# Patient Record
Sex: MALE | Race: WHITE | NOT HISPANIC OR LATINO
[De-identification: names, ages, dates, MRNs, and addresses within clinical notes are randomized per-mention and may not be internally consistent; named-entity substitution may affect disease eponyms.]

---

## 2019-08-19 ENCOUNTER — APPOINTMENT (OUTPATIENT)
Dept: RADIOLOGY | Facility: CLINIC | Age: 44
End: 2019-08-19
Payer: SELF-PAY

## 2019-08-19 PROBLEM — Z00.00 ENCOUNTER FOR PREVENTIVE HEALTH EXAMINATION: Status: ACTIVE | Noted: 2019-08-19

## 2019-08-19 PROCEDURE — 71046 X-RAY EXAM CHEST 2 VIEWS: CPT

## 2019-08-30 PROBLEM — Z00.00 ENCOUNTER FOR PREVENTIVE HEALTH EXAMINATION: Noted: 2019-08-30

## 2019-09-09 ENCOUNTER — APPOINTMENT (OUTPATIENT)
Dept: CARDIOLOGY | Facility: CLINIC | Age: 44
End: 2019-09-09
Payer: SELF-PAY

## 2019-09-09 ENCOUNTER — NON-APPOINTMENT (OUTPATIENT)
Age: 44
End: 2019-09-09

## 2019-09-09 VITALS
WEIGHT: 251 LBS | BODY MASS INDEX: 38.04 KG/M2 | HEART RATE: 75 BPM | DIASTOLIC BLOOD PRESSURE: 62 MMHG | SYSTOLIC BLOOD PRESSURE: 110 MMHG | HEIGHT: 68 IN | OXYGEN SATURATION: 95 %

## 2019-09-09 DIAGNOSIS — Z86.79 PERSONAL HISTORY OF OTHER DISEASES OF THE CIRCULATORY SYSTEM: ICD-10-CM

## 2019-09-09 DIAGNOSIS — Z78.9 OTHER SPECIFIED HEALTH STATUS: ICD-10-CM

## 2019-09-09 DIAGNOSIS — Z82.49 FAMILY HISTORY OF ISCHEMIC HEART DISEASE AND OTHER DISEASES OF THE CIRCULATORY SYSTEM: ICD-10-CM

## 2019-09-09 DIAGNOSIS — Z83.3 FAMILY HISTORY OF DIABETES MELLITUS: ICD-10-CM

## 2019-09-09 PROCEDURE — 93000 ELECTROCARDIOGRAM COMPLETE: CPT

## 2019-09-09 PROCEDURE — 99204 OFFICE O/P NEW MOD 45 MIN: CPT | Mod: 25

## 2019-09-09 NOTE — HISTORY OF PRESENT ILLNESS
[FreeTextEntry1] : This is a 44 year old male with no PMHx who was living in [place] up until April 2019. In March 2019, he went to hospital there with increasing SOB and swelling. He stated he had the flu. They told him he had heart failure (records not available at this time). Since that time he moved to [place]. He continues with leg swelling and shortness of breath. SOB is worse with exertion and better with rest. He has no palpitations, dizziness. He has no chest pain. He takes his medications with no adverse effects. Symptoms are stable and not getting any better or worse since they started in March.

## 2019-09-09 NOTE — REVIEW OF SYSTEMS
[Shortness Of Breath] : shortness of breath [Dyspnea on exertion] : dyspnea during exertion [Chest Pain] : no chest pain [Lower Ext Edema] : lower extremity edema [Palpitations] : no palpitations [Negative] : Heme/Lymph

## 2019-09-09 NOTE — DISCUSSION/SUMMARY
[FreeTextEntry1] : 1. Dyspnea: possible diagnoses of HF in [place]. I will attempt to obtain records. I will order an echocardiogram. I asked the patient to continue his Lisinopril and Lasix for now. He can follow up with me after testing and hospital records reviewed.

## 2019-09-09 NOTE — PHYSICAL EXAM
[General Appearance - Well Developed] : well developed [Normal Appearance] : normal appearance [Well Groomed] : well groomed [General Appearance - Well Nourished] : well nourished [Normal Conjunctiva] : the conjunctiva exhibited no abnormalities [General Appearance - In No Acute Distress] : no acute distress [No Oral Pallor] : no oral pallor [Eyelids - No Xanthelasma] : the eyelids demonstrated no xanthelasmas [No Oral Cyanosis] : no oral cyanosis [Heart Rate And Rhythm] : heart rate and rhythm were normal [Heart Sounds] : normal S1 and S2 [FreeTextEntry1] : bilateral pitting edema [Murmurs] : no murmurs present [Respiration, Rhythm And Depth] : normal respiratory rhythm and effort [Exaggerated Use Of Accessory Muscles For Inspiration] : no accessory muscle use [Auscultation Breath Sounds / Voice Sounds] : lungs were clear to auscultation bilaterally [Abnormal Walk] : normal gait [Gait - Sufficient For Exercise Testing] : the gait was sufficient for exercise testing [Nail Clubbing] : no clubbing of the fingernails [Cyanosis, Localized] : no localized cyanosis [Skin Turgor] : normal skin turgor [] : no ischemic changes [Impaired Insight] : insight and judgment were intact [Affect] : the affect was normal [Memory Recent] : recent memory was not impaired

## 2019-09-10 ENCOUNTER — APPOINTMENT (OUTPATIENT)
Dept: CARDIOLOGY | Facility: CLINIC | Age: 44
End: 2019-09-10
Payer: SELF-PAY

## 2019-09-10 PROCEDURE — 93306 TTE W/DOPPLER COMPLETE: CPT

## 2019-09-23 ENCOUNTER — APPOINTMENT (OUTPATIENT)
Dept: CARDIOLOGY | Facility: CLINIC | Age: 44
End: 2019-09-23
Payer: SELF-PAY

## 2019-09-23 ENCOUNTER — INPATIENT (INPATIENT)
Facility: HOSPITAL | Age: 44
LOS: 3 days | Discharge: ANOTHER HOSPITAL | End: 2019-09-27
Attending: INTERNAL MEDICINE
Payer: MEDICAID

## 2019-09-23 ENCOUNTER — OUTPATIENT (OUTPATIENT)
Dept: OUTPATIENT SERVICES | Facility: HOSPITAL | Age: 44
LOS: 1 days | End: 2019-09-23

## 2019-09-23 VITALS — DIASTOLIC BLOOD PRESSURE: 64 MMHG | SYSTOLIC BLOOD PRESSURE: 118 MMHG | HEART RATE: 82 BPM | OXYGEN SATURATION: 95 %

## 2019-09-23 PROCEDURE — 99215 OFFICE O/P EST HI 40 MIN: CPT

## 2019-09-23 PROCEDURE — 99284 EMERGENCY DEPT VISIT MOD MDM: CPT

## 2019-09-23 PROCEDURE — 71045 X-RAY EXAM CHEST 1 VIEW: CPT | Mod: 26

## 2019-09-23 PROCEDURE — 93308 TTE F-UP OR LMTD: CPT

## 2019-09-23 RX ORDER — LISINOPRIL 10 MG/1
10 TABLET ORAL DAILY
Qty: 14 | Refills: 0 | Status: DISCONTINUED | COMMUNITY
Start: 2019-09-09 | End: 2019-09-23

## 2019-09-23 NOTE — DISCUSSION/SUMMARY
[FreeTextEntry1] : 1. Large pericardial effusion: recurrent. s/p drainage on 9/16/2019 at [facility]. Removed 770 cc's of fluid. Symptomatically improved, however echocardiogram done in office today revealed large pericardial effusion with no evidence of tamponade physiology on echocardiogram. I asked patient to go to [facility] ED for further evaluation. A repeat pericardiocentesis can be performed. CT of the chest performed on 9/16/2019 revealed thickening of the parietal and visceral pericardium. I am recommending invasive hemodynamics be performed before and after the pericardiocentesis to determine if effusive-constrictive pericarditis is present. Patient should continue on Ibuprofen 600mg TID and I added colchicine 0.6mg BID. \par \par 2. Hypertension: continue Losartan-HCTZ 100/25mg and Coreg 6.25mg BID.

## 2019-09-23 NOTE — HISTORY OF PRESENT ILLNESS
[FreeTextEntry1] : Historical Perspective:\par This is a 44 year old male with no PMHx who was living in [place] up until April 2019. In March 2019, he went to hospital there with increasing SOB and swelling. He stated he had the flu. They told him he had heart failure (records not available at this time). Since that time he moved to [place]. He continues with leg swelling and shortness of breath. SOB is worse with exertion and better with rest. He has no palpitations, dizziness. He has no chest pain. He takes his medications with no adverse effects. Symptoms are stable and not getting any better or worse since they started in March.\par \par Current Health Status:\par Patient finally listened to our voicemail stating to go to hospital because he had a large pericardial effusion. He went to [facility] and underwent a pericardiocentesis on 9/16/2019. 770ccs of fluid was drained. He returns to the office feeling much better. SOB has resolved. He has no chest pain. Orthopnea has resolved as well.

## 2019-09-23 NOTE — REASON FOR VISIT
[Initial Evaluation] : an initial evaluation of [Dyspnea] : dyspnea [Spouse] : spouse [Source: ______] : History obtained from [unfilled]

## 2019-09-23 NOTE — PHYSICAL EXAM
[General Appearance - Well Developed] : well developed [Normal Appearance] : normal appearance [Well Groomed] : well groomed [General Appearance - Well Nourished] : well nourished [General Appearance - In No Acute Distress] : no acute distress [Normal Conjunctiva] : the conjunctiva exhibited no abnormalities [Eyelids - No Xanthelasma] : the eyelids demonstrated no xanthelasmas [No Oral Pallor] : no oral pallor [No Oral Cyanosis] : no oral cyanosis [Respiration, Rhythm And Depth] : normal respiratory rhythm and effort [Exaggerated Use Of Accessory Muscles For Inspiration] : no accessory muscle use [Auscultation Breath Sounds / Voice Sounds] : lungs were clear to auscultation bilaterally [Heart Rate And Rhythm] : heart rate and rhythm were normal [Heart Sounds] : normal S1 and S2 [Murmurs] : no murmurs present [FreeTextEntry1] : bilateral pitting edema [Abnormal Walk] : normal gait [Gait - Sufficient For Exercise Testing] : the gait was sufficient for exercise testing [Nail Clubbing] : no clubbing of the fingernails [Cyanosis, Localized] : no localized cyanosis [Skin Turgor] : normal skin turgor [] : no rash [Impaired Insight] : insight and judgment were intact [Affect] : the affect was normal [Memory Recent] : recent memory was not impaired

## 2019-09-24 ENCOUNTER — OUTPATIENT (OUTPATIENT)
Dept: OUTPATIENT SERVICES | Facility: HOSPITAL | Age: 44
LOS: 1 days | End: 2019-09-24

## 2019-09-24 PROCEDURE — 93010 ELECTROCARDIOGRAM REPORT: CPT

## 2019-09-24 PROCEDURE — 33015: CPT

## 2019-09-24 PROCEDURE — 99254 IP/OBS CNSLTJ NEW/EST MOD 60: CPT | Mod: 25

## 2019-09-24 PROCEDURE — 93308 TTE F-UP OR LMTD: CPT | Mod: 26

## 2019-09-24 PROCEDURE — 93453 R&L HRT CATH W/VENTRICLGRPHY: CPT | Mod: 26

## 2019-09-24 PROCEDURE — 75989 ABSCESS DRAINAGE UNDER X-RAY: CPT | Mod: 26

## 2019-09-26 PROCEDURE — 99024 POSTOP FOLLOW-UP VISIT: CPT

## 2019-09-26 PROCEDURE — 93308 TTE F-UP OR LMTD: CPT | Mod: 26

## 2019-09-27 ENCOUNTER — INPATIENT (INPATIENT)
Facility: HOSPITAL | Age: 44
LOS: 10 days | Discharge: HOME | DRG: 271 | End: 2019-10-08
Attending: THORACIC SURGERY (CARDIOTHORACIC VASCULAR SURGERY) | Admitting: THORACIC SURGERY (CARDIOTHORACIC VASCULAR SURGERY)
Payer: MEDICAID

## 2019-09-27 VITALS
SYSTOLIC BLOOD PRESSURE: 118 MMHG | HEART RATE: 84 BPM | RESPIRATION RATE: 22 BRPM | DIASTOLIC BLOOD PRESSURE: 73 MMHG | OXYGEN SATURATION: 97 %

## 2019-09-27 DIAGNOSIS — I31.3 PERICARDIAL EFFUSION (NONINFLAMMATORY): ICD-10-CM

## 2019-09-27 LAB
ALBUMIN SERPL ELPH-MCNC: 3 G/DL — LOW (ref 3.3–5.2)
ALP SERPL-CCNC: 96 U/L — SIGNIFICANT CHANGE UP (ref 40–120)
ALT FLD-CCNC: 14 U/L — SIGNIFICANT CHANGE UP
ANION GAP SERPL CALC-SCNC: 12 MMOL/L — SIGNIFICANT CHANGE UP (ref 5–17)
APPEARANCE UR: CLEAR — SIGNIFICANT CHANGE UP
APTT BLD: 31.7 SEC — SIGNIFICANT CHANGE UP (ref 27.5–36.3)
AST SERPL-CCNC: 17 U/L — SIGNIFICANT CHANGE UP
BILIRUB SERPL-MCNC: 0.3 MG/DL — LOW (ref 0.4–2)
BILIRUB UR-MCNC: NEGATIVE — SIGNIFICANT CHANGE UP
BLD GP AB SCN SERPL QL: SIGNIFICANT CHANGE UP
BUN SERPL-MCNC: 11 MG/DL — SIGNIFICANT CHANGE UP (ref 8–20)
CALCIUM SERPL-MCNC: 8.7 MG/DL — SIGNIFICANT CHANGE UP (ref 8.6–10.2)
CHLORIDE SERPL-SCNC: 100 MMOL/L — SIGNIFICANT CHANGE UP (ref 98–107)
CO2 SERPL-SCNC: 26 MMOL/L — SIGNIFICANT CHANGE UP (ref 22–29)
COLOR SPEC: YELLOW — SIGNIFICANT CHANGE UP
CREAT SERPL-MCNC: 0.52 MG/DL — SIGNIFICANT CHANGE UP (ref 0.5–1.3)
DIFF PNL FLD: NEGATIVE — SIGNIFICANT CHANGE UP
GLUCOSE BLDC GLUCOMTR-MCNC: 107 MG/DL — HIGH (ref 70–99)
GLUCOSE SERPL-MCNC: 112 MG/DL — SIGNIFICANT CHANGE UP (ref 70–115)
GLUCOSE UR QL: NEGATIVE MG/DL — SIGNIFICANT CHANGE UP
HBA1C BLD-MCNC: 6.9 % — HIGH (ref 4–5.6)
HCT VFR BLD CALC: 36.1 % — LOW (ref 39–50)
HGB BLD-MCNC: 11.3 G/DL — LOW (ref 13–17)
INR BLD: 1.29 RATIO — HIGH (ref 0.88–1.16)
KETONES UR-MCNC: NEGATIVE — SIGNIFICANT CHANGE UP
LEUKOCYTE ESTERASE UR-ACNC: NEGATIVE — SIGNIFICANT CHANGE UP
MAGNESIUM SERPL-MCNC: 1.9 MG/DL — SIGNIFICANT CHANGE UP (ref 1.6–2.6)
MCHC RBC-ENTMCNC: 25.5 PG — LOW (ref 27–34)
MCHC RBC-ENTMCNC: 31.3 GM/DL — LOW (ref 32–36)
MCV RBC AUTO: 81.3 FL — SIGNIFICANT CHANGE UP (ref 80–100)
MRSA PCR RESULT.: SIGNIFICANT CHANGE UP
NITRITE UR-MCNC: NEGATIVE — SIGNIFICANT CHANGE UP
NT-PROBNP SERPL-SCNC: 1765 PG/ML — HIGH (ref 0–300)
PH UR: 6 — SIGNIFICANT CHANGE UP (ref 5–8)
PLATELET # BLD AUTO: 474 K/UL — HIGH (ref 150–400)
POTASSIUM SERPL-MCNC: 3.6 MMOL/L — SIGNIFICANT CHANGE UP (ref 3.5–5.3)
POTASSIUM SERPL-SCNC: 3.6 MMOL/L — SIGNIFICANT CHANGE UP (ref 3.5–5.3)
PROT SERPL-MCNC: 6.4 G/DL — LOW (ref 6.6–8.7)
PROT UR-MCNC: NEGATIVE MG/DL — SIGNIFICANT CHANGE UP
PROTHROM AB SERPL-ACNC: 15 SEC — HIGH (ref 10–12.9)
RAPID RVP RESULT: SIGNIFICANT CHANGE UP
RBC # BLD: 4.44 M/UL — SIGNIFICANT CHANGE UP (ref 4.2–5.8)
RBC # FLD: 15 % — HIGH (ref 10.3–14.5)
S AUREUS DNA NOSE QL NAA+PROBE: DETECTED
SODIUM SERPL-SCNC: 138 MMOL/L — SIGNIFICANT CHANGE UP (ref 135–145)
SP GR SPEC: 1 — LOW (ref 1.01–1.02)
TSH SERPL-MCNC: 4.53 UIU/ML — HIGH (ref 0.27–4.2)
UROBILINOGEN FLD QL: NEGATIVE MG/DL — SIGNIFICANT CHANGE UP
WBC # BLD: 13.34 K/UL — HIGH (ref 3.8–10.5)
WBC # FLD AUTO: 13.34 K/UL — HIGH (ref 3.8–10.5)

## 2019-09-27 PROCEDURE — 99232 SBSQ HOSP IP/OBS MODERATE 35: CPT

## 2019-09-27 PROCEDURE — 71045 X-RAY EXAM CHEST 1 VIEW: CPT | Mod: 26

## 2019-09-27 PROCEDURE — 93308 TTE F-UP OR LMTD: CPT | Mod: 26

## 2019-09-27 PROCEDURE — 33025 INCISION OF HEART SAC: CPT | Mod: AS

## 2019-09-27 PROCEDURE — 93010 ELECTROCARDIOGRAM REPORT: CPT

## 2019-09-27 RX ORDER — VANCOMYCIN HCL 1 G
1500 VIAL (EA) INTRAVENOUS ONCE
Refills: 0 | Status: DISCONTINUED | OUTPATIENT
Start: 2019-09-28 | End: 2019-09-29

## 2019-09-27 RX ORDER — CHLORHEXIDINE GLUCONATE 213 G/1000ML
15 SOLUTION TOPICAL
Refills: 0 | Status: DISCONTINUED | OUTPATIENT
Start: 2019-09-27 | End: 2019-09-30

## 2019-09-27 RX ORDER — SODIUM CHLORIDE 9 MG/ML
3 INJECTION INTRAMUSCULAR; INTRAVENOUS; SUBCUTANEOUS EVERY 8 HOURS
Refills: 0 | Status: DISCONTINUED | OUTPATIENT
Start: 2019-09-27 | End: 2019-10-08

## 2019-09-27 RX ORDER — POTASSIUM CHLORIDE 20 MEQ
40 PACKET (EA) ORAL ONCE
Refills: 0 | Status: COMPLETED | OUTPATIENT
Start: 2019-09-27 | End: 2019-09-27

## 2019-09-27 RX ORDER — INSULIN LISPRO 100/ML
VIAL (ML) SUBCUTANEOUS
Refills: 0 | Status: DISCONTINUED | OUTPATIENT
Start: 2019-09-27 | End: 2019-10-08

## 2019-09-27 RX ORDER — MUPIROCIN 20 MG/G
1 OINTMENT TOPICAL
Refills: 0 | Status: COMPLETED | OUTPATIENT
Start: 2019-09-27 | End: 2019-10-02

## 2019-09-27 RX ORDER — CEFUROXIME AXETIL 250 MG
1500 TABLET ORAL ONCE
Refills: 0 | Status: COMPLETED | OUTPATIENT
Start: 2019-09-28 | End: 2019-09-28

## 2019-09-27 RX ORDER — CHLORHEXIDINE GLUCONATE 213 G/1000ML
1 SOLUTION TOPICAL EVERY 12 HOURS
Refills: 0 | Status: COMPLETED | OUTPATIENT
Start: 2019-09-27 | End: 2019-09-28

## 2019-09-27 RX ORDER — MAGNESIUM SULFATE 500 MG/ML
1 VIAL (ML) INJECTION ONCE
Refills: 0 | Status: COMPLETED | OUTPATIENT
Start: 2019-09-27 | End: 2019-09-27

## 2019-09-27 RX ORDER — METFORMIN HYDROCHLORIDE 850 MG/1
1 TABLET ORAL
Qty: 0 | Refills: 0 | DISCHARGE

## 2019-09-27 RX ADMIN — CHLORHEXIDINE GLUCONATE 1 APPLICATION(S): 213 SOLUTION TOPICAL at 17:55

## 2019-09-27 RX ADMIN — CHLORHEXIDINE GLUCONATE 15 MILLILITER(S): 213 SOLUTION TOPICAL at 17:31

## 2019-09-27 RX ADMIN — MUPIROCIN 1 APPLICATION(S): 20 OINTMENT TOPICAL at 17:31

## 2019-09-27 RX ADMIN — CHLORHEXIDINE GLUCONATE 1 APPLICATION(S): 213 SOLUTION TOPICAL at 19:42

## 2019-09-27 RX ADMIN — Medication 40 MILLIEQUIVALENT(S): at 19:04

## 2019-09-27 NOTE — H&P ADULT - HISTORY OF PRESENT ILLNESS
Patient is a 44 year old male with history of IDDM, HTN, and anemia.  In march while he was in [place] he stated he wasn't feeling well and there was prescribed blood pressure medication.  He was told to follow up when he got home but he was feeling well and he never did.  He started to see swelling in his legs and started to feel more short of breath.  He followed up with cardiologist and had an echo.  His cardiologist was trying to reach him, he finally got a message days later that he had fluid around the heart and needed to be drained.  He was admitted to [facility] 15 days ago, he had a pericardial drainage for 700cc's serous drainage. He was started on medications for pericarditis and was discharged after 4 days. After follow up with cardiologist, he was found to have another accumulation of pericardial effusion.  He was sent to [facility]. On 9/24/19 he underwent a Right heart cath and had another pericardialcentesis for 700 cc's.  The drain was D/C'd on  9/26/19, repeat echo showed posterior moderate effusion.  He was then transported to [place] for evaluation for pericardial window.  He denied and recent viral syndrome, denies, cough, dizziness, SOB.

## 2019-09-27 NOTE — H&P ADULT - ASSESSMENT
44 year old male examined at the bedside alongside with DR. Peters and the interpretive services.  He is hemodynamically stable with moderate pericardial effusion.  Plan is to monitor in ICU tonight, npo after midnight, and will go to the OR in the AM for drainage of pericardial effusion.  SCD's for DVT prophylaxis.  Patient is diabetic, glucose will be monitored and he will be covered with a correction scale. Will continue hypertension medications after drainage tomorrow as tolerated.  Will continue pericarditis medication post op.

## 2019-09-27 NOTE — H&P ADULT - NSHPPHYSICALEXAM_GEN_ALL_CORE
HR 65 NSR  /87  Sp02 100 % on RA    Obese well nourished  Alert and oriented   Chest CTA distant heart sounds  chest tube site healing well  Abdomen soft NT  Groins soft non tender  Entremity B/L lower 2++ edema

## 2019-09-27 NOTE — PATIENT PROFILE ADULT - NSPROEDALEARNPREFOTH_GEN_A_NUR
individual instruction/audio/skill demonstration/written material/group instruction/verbal instruction

## 2019-09-27 NOTE — PATIENT PROFILE ADULT - NSPROEDALEARNPREF_GEN_A_NUR
group instruction/audio/verbal instruction/written material/skill demonstration/individual instruction

## 2019-09-28 DIAGNOSIS — R09.89 OTHER SPECIFIED SYMPTOMS AND SIGNS INVOLVING THE CIRCULATORY AND RESPIRATORY SYSTEMS: ICD-10-CM

## 2019-09-28 DIAGNOSIS — E11.9 TYPE 2 DIABETES MELLITUS WITHOUT COMPLICATIONS: ICD-10-CM

## 2019-09-28 DIAGNOSIS — Z29.9 ENCOUNTER FOR PROPHYLACTIC MEASURES, UNSPECIFIED: ICD-10-CM

## 2019-09-28 DIAGNOSIS — I10 ESSENTIAL (PRIMARY) HYPERTENSION: ICD-10-CM

## 2019-09-28 DIAGNOSIS — E03.9 HYPOTHYROIDISM, UNSPECIFIED: ICD-10-CM

## 2019-09-28 DIAGNOSIS — I31.3 PERICARDIAL EFFUSION (NONINFLAMMATORY): ICD-10-CM

## 2019-09-28 LAB
ABO RH CONFIRMATION: SIGNIFICANT CHANGE UP
ALBUMIN SERPL ELPH-MCNC: 3.2 G/DL — LOW (ref 3.3–5.2)
ALP SERPL-CCNC: 101 U/L — SIGNIFICANT CHANGE UP (ref 40–120)
ALT FLD-CCNC: 13 U/L — SIGNIFICANT CHANGE UP
ANION GAP SERPL CALC-SCNC: 16 MMOL/L — SIGNIFICANT CHANGE UP (ref 5–17)
AST SERPL-CCNC: 13 U/L — SIGNIFICANT CHANGE UP
B BURGDOR C6 AB SER-ACNC: POSITIVE
B BURGDOR IGG+IGM SER-ACNC: 1.14 INDEX — HIGH (ref 0.01–0.89)
BILIRUB SERPL-MCNC: 0.6 MG/DL — SIGNIFICANT CHANGE UP (ref 0.4–2)
BUN SERPL-MCNC: 9 MG/DL — SIGNIFICANT CHANGE UP (ref 8–20)
CALCIUM SERPL-MCNC: 9.1 MG/DL — SIGNIFICANT CHANGE UP (ref 8.6–10.2)
CHLORIDE SERPL-SCNC: 101 MMOL/L — SIGNIFICANT CHANGE UP (ref 98–107)
CHOLEST SERPL-MCNC: 126 MG/DL — SIGNIFICANT CHANGE UP (ref 110–199)
CO2 SERPL-SCNC: 25 MMOL/L — SIGNIFICANT CHANGE UP (ref 22–29)
CREAT SERPL-MCNC: 0.67 MG/DL — SIGNIFICANT CHANGE UP (ref 0.5–1.3)
CRP SERPL-MCNC: 8.78 MG/DL — HIGH (ref 0–0.4)
ERYTHROCYTE [SEDIMENTATION RATE] IN BLOOD: 46 MM/HR — HIGH (ref 0–20)
GLUCOSE BLDC GLUCOMTR-MCNC: 105 MG/DL — HIGH (ref 70–99)
GLUCOSE BLDC GLUCOMTR-MCNC: 116 MG/DL — HIGH (ref 70–99)
GLUCOSE BLDC GLUCOMTR-MCNC: 132 MG/DL — HIGH (ref 70–99)
GLUCOSE BLDC GLUCOMTR-MCNC: 164 MG/DL — HIGH (ref 70–99)
GLUCOSE BLDC GLUCOMTR-MCNC: 192 MG/DL — HIGH (ref 70–99)
GLUCOSE BLDC GLUCOMTR-MCNC: 97 MG/DL — SIGNIFICANT CHANGE UP (ref 70–99)
GLUCOSE SERPL-MCNC: 146 MG/DL — HIGH (ref 70–115)
HBA1C BLD-MCNC: 7.1 % — HIGH (ref 4–5.6)
HCT VFR BLD CALC: 37 % — LOW (ref 39–50)
HDLC SERPL-MCNC: 46 MG/DL — SIGNIFICANT CHANGE UP
HGB BLD-MCNC: 11.1 G/DL — LOW (ref 13–17)
LDH SERPL L TO P-CCNC: 183 U/L — SIGNIFICANT CHANGE UP (ref 98–192)
LIPID PNL WITH DIRECT LDL SERPL: 62 MG/DL — SIGNIFICANT CHANGE UP
MAGNESIUM SERPL-MCNC: 1.9 MG/DL — SIGNIFICANT CHANGE UP (ref 1.6–2.6)
MCHC RBC-ENTMCNC: 25.1 PG — LOW (ref 27–34)
MCHC RBC-ENTMCNC: 30 GM/DL — LOW (ref 32–36)
MCV RBC AUTO: 83.5 FL — SIGNIFICANT CHANGE UP (ref 80–100)
NT-PROBNP SERPL-SCNC: 1988 PG/ML — HIGH (ref 0–300)
PLATELET # BLD AUTO: 499 K/UL — HIGH (ref 150–400)
POTASSIUM SERPL-MCNC: 3.8 MMOL/L — SIGNIFICANT CHANGE UP (ref 3.5–5.3)
POTASSIUM SERPL-MCNC: 4 MMOL/L — SIGNIFICANT CHANGE UP (ref 3.5–5.3)
POTASSIUM SERPL-SCNC: 3.8 MMOL/L — SIGNIFICANT CHANGE UP (ref 3.5–5.3)
POTASSIUM SERPL-SCNC: 4 MMOL/L — SIGNIFICANT CHANGE UP (ref 3.5–5.3)
PREALB SERPL-MCNC: 12 MG/DL — LOW (ref 18–38)
PROT SERPL-MCNC: 7.3 G/DL — SIGNIFICANT CHANGE UP (ref 6.6–8.7)
RBC # BLD: 4.43 M/UL — SIGNIFICANT CHANGE UP (ref 4.2–5.8)
RBC # FLD: 15.2 % — HIGH (ref 10.3–14.5)
SODIUM SERPL-SCNC: 142 MMOL/L — SIGNIFICANT CHANGE UP (ref 135–145)
TOTAL CHOLESTEROL/HDL RATIO MEASUREMENT: 3 RATIO — LOW (ref 3.4–9.6)
TRIGL SERPL-MCNC: 89 MG/DL — SIGNIFICANT CHANGE UP (ref 10–200)
WBC # BLD: 14.65 K/UL — HIGH (ref 3.8–10.5)
WBC # FLD AUTO: 14.65 K/UL — HIGH (ref 3.8–10.5)

## 2019-09-28 PROCEDURE — 71045 X-RAY EXAM CHEST 1 VIEW: CPT | Mod: 26

## 2019-09-28 PROCEDURE — 99232 SBSQ HOSP IP/OBS MODERATE 35: CPT

## 2019-09-28 RX ORDER — PANTOPRAZOLE SODIUM 20 MG/1
40 TABLET, DELAYED RELEASE ORAL DAILY
Refills: 0 | Status: DISCONTINUED | OUTPATIENT
Start: 2019-09-28 | End: 2019-09-30

## 2019-09-28 RX ORDER — POTASSIUM CHLORIDE 20 MEQ
40 PACKET (EA) ORAL ONCE
Refills: 0 | Status: COMPLETED | OUTPATIENT
Start: 2019-09-28 | End: 2019-09-28

## 2019-09-28 RX ORDER — FUROSEMIDE 40 MG
40 TABLET ORAL ONCE
Refills: 0 | Status: COMPLETED | OUTPATIENT
Start: 2019-09-28 | End: 2019-09-28

## 2019-09-28 RX ADMIN — Medication 40 MILLIGRAM(S): at 07:46

## 2019-09-28 RX ADMIN — Medication 40 MILLIEQUIVALENT(S): at 11:49

## 2019-09-28 RX ADMIN — SODIUM CHLORIDE 3 MILLILITER(S): 9 INJECTION INTRAMUSCULAR; INTRAVENOUS; SUBCUTANEOUS at 21:40

## 2019-09-28 RX ADMIN — PANTOPRAZOLE SODIUM 40 MILLIGRAM(S): 20 TABLET, DELAYED RELEASE ORAL at 11:49

## 2019-09-28 RX ADMIN — CHLORHEXIDINE GLUCONATE 15 MILLILITER(S): 213 SOLUTION TOPICAL at 17:39

## 2019-09-28 RX ADMIN — CHLORHEXIDINE GLUCONATE 1 APPLICATION(S): 213 SOLUTION TOPICAL at 06:25

## 2019-09-28 RX ADMIN — MUPIROCIN 1 APPLICATION(S): 20 OINTMENT TOPICAL at 06:25

## 2019-09-28 RX ADMIN — Medication 40 MILLIEQUIVALENT(S): at 19:12

## 2019-09-28 RX ADMIN — Medication 2: at 07:47

## 2019-09-28 RX ADMIN — MUPIROCIN 1 APPLICATION(S): 20 OINTMENT TOPICAL at 17:39

## 2019-09-28 RX ADMIN — SODIUM CHLORIDE 3 MILLILITER(S): 9 INJECTION INTRAMUSCULAR; INTRAVENOUS; SUBCUTANEOUS at 06:28

## 2019-09-28 RX ADMIN — SODIUM CHLORIDE 3 MILLILITER(S): 9 INJECTION INTRAMUSCULAR; INTRAVENOUS; SUBCUTANEOUS at 13:47

## 2019-09-28 RX ADMIN — CHLORHEXIDINE GLUCONATE 1 APPLICATION(S): 213 SOLUTION TOPICAL at 06:27

## 2019-09-28 RX ADMIN — CHLORHEXIDINE GLUCONATE 15 MILLILITER(S): 213 SOLUTION TOPICAL at 06:25

## 2019-09-28 NOTE — PROGRESS NOTE ADULT - ASSESSMENT
44 year old Male with PMH of  cardiomegaly, DM II, HTN, and recurrent pericardial effusions with pericardiocentesis x2 (serous drainage), most recently at [facility] where a residual posterior moderate pericardial effusion was seen on TTE after the drain was removed on 9/26/19. Patient was then transferred to [facility] on 9/27 for further management. At this time, patient is awaiting OR date for pericardial window. He remains hemodynamically stable despite IV diuresis with lasix without clinical evidence of cardiac tamponade. 44 year old Male with PMH of  cardiomegaly, DM II, HTN, and recurrent pericardial effusions with pericardiocentesis x2 (serous drainage), most recently at [facility] where a residual posterior moderate pericardial effusion was seen on TTE after the drain was removed on 9/26/19. Patient was then transferred to [facility] on 9/27 for further management. At this time, patient will likely go to the OR for a pericardial window on 9/30/19. He remains hemodynamically stable despite IV diuresis with lasix without clinical evidence of cardiac tamponade.

## 2019-09-28 NOTE — PROGRESS NOTE ADULT - PROBLEM SELECTOR PLAN 5
continue GI ppx with Protonix IVP  continue dvt ppx with SCD boots, avoid chemical DVT ppx at this time given pericardial effusion

## 2019-09-28 NOTE — PROGRESS NOTE ADULT - PROBLEM SELECTOR PLAN 1
Awaiting OR date  Continue to monitor for signs of cardiac tamponade Awaiting OR date  Continue to monitor for signs of cardiac tamponade  Will need to send fluid for cytology   Will likely need pericardial biopsy Pericardial window 9/30/19  Continue to monitor for signs of cardiac tamponade  Will need to send fluid for cytology   Will likely need pericardial biopsy

## 2019-09-28 NOTE — PROGRESS NOTE ADULT - SUBJECTIVE AND OBJECTIVE BOX
Brief summary:  44 year old Male with PMH of  cardiomegaly, DM II, HTN, and recurrent pericardial effusions with pericardiocentesis x2 (serous drainage), most recently at [facility] where a residual posterior moderate pericardial effusion was seen on TTE after the drain was removed on 9/26/19. Patient was then transferred to [facility] on 9/27 for further management.     Overnight events:  Patient denies acute pain with radiating or aggravating factors.  He denies chest pain, shortness of breath, palpitations, headache, dizziness, nausea, or vomiting.     PAST MEDICAL & SURGICAL HISTORY:  Heart failure   Cardiomegaly  Nonsmoker  Diabetes mellitus  Hypertension    Medications:  chlorhexidine 0.12% Liquid 15 milliLiter(s) Swish and Spit two times a day  insulin lispro (HumaLOG) corrective regimen sliding scale   SubCutaneous Before meals and at bedtime  mupirocin 2% Ointment 1 Application(s) Both Nostrils two times a day  pantoprazole  Injectable 40 milliGRAM(s) IV Push daily  sodium chloride 0.9% lock flush 3 milliLiter(s) IV Push every 8 hours  vancomycin  IVPB 1500 milliGRAM(s) IV Intermittent once      Height (cm): 172 (09-27 @ 15:05)  Weight (kg): 111 (09-27 @ 15:05)  BMI (kg/m2): 37.5 (09-27 @ 15:05)  BSA (m2): 2.22 (09-27 @ 15:05)                        11.1   14.65 )-----------( 499      ( 28 Sep 2019 04:09 )             37.0   09-28    142  |  101  |  9.0  ----------------------------<  146<H>  4.0   |  25.0  |  0.67    Ca    9.1      28 Sep 2019 04:09  Mg     1.9     09-28    TPro  7.3  /  Alb  3.2<L>  /  TBili  0.6  /  DBili  x   /  AST  13  /  ALT  13  /  AlkPhos  101  09-28      PT/INR - ( 27 Sep 2019 17:24 )   PT: 15.0 sec;   INR: 1.29 ratio   PTT - ( 27 Sep 2019 17:24 )  PTT:31.7 sec    Objective:  T(C): 37.1 (09-28-19 @ 11:00), Max: 37.1 (09-27-19 @ 20:00)  HR: 92 (09-28-19 @ 10:00) (84 - 101)  BP: 113/57 (09-28-19 @ 10:00) (105/76 - 151/86)  RR: 17 (09-28-19 @ 10:00) (11 - 30)  SpO2: 96% (09-28-19 @ 10:00) (96% - 98%)    CAPILLARY BLOOD GLUCOSE  POCT Blood Glucose.: 97 mg/dL (28 Sep 2019 11:45)  POCT Blood Glucose.: 164 mg/dL (28 Sep 2019 07:36)  POCT Blood Glucose.: 192 mg/dL (28 Sep 2019 06:30)  POCT Blood Glucose.: 132 mg/dL (28 Sep 2019 00:28)  POCT Blood Glucose.: 107 mg/dL (27 Sep 2019 17:15)    I&O's Summary    27 Sep 2019 07:01  -  28 Sep 2019 07:00  --------------------------------------------------------  IN: 240 mL / OUT: 1800 mL / NET: -1560 mL    28 Sep 2019 07:01  -  28 Sep 2019 13:27  --------------------------------------------------------  IN: 0 mL / OUT: 2350 mL / NET: -2350 mL    Physical Exam  Neuro: A+O x 3, non-focal, speech clear and intact  Pulm: CTA, equal bilaterally  CV: RRR, no murmurs, no JVD, +S1S2  Abd: soft, NT, ND, +BS  Ext: +DP Pulses b/l, +PT pulses, no edema    Imaging:  CXR:  < from: Xray Chest 1 View- PORTABLE-Routine (09.28.19 @ 06:09) >  INTERPRETATION:  Chest radiograph (one view)     CPT 45311    CLINICAL INFORMATION:  Patient is unable to communicate.  Short of   breath.     TECHNIQUE:  Single frontal view of the chest was obtained.    FINDINGS:  Prior study dated 9/27/2019 was available for review.    The lungs demonstrate increased pulmonary vascular congestion. No gross   consolidation is seen. No pleural effusion is seen. The heart is enlarged.      IMPRESSION: Cardiomegaly. Increased pulmonary vascular congestion noted.   No gross consolidation is seen.      < end of copied text >      TTE:  < from: TTE Echo Complete w/Doppler (09.27.19 @ 15:23) >  PHYSICIAN INTERPRETATION:  Left Ventricle: The left ventricle was not well visualized. The left   ventricular internal cavity size is normal.  Global LV systolic function was normal. Left ventricular ejection   fraction, by visual estimation, is 55 to 60%. Though limited in views,   the LV appears normal in size and systolic function. LV is seen best in   PLAX and PSAX views. Recommend Definity if clinically indictated.  Right Ventricle: The right ventricular size is normal. RV systolic   function is normal.  Left Atrium: Mild to moderately enlarged left atrium.  Pericardium: A moderately sized pericardial effusion is present. The   pericardial effusion is globally located around the entire heart. A   small-moderate sized pericardial effusion is present without Rv collapse.  Mitral Valve: Structurally normal mitral valve, with normal leaflet   excursion. Trace mitral valve regurgitation is seen.  Tricuspid Valve: The tricuspid valve is not well seen. Trivial tricuspid   regurgitation is visualized. Adequate TR velocity was not obtained to   accurately assess RVSP.  Aortic Valve: The aortic valve was not wellvisualized. Sclerotic aortic   valve with normal opening. No evidence of aortic valve regurgitation is   seen.  Pulmonic Valve: The pulmonic valve was not well visualized.  Aorta: The aortic root is normal in size and structure.  Pulmonary Artery: The pulmonary artery is of normal size and origin.  Venous: The pulmonary veins were not well visualized. The inferior vena   cava was dilated, with respiratory size variation less than 50%.       Summary:   1. Left ventricular ejection fraction, by visual estimation, is 55 to   60%.   2. Normal global left ventricular systolic function.   3. Though limited in views, the LV appears normal in size and systolic   function. LV is seen best in PLAX and PSAX views. Recommend Definity if   clinically indictated.   4. A small-moderate sized pericardial effusion is present without Rv   collapse.   5. Sclerotic aortic valve with normal opening.    < end of copied text >

## 2019-09-29 ENCOUNTER — TRANSCRIPTION ENCOUNTER (OUTPATIENT)
Age: 44
End: 2019-09-29

## 2019-09-29 LAB
ANION GAP SERPL CALC-SCNC: 13 MMOL/L — SIGNIFICANT CHANGE UP (ref 5–17)
BUN SERPL-MCNC: 9 MG/DL — SIGNIFICANT CHANGE UP (ref 8–20)
CALCIUM SERPL-MCNC: 8.7 MG/DL — SIGNIFICANT CHANGE UP (ref 8.6–10.2)
CHLORIDE SERPL-SCNC: 98 MMOL/L — SIGNIFICANT CHANGE UP (ref 98–107)
CO2 SERPL-SCNC: 26 MMOL/L — SIGNIFICANT CHANGE UP (ref 22–29)
CREAT SERPL-MCNC: 0.7 MG/DL — SIGNIFICANT CHANGE UP (ref 0.5–1.3)
GLUCOSE BLDC GLUCOMTR-MCNC: 119 MG/DL — HIGH (ref 70–99)
GLUCOSE BLDC GLUCOMTR-MCNC: 126 MG/DL — HIGH (ref 70–99)
GLUCOSE BLDC GLUCOMTR-MCNC: 131 MG/DL — HIGH (ref 70–99)
GLUCOSE BLDC GLUCOMTR-MCNC: 179 MG/DL — HIGH (ref 70–99)
GLUCOSE SERPL-MCNC: 141 MG/DL — HIGH (ref 70–115)
HCT VFR BLD CALC: 37 % — LOW (ref 39–50)
HGB BLD-MCNC: 11.2 G/DL — LOW (ref 13–17)
MAGNESIUM SERPL-MCNC: 1.9 MG/DL — SIGNIFICANT CHANGE UP (ref 1.6–2.6)
MCHC RBC-ENTMCNC: 25.3 PG — LOW (ref 27–34)
MCHC RBC-ENTMCNC: 30.3 GM/DL — LOW (ref 32–36)
MCV RBC AUTO: 83.7 FL — SIGNIFICANT CHANGE UP (ref 80–100)
PLATELET # BLD AUTO: 506 K/UL — HIGH (ref 150–400)
POTASSIUM SERPL-MCNC: 5.1 MMOL/L — SIGNIFICANT CHANGE UP (ref 3.5–5.3)
POTASSIUM SERPL-SCNC: 5.1 MMOL/L — SIGNIFICANT CHANGE UP (ref 3.5–5.3)
RAPID RVP RESULT: SIGNIFICANT CHANGE UP
RBC # BLD: 4.42 M/UL — SIGNIFICANT CHANGE UP (ref 4.2–5.8)
RBC # FLD: 15.4 % — HIGH (ref 10.3–14.5)
SODIUM SERPL-SCNC: 137 MMOL/L — SIGNIFICANT CHANGE UP (ref 135–145)
T3 SERPL-MCNC: 104 NG/DL — SIGNIFICANT CHANGE UP (ref 80–200)
T4 AB SER-ACNC: 7 UG/DL — SIGNIFICANT CHANGE UP (ref 4.5–12)
WBC # BLD: 15.49 K/UL — HIGH (ref 3.8–10.5)
WBC # FLD AUTO: 15.49 K/UL — HIGH (ref 3.8–10.5)

## 2019-09-29 PROCEDURE — 71045 X-RAY EXAM CHEST 1 VIEW: CPT | Mod: 26

## 2019-09-29 PROCEDURE — 99222 1ST HOSP IP/OBS MODERATE 55: CPT

## 2019-09-29 PROCEDURE — 99232 SBSQ HOSP IP/OBS MODERATE 35: CPT | Mod: 57

## 2019-09-29 RX ORDER — MAGNESIUM SULFATE 500 MG/ML
2 VIAL (ML) INJECTION ONCE
Refills: 0 | Status: COMPLETED | OUTPATIENT
Start: 2019-09-29 | End: 2019-09-29

## 2019-09-29 RX ORDER — MUPIROCIN 20 MG/G
1 OINTMENT TOPICAL
Refills: 0 | Status: DISCONTINUED | OUTPATIENT
Start: 2019-09-29 | End: 2019-09-29

## 2019-09-29 RX ORDER — CEFTRIAXONE 500 MG/1
1000 INJECTION, POWDER, FOR SOLUTION INTRAMUSCULAR; INTRAVENOUS EVERY 24 HOURS
Refills: 0 | Status: DISCONTINUED | OUTPATIENT
Start: 2019-09-29 | End: 2019-09-30

## 2019-09-29 RX ORDER — VANCOMYCIN HCL 1 G
1500 VIAL (EA) INTRAVENOUS ONCE
Refills: 0 | Status: COMPLETED | OUTPATIENT
Start: 2019-09-30 | End: 2019-09-30

## 2019-09-29 RX ORDER — IBUPROFEN 200 MG
600 TABLET ORAL EVERY 6 HOURS
Refills: 0 | Status: DISCONTINUED | OUTPATIENT
Start: 2019-09-29 | End: 2019-09-30

## 2019-09-29 RX ORDER — POTASSIUM CHLORIDE 20 MEQ
40 PACKET (EA) ORAL ONCE
Refills: 0 | Status: COMPLETED | OUTPATIENT
Start: 2019-09-29 | End: 2019-09-29

## 2019-09-29 RX ORDER — CARVEDILOL PHOSPHATE 80 MG/1
3.12 CAPSULE, EXTENDED RELEASE ORAL EVERY 12 HOURS
Refills: 0 | Status: DISCONTINUED | OUTPATIENT
Start: 2019-09-29 | End: 2019-10-01

## 2019-09-29 RX ORDER — COLCHICINE 0.6 MG
0.6 TABLET ORAL
Refills: 0 | Status: DISCONTINUED | OUTPATIENT
Start: 2019-09-29 | End: 2019-10-08

## 2019-09-29 RX ADMIN — CHLORHEXIDINE GLUCONATE 15 MILLILITER(S): 213 SOLUTION TOPICAL at 06:53

## 2019-09-29 RX ADMIN — CHLORHEXIDINE GLUCONATE 15 MILLILITER(S): 213 SOLUTION TOPICAL at 17:11

## 2019-09-29 RX ADMIN — Medication 40 MILLIEQUIVALENT(S): at 06:56

## 2019-09-29 RX ADMIN — PANTOPRAZOLE SODIUM 40 MILLIGRAM(S): 20 TABLET, DELAYED RELEASE ORAL at 12:08

## 2019-09-29 RX ADMIN — SODIUM CHLORIDE 3 MILLILITER(S): 9 INJECTION INTRAMUSCULAR; INTRAVENOUS; SUBCUTANEOUS at 22:31

## 2019-09-29 RX ADMIN — CARVEDILOL PHOSPHATE 3.12 MILLIGRAM(S): 80 CAPSULE, EXTENDED RELEASE ORAL at 17:11

## 2019-09-29 RX ADMIN — CEFTRIAXONE 100 MILLIGRAM(S): 500 INJECTION, POWDER, FOR SOLUTION INTRAMUSCULAR; INTRAVENOUS at 14:47

## 2019-09-29 RX ADMIN — SODIUM CHLORIDE 3 MILLILITER(S): 9 INJECTION INTRAMUSCULAR; INTRAVENOUS; SUBCUTANEOUS at 14:48

## 2019-09-29 RX ADMIN — Medication 600 MILLIGRAM(S): at 17:11

## 2019-09-29 RX ADMIN — SODIUM CHLORIDE 3 MILLILITER(S): 9 INJECTION INTRAMUSCULAR; INTRAVENOUS; SUBCUTANEOUS at 06:51

## 2019-09-29 RX ADMIN — Medication 2: at 16:48

## 2019-09-29 RX ADMIN — Medication 600 MILLIGRAM(S): at 23:35

## 2019-09-29 RX ADMIN — Medication 0.6 MILLIGRAM(S): at 14:47

## 2019-09-29 RX ADMIN — Medication 50 GRAM(S): at 08:11

## 2019-09-29 RX ADMIN — MUPIROCIN 1 APPLICATION(S): 20 OINTMENT TOPICAL at 17:11

## 2019-09-29 RX ADMIN — MUPIROCIN 1 APPLICATION(S): 20 OINTMENT TOPICAL at 06:52

## 2019-09-29 NOTE — PROGRESS NOTE ADULT - PROBLEM SELECTOR PLAN 1
Pericardial window 9/30/19  Continue to monitor for signs of cardiac tamponade  Will need to send fluid for cytology   Will likely need pericardial biopsy  Pt tested positive for Lyme Dx, ID consult recommended.

## 2019-09-29 NOTE — CONSULT NOTE ADULT - SUBJECTIVE AND OBJECTIVE BOX
[facility]  INFECTIOUS DISEASES AND INTERNAL MEDICINE at [place]  =======================================================  Carlo Hernandez MD  Diplomates American Board of Internal Medicine and Infectious Diseases  Telephone [phone]  Fax            [phone]  =======================================================    [facility]-[id number]  MIRANDA RAYO   HPI:  This 44 year old male with history of obesity, IDDM, HTN, and anemia.  In march while he was in [place] he stated he wasn't feeling well and there was prescribed blood pressure medication.  He was told to follow up when he got home but he was feeling well and he never did.  He started to see swelling in his legs and started to feel more short of breath.  He followed up with cardiologist and had an echo.  His cardiologist was trying to reach him, he finally got a message days later that he had fluid around the heart and needed to be drained.  He was admitted to [facility] 15 days ago, he had a pericardial drainage for 700cc's serous drainage. He was started on medications for pericarditis and was discharged after 4 days. After follow up with cardiologist, he was found to have another accumulation of pericardial effusion.  He was sent to [facility]. On 9/24/19 he underwent a Right heart cath and had another pericardiocentesis for 700 cc's.  The drain was D/C'd on  9/26/19, repeat echo showed posterior moderate effusion.  He was then transported to [place] for evaluation for pericardial window on 9/27/19.  He denied and recent viral syndrome, denies, cough, dizziness, SOB.   He works as a  for [employer].   Hx of Tick bites in the past, possible 17 years ago.     No sick contacts.  no recent tick bite  no rashes on skin  HIV status unknown  no recent travel    I have personally reviewed the labs and data; pertinent labs and data are listed in this note; please see below.   =======================================================  Past Medical & Surgical Hx:  =====================  PAST MEDICAL & SURGICAL HISTORY:  Heart failure  Cardiomegaly  Nonsmoker  Diabetes mellitus  Hypertension    Problem List:  ==========  HEALTH ISSUES - PROBLEM Dx:  Pulmonary congestion: Pulmonary congestion  Prophylactic measure: Prophylactic measure  Hypothyroidism, unspecified type: Hypothyroidism, unspecified type  Type 2 diabetes mellitus without complication, with long-term current use of insulin: Type 2 diabetes mellitus without complication, with long-term current use of insulin  Essential hypertension: Essential hypertension  Pericardial effusion without cardiac tamponade: Pericardial effusion without cardiac tamponade    Social Hx:  =======  no toxic habits currently    FAMILY HISTORY:  no significant family history of immunosuppressive disorders in mother or father   =======================================================    REVIEW OF SYSTEMS:  CONSTITUTIONAL:  No Fever or chills  HEENT:  No diplopia or blurred vision.  No earache, sore throat or runny nose.  CARDIOVASCULAR:  No pressure, squeezing, strangling, tightness, heaviness or aching about the chest, neck, axilla or epigastrium.  RESPIRATORY:  No cough, shortness of breath  GASTROINTESTINAL:  No nausea, vomiting or diarrhea.  GENITOURINARY:  No dysuria, frequency or urgency. No Blood in urine  MUSCULOSKELETAL:  no joint aches, no muscle pain  SKIN:  No change in skin, hair or nails.  NEUROLOGIC:  No Headaches, seizures or weakness.  PSYCHIATRIC:  No disorder of thought or mood.  ENDOCRINE:  No heat or cold intolerance  HEMATOLOGICAL:  No easy bruising or bleeding.   =======================================================    Allergies  No Known Allergies    Antibiotics:  cefTRIAXone   IVPB 1000 milliGRAM(s) IV Intermittent every 24 hours    Other medications:  chlorhexidine 0.12% Liquid 15 milliLiter(s) Swish and Spit two times a day  colchicine 0.6 milliGRAM(s) Oral two times a day  ibuprofen  Tablet. 600 milliGRAM(s) Oral every 6 hours  insulin lispro (HumaLOG) corrective regimen sliding scale   SubCutaneous Before meals and at bedtime  mupirocin 2% Ointment 1 Application(s) Both Nostrils two times a day  pantoprazole  Injectable 40 milliGRAM(s) IV Push daily  sodium chloride 0.9% lock flush 3 milliLiter(s) IV Push every 8 hours     cefTRIAXone   IVPB   100 mL/Hr IV Intermittent (09-29-19 @ 14:47)  cefuroxime  IVPB   100 mL/Hr IV Intermittent (09-28-19 @ 06:00)    ======================================================  Physical Exam:  ============  T(F): 98.5 (29 Sep 2019 06:00), Max: 99 (28 Sep 2019 23:00)  HR: 113 (29 Sep 2019 12:00)  BP: 133/79 (29 Sep 2019 12:00)  RR: 19 (29 Sep 2019 12:00)  SpO2: 100% (29 Sep 2019 12:00) (95% - 100%)  temp max in last 48H T(F): , Max: 99 (09-28-19 @ 23:00)    General:  No acute distress.  OBESE  Eye: Pupils are equal, round and reactive to light, Extraocular movements are intact, Normal conjunctiva.  HENT: Normocephalic, Oral mucosa is moist, No pharyngeal erythema, No sinus tenderness.  Neck: Supple, No lymphadenopathy.  Respiratory: Lungs are clear to auscultation, Respirations are non-labored.  Cardiovascular: Normal rate, Regular rhythm,   Gastrointestinal: Soft, Non-tender, Non-distended, Normal bowel sounds.  Genitourinary: No costovertebral angle tenderness.  Lymphatics: No lymphadenopathy neck,   Musculoskeletal: Normal range of motion, Normal strength.  Integumentary: No rash.  Neurologic: Alert, Oriented, No focal deficits, Cranial Nerves II-XII are grossly intact.  Psychiatric: Appropriate mood & affect.    =======================================================  Labs:                        11.2   15.49 )-----------( 506      ( 29 Sep 2019 08:32 )             37.0       WBC Count: 15.49 K/uL (09-29-19 @ 08:32)  WBC Count: 14.65 K/uL (09-28-19 @ 04:09)  WBC Count: 13.34 K/uL (09-27-19 @ 17:24)      09-29    137  |  98  |  9.0  ----------------------------<  141<H>  5.1   |  26.0  |  0.70    Ca    8.7      29 Sep 2019 08:32  Mg     1.9     09-29    TPro  7.3  /  Alb  3.2<L>  /  TBili  0.6  /  DBili  x   /  AST  13  /  ALT  13  /  AlkPhos  101  09-28      Creatinine, Serum: 0.70 mg/dL (09-29-19 @ 08:32)  Creatinine, Serum: 0.67 mg/dL (09-28-19 @ 04:09)  Creatinine, Serum: 0.52 mg/dL (09-27-19 @ 17:24)    C-Reactive Protein, Serum: 8.78 mg/dL (09-28-19 @ 04:08)    Sedimentation Rate, Erythrocyte: 46 mm/hr (09-28-19 @ 04:08)    LYME IgG/IgM Antibodies Result: 1.14 Index (09.28.19 @ 20:09)  Lyme C6 Interpretation: Positive: METHOD: Liaison Chemiluminescent Immunoassay      Reference Range: (values expressed as Lyme Index )                                < 0.90        Negative                                0.90 - 1.09   Equivocal                                >= 1.10     Positive  CDC/ASTPHLD Guidelines recommend that all samples judged equivocal or  positive be re-tested by immunoblot for confirmation of results. (09.28.19 @ 20:09)

## 2019-09-29 NOTE — PROGRESS NOTE ADULT - SUBJECTIVE AND OBJECTIVE BOX
Subjective:  43yo M resting comfortable, no c/o pain, SOB      HPI:  Patient is a 44 year old male with history of IDDM, HTN, and anemia.  In march while he was in [place] he stated he wasn't feeling well and there was prescribed blood pressure medication.  He was told to follow up when he got home but he was feeling well and he never did.  He started to see swelling in his legs and started to feel more short of breath.  He followed up with cardiologist and had an echo.  His cardiologist was trying to reach him, he finally got a message days later that he had fluid around the heart and needed to be drained.  He was admitted to [facility] 15 days ago, he had a pericardial drainage for 700cc's serous drainage. He was started on medications for pericarditis and was discharged after 4 days. After follow up with cardiologist, he was found to have another accumulation of pericardial effusion.  He was sent to [facility]. On 9/24/19 he underwent a Right heart cath and had another pericardialcentesis for 700 cc's.  The drain was D/C'd on  9/26/19, repeat echo showed posterior moderate effusion.  He was then transported to [place] for evaluation for pericardial window.  He denied and recent viral syndrome, denies, cough, dizziness, SOB. (27 Sep 2019 17:18)          PAST MEDICAL & SURGICAL HISTORY:  Heart failure  Cardiomegaly  Nonsmoker  Diabetes mellitus  Hypertension          MEDICATIONS  (STANDING):  chlorhexidine 0.12% Liquid 15 milliLiter(s) Swish and Spit two times a day  insulin lispro (HumaLOG) corrective regimen sliding scale   SubCutaneous Before meals and at bedtime  mupirocin 2% Ointment 1 Application(s) Both Nostrils two times a day  pantoprazole  Injectable 40 milliGRAM(s) IV Push daily  sodium chloride 0.9% lock flush 3 milliLiter(s) IV Push every 8 hours  vancomycin  IVPB 1500 milliGRAM(s) IV Intermittent once    MEDICATIONS  (PRN):          Allergies    No Known Allergies    Intolerances          WEIGHTS:  Daily     Daily   Admit Wt: Drug Dosing Weight  Height (cm): 172 (27 Sep 2019 15:05)  Weight (kg): 111 (27 Sep 2019 15:05)  BMI (kg/m2): 37.5 (27 Sep 2019 15:05)  BSA (m2): 2.22 (27 Sep 2019 15:05)  I&O's Summary    27 Sep 2019 07:01  -  28 Sep 2019 07:00  --------------------------------------------------------  IN: 240 mL / OUT: 1800 mL / NET: -1560 mL    28 Sep 2019 07:01  -  29 Sep 2019 03:59  --------------------------------------------------------  IN: 450 mL / OUT: 4550 mL / NET: -4100 mL        VITAL SIGNS:  ICU Vital Signs Last 24 Hrs  T(C): 37.2 (28 Sep 2019 23:00), Max: 37.2 (28 Sep 2019 23:00)  T(F): 99 (28 Sep 2019 23:00), Max: 99 (28 Sep 2019 23:00)  HR: 106 (28 Sep 2019 23:00) (91 - 106)  BP: 133/86 (28 Sep 2019 23:00) (110/72 - 151/86)  BP(mean): 102 (28 Sep 2019 23:00) (78 - 110)  ABP: --  ABP(mean): --  RR: 20 (28 Sep 2019 23:00) (11 - 25)  SpO2: 99% (28 Sep 2019 23:00) (95% - 99%)        All laboratory results, radiology and medications reviewed.    LABS:  09-28    x   |  x   |  x   ----------------------------<  x   3.8   |  x   |  x     Ca    9.1      28 Sep 2019 04:09  Mg     1.9     09-28    TPro  7.3  /  Alb  3.2<L>  /  TBili  0.6  /  DBili  x   /  AST  13  /  ALT  13  /  AlkPhos  101  09-28                                 11.1   14.65 )-----------( 499      ( 28 Sep 2019 04:09 )             37.0          PT/INR - ( 27 Sep 2019 17:24 )   PT: 15.0 sec;   INR: 1.29 ratio         PTT - ( 27 Sep 2019 17:24 )  PTT:31.7 sec  Bilirubin Total, Serum: 0.6 mg/dL (09-28 @ 04:09)          CAPILLARY BLOOD GLUCOSE      POCT Blood Glucose.: 116 mg/dL (28 Sep 2019 21:21)  POCT Blood Glucose.: 105 mg/dL (28 Sep 2019 16:43)  POCT Blood Glucose.: 97 mg/dL (28 Sep 2019 11:45)  POCT Blood Glucose.: 164 mg/dL (28 Sep 2019 07:36)  POCT Blood Glucose.: 192 mg/dL (28 Sep 2019 06:30)             PHYSICAL EXAM:  General:  well nourished, no acute distress, Malay speaking  Neurology:  alert and oriented X 4, nonfocal, no gross deficits  Respiratory:  clear to auscultation bilaterally  CV:  regular rate and rhythm, normal S1 S2  Abdomen:  soft, nontender, nondistended, positive bowel sounds  Extremities:  warm, well perfused, no edema +DP pulses

## 2019-09-29 NOTE — DIETITIAN INITIAL EVALUATION ADULT. - PERTINENT LABORATORY DATA
09-29 Na137 mmol/L Glu 141 mg/dL<H> K+ 5.1 mmol/L Cr  0.70 mg/dL BUN 9.0 mg/dL Phos n/a   Alb n/a   PAB n/a

## 2019-09-29 NOTE — PROGRESS NOTE ADULT - ASSESSMENT
44 year old Male with PMH of  cardiomegaly, DM II, HTN, and recurrent pericardial effusions with pericardiocentesis x2 (serous drainage), most recently at [facility] where a residual posterior moderate pericardial effusion was seen on TTE after the drain was removed on 9/26/19. Patient was then transferred to [facility] on 9/27 for further management. At this time, patient will likely go to the OR for a pericardial window on 9/30/19. He remains hemodynamically stable despite IV diuresis with lasix without clinical evidence of cardiac tamponade.   Pt tested positive for Lyme Disease both IgG/IgM and c6.

## 2019-09-29 NOTE — DIETITIAN INITIAL EVALUATION ADULT. - OTHER INFO
44 year old Male with PMH of  cardiomegaly, DM II, HTN, and recurrent pericardial effusions with pericardiocentesis x2 (serous drainage), most recently at [facility] where a residual posterior moderate pericardial effusion was seen on TTE after the drain was removed on 9/26/19. Patient was then transferred to [facility] on 9/27 for further management. At this time, patient will likely go to the OR for a pericardial window on 9/30/19. He remains hemodynamically stable despite IV diuresis with lasix without clinical evidence of cardiac tamponade.   Pt tested positive for Lyme Disease both IgG/IgM and c6.  Pt appearing overweight/ obese. reporting good PO intake and no issues. HgA1c 7.1 noted. pt describes 12 yr hx of DM and diet compliance.

## 2019-09-30 ENCOUNTER — RESULT REVIEW (OUTPATIENT)
Age: 44
End: 2019-09-30

## 2019-09-30 ENCOUNTER — APPOINTMENT (OUTPATIENT)
Dept: CARDIOTHORACIC SURGERY | Facility: HOSPITAL | Age: 44
End: 2019-09-30

## 2019-09-30 LAB
ANION GAP SERPL CALC-SCNC: 10 MMOL/L — SIGNIFICANT CHANGE UP (ref 5–17)
ANION GAP SERPL CALC-SCNC: 11 MMOL/L — SIGNIFICANT CHANGE UP (ref 5–17)
ANION GAP SERPL CALC-SCNC: 11 MMOL/L — SIGNIFICANT CHANGE UP (ref 5–17)
APTT BLD: 32.3 SEC — SIGNIFICANT CHANGE UP (ref 27.5–36.3)
APTT BLD: 32.8 SEC — SIGNIFICANT CHANGE UP (ref 27.5–36.3)
BUN SERPL-MCNC: 13 MG/DL — SIGNIFICANT CHANGE UP (ref 8–20)
BUN SERPL-MCNC: 16 MG/DL — SIGNIFICANT CHANGE UP (ref 8–20)
BUN SERPL-MCNC: 20 MG/DL — SIGNIFICANT CHANGE UP (ref 8–20)
CALCIUM SERPL-MCNC: 8.8 MG/DL — SIGNIFICANT CHANGE UP (ref 8.6–10.2)
CALCIUM SERPL-MCNC: 8.8 MG/DL — SIGNIFICANT CHANGE UP (ref 8.6–10.2)
CALCIUM SERPL-MCNC: 9.2 MG/DL — SIGNIFICANT CHANGE UP (ref 8.6–10.2)
CHLORIDE SERPL-SCNC: 98 MMOL/L — SIGNIFICANT CHANGE UP (ref 98–107)
CHLORIDE SERPL-SCNC: 99 MMOL/L — SIGNIFICANT CHANGE UP (ref 98–107)
CHLORIDE SERPL-SCNC: 99 MMOL/L — SIGNIFICANT CHANGE UP (ref 98–107)
CO2 SERPL-SCNC: 22 MMOL/L — SIGNIFICANT CHANGE UP (ref 22–29)
CO2 SERPL-SCNC: 23 MMOL/L — SIGNIFICANT CHANGE UP (ref 22–29)
CO2 SERPL-SCNC: 23 MMOL/L — SIGNIFICANT CHANGE UP (ref 22–29)
CREAT SERPL-MCNC: 0.7 MG/DL — SIGNIFICANT CHANGE UP (ref 0.5–1.3)
CREAT SERPL-MCNC: 1.04 MG/DL — SIGNIFICANT CHANGE UP (ref 0.5–1.3)
CREAT SERPL-MCNC: 1.09 MG/DL — SIGNIFICANT CHANGE UP (ref 0.5–1.3)
GAS PNL BLDA: SIGNIFICANT CHANGE UP
GLUCOSE BLDC GLUCOMTR-MCNC: 131 MG/DL — HIGH (ref 70–99)
GLUCOSE BLDC GLUCOMTR-MCNC: 142 MG/DL — HIGH (ref 70–99)
GLUCOSE BLDC GLUCOMTR-MCNC: 148 MG/DL — HIGH (ref 70–99)
GLUCOSE SERPL-MCNC: 129 MG/DL — HIGH (ref 70–115)
GLUCOSE SERPL-MCNC: 154 MG/DL — HIGH (ref 70–115)
GLUCOSE SERPL-MCNC: 158 MG/DL — HIGH (ref 70–115)
GRAM STN FLD: SIGNIFICANT CHANGE UP
HCT VFR BLD CALC: 32.1 % — LOW (ref 39–50)
HCT VFR BLD CALC: 34.3 % — LOW (ref 39–50)
HGB BLD-MCNC: 10.5 G/DL — LOW (ref 13–17)
HGB BLD-MCNC: 9.8 G/DL — LOW (ref 13–17)
HIV 1 & 2 AB SERPL IA.RAPID: SIGNIFICANT CHANGE UP
INR BLD: 1.4 RATIO — HIGH (ref 0.88–1.16)
INR BLD: 1.46 RATIO — HIGH (ref 0.88–1.16)
MAGNESIUM SERPL-MCNC: 2.1 MG/DL — SIGNIFICANT CHANGE UP (ref 1.6–2.6)
MCHC RBC-ENTMCNC: 25 PG — LOW (ref 27–34)
MCHC RBC-ENTMCNC: 25.1 PG — LOW (ref 27–34)
MCHC RBC-ENTMCNC: 30.5 GM/DL — LOW (ref 32–36)
MCHC RBC-ENTMCNC: 30.6 GM/DL — LOW (ref 32–36)
MCV RBC AUTO: 81.9 FL — SIGNIFICANT CHANGE UP (ref 80–100)
MCV RBC AUTO: 82.1 FL — SIGNIFICANT CHANGE UP (ref 80–100)
PLATELET # BLD AUTO: 437 K/UL — HIGH (ref 150–400)
PLATELET # BLD AUTO: 461 K/UL — HIGH (ref 150–400)
POTASSIUM SERPL-MCNC: 4.7 MMOL/L — SIGNIFICANT CHANGE UP (ref 3.5–5.3)
POTASSIUM SERPL-MCNC: 4.9 MMOL/L — SIGNIFICANT CHANGE UP (ref 3.5–5.3)
POTASSIUM SERPL-MCNC: 5.2 MMOL/L — SIGNIFICANT CHANGE UP (ref 3.5–5.3)
POTASSIUM SERPL-SCNC: 4.7 MMOL/L — SIGNIFICANT CHANGE UP (ref 3.5–5.3)
POTASSIUM SERPL-SCNC: 4.9 MMOL/L — SIGNIFICANT CHANGE UP (ref 3.5–5.3)
POTASSIUM SERPL-SCNC: 5.2 MMOL/L — SIGNIFICANT CHANGE UP (ref 3.5–5.3)
PROTHROM AB SERPL-ACNC: 16.3 SEC — HIGH (ref 10–12.9)
PROTHROM AB SERPL-ACNC: 17 SEC — HIGH (ref 10–12.9)
RBC # BLD: 3.92 M/UL — LOW (ref 4.2–5.8)
RBC # BLD: 4.18 M/UL — LOW (ref 4.2–5.8)
RBC # FLD: 15.1 % — HIGH (ref 10.3–14.5)
RBC # FLD: 15.3 % — HIGH (ref 10.3–14.5)
SODIUM SERPL-SCNC: 131 MMOL/L — LOW (ref 135–145)
SODIUM SERPL-SCNC: 132 MMOL/L — LOW (ref 135–145)
SODIUM SERPL-SCNC: 133 MMOL/L — LOW (ref 135–145)
SPECIMEN SOURCE: SIGNIFICANT CHANGE UP
WBC # BLD: 15.2 K/UL — HIGH (ref 3.8–10.5)
WBC # BLD: 16.58 K/UL — HIGH (ref 3.8–10.5)
WBC # FLD AUTO: 15.2 K/UL — HIGH (ref 3.8–10.5)
WBC # FLD AUTO: 16.58 K/UL — HIGH (ref 3.8–10.5)

## 2019-09-30 PROCEDURE — 33025 INCISION OF HEART SAC: CPT | Mod: AS

## 2019-09-30 PROCEDURE — 33025 INCISION OF HEART SAC: CPT

## 2019-09-30 PROCEDURE — 88305 TISSUE EXAM BY PATHOLOGIST: CPT | Mod: 26

## 2019-09-30 PROCEDURE — 88112 CYTOPATH CELL ENHANCE TECH: CPT | Mod: 26

## 2019-09-30 PROCEDURE — 99232 SBSQ HOSP IP/OBS MODERATE 35: CPT

## 2019-09-30 PROCEDURE — 71045 X-RAY EXAM CHEST 1 VIEW: CPT | Mod: 26

## 2019-09-30 PROCEDURE — 93010 ELECTROCARDIOGRAM REPORT: CPT

## 2019-09-30 RX ORDER — SODIUM CHLORIDE 9 MG/ML
1000 INJECTION INTRAMUSCULAR; INTRAVENOUS; SUBCUTANEOUS
Refills: 0 | Status: DISCONTINUED | OUTPATIENT
Start: 2019-09-30 | End: 2019-10-01

## 2019-09-30 RX ORDER — CEFTRIAXONE 500 MG/1
2000 INJECTION, POWDER, FOR SOLUTION INTRAMUSCULAR; INTRAVENOUS EVERY 24 HOURS
Refills: 0 | Status: DISCONTINUED | OUTPATIENT
Start: 2019-09-30 | End: 2019-10-08

## 2019-09-30 RX ORDER — KETOROLAC TROMETHAMINE 30 MG/ML
15 SYRINGE (ML) INJECTION EVERY 6 HOURS
Refills: 0 | Status: DISCONTINUED | OUTPATIENT
Start: 2019-09-30 | End: 2019-10-02

## 2019-09-30 RX ORDER — FENTANYL CITRATE 50 UG/ML
25 INJECTION INTRAVENOUS ONCE
Refills: 0 | Status: DISCONTINUED | OUTPATIENT
Start: 2019-09-30 | End: 2019-09-30

## 2019-09-30 RX ORDER — CHLORHEXIDINE GLUCONATE 213 G/1000ML
1 SOLUTION TOPICAL
Refills: 0 | Status: DISCONTINUED | OUTPATIENT
Start: 2019-09-30 | End: 2019-10-01

## 2019-09-30 RX ORDER — SODIUM CHLORIDE 9 MG/ML
250 INJECTION, SOLUTION INTRAVENOUS ONCE
Refills: 0 | Status: COMPLETED | OUTPATIENT
Start: 2019-09-30 | End: 2019-09-30

## 2019-09-30 RX ORDER — VANCOMYCIN HCL 1 G
1000 VIAL (EA) INTRAVENOUS ONCE
Refills: 0 | Status: DISCONTINUED | OUTPATIENT
Start: 2019-09-30 | End: 2019-09-30

## 2019-09-30 RX ORDER — VANCOMYCIN HCL 1 G
1000 VIAL (EA) INTRAVENOUS
Refills: 0 | Status: COMPLETED | OUTPATIENT
Start: 2019-09-30 | End: 2019-10-02

## 2019-09-30 RX ORDER — PANTOPRAZOLE SODIUM 20 MG/1
40 TABLET, DELAYED RELEASE ORAL
Refills: 0 | Status: DISCONTINUED | OUTPATIENT
Start: 2019-10-01 | End: 2019-10-08

## 2019-09-30 RX ADMIN — CARVEDILOL PHOSPHATE 3.12 MILLIGRAM(S): 80 CAPSULE, EXTENDED RELEASE ORAL at 02:06

## 2019-09-30 RX ADMIN — SODIUM CHLORIDE 1500 MILLILITER(S): 9 INJECTION, SOLUTION INTRAVENOUS at 12:00

## 2019-09-30 RX ADMIN — Medication 15 MILLIGRAM(S): at 23:24

## 2019-09-30 RX ADMIN — Medication 15 MILLIGRAM(S): at 17:05

## 2019-09-30 RX ADMIN — MUPIROCIN 1 APPLICATION(S): 20 OINTMENT TOPICAL at 17:06

## 2019-09-30 RX ADMIN — FENTANYL CITRATE 25 MICROGRAM(S): 50 INJECTION INTRAVENOUS at 11:10

## 2019-09-30 RX ADMIN — SODIUM CHLORIDE 3 MILLILITER(S): 9 INJECTION INTRAMUSCULAR; INTRAVENOUS; SUBCUTANEOUS at 20:55

## 2019-09-30 RX ADMIN — SODIUM CHLORIDE 3 MILLILITER(S): 9 INJECTION INTRAMUSCULAR; INTRAVENOUS; SUBCUTANEOUS at 06:23

## 2019-09-30 RX ADMIN — CEFTRIAXONE 100 MILLIGRAM(S): 500 INJECTION, POWDER, FOR SOLUTION INTRAMUSCULAR; INTRAVENOUS at 15:09

## 2019-09-30 RX ADMIN — CHLORHEXIDINE GLUCONATE 1 APPLICATION(S): 213 SOLUTION TOPICAL at 11:59

## 2019-09-30 RX ADMIN — Medication 600 MILLIGRAM(S): at 06:23

## 2019-09-30 RX ADMIN — Medication 0.6 MILLIGRAM(S): at 06:23

## 2019-09-30 RX ADMIN — Medication 15 MILLIGRAM(S): at 12:07

## 2019-09-30 RX ADMIN — Medication 250 MILLIGRAM(S): at 20:10

## 2019-09-30 RX ADMIN — CHLORHEXIDINE GLUCONATE 15 MILLILITER(S): 213 SOLUTION TOPICAL at 06:23

## 2019-09-30 RX ADMIN — SODIUM CHLORIDE 3 MILLILITER(S): 9 INJECTION INTRAMUSCULAR; INTRAVENOUS; SUBCUTANEOUS at 12:01

## 2019-09-30 RX ADMIN — Medication 15 MILLIGRAM(S): at 23:09

## 2019-09-30 RX ADMIN — Medication 15 MILLIGRAM(S): at 12:20

## 2019-09-30 RX ADMIN — MUPIROCIN 1 APPLICATION(S): 20 OINTMENT TOPICAL at 06:23

## 2019-09-30 RX ADMIN — CARVEDILOL PHOSPHATE 3.12 MILLIGRAM(S): 80 CAPSULE, EXTENDED RELEASE ORAL at 17:06

## 2019-09-30 RX ADMIN — FENTANYL CITRATE 25 MICROGRAM(S): 50 INJECTION INTRAVENOUS at 10:55

## 2019-09-30 RX ADMIN — Medication 0.6 MILLIGRAM(S): at 17:06

## 2019-09-30 NOTE — PROGRESS NOTE ADULT - SUBJECTIVE AND OBJECTIVE BOX
[facility]  INFECTIOUS DISEASES AND INTERNAL MEDICINE at [place]  =======================================================  Carlo Hernandez MD  Diplomates American Board of Internal Medicine and Infectious Diseases  Telephone [phone]  Fax            [phone]  =======================================================    [facility]-[id number]  MIRANDA RAYO   follow up for:  + lyme test; pericarditis  patient seen and examined.     s/p pericardial window today.     I have personally reviewed the labs and data; pertinent labs and data are listed in this note; please see below.   ===================================================  REVIEW OF SYSTEMS:  CONSTITUTIONAL:  No Fever or chills  HEENT:  No diplopia or blurred vision.  No earache, sore throat or runny nose.  CARDIOVASCULAR:  No pressure, squeezing, strangling, tightness, heaviness or aching about the chest, neck, axilla or epigastrium.  RESPIRATORY:  No cough, shortness of breath  GASTROINTESTINAL:  No nausea, vomiting or diarrhea.  GENITOURINARY:  No dysuria, frequency or urgency. No Blood in urine  MUSCULOSKELETAL:  no joint aches, no muscle pain  SKIN:  No change in skin, hair or nails.  NEUROLOGIC:  No Headaches, seizures or weakness.  PSYCHIATRIC:  No disorder of thought or mood.  ENDOCRINE:  No heat or cold intolerance  HEMATOLOGICAL:  No easy bruising or bleeding.   =======================================================  Allergies    No Known Allergies    Intolerances    Antibiotics:  cefTRIAXone   IVPB 2000 milliGRAM(s) IV Intermittent every 24 hours  vancomycin  IVPB 1000 milliGRAM(s) IV Intermittent two times a day    Other medications:  carvedilol 3.125 milliGRAM(s) Oral every 12 hours  chlorhexidine 2% Cloths 1 Application(s) Topical <User Schedule>  colchicine 0.6 milliGRAM(s) Oral two times a day  insulin lispro (HumaLOG) corrective regimen sliding scale   SubCutaneous Before meals and at bedtime  ketorolac   Injectable 15 milliGRAM(s) IV Push every 6 hours  lactated ringers Bolus 250 milliLiter(s) IV Bolus once  mupirocin 2% Ointment 1 Application(s) Both Nostrils two times a day  sodium chloride 0.9% lock flush 3 milliLiter(s) IV Push every 8 hours    ======================================================  Physical Exam:  ============  T(F): 98.4 (30 Sep 2019 12:00), Max: 99.2 (29 Sep 2019 16:00)  HR: 87 (30 Sep 2019 14:00)  BP: 117/67 (30 Sep 2019 13:00)  RR: 16 (30 Sep 2019 14:00)  SpO2: 99% (30 Sep 2019 14:00) (93% - 99%)  temp max in last 48H T(F): , Max: 99.2 (09-29-19 @ 16:00)    General:  No acute distress.  OBESE  Eye: Pupils are equal, round and reactive to light, Extraocular movements are intact, Normal conjunctiva.  HENT: Normocephalic, Oral mucosa is moist, No pharyngeal erythema, No sinus tenderness.  Neck: Supple, No lymphadenopathy.  Respiratory: Lungs are clear to auscultation, Respirations are non-labored.  LEFT sided chest tube  Cardiovascular: Normal rate, Regular rhythm,   Gastrointestinal: Soft, Non-tender, Non-distended, Normal bowel sounds.  Genitourinary: No costovertebral angle tenderness.  Lymphatics: No lymphadenopathy neck,   Musculoskeletal: Normal range of motion, Normal strength.  Integumentary: No rash.  Neurologic: Alert, Oriented, No focal deficits, Cranial Nerves II-XII are grossly intact.  Psychiatric: Appropriate mood & affect.  =======================================================  Labs:                        9.8    15.20 )-----------( 437      ( 30 Sep 2019 10:50 )             32.1       WBC Count: 15.20 K/uL (09-30-19 @ 10:50)  WBC Count: 16.58 K/uL (09-30-19 @ 02:14)  WBC Count: 15.49 K/uL (09-29-19 @ 08:32)  WBC Count: 14.65 K/uL (09-28-19 @ 04:09)  WBC Count: 13.34 K/uL (09-27-19 @ 17:24)      09-30    133<L>  |  99  |  16.0  ----------------------------<  158<H>  5.2   |  23.0  |  1.04    Ca    9.2      30 Sep 2019 10:50  Mg     2.1     09-30        Culture - Body Fluid with Gram Stain (collected 09-30-19 @ 12:24)  Source: .Body Fluid Pericardial Effusion  Gram Stain (09-30-19 @ 12:48):    Few White blood cells    No organisms seen      Creatinine, Serum: 1.04 mg/dL (09-30-19 @ 10:50)  Creatinine, Serum: 0.70 mg/dL (09-30-19 @ 02:14)  Creatinine, Serum: 0.70 mg/dL (09-29-19 @ 08:32)  Creatinine, Serum: 0.67 mg/dL (09-28-19 @ 04:09)  Creatinine, Serum: 0.52 mg/dL (09-27-19 @ 17:24)    C-Reactive Protein, Serum: 8.78 mg/dL (09-28-19 @ 04:08)    Sedimentation Rate, Erythrocyte: 46 mm/hr (09-28-19 @ 04:08)           LYME IgG/IgM Antibodies Result: 1.14 Index (09.28.19 @ 20:09)  Lyme C6 Interpretation: Positive: METHOD: Liaison Chemiluminescent Immunoassay      Reference Range: (values expressed as Lyme Index )                                < 0.90        Negative                                0.90 - 1.09   Equivocal                                >= 1.10     Positive  CDC/ASTPHLD Guidelines recommend that all samples judged equivocal or  positive be re-tested by immunoblot for confirmation of results. (09.28.19 @ 20:09)

## 2019-09-30 NOTE — PROGRESS NOTE ADULT - ASSESSMENT
This 44 year old male with history of obesity, IDDM, HTN, and anemia.  In march while he was in [place] he stated he wasn't feeling well and there was prescribed blood pressure medication.  He was told to follow up when he got home but he was feeling well and he never did.  He started to see swelling in his legs and started to feel more short of breath.  He followed up with cardiologist and had an echo.  His cardiologist was trying to reach him, he finally got a message days later that he had fluid around the heart and needed to be drained.  He was admitted to [facility] 15 days ago, he had a pericardial drainage for 700cc's serous drainage. He was started on medications for pericarditis and was discharged after 4 days. After follow up with cardiologist, he was found to have another accumulation of pericardial effusion.  He was sent to [facility]. On 9/24/19 he underwent a Right heart cath and had another pericardiocentesis for 700 cc's.  The drain was D/C'd on  9/26/19, repeat echo showed posterior moderate effusion.  He was then transported to [place] for evaluation for pericardial window on 9/27/19.    Pending pericardial window on 9/30/19  in terms of his pericarditis, not entirely clear that this is Lyme disease.    his Lyme c6 is positive, but can be related to prior tick bite. he has never been previously treated for Lyme.     - check Lyme western blot; check other viral etiologies - SENT  - HIV screen in non-reactive  - Continue ceftriaxone 2 grams Q 24H for now.   - follow up on surgical pathology    - follow up all outstanding cultures  - trend temperature and WBC curve  - repeat cultures from blood and all sources if febrile.

## 2019-09-30 NOTE — BRIEF OPERATIVE NOTE - OPERATION/FINDINGS
Subxyphoid exploration with findings of adhesions.  A left thoracotomy was then performed with creation of pericardial window and drainage of 550 cc's serous fluid.

## 2019-09-30 NOTE — PROGRESS NOTE ADULT - ASSESSMENT
9/12 pericardial effusion drained at [facility]   9/24 to [facility] [place] right and left heart cath, effusion drained   9/26 drained removed  9/27 reaccumulation of effusion on TTE, transferred to [facility] 44 year old male, w/PMH of HTN, DMII (HA1c 6.9), and pericardial effusion     9/12 pericardial effusion drained at [facility]   [place] [facility] [place], effusion drained   9/26 drained removed  9/27 reaccumulation of effusion on TTE, transferred to [facility]

## 2019-09-30 NOTE — PROGRESS NOTE ADULT - SUBJECTIVE AND OBJECTIVE BOX
Subjective: no c/o incisional pain at this time. Denies CP, SOB, palpitations, N/V, other c/o. Patient is resting comfortably     T(C): 36.4 (09-29-19 @ 23:00), Max: 37.3 (09-29-19 @ 16:00)  HR: 92 (09-29-19 @ 23:00) (89 - 113)  BP: 127/91 (09-29-19 @ 23:00) (96/75 - 138/100)  ABP: --  ABP(mean): --  RR: 21 (09-29-19 @ 23:00) (7 - 22)  SpO2: 96% (09-29-19 @ 23:00) (95% - 100%)  Wt(kg): --  CVP(mm Hg): --  CO: --  CI: --  PA: --       I&O's Detail    28 Sep 2019 07:01  -  29 Sep 2019 07:00  --------------------------------------------------------  IN:    Oral Fluid: 450 mL  Total IN: 450 mL    OUT:    Voided: 5950 mL  Total OUT: 5950 mL    Total NET: -5500 mL      29 Sep 2019 07:01  -  30 Sep 2019 00:10  --------------------------------------------------------  IN:    Oral Fluid: 1380 mL    Solution: 50 mL    Solution: 50 mL  Total IN: 1480 mL    OUT:    Voided: 3450 mL  Total OUT: 3450 mL    Total NET: -1970 mL          LABS: All Lab data reviewed and analyzed                        11.2   15.49 )-----------( 506      ( 29 Sep 2019 08:32 )             37.0     09-29    137  |  98  |  9.0  ----------------------------<  141<H>  5.1   |  26.0  |  0.70    Ca    8.7      29 Sep 2019 08:32  Mg     1.9     09-29    TPro  7.3  /  Alb  3.2<L>  /  TBili  0.6  /  DBili  x   /  AST  13  /  ALT  13  /  AlkPhos  101  09-28        CAPILLARY BLOOD GLUCOSE      POCT Blood Glucose.: 119 mg/dL (29 Sep 2019 22:30)  POCT Blood Glucose.: 179 mg/dL (29 Sep 2019 16:44)  POCT Blood Glucose.: 126 mg/dL (29 Sep 2019 12:06)  POCT Blood Glucose.: 131 mg/dL (29 Sep 2019 07:53)           RADIOLOGY: - Reviewed and analyzed   CXR: pending    HOSPITAL MEDICATIONS: All medications reviewed and analyzed  MEDICATIONS  (STANDING):  carvedilol 3.125 milliGRAM(s) Oral every 12 hours  cefTRIAXone   IVPB 1000 milliGRAM(s) IV Intermittent every 24 hours  chlorhexidine 0.12% Liquid 15 milliLiter(s) Swish and Spit two times a day  colchicine 0.6 milliGRAM(s) Oral two times a day  ibuprofen  Tablet. 600 milliGRAM(s) Oral every 6 hours  insulin lispro (HumaLOG) corrective regimen sliding scale   SubCutaneous Before meals and at bedtime  mupirocin 2% Ointment 1 Application(s) Both Nostrils two times a day  pantoprazole  Injectable 40 milliGRAM(s) IV Push daily  sodium chloride 0.9% lock flush 3 milliLiter(s) IV Push every 8 hours  vancomycin  IVPB 1500 milliGRAM(s) IV Intermittent once    MEDICATIONS  (PRN):      Lines: PIV     Physical Exam  Neuro: A+O x 3, non-focal, speech clear and intact  HEENT: No ETT or NGT/OGT  Neck:  ROM intact, no JVD, no nodes or masses palpable, trachea midline, no tracheostomy  Pulm: CTA, good air entry, equal bilaterally, no rales/rhonchi/wheezing, no accessory muscle use noted  CV: RRR, S1, S2. No murmurs, rubs, or gallops noted.  Abd: +BSx4. Soft, nontender, nondistended.  : no bell  Ext: MARIA x 4, no edema, no cyanosis or clubbing, distal motor intact  Skin: warm, dry, no rashes        PAST MEDICAL & SURGICAL HISTORY:  Heart failure  Cardiomegaly  Nonsmoker  Diabetes mellitus  Hypertension

## 2019-09-30 NOTE — PROGRESS NOTE ADULT - PROBLEM SELECTOR PLAN 1
Plan is for pericardial window 9/30/19  Continue to monitor for signs of cardiac tamponade  Will need to send fluid for cytology   Will likely need pericardial biopsy  Pt tested positive for Lyme Dx  ID is following viral serologies sent, and started on ceftriaxone

## 2019-10-01 LAB
ANION GAP SERPL CALC-SCNC: 15 MMOL/L — SIGNIFICANT CHANGE UP (ref 5–17)
B BURGDOR C6 AB SER-ACNC: ABNORMAL
B BURGDOR IGG+IGM SER-ACNC: 1.02 INDEX — HIGH (ref 0.01–0.89)
BUN SERPL-MCNC: 17 MG/DL — SIGNIFICANT CHANGE UP (ref 8–20)
CALCIUM SERPL-MCNC: 8.8 MG/DL — SIGNIFICANT CHANGE UP (ref 8.6–10.2)
CHLORIDE SERPL-SCNC: 102 MMOL/L — SIGNIFICANT CHANGE UP (ref 98–107)
CO2 SERPL-SCNC: 22 MMOL/L — SIGNIFICANT CHANGE UP (ref 22–29)
CREAT SERPL-MCNC: 0.73 MG/DL — SIGNIFICANT CHANGE UP (ref 0.5–1.3)
DSDNA AB SER-ACNC: <12 IU/ML — SIGNIFICANT CHANGE UP
GLUCOSE BLDC GLUCOMTR-MCNC: 112 MG/DL — HIGH (ref 70–99)
GLUCOSE BLDC GLUCOMTR-MCNC: 124 MG/DL — HIGH (ref 70–99)
GLUCOSE BLDC GLUCOMTR-MCNC: 169 MG/DL — HIGH (ref 70–99)
GLUCOSE BLDC GLUCOMTR-MCNC: 223 MG/DL — HIGH (ref 70–99)
GLUCOSE SERPL-MCNC: 135 MG/DL — HIGH (ref 70–115)
HCT VFR BLD CALC: 34.7 % — LOW (ref 39–50)
HGB BLD-MCNC: 10.6 G/DL — LOW (ref 13–17)
MAGNESIUM SERPL-MCNC: 2 MG/DL — SIGNIFICANT CHANGE UP (ref 1.6–2.6)
MCHC RBC-ENTMCNC: 25 PG — LOW (ref 27–34)
MCHC RBC-ENTMCNC: 30.5 GM/DL — LOW (ref 32–36)
MCV RBC AUTO: 81.8 FL — SIGNIFICANT CHANGE UP (ref 80–100)
NON-GYNECOLOGICAL CYTOLOGY STUDY: SIGNIFICANT CHANGE UP
PLATELET # BLD AUTO: 440 K/UL — HIGH (ref 150–400)
POTASSIUM SERPL-MCNC: 4.4 MMOL/L — SIGNIFICANT CHANGE UP (ref 3.5–5.3)
POTASSIUM SERPL-SCNC: 4.4 MMOL/L — SIGNIFICANT CHANGE UP (ref 3.5–5.3)
RBC # BLD: 4.24 M/UL — SIGNIFICANT CHANGE UP (ref 4.2–5.8)
RBC # FLD: 15.2 % — HIGH (ref 10.3–14.5)
SODIUM SERPL-SCNC: 139 MMOL/L — SIGNIFICANT CHANGE UP (ref 135–145)
WBC # BLD: 13.73 K/UL — HIGH (ref 3.8–10.5)
WBC # FLD AUTO: 13.73 K/UL — HIGH (ref 3.8–10.5)

## 2019-10-01 PROCEDURE — 99024 POSTOP FOLLOW-UP VISIT: CPT

## 2019-10-01 PROCEDURE — 93970 EXTREMITY STUDY: CPT | Mod: 26

## 2019-10-01 PROCEDURE — 71045 X-RAY EXAM CHEST 1 VIEW: CPT | Mod: 26

## 2019-10-01 PROCEDURE — 99223 1ST HOSP IP/OBS HIGH 75: CPT

## 2019-10-01 PROCEDURE — 99232 SBSQ HOSP IP/OBS MODERATE 35: CPT

## 2019-10-01 RX ORDER — CARVEDILOL PHOSPHATE 80 MG/1
1 CAPSULE, EXTENDED RELEASE ORAL
Qty: 0 | Refills: 0 | DISCHARGE

## 2019-10-01 RX ORDER — IBUPROFEN 200 MG
1 TABLET ORAL
Qty: 0 | Refills: 0 | DISCHARGE

## 2019-10-01 RX ORDER — METOPROLOL TARTRATE 50 MG
25 TABLET ORAL EVERY 8 HOURS
Refills: 0 | Status: DISCONTINUED | OUTPATIENT
Start: 2019-10-01 | End: 2019-10-03

## 2019-10-01 RX ORDER — ENOXAPARIN SODIUM 100 MG/ML
40 INJECTION SUBCUTANEOUS EVERY 12 HOURS
Refills: 0 | Status: DISCONTINUED | OUTPATIENT
Start: 2019-10-01 | End: 2019-10-08

## 2019-10-01 RX ORDER — OXYCODONE AND ACETAMINOPHEN 5; 325 MG/1; MG/1
2 TABLET ORAL EVERY 4 HOURS
Refills: 0 | Status: DISCONTINUED | OUTPATIENT
Start: 2019-10-01 | End: 2019-10-01

## 2019-10-01 RX ORDER — SODIUM CHLORIDE 9 MG/ML
3 INJECTION INTRAMUSCULAR; INTRAVENOUS; SUBCUTANEOUS EVERY 8 HOURS
Refills: 0 | Status: DISCONTINUED | OUTPATIENT
Start: 2019-10-01 | End: 2019-10-08

## 2019-10-01 RX ORDER — IBUPROFEN 200 MG
600 TABLET ORAL EVERY 6 HOURS
Refills: 0 | Status: DISCONTINUED | OUTPATIENT
Start: 2019-10-02 | End: 2019-10-08

## 2019-10-01 RX ORDER — OXYCODONE AND ACETAMINOPHEN 5; 325 MG/1; MG/1
1 TABLET ORAL EVERY 4 HOURS
Refills: 0 | Status: DISCONTINUED | OUTPATIENT
Start: 2019-10-01 | End: 2019-10-02

## 2019-10-01 RX ORDER — FUROSEMIDE 40 MG
20 TABLET ORAL
Refills: 0 | Status: DISCONTINUED | OUTPATIENT
Start: 2019-10-01 | End: 2019-10-07

## 2019-10-01 RX ORDER — FENTANYL CITRATE 50 UG/ML
25 INJECTION INTRAVENOUS ONCE
Refills: 0 | Status: DISCONTINUED | OUTPATIENT
Start: 2019-10-01 | End: 2019-10-01

## 2019-10-01 RX ORDER — METOPROLOL TARTRATE 50 MG
25 TABLET ORAL
Refills: 0 | Status: DISCONTINUED | OUTPATIENT
Start: 2019-10-01 | End: 2019-10-01

## 2019-10-01 RX ADMIN — Medication 15 MILLIGRAM(S): at 05:28

## 2019-10-01 RX ADMIN — OXYCODONE AND ACETAMINOPHEN 1 TABLET(S): 5; 325 TABLET ORAL at 11:33

## 2019-10-01 RX ADMIN — Medication 4: at 11:34

## 2019-10-01 RX ADMIN — Medication 15 MILLIGRAM(S): at 05:13

## 2019-10-01 RX ADMIN — Medication 2: at 21:34

## 2019-10-01 RX ADMIN — FENTANYL CITRATE 25 MICROGRAM(S): 50 INJECTION INTRAVENOUS at 01:45

## 2019-10-01 RX ADMIN — SODIUM CHLORIDE 3 MILLILITER(S): 9 INJECTION INTRAMUSCULAR; INTRAVENOUS; SUBCUTANEOUS at 21:09

## 2019-10-01 RX ADMIN — CHLORHEXIDINE GLUCONATE 1 APPLICATION(S): 213 SOLUTION TOPICAL at 05:14

## 2019-10-01 RX ADMIN — Medication 20 MILLIGRAM(S): at 17:02

## 2019-10-01 RX ADMIN — MUPIROCIN 1 APPLICATION(S): 20 OINTMENT TOPICAL at 17:03

## 2019-10-01 RX ADMIN — OXYCODONE AND ACETAMINOPHEN 1 TABLET(S): 5; 325 TABLET ORAL at 12:00

## 2019-10-01 RX ADMIN — Medication 15 MILLIGRAM(S): at 17:02

## 2019-10-01 RX ADMIN — SODIUM CHLORIDE 3 MILLILITER(S): 9 INJECTION INTRAMUSCULAR; INTRAVENOUS; SUBCUTANEOUS at 13:47

## 2019-10-01 RX ADMIN — CARVEDILOL PHOSPHATE 3.12 MILLIGRAM(S): 80 CAPSULE, EXTENDED RELEASE ORAL at 05:14

## 2019-10-01 RX ADMIN — Medication 25 MILLIGRAM(S): at 21:34

## 2019-10-01 RX ADMIN — Medication 0.6 MILLIGRAM(S): at 05:14

## 2019-10-01 RX ADMIN — Medication 25 MILLIGRAM(S): at 11:39

## 2019-10-01 RX ADMIN — Medication 15 MILLIGRAM(S): at 11:49

## 2019-10-01 RX ADMIN — Medication 0.6 MILLIGRAM(S): at 17:02

## 2019-10-01 RX ADMIN — PANTOPRAZOLE SODIUM 40 MILLIGRAM(S): 20 TABLET, DELAYED RELEASE ORAL at 05:14

## 2019-10-01 RX ADMIN — SODIUM CHLORIDE 3 MILLILITER(S): 9 INJECTION INTRAMUSCULAR; INTRAVENOUS; SUBCUTANEOUS at 05:14

## 2019-10-01 RX ADMIN — Medication 250 MILLIGRAM(S): at 08:25

## 2019-10-01 RX ADMIN — CEFTRIAXONE 100 MILLIGRAM(S): 500 INJECTION, POWDER, FOR SOLUTION INTRAMUSCULAR; INTRAVENOUS at 13:54

## 2019-10-01 RX ADMIN — ENOXAPARIN SODIUM 40 MILLIGRAM(S): 100 INJECTION SUBCUTANEOUS at 17:01

## 2019-10-01 RX ADMIN — SODIUM CHLORIDE 3 MILLILITER(S): 9 INJECTION INTRAMUSCULAR; INTRAVENOUS; SUBCUTANEOUS at 05:19

## 2019-10-01 RX ADMIN — FENTANYL CITRATE 25 MICROGRAM(S): 50 INJECTION INTRAVENOUS at 02:00

## 2019-10-01 RX ADMIN — Medication 250 MILLIGRAM(S): at 20:34

## 2019-10-01 RX ADMIN — Medication 15 MILLIGRAM(S): at 11:34

## 2019-10-01 NOTE — PROGRESS NOTE ADULT - SUBJECTIVE AND OBJECTIVE BOX
Brief summary:  44 year old Male with PMH of  cardiomegaly, DM II, HTN, and recurrent pericardial effusions with pericardiocentesis x2 (serous drainage), most recently at [place] where a residual posterior moderate pericardial effusion was seen on TTE after the drain was removed on 9/26/19. Patient was then transferred to [facility] on 9/27 for further management.     Overnight events:  Patient admits to 5/10 left chest incisional pain that is non-radiating and aggravated with coughing and deep breathing. Patient denies CP, SOB, HA, dizziness, nausea, vomiting, or palpitations.     PAST MEDICAL & SURGICAL HISTORY:  Heart failure  Cardiomegaly  Nonsmoker  Diabetes mellitus  Hypertension    Medications:  carvedilol 3.125 milliGRAM(s) Oral every 12 hours  cefTRIAXone   IVPB 2000 milliGRAM(s) IV Intermittent every 24 hours  chlorhexidine 2% Cloths 1 Application(s) Topical <User Schedule>  colchicine 0.6 milliGRAM(s) Oral two times a day  insulin lispro (HumaLOG) corrective regimen sliding scale   SubCutaneous Before meals and at bedtime  ketorolac   Injectable 15 milliGRAM(s) IV Push every 6 hours  mupirocin 2% Ointment 1 Application(s) Both Nostrils two times a day  pantoprazole    Tablet 40 milliGRAM(s) Oral before breakfast  sodium chloride 0.9% lock flush 3 milliLiter(s) IV Push every 8 hours  sodium chloride 0.9%. 1000 milliLiter(s) IV Continuous <Continuous>  sodium chloride 0.9%. 1000 milliLiter(s) IV Continuous <Continuous>  vancomycin  IVPB 1000 milliGRAM(s) IV Intermittent two times a day    Height (cm): 172 (09-30 @ 06:51)  Weight (kg): 111 (09-30 @ 06:51)  BMI (kg/m2): 37.5 (09-30 @ 06:51)  BSA (m2): 2.22 (09-30 @ 06:51)  Daily Height in cm: 172 (30 Sep 2019 06:51)        ABG - ( 30 Sep 2019 10:36 )  pH, Arterial: 7.40  pH, Blood: x     /  pCO2: 41    /  pO2: 134   / HCO3: 25    / Base Excess: 0.3   /  SaO2: 99                              10.6   13.73 )-----------( 440      ( 01 Oct 2019 03:05 )             34.7   09-30    131<L>  |  99  |  20.0  ----------------------------<  154<H>  4.9   |  22.0  |  1.09    Ca    8.8      30 Sep 2019 18:19  Mg     2.1     09-30      PT/INR - ( 30 Sep 2019 10:50 )   PT: 17.0 sec;   INR: 1.46 ratio  PTT - ( 30 Sep 2019 10:50 )  PTT:32.3 sec      Objective:  T(C): 37.4 (10-01-19 @ 03:00), Max: 37.4 (10-01-19 @ 03:00)  HR: 90 (10-01-19 @ 03:00) (79 - 100)  BP: 125/78 (10-01-19 @ 03:00) (102/71 - 128/74)  RR: 20 (10-01-19 @ 03:00) (14 - 23)  SpO2: 96% (10-01-19 @ 03:00) (95% - 100%)    CAPILLARY BLOOD GLUCOSE  POCT Blood Glucose.: 142 mg/dL (30 Sep 2019 21:03)  POCT Blood Glucose.: 131 mg/dL (30 Sep 2019 17:01)  POCT Blood Glucose.: 148 mg/dL (30 Sep 2019 12:00)    I&O's Summary    29 Sep 2019 07:01  -  30 Sep 2019 07:00  --------------------------------------------------------  IN: 1480 mL / OUT: 4050 mL / NET: -2570 mL    30 Sep 2019 07:01  -  01 Oct 2019 03:48  --------------------------------------------------------  IN: 1750 mL / OUT: 3060 mL / NET: -1310 mL        Physical Exam  Neuro: A+O x 3, non-focal, speech clear and intact  Pulm: CTA, equal bilaterally  CV: RRR, no murmurs, +S1S2  Abd: soft, NT, ND, +BS  Ext: +DP Pulses b/l, +PT pulses, no edema  Inc: thoracotomy site C/D/I/stable w/ dressing  Chest tubes: miky chest tube    Imaging:  CXR:  < from: Xray Chest 1 View AP/PA. (09.30.19 @ 12:08) >    IMPRESSION:    Endotracheal tube directed down the proximal aspect of the right mainstem   bronchus.    Low inspiratory volume exam with accentuation of the pulmonary markings.   Superimposed edema is not excluded.          < end of copied text >

## 2019-10-01 NOTE — PROGRESS NOTE ADULT - SUBJECTIVE AND OBJECTIVE BOX
[facility]  INFECTIOUS DISEASES AND INTERNAL MEDICINE at [place]  =======================================================  Carlo Hernandez MD  Diplomates American Board of Internal Medicine and Infectious Diseases  Telephone [phone]  Fax            [phone]  =======================================================    [facility]-[id number]  MIRANDA RAYO   follow up for:  + lyme test; pericarditis  patient seen and examined.     s/p pericardial window 9/30/19.   no new complaints    I have personally reviewed the labs and data; pertinent labs and data are listed in this note; please see below.   ===================================================  REVIEW OF SYSTEMS:  CONSTITUTIONAL:  No Fever or chills  HEENT:  No diplopia or blurred vision.  No earache, sore throat or runny nose.  CARDIOVASCULAR:  No pressure, squeezing, strangling, tightness, heaviness or aching about the chest, neck, axilla or epigastrium.  RESPIRATORY:  No cough, shortness of breath  GASTROINTESTINAL:  No nausea, vomiting or diarrhea.  GENITOURINARY:  No dysuria, frequency or urgency. No Blood in urine  MUSCULOSKELETAL:  no joint aches, no muscle pain  SKIN:  No change in skin, hair or nails.  NEUROLOGIC:  No Headaches, seizures or weakness.  PSYCHIATRIC:  No disorder of thought or mood.  ENDOCRINE:  No heat or cold intolerance  HEMATOLOGICAL:  No easy bruising or bleeding.   =======================================================  Allergies  No Known Allergies    Antibiotics:  cefTRIAXone   IVPB 2000 milliGRAM(s) IV Intermittent every 24 hours  vancomycin  IVPB 1000 milliGRAM(s) IV Intermittent two times a day    Other medications:  colchicine 0.6 milliGRAM(s) Oral two times a day  enoxaparin Injectable 40 milliGRAM(s) SubCutaneous every 12 hours  insulin lispro (HumaLOG) corrective regimen sliding scale   SubCutaneous Before meals and at bedtime  ketorolac   Injectable 15 milliGRAM(s) IV Push every 6 hours  metoprolol tartrate 25 milliGRAM(s) Oral every 8 hours  mupirocin 2% Ointment 1 Application(s) Both Nostrils two times a day  pantoprazole    Tablet 40 milliGRAM(s) Oral before breakfast  sodium chloride 0.9% lock flush 3 milliLiter(s) IV Push every 8 hours  sodium chloride 0.9% lock flush 3 milliLiter(s) IV Push every 8 hours    ======================================================  Physical Exam:  ============  T(F): 99.2 (01 Oct 2019 09:00), Max: 99.3 (01 Oct 2019 03:00)  HR: 89 (01 Oct 2019 12:00)  BP: 138/84 (01 Oct 2019 12:00)  RR: 16 (01 Oct 2019 12:00)  SpO2: 94% (01 Oct 2019 12:00) (91% - 100%)  temp max in last 48H T(F): , Max: 99.3 (10-01-19 @ 03:00)    General:  No acute distress.  OBESE  Eye: Pupils are equal, round and reactive to light, Extraocular movements are intact, Normal conjunctiva.  HENT: Normocephalic, Oral mucosa is moist, No pharyngeal erythema, No sinus tenderness.  Neck: Supple, No lymphadenopathy.  Respiratory: Lungs are clear to auscultation, Respirations are non-labored.  LEFT sided chest tube  Cardiovascular: Normal rate, Regular rhythm,   Gastrointestinal: Soft, Non-tender, Non-distended, Normal bowel sounds.  Genitourinary: No costovertebral angle tenderness.  Lymphatics: No lymphadenopathy neck,   Musculoskeletal: Normal range of motion, Normal strength.  Integumentary: No rash.  Neurologic: Alert, Oriented, No focal deficits, Cranial Nerves II-XII are grossly intact.  Psychiatric: Appropriate mood & affect.    =======================================================  Labs:                        10.6   13.73 )-----------( 440      ( 01 Oct 2019 03:05 )             34.7       WBC Count: 13.73 K/uL (10-01-19 @ 03:05)  WBC Count: 15.20 K/uL (09-30-19 @ 10:50)  WBC Count: 16.58 K/uL (09-30-19 @ 02:14)  WBC Count: 15.49 K/uL (09-29-19 @ 08:32)  WBC Count: 14.65 K/uL (09-28-19 @ 04:09)  WBC Count: 13.34 K/uL (09-27-19 @ 17:24)      10-01    139  |  102  |  17.0  ----------------------------<  135<H>  4.4   |  22.0  |  0.73    Ca    8.8      01 Oct 2019 03:05  Mg     2.0     10-01        Culture - Body Fluid with Gram Stain (collected 09-30-19 @ 12:24)  Source: .Body Fluid Pericardial Effusion  Gram Stain (09-30-19 @ 12:48):    Few White blood cells    No organisms seen

## 2019-10-01 NOTE — PROGRESS NOTE ADULT - PROBLEM SELECTOR PLAN 5
continue GI ppx with Protonix IVP  continue dvt ppx with SCD boots, discuss restarting chemical DVT ppx at this time given pericardial effusion

## 2019-10-01 NOTE — CONSULT NOTE ADULT - SUBJECTIVE AND OBJECTIVE BOX
HPI:  Patient is a 44 year old male with history of IDDM, HTN, and anemia.  In march while he was in [place] he stated he wasn't feeling well and there was prescribed blood pressure medication.  He was told to follow up when he got home but he was feeling well and he never did.  He started to see swelling in his legs and started to feel more short of breath.  He followed up with cardiologist and had an echo.  His cardiologist was trying to reach him, he finally got a message days later that he had fluid around the heart and needed to be drained.  He was admitted to [facility] 15 days ago, he had a pericardial drainage for 700cc's serous drainage. He was started on medications for pericarditis and was discharged after 4 days. After follow up with cardiologist, he was found to have another accumulation of pericardial effusion.  He was sent to [facility]. On 9/24/19 he underwent a Right heart cath and had another pericardialcentesis for 700 cc's.  The drain was D/C'd on  9/26/19, repeat echo showed posterior moderate effusion.  He was then transported to [place] for evaluation for pericardial window.  He denied and recent viral syndrome, denies, cough, dizziness, SOB. Pt found to have + Lyme titer       PAST MEDICAL & SURGICAL HISTORY:  Heart failure  Cardiomegaly  Nonsmoker  Diabetes mellitus x 3 years   On MFN and Novolin NPH 12-0-12-0  neck surgery 25 years ago   Hypertension      FAMILY HISTORY:  Monm and sister with DM   as well as uncles and cousins   SOCIAL HISTORY: not smoking no alcool     REVIEW OF SYSTEMS:    Constitutional: No fever, no chills, no change in weight.    Eyes: No eye swelling,no  blurry vision, no redness, no loss of vision.    Neck: No neck pain, no change in voice.    Lungs: No shortness of breath, no wheezing, no cough    CV: No chest pain, no palpitations, no pain with walking.  + Le edema     GI: No nausea, no vomiting, no constipation, no diarrhea, no abdominal pain    : No urinary frequency, no blood in urine, no urinary burning, no difficulty voiding.    Musculoskeletal: No muscle pain, no joint pain, no swelling.    Skin: No rash, no infections.    Neurologic: No headaches, no weakness, no burning or pain in feet, no tremor.    Endocrine: No heat intolerance, no cold intolerance, no increased sweating, no shakiness between meals.    Psych: No depression, no anxiety, no trouble concentrating    MEDICATIONS  (STANDING):  cefTRIAXone   IVPB 2000 milliGRAM(s) IV Intermittent every 24 hours  colchicine 0.6 milliGRAM(s) Oral two times a day  enoxaparin Injectable 40 milliGRAM(s) SubCutaneous every 12 hours  furosemide   Injectable 20 milliGRAM(s) IV Push two times a day  ibuprofen  Tablet. 600 milliGRAM(s) Oral every 6 hours  insulin lispro (HumaLOG) corrective regimen sliding scale   SubCutaneous Before meals and at bedtime  ketorolac   Injectable 15 milliGRAM(s) IV Push every 6 hours  metoprolol tartrate 25 milliGRAM(s) Oral every 8 hours  mupirocin 2% Ointment 1 Application(s) Both Nostrils two times a day  pantoprazole    Tablet 40 milliGRAM(s) Oral before breakfast  sodium chloride 0.9% lock flush 3 milliLiter(s) IV Push every 8 hours  sodium chloride 0.9% lock flush 3 milliLiter(s) IV Push every 8 hours  vancomycin  IVPB 1000 milliGRAM(s) IV Intermittent two times a day    MEDICATIONS  (PRN):  oxyCODONE    5 mG/acetaminophen 325 mG 1 Tablet(s) Oral every 4 hours PRN Moderate Pain (4 - 6)  oxyCODONE    5 mG/acetaminophen 325 mG 2 Tablet(s) Oral every 4 hours PRN Severe Pain (7 - 10)      Allergies    No Known Allergies    Intolerances          CAPILLARY BLOOD GLUCOSE  T4, Serum (09.29.19 @ 08:32)    T4, Serum: 7.0 ug/dL    Neck banadage right side of neck no thryomegaly    HEart RRR Normal S1 S2   Lung CTAb   Abd soft NT ND   Ext ++ edema  feet C/D/I    Thyroid Stimulating Hormone, Serum (09.27.19 @ 17:24)    Thyroid Stimulating Hormone, Serum: 4.53 uIU/mL      RADIOLOGY & ADDITIONAL TESTS:      .

## 2019-10-01 NOTE — PROGRESS NOTE ADULT - ASSESSMENT
44 year old Male with PMH of  cardiomegaly, DM II, HTN, and recurrent pericardial effusions with pericardiocentesis x2 (serous drainage), most recently at [facility] where a residual posterior moderate pericardial effusion was seen on TTE after the drain was removed on 9/26/19. Patient was then transferred to [facility] on 9/27 for further management. Patient underwent a pericardial window and pericardial biopsy on 9/30/19 with Dr. Peters.

## 2019-10-01 NOTE — PROGRESS NOTE ADULT - PROBLEM SELECTOR PLAN 1
Pericardial window via thoracotomy on 9/30/19  Fluid was sent for cytology   Follow up pericardial biopsy  Pt tested positive for Lyme Dx, ID is following viral serologies sent and started on ceftriaxone, may be past exposure  Continue colchicine/ibuoprofen

## 2019-10-01 NOTE — PROGRESS NOTE ADULT - ASSESSMENT
This 44 year old male with history of obesity, IDDM, HTN, and anemia.  In march while he was in [place] he stated he wasn't feeling well and there was prescribed blood pressure medication.  He was told to follow up when he got home but he was feeling well and he never did.  He started to see swelling in his legs and started to feel more short of breath.  He followed up with cardiologist and had an echo.  His cardiologist was trying to reach him, he finally got a message days later that he had fluid around the heart and needed to be drained.  He was admitted to [facility] 15 days ago, he had a pericardial drainage for 700cc's serous drainage. He was started on medications for pericarditis and was discharged after 4 days. After follow up with cardiologist, he was found to have another accumulation of pericardial effusion.  He was sent to [facility]. On 9/24/19 he underwent a Right heart cath and had another pericardiocentesis for 700 cc's.  The drain was D/C'd on  9/26/19, repeat echo showed posterior moderate effusion.  He was then transported to [place] for evaluation for pericardial window on 9/27/19.    Pending pericardial window on 9/30/19  in terms of his pericarditis, not entirely clear that this is Lyme disease.    his Lyme c6 is positive, but can be related to prior tick bite. he has never been previously treated for Lyme.     - Lyme western blot; other viral etiologies - SENT  - HIV screen in non-reactive  - Continue ceftriaxone 2 grams Q 24H; anticipate for 14 days. then stop    - follow up on surgical pathology    - follow up all outstanding cultures  - trend temperature and WBC curve  - repeat cultures from blood and all sources if febrile.

## 2019-10-02 LAB
ANA PAT FLD IF-IMP: ABNORMAL
ANA TITR SER: ABNORMAL
ANION GAP SERPL CALC-SCNC: 12 MMOL/L — SIGNIFICANT CHANGE UP (ref 5–17)
BUN SERPL-MCNC: 16 MG/DL — SIGNIFICANT CHANGE UP (ref 8–20)
CALCIUM SERPL-MCNC: 8.8 MG/DL — SIGNIFICANT CHANGE UP (ref 8.6–10.2)
CHLORIDE SERPL-SCNC: 99 MMOL/L — SIGNIFICANT CHANGE UP (ref 98–107)
CK MB BLD-MCNC: NEGATIVE TITER — SIGNIFICANT CHANGE UP
CO2 SERPL-SCNC: 23 MMOL/L — SIGNIFICANT CHANGE UP (ref 22–29)
COXSACKIE TYPE A-24: NEGATIVE TITER — SIGNIFICANT CHANGE UP
CREAT SERPL-MCNC: 0.72 MG/DL — SIGNIFICANT CHANGE UP (ref 0.5–1.3)
CV A24 IGG TITR SER IF: NEGATIVE TITER — SIGNIFICANT CHANGE UP
CV A7 AB SER-ACNC: NEGATIVE TITER — SIGNIFICANT CHANGE UP
CV A9 AB TITR FLD: NEGATIVE TITER — SIGNIFICANT CHANGE UP
EV RNA SPEC QL NAA+PROBE: NEGATIVE — SIGNIFICANT CHANGE UP
GLUCOSE BLDC GLUCOMTR-MCNC: 101 MG/DL — HIGH (ref 70–99)
GLUCOSE BLDC GLUCOMTR-MCNC: 119 MG/DL — HIGH (ref 70–99)
GLUCOSE BLDC GLUCOMTR-MCNC: 132 MG/DL — HIGH (ref 70–99)
GLUCOSE BLDC GLUCOMTR-MCNC: 156 MG/DL — HIGH (ref 70–99)
GLUCOSE SERPL-MCNC: 126 MG/DL — HIGH (ref 70–115)
HCT VFR BLD CALC: 39.3 % — SIGNIFICANT CHANGE UP (ref 39–50)
HGB BLD-MCNC: 11.9 G/DL — LOW (ref 13–17)
MAGNESIUM SERPL-MCNC: 2 MG/DL — SIGNIFICANT CHANGE UP (ref 1.6–2.6)
MCHC RBC-ENTMCNC: 24.9 PG — LOW (ref 27–34)
MCHC RBC-ENTMCNC: 30.3 GM/DL — LOW (ref 32–36)
MCV RBC AUTO: 82.2 FL — SIGNIFICANT CHANGE UP (ref 80–100)
PLATELET # BLD AUTO: 525 K/UL — HIGH (ref 150–400)
POTASSIUM SERPL-MCNC: 4.5 MMOL/L — SIGNIFICANT CHANGE UP (ref 3.5–5.3)
POTASSIUM SERPL-SCNC: 4.5 MMOL/L — SIGNIFICANT CHANGE UP (ref 3.5–5.3)
RBC # BLD: 4.78 M/UL — SIGNIFICANT CHANGE UP (ref 4.2–5.8)
RBC # FLD: 15.3 % — HIGH (ref 10.3–14.5)
SODIUM SERPL-SCNC: 134 MMOL/L — LOW (ref 135–145)
WBC # BLD: 15.88 K/UL — HIGH (ref 3.8–10.5)
WBC # FLD AUTO: 15.88 K/UL — HIGH (ref 3.8–10.5)

## 2019-10-02 PROCEDURE — 76942 ECHO GUIDE FOR BIOPSY: CPT | Mod: 26,59

## 2019-10-02 PROCEDURE — 36569 INSJ PICC 5 YR+ W/O IMAGING: CPT

## 2019-10-02 PROCEDURE — 71045 X-RAY EXAM CHEST 1 VIEW: CPT | Mod: 26

## 2019-10-02 PROCEDURE — 99232 SBSQ HOSP IP/OBS MODERATE 35: CPT

## 2019-10-02 PROCEDURE — 76937 US GUIDE VASCULAR ACCESS: CPT | Mod: 26,59

## 2019-10-02 RX ORDER — SODIUM CHLORIDE 9 MG/ML
15 INJECTION INTRAMUSCULAR; INTRAVENOUS; SUBCUTANEOUS
Refills: 0 | Status: DISCONTINUED | OUTPATIENT
Start: 2019-10-02 | End: 2019-10-08

## 2019-10-02 RX ORDER — CHLORHEXIDINE GLUCONATE 213 G/1000ML
1 SOLUTION TOPICAL
Refills: 0 | Status: DISCONTINUED | OUTPATIENT
Start: 2019-10-02 | End: 2019-10-08

## 2019-10-02 RX ORDER — METFORMIN HYDROCHLORIDE 850 MG/1
1000 TABLET ORAL
Refills: 0 | Status: DISCONTINUED | OUTPATIENT
Start: 2019-10-02 | End: 2019-10-08

## 2019-10-02 RX ORDER — SACCHAROMYCES BOULARDII 250 MG
250 POWDER IN PACKET (EA) ORAL
Refills: 0 | Status: DISCONTINUED | OUTPATIENT
Start: 2019-10-02 | End: 2019-10-08

## 2019-10-02 RX ADMIN — CEFTRIAXONE 100 MILLIGRAM(S): 500 INJECTION, POWDER, FOR SOLUTION INTRAMUSCULAR; INTRAVENOUS at 13:25

## 2019-10-02 RX ADMIN — Medication 250 MILLIGRAM(S): at 09:38

## 2019-10-02 RX ADMIN — Medication 15 MILLIGRAM(S): at 05:48

## 2019-10-02 RX ADMIN — MUPIROCIN 1 APPLICATION(S): 20 OINTMENT TOPICAL at 05:49

## 2019-10-02 RX ADMIN — OXYCODONE AND ACETAMINOPHEN 1 TABLET(S): 5; 325 TABLET ORAL at 13:24

## 2019-10-02 RX ADMIN — Medication 0.6 MILLIGRAM(S): at 05:47

## 2019-10-02 RX ADMIN — ENOXAPARIN SODIUM 40 MILLIGRAM(S): 100 INJECTION SUBCUTANEOUS at 09:39

## 2019-10-02 RX ADMIN — Medication 600 MILLIGRAM(S): at 18:27

## 2019-10-02 RX ADMIN — Medication 25 MILLIGRAM(S): at 13:03

## 2019-10-02 RX ADMIN — SODIUM CHLORIDE 3 MILLILITER(S): 9 INJECTION INTRAMUSCULAR; INTRAVENOUS; SUBCUTANEOUS at 06:06

## 2019-10-02 RX ADMIN — Medication 15 MILLIGRAM(S): at 07:21

## 2019-10-02 RX ADMIN — PANTOPRAZOLE SODIUM 40 MILLIGRAM(S): 20 TABLET, DELAYED RELEASE ORAL at 05:48

## 2019-10-02 RX ADMIN — Medication 20 MILLIGRAM(S): at 05:48

## 2019-10-02 RX ADMIN — Medication 25 MILLIGRAM(S): at 21:56

## 2019-10-02 RX ADMIN — Medication 250 MILLIGRAM(S): at 18:28

## 2019-10-02 RX ADMIN — Medication 2: at 13:02

## 2019-10-02 RX ADMIN — Medication 0.6 MILLIGRAM(S): at 18:27

## 2019-10-02 RX ADMIN — Medication 20 MILLIGRAM(S): at 18:29

## 2019-10-02 RX ADMIN — Medication 25 MILLIGRAM(S): at 05:48

## 2019-10-02 RX ADMIN — Medication 100 MILLIGRAM(S): at 18:27

## 2019-10-02 RX ADMIN — Medication 600 MILLIGRAM(S): at 18:57

## 2019-10-02 RX ADMIN — CHLORHEXIDINE GLUCONATE 1 APPLICATION(S): 213 SOLUTION TOPICAL at 09:06

## 2019-10-02 RX ADMIN — SODIUM CHLORIDE 3 MILLILITER(S): 9 INJECTION INTRAMUSCULAR; INTRAVENOUS; SUBCUTANEOUS at 13:26

## 2019-10-02 RX ADMIN — METFORMIN HYDROCHLORIDE 1000 MILLIGRAM(S): 850 TABLET ORAL at 18:28

## 2019-10-02 RX ADMIN — SODIUM CHLORIDE 3 MILLILITER(S): 9 INJECTION INTRAMUSCULAR; INTRAVENOUS; SUBCUTANEOUS at 21:55

## 2019-10-02 RX ADMIN — OXYCODONE AND ACETAMINOPHEN 1 TABLET(S): 5; 325 TABLET ORAL at 13:54

## 2019-10-02 RX ADMIN — Medication 15 MILLIGRAM(S): at 00:00

## 2019-10-02 RX ADMIN — ENOXAPARIN SODIUM 40 MILLIGRAM(S): 100 INJECTION SUBCUTANEOUS at 21:56

## 2019-10-02 RX ADMIN — Medication 15 MILLIGRAM(S): at 01:00

## 2019-10-02 NOTE — PROGRESS NOTE ADULT - SUBJECTIVE AND OBJECTIVE BOX
[facility]  INFECTIOUS DISEASES AND INTERNAL MEDICINE at [place]  =======================================================  Carlo Hernandez MD  Diplomates American Board of Internal Medicine and Infectious Diseases  Telephone [phone]  Fax            [phone]  =======================================================    [facility]-[id number]  MIRANDA RAYO   follow up for:  + lyme test; pericarditis  patient seen and examined.     s/p pericardial window 9/30/19.   has some cough this AM    I have personally reviewed the labs and data; pertinent labs and data are listed in this note; please see below.   ===================================================  REVIEW OF SYSTEMS:  CONSTITUTIONAL:  No Fever or chills  HEENT:  No diplopia or blurred vision.  No earache, sore throat or runny nose.  CARDIOVASCULAR:  No pressure, squeezing, strangling, tightness, heaviness or aching about the chest, neck, axilla or epigastrium.  RESPIRATORY:  No cough, shortness of breath  GASTROINTESTINAL:  No nausea, vomiting or diarrhea.  GENITOURINARY:  No dysuria, frequency or urgency. No Blood in urine  MUSCULOSKELETAL:  no joint aches, no muscle pain  SKIN:  No change in skin, hair or nails.  NEUROLOGIC:  No Headaches, seizures or weakness.  PSYCHIATRIC:  No disorder of thought or mood.  ENDOCRINE:  No heat or cold intolerance  HEMATOLOGICAL:  No easy bruising or bleeding.   =======================================================  Allergies  No Known Allergies    Antibiotics:  cefTRIAXone   IVPB 2000 milliGRAM(s) IV Intermittent every 24 hours    Other medications:  colchicine 0.6 milliGRAM(s) Oral two times a day  enoxaparin Injectable 40 milliGRAM(s) SubCutaneous every 12 hours  furosemide   Injectable 20 milliGRAM(s) IV Push two times a day  ibuprofen  Tablet. 600 milliGRAM(s) Oral every 6 hours  insulin lispro (HumaLOG) corrective regimen sliding scale   SubCutaneous Before meals and at bedtime  metFORMIN 1000 milliGRAM(s) Oral two times a day  metoprolol tartrate 25 milliGRAM(s) Oral every 8 hours  pantoprazole    Tablet 40 milliGRAM(s) Oral before breakfast  saccharomyces boulardii 250 milliGRAM(s) Oral two times a day  sodium chloride 0.9% lock flush 3 milliLiter(s) IV Push every 8 hours  sodium chloride 0.9% lock flush 3 milliLiter(s) IV Push every 8 hours    ======================================================  Physical Exam:  ============  T(F): 98.6 (02 Oct 2019 08:00), Max: 99.4 (01 Oct 2019 21:00)  HR: 98 (02 Oct 2019 13:10)  BP: 122/85 (02 Oct 2019 13:10)  RR: 17 (02 Oct 2019 13:10)  SpO2: 98% (02 Oct 2019 13:10) (94% - 98%)  temp max in last 48H T(F): , Max: 99.4 (10-01-19 @ 21:00)    General:  No acute distress.  OBESE  Eye: Pupils are equal, round and reactive to light, Extraocular movements are intact, Normal conjunctiva.  HENT: Normocephalic, Oral mucosa is moist, No pharyngeal erythema, No sinus tenderness.  Neck: Supple, No lymphadenopathy.  Respiratory: Lungs are clear to auscultation, Respirations are non-labored.  LEFT sided chest tube  Cardiovascular: Normal rate, Regular rhythm,   Gastrointestinal: Soft, Non-tender, Non-distended, Normal bowel sounds.  Genitourinary: No costovertebral angle tenderness.  Lymphatics: No lymphadenopathy neck,   Musculoskeletal: Normal range of motion, Normal strength.  Integumentary: No rash.  Neurologic: Alert, Oriented, No focal deficits, Cranial Nerves II-XII are grossly intact.  Psychiatric: Appropriate mood & affect.    =======================================================  Labs:                        11.9   15.88 )-----------( 525      ( 02 Oct 2019 06:14 )             39.3       WBC Count: 15.88 K/uL (10-02-19 @ 06:14)  WBC Count: 13.73 K/uL (10-01-19 @ 03:05)  WBC Count: 15.20 K/uL (09-30-19 @ 10:50)  WBC Count: 16.58 K/uL (09-30-19 @ 02:14)  WBC Count: 15.49 K/uL (09-29-19 @ 08:32)  WBC Count: 14.65 K/uL (09-28-19 @ 04:09)  WBC Count: 13.34 K/uL (09-27-19 @ 17:24)      10-02    134<L>  |  99  |  16.0  ----------------------------<  126<H>  4.5   |  23.0  |  0.72    Ca    8.8      02 Oct 2019 06:14  Mg     2.0     10-02        Culture - Body Fluid with Gram Stain (collected 09-30-19 @ 12:24)  Source: .Body Fluid Pericardial Effusion  Gram Stain (09-30-19 @ 12:48):    Few White blood cells    No organisms seen

## 2019-10-02 NOTE — PROGRESS NOTE ADULT - SUBJECTIVE AND OBJECTIVE BOX
INTERVAL HPI/OVERNIGHT EVENTS:  follwo up T2DM    has been havign more coughing epsidoes today     HAs CP when takes deep breath   MEDICATIONS  (STANDING):  cefTRIAXone   IVPB 2000 milliGRAM(s) IV Intermittent every 24 hours  chlorhexidine 2% Cloths 1 Application(s) Topical <User Schedule>  colchicine 0.6 milliGRAM(s) Oral two times a day  enoxaparin Injectable 40 milliGRAM(s) SubCutaneous every 12 hours  furosemide   Injectable 20 milliGRAM(s) IV Push two times a day  ibuprofen  Tablet. 600 milliGRAM(s) Oral every 6 hours  insulin lispro (HumaLOG) corrective regimen sliding scale   SubCutaneous Before meals and at bedtime  metFORMIN 1000 milliGRAM(s) Oral two times a day  metoprolol tartrate 25 milliGRAM(s) Oral every 8 hours  pantoprazole    Tablet 40 milliGRAM(s) Oral before breakfast  saccharomyces boulardii 250 milliGRAM(s) Oral two times a day  sodium chloride 0.9% lock flush 3 milliLiter(s) IV Push every 8 hours  sodium chloride 0.9% lock flush 3 milliLiter(s) IV Push every 8 hours    MEDICATIONS  (PRN):  guaiFENesin    Syrup 100 milliGRAM(s) Oral every 6 hours PRN Cough  oxyCODONE    5 mG/acetaminophen 325 mG 1 Tablet(s) Oral every 4 hours PRN Moderate Pain (4 - 6)  oxyCODONE    5 mG/acetaminophen 325 mG 2 Tablet(s) Oral every 4 hours PRN Severe Pain (7 - 10)  sodium chloride 0.9% lock flush 15 milliLiter(s) IV Push every 1 hour PRN Pre/post blood products, medications, blood draw, and to maintain line patency      Allergies    No Known Allergies    Intolerances    [facility] patient (Unknown)      Review of systems:    Vital Signs Last 24 Hrs  T(C): 37.1 (02 Oct 2019 21:51), Max: 37.3 (01 Oct 2019 22:37)  T(F): 98.7 (02 Oct 2019 21:51), Max: 99.2 (01 Oct 2019 22:37)  HR: 98 (02 Oct 2019 21:51) (86 - 99)  BP: 126/81 (02 Oct 2019 21:51) (109/68 - 138/77)  BP(mean): 101 (01 Oct 2019 22:36) (101 - 101)  RR: 16 (02 Oct 2019 21:51) (16 - 22)  SpO2: 98% (02 Oct 2019 21:51) (95% - 98%)    PHYSICAL EXAM:      Constitutional: NAD, well-groomed, well-developed  HEENT: PERRLA, EOMI, no exophalmos  Neck: No LAD, No JVD, trachea midline, no thyroid enlargement  Back: Normal spine flexure, No CVA tenderness  Respiratory: CTAB  Cardiovascular: S1 and S2, RRR, no M/G/R  Gastrointestinal: BS+, soft, no organomeglag or mass  EXtremities 2+pittign edema bialterally   Vascular: 2+ peripheral pulses          LABS:                        11.9   15.88 )-----------( 525      ( 02 Oct 2019 06:14 )             39.3     10-02    134<L>  |  99  |  16.0  ----------------------------<  126<H>  4.5   |  23.0  |  0.72    Ca    8.8      02 Oct 2019 06:14  Mg     2.0     10-02            CAPILLARY BLOOD GLUCOSE  CAPILLARY BLOOD GLUCOSE      POCT Blood Glucose.: 132 mg/dL (02 Oct 2019 21:35)  POCT Blood Glucose.: 101 mg/dL (02 Oct 2019 17:04)  POCT Blood Glucose.: 156 mg/dL (02 Oct 2019 12:44)  POCT Blood Glucose.: 119 mg/dL (02 Oct 2019 08:18)      RADIOLOGY & ADDITIONAL TESTS:

## 2019-10-02 NOTE — PROGRESS NOTE ADULT - ASSESSMENT
This 44 year old male with history of obesity, IDDM, HTN, and anemia.  In march while he was in [place] he stated he wasn't feeling well and there was prescribed blood pressure medication.  He was told to follow up when he got home but he was feeling well and he never did.  He started to see swelling in his legs and started to feel more short of breath.  He followed up with cardiologist and had an echo.  His cardiologist was trying to reach him, he finally got a message days later that he had fluid around the heart and needed to be drained.  He was admitted to [facility] 15 days ago, he had a pericardial drainage for 700cc's serous drainage. He was started on medications for pericarditis and was discharged after 4 days. After follow up with cardiologist, he was found to have another accumulation of pericardial effusion.  He was sent to [facility]. On 9/24/19 he underwent a Right heart cath and had another pericardiocentesis for 700 cc's.  The drain was D/C'd on  9/26/19, repeat echo showed posterior moderate effusion.  He was then transported to [place] for evaluation for pericardial window on 9/27/19.    Pending pericardial window on 9/30/19  in terms of his pericarditis, not entirely clear that this is Lyme disease.    his Lyme c6 is positive, but can be related to prior tick bite. he has never been previously treated for Lyme.     - repeat Lyme C6 is lower, western blot is in process  - HIV screen in non-reactive  - Continue ceftriaxone 2 grams Q 24H; anticipate for 14 days. then stop; 10/13/19  - will need MIDLINE when ready for discharge to community.       - follow up on surgical pathology

## 2019-10-02 NOTE — PROCEDURE NOTE - NSICDXPROCEDURE_GEN_ALL_CORE_FT
PROCEDURES:  US guided needle placement 02-Oct-2019 15:35:59 Reyes Gilmore  US guided vascular access 02-Oct-2019 15:35:44 Patent right basilic vein Reyes Mccall  Midline catheter insertion 02-Oct-2019 15:35:11 4FR 14CM 32CMCIRC BARD POWER MIDLINE ns flush good heme back right basilic vein Reyes Mccall

## 2019-10-02 NOTE — PROCEDURE NOTE - NSINFORMCONSENT_GEN_A_CORE
Benefits, risks, and possible complications of procedure explained to patient/caregiver who verbalized understanding and gave verbal consent./surrogate  bedside

## 2019-10-02 NOTE — PROGRESS NOTE ADULT - ASSESSMENT
44 year old Male with PMH of  cardiomegaly, DM II, HTN, and recurrent pericardial effusions with pericardiocentesis x2 (serous drainage), most recently at [facility] where a residual posterior moderate pericardial effusion was seen on TTE after the drain was removed on 9/26/19. Patient was then transferred to [facility] on 9/27 for further management. Patient underwent a pericardial window and pericardial biopsy on 9/30/19 with Dr. Peters.

## 2019-10-02 NOTE — PROGRESS NOTE ADULT - ASSESSMENT
T2DM - COnt current RX    BS under good control    Pericardial effucion follow up per CT surgery   being tx for Lyme  diseae now- coudl be contributing

## 2019-10-02 NOTE — PROCEDURE NOTE - NSPROCDETAILS_GEN_ALL_CORE
ultrasound assessment/location identified, draped/prepped, sterile technique used/sterile dressing applied/sterile technique, catheter placed/ultrasound guidance/supine position

## 2019-10-02 NOTE — PROGRESS NOTE ADULT - SUBJECTIVE AND OBJECTIVE BOX
Subjective: Pt sitting up in chair eating breakfast.  Denies CP or SOB.  NAD noted.      Tele:  SR                        T(F): 98.6 (10-02-19 @ 08:00), Max: 99.4 (10-01-19 @ 21:00)  HR: 95 (10-02-19 @ 08:00) (81 - 98)  BP: 114/79 (10-02-19 @ 08:00) (111/71 - 138/84)  RR: 16 (10-02-19 @ 08:00) (16 - 30)  SpO2: 95% (10-02-19 @ 08:00) (94% - 96%) on room air at rest.    Daily     Daily Weight in k.3 (02 Oct 2019 04:55)    LV EF: 65%    No Known Allergies      10-02    134<L>  |  99  |  16.0  ----------------------------<  126<H>  4.5   |  23.0  |  0.72    Ca    8.8      02 Oct 2019 06:14  Mg     2.0     10-02                               11.9   15.88 )-----------( 525      ( 02 Oct 2019 06:14 )             39.3        PT/INR - ( 30 Sep 2019 10:50 )   PT: 17.0 sec;   INR: 1.46 ratio         PTT - ( 30 Sep 2019 10:50 )  PTT:32.3 sec           CAPILLARY BLOOD GLUCOSE  POCT Blood Glucose.: 119 mg/dL (02 Oct 2019 08:18)  POCT Blood Glucose.: 169 mg/dL (01 Oct 2019 21:28)  POCT Blood Glucose.: 112 mg/dL (01 Oct 2019 16:56)  POCT Blood Glucose.: 223 mg/dL (01 Oct 2019 11:32)           CXR: < from: Xray Chest 1 View- PORTABLE-Routine (10.02.19 @ 05:16) >   EXAM:  XR CHEST PORTABLE ROUTINE 1V                          PROCEDURE DATE:  10/02/2019        INTERPRETATION:  CHEST AP PORTABLE:    History: pericardial window.     Date and time of exam: 10/2/2019 5:00 AM.    Technique: A single AP view of the chest was obtained.    Comparison exam: 10/1/2019 5:20 AM.    Findings:  Cardiomegaly. Again noted is a pericardial drain. Resolution of pulmonary   vascular congestion since the prior study. The lungs are clear. Small   left pleural effusion, unchanged..    Impression:  Resolution of pulmonary vascular congestion since the prior study.   Otherwise stable.    RENETTA ZAMBRANO M.D., ATTENDING RADIOLOGIST  This document has been electronically signed. Oct  2 2019  9:59AM      < end of copied text >      I&O's Detail    01 Oct 2019 07:01  -  02 Oct 2019 07:00  --------------------------------------------------------  IN:    Oral Fluid: 1430 mL    Solution: 250 mL    Solution: 50 mL  Total IN: 1730 mL    OUT:    Chest Tube: 390 mL    Voided: 3525 mL  Total OUT: 3915 mL    Total NET: -2185 mL      02 Oct 2019 07:01  -  02 Oct 2019 10:36  --------------------------------------------------------  IN:    Oral Fluid: 725 mL  Total IN: 725 mL    OUT:  Total OUT: 0 mL    Total NET: 725 mL      CHEST TUBE:  [x ] YES [ ] NO  OUTPUT:  CT 240ml overnight and 390ml for the last 24 hours    AIR LEAKS:  [ ] YES [ x] NO      EPICARDIAL WIRES:  [ ] YES [x ] NO      BOWEL MOVEMENT:  [ ] YES [x ] NO        Active Medications:  cefTRIAXone   IVPB 2000 milliGRAM(s) IV Intermittent every 24 hours  colchicine 0.6 milliGRAM(s) Oral two times a day  enoxaparin Injectable 40 milliGRAM(s) SubCutaneous every 12 hours  furosemide   Injectable 20 milliGRAM(s) IV Push two times a day  ibuprofen  Tablet. 600 milliGRAM(s) Oral every 6 hours  insulin lispro (HumaLOG) corrective regimen sliding scale   SubCutaneous Before meals and at bedtime  metoprolol tartrate 25 milliGRAM(s) Oral every 8 hours  oxyCODONE    5 mG/acetaminophen 325 mG 1 Tablet(s) Oral every 4 hours PRN  oxyCODONE    5 mG/acetaminophen 325 mG 2 Tablet(s) Oral every 4 hours PRN  pantoprazole    Tablet 40 milliGRAM(s) Oral before breakfast  saccharomyces boulardii 250 milliGRAM(s) Oral two times a day  sodium chloride 0.9% lock flush 3 milliLiter(s) IV Push every 8 hours  sodium chloride 0.9% lock flush 3 milliLiter(s) IV Push every 8 hours      Physical Exam:    Neuro: AAOX3.  Non focal.    Pulm: Diminished BLL.  +CT to waterseal.  No crepitus.  No airleak.    CV: RRR.  +S1+S2.    Abd: Soft/NT/ND.  +BS. + Flatus.  -BM.    Extremities: + 1 edema BLE.  +pp.    Incision(s): Left thoracotomy site with dsd c/d/i                     PAST MEDICAL & SURGICAL HISTORY:  Heart failure  Cardiomegaly  Nonsmoker  Diabetes mellitus  Hypertension

## 2019-10-02 NOTE — PROCEDURE NOTE - NSPOSTCAREGUIDE_GEN_A_CORE
Instructed patient/caregiver regarding signs and symptoms of infection/Keep the cast/splint/dressing clean and dry/Care for catheter as per unit/ICU protocols/Instructed patient/caregiver to follow-up with primary care physician/Verbal/written post procedure instructions were given to patient/caregiver

## 2019-10-03 LAB
ALBUMIN SERPL ELPH-MCNC: 2.5 G/DL — LOW (ref 3.3–5.2)
ALP SERPL-CCNC: 118 U/L — SIGNIFICANT CHANGE UP (ref 40–120)
ALT FLD-CCNC: 14 U/L — SIGNIFICANT CHANGE UP
ANION GAP SERPL CALC-SCNC: 10 MMOL/L — SIGNIFICANT CHANGE UP (ref 5–17)
AST SERPL-CCNC: 14 U/L — SIGNIFICANT CHANGE UP
BILIRUB SERPL-MCNC: 0.5 MG/DL — SIGNIFICANT CHANGE UP (ref 0.4–2)
BUN SERPL-MCNC: 15 MG/DL — SIGNIFICANT CHANGE UP (ref 8–20)
CALCIUM SERPL-MCNC: 8.6 MG/DL — SIGNIFICANT CHANGE UP (ref 8.6–10.2)
CHLORIDE SERPL-SCNC: 100 MMOL/L — SIGNIFICANT CHANGE UP (ref 98–107)
CO2 SERPL-SCNC: 25 MMOL/L — SIGNIFICANT CHANGE UP (ref 22–29)
CREAT SERPL-MCNC: 0.75 MG/DL — SIGNIFICANT CHANGE UP (ref 0.5–1.3)
CV B1 AB TITR FLD: HIGH
CV B2 AB TITR FLD: NEGATIVE — SIGNIFICANT CHANGE UP
CV B3 AB TITR FLD: NEGATIVE — SIGNIFICANT CHANGE UP
CV B4 AB TITR FLD: NEGATIVE — SIGNIFICANT CHANGE UP
CV B5 AB TITR FLD: HIGH
CV B6 AB TITR FLD: HIGH
GLUCOSE BLDC GLUCOMTR-MCNC: 104 MG/DL — HIGH (ref 70–99)
GLUCOSE BLDC GLUCOMTR-MCNC: 126 MG/DL — HIGH (ref 70–99)
GLUCOSE BLDC GLUCOMTR-MCNC: 128 MG/DL — HIGH (ref 70–99)
GLUCOSE BLDC GLUCOMTR-MCNC: 138 MG/DL — HIGH (ref 70–99)
GLUCOSE SERPL-MCNC: 126 MG/DL — HIGH (ref 70–115)
HCT VFR BLD CALC: 36.1 % — LOW (ref 39–50)
HGB BLD-MCNC: 10.9 G/DL — LOW (ref 13–17)
MAGNESIUM SERPL-MCNC: 1.9 MG/DL — SIGNIFICANT CHANGE UP (ref 1.8–2.6)
MCHC RBC-ENTMCNC: 25.1 PG — LOW (ref 27–34)
MCHC RBC-ENTMCNC: 30.2 GM/DL — LOW (ref 32–36)
MCV RBC AUTO: 83 FL — SIGNIFICANT CHANGE UP (ref 80–100)
PHOSPHATE SERPL-MCNC: 3.5 MG/DL — SIGNIFICANT CHANGE UP (ref 2.4–4.7)
PLATELET # BLD AUTO: 531 K/UL — HIGH (ref 150–400)
POTASSIUM SERPL-MCNC: 4.2 MMOL/L — SIGNIFICANT CHANGE UP (ref 3.5–5.3)
POTASSIUM SERPL-SCNC: 4.2 MMOL/L — SIGNIFICANT CHANGE UP (ref 3.5–5.3)
PROT SERPL-MCNC: 6.1 G/DL — LOW (ref 6.6–8.7)
RBC # BLD: 4.35 M/UL — SIGNIFICANT CHANGE UP (ref 4.2–5.8)
RBC # FLD: 15.5 % — HIGH (ref 10.3–14.5)
SODIUM SERPL-SCNC: 135 MMOL/L — SIGNIFICANT CHANGE UP (ref 135–145)
TSH SERPL-MCNC: 3.69 UIU/ML — SIGNIFICANT CHANGE UP (ref 0.27–4.2)
WBC # BLD: 15.44 K/UL — HIGH (ref 3.8–10.5)
WBC # FLD AUTO: 15.44 K/UL — HIGH (ref 3.8–10.5)

## 2019-10-03 PROCEDURE — 99232 SBSQ HOSP IP/OBS MODERATE 35: CPT

## 2019-10-03 PROCEDURE — 71045 X-RAY EXAM CHEST 1 VIEW: CPT | Mod: 26

## 2019-10-03 PROCEDURE — 93010 ELECTROCARDIOGRAM REPORT: CPT

## 2019-10-03 RX ORDER — METOPROLOL TARTRATE 50 MG
25 TABLET ORAL EVERY 6 HOURS
Refills: 0 | Status: DISCONTINUED | OUTPATIENT
Start: 2019-10-03 | End: 2019-10-05

## 2019-10-03 RX ADMIN — METFORMIN HYDROCHLORIDE 1000 MILLIGRAM(S): 850 TABLET ORAL at 17:31

## 2019-10-03 RX ADMIN — Medication 600 MILLIGRAM(S): at 17:57

## 2019-10-03 RX ADMIN — CHLORHEXIDINE GLUCONATE 1 APPLICATION(S): 213 SOLUTION TOPICAL at 12:00

## 2019-10-03 RX ADMIN — ENOXAPARIN SODIUM 40 MILLIGRAM(S): 100 INJECTION SUBCUTANEOUS at 09:01

## 2019-10-03 RX ADMIN — Medication 25 MILLIGRAM(S): at 05:48

## 2019-10-03 RX ADMIN — Medication 600 MILLIGRAM(S): at 05:47

## 2019-10-03 RX ADMIN — Medication 250 MILLIGRAM(S): at 05:48

## 2019-10-03 RX ADMIN — Medication 600 MILLIGRAM(S): at 12:02

## 2019-10-03 RX ADMIN — Medication 600 MILLIGRAM(S): at 07:00

## 2019-10-03 RX ADMIN — SODIUM CHLORIDE 3 MILLILITER(S): 9 INJECTION INTRAMUSCULAR; INTRAVENOUS; SUBCUTANEOUS at 21:22

## 2019-10-03 RX ADMIN — Medication 250 MILLIGRAM(S): at 17:30

## 2019-10-03 RX ADMIN — Medication 0.6 MILLIGRAM(S): at 17:31

## 2019-10-03 RX ADMIN — Medication 0.6 MILLIGRAM(S): at 05:47

## 2019-10-03 RX ADMIN — ENOXAPARIN SODIUM 40 MILLIGRAM(S): 100 INJECTION SUBCUTANEOUS at 21:22

## 2019-10-03 RX ADMIN — Medication 25 MILLIGRAM(S): at 17:31

## 2019-10-03 RX ADMIN — SODIUM CHLORIDE 3 MILLILITER(S): 9 INJECTION INTRAMUSCULAR; INTRAVENOUS; SUBCUTANEOUS at 14:22

## 2019-10-03 RX ADMIN — Medication 600 MILLIGRAM(S): at 17:31

## 2019-10-03 RX ADMIN — METFORMIN HYDROCHLORIDE 1000 MILLIGRAM(S): 850 TABLET ORAL at 09:01

## 2019-10-03 RX ADMIN — Medication 20 MILLIGRAM(S): at 05:47

## 2019-10-03 RX ADMIN — CEFTRIAXONE 100 MILLIGRAM(S): 500 INJECTION, POWDER, FOR SOLUTION INTRAMUSCULAR; INTRAVENOUS at 14:39

## 2019-10-03 RX ADMIN — PANTOPRAZOLE SODIUM 40 MILLIGRAM(S): 20 TABLET, DELAYED RELEASE ORAL at 05:47

## 2019-10-03 RX ADMIN — Medication 600 MILLIGRAM(S): at 12:30

## 2019-10-03 RX ADMIN — Medication 25 MILLIGRAM(S): at 12:02

## 2019-10-03 RX ADMIN — Medication 600 MILLIGRAM(S): at 00:02

## 2019-10-03 RX ADMIN — Medication 600 MILLIGRAM(S): at 01:00

## 2019-10-03 RX ADMIN — SODIUM CHLORIDE 3 MILLILITER(S): 9 INJECTION INTRAMUSCULAR; INTRAVENOUS; SUBCUTANEOUS at 07:39

## 2019-10-03 RX ADMIN — Medication 20 MILLIGRAM(S): at 17:31

## 2019-10-03 NOTE — PROGRESS NOTE ADULT - ASSESSMENT
T2DM now on MFN and sliding scale insulin    cont current rx    BS stabel   s/p pericardial effusion recurrnet- on antibiotics

## 2019-10-03 NOTE — PROGRESS NOTE ADULT - ASSESSMENT
This 44 year old male with history of obesity, IDDM, HTN, and anemia.  In march while he was in [place] he stated he wasn't feeling well and there was prescribed blood pressure medication.  He was told to follow up when he got home but he was feeling well and he never did.  He started to see swelling in his legs and started to feel more short of breath.  He followed up with cardiologist and had an echo.  His cardiologist was trying to reach him, he finally got a message days later that he had fluid around the heart and needed to be drained.  He was admitted to [facility] 15 days ago, he had a pericardial drainage for 700cc's serous drainage. He was started on medications for pericarditis and was discharged after 4 days. After follow up with cardiologist, he was found to have another accumulation of pericardial effusion.  He was sent to [facility]. On 9/24/19 he underwent a Right heart cath and had another pericardiocentesis for 700 cc's.  The drain was D/C'd on  9/26/19, repeat echo showed posterior moderate effusion.  He was then transported to [place] for evaluation for pericardial window on 9/27/19.    Pending pericardial window on 9/30/19  in terms of his pericarditis, not entirely clear that this is Lyme disease.    his Lyme c6 is positive, but can be related to prior tick bite. he has never been previously treated for Lyme.     - repeat Lyme C6 is lower, western blot is in process  - HIV screen in non-reactive  - Continue ceftriaxone 2 grams Q 24H; anticipate for 14 days. then stop; 10/13/19  - s/p midline      - follow up on surgical pathology

## 2019-10-03 NOTE — PROGRESS NOTE ADULT - PROBLEM SELECTOR PLAN 1
Pericardial window via thoracotomy on 9/30/19  Fluid was sent for cytology   Follow up pericardial biopsy  Pt tested positive for Lyme Dx, ID is following viral serologies sent and started on ceftriaxone, may be past exposure  Continue colchicine/ibuoprofen for pericarditis

## 2019-10-03 NOTE — PROGRESS NOTE ADULT - SUBJECTIVE AND OBJECTIVE BOX
Subjective: "I feel ok" NAD seated in chair, denies cp, sob.    VITAL SIGNS  Vital Signs Last 24 Hrs  T(C): 36.9 (10-03-19 @ 05:44), Max: 37.1 (10-02-19 @ 21:51)  T(F): 98.5 (10-03-19 @ 05:44), Max: 98.7 (10-02-19 @ 21:51)  HR: 91 (10-03-19 @ 05:44) (85 - 99)  BP: 131/82 (10-03-19 @ 05:44) (109/68 - 131/82)  RR: 20 (10-03-19 @ 05:44) (16 - 20)  SpO2: 97% (10-03-19 @ 05:44) (96% - 99%)  on ra              Telemetry/Alarms:  SR 75-90, one episode of a missed beat (may be blocked qrs)  LVEF: 65%    MEDICATIONS  cefTRIAXone   IVPB 2000 milliGRAM(s) IV Intermittent every 24 hours  chlorhexidine 2% Cloths 1 Application(s) Topical <User Schedule>  colchicine 0.6 milliGRAM(s) Oral two times a day  enoxaparin Injectable 40 milliGRAM(s) SubCutaneous every 12 hours  furosemide   Injectable 20 milliGRAM(s) IV Push two times a day  guaiFENesin    Syrup 100 milliGRAM(s) Oral every 6 hours PRN  ibuprofen  Tablet. 600 milliGRAM(s) Oral every 6 hours  insulin lispro (HumaLOG) corrective regimen sliding scale   SubCutaneous Before meals and at bedtime  metFORMIN 1000 milliGRAM(s) Oral two times a day  metoprolol tartrate 25 milliGRAM(s) Oral every 6 hours  oxyCODONE    5 mG/acetaminophen 325 mG 1 Tablet(s) Oral every 4 hours PRN  oxyCODONE    5 mG/acetaminophen 325 mG 2 Tablet(s) Oral every 4 hours PRN  pantoprazole    Tablet 40 milliGRAM(s) Oral before breakfast  saccharomyces boulardii 250 milliGRAM(s) Oral two times a day  sodium chloride 0.9% lock flush 3 milliLiter(s) IV Push every 8 hours  sodium chloride 0.9% lock flush 15 milliLiter(s) IV Push every 1 hour PRN  sodium chloride 0.9% lock flush 3 milliLiter(s) IV Push every 8 hours      PHYSICAL EXAM  General: well nourished, well developed, no acute distress  Neurology: alert and oriented x 3, nonfocal, no gross deficits  Respiratory: diminished left lung  CV: regular rate and rhythm, normal S1, S2  Abdomen: soft, nontender, nondistended, positive bowel sounds, last bowel movement today  Extremities: warm, well perfused. 2+ edema. + DP pulses  Incisions: left lateral thor inc, + mepilex, c/d/i.    Chest tubes: left thoracotomy tube with serous drainage, no air leak      10-02 @ 07:  -  10-03 @ 07:00  --------------------------------------------------------  IN: 800 mL / OUT: 3065 mL / NET: -2265 mL    10-03 @ 07:  -  10-03 @ 11:11  --------------------------------------------------------  IN: 0 mL / OUT: 400 mL / NET: -400 mL        Weights:  Daily     Daily Weight in k.2 (03 Oct 2019 05:02)  Admit Wt: Drug Dosing Weight  Height (cm): 172 (30 Sep 2019 06:51)  Weight (kg): 111 (30 Sep 2019 06:51)  BMI (kg/m2): 37.5 (30 Sep 2019 06:51)  BSA (m2): 2.22 (30 Sep 2019 06:51)    All laboratory results, radiology and medications reviewed.    LABS  10-03    135  |  100  |  15.0  ----------------------------<  126<H>  4.2   |  25.0  |  0.75    Ca    8.6      03 Oct 2019 06:13  Phos  3.5     10-03  Mg     1.9     10-03    TPro  6.1<L>  /  Alb  2.5<L>  /  TBili  0.5  /  DBili  x   /  AST  14  /  ALT  14  /  AlkPhos  118  10-03                                 10.9   15.44 )-----------( 531      ( 03 Oct 2019 06:13 )             36.1            Bilirubin Total, Serum: 0.5 mg/dL (10-03 @ 06:13)    CAPILLARY BLOOD GLUCOSE      POCT Blood Glucose.: 128 mg/dL (03 Oct 2019 08:57)  POCT Blood Glucose.: 132 mg/dL (02 Oct 2019 21:35)  POCT Blood Glucose.: 101 mg/dL (02 Oct 2019 17:04)  POCT Blood Glucose.: 156 mg/dL (02 Oct 2019 12:44)           Today's CXR: < from: Xray Chest 1 View- PORTABLE-Routine (10.02.19 @ 05:16) >    Findings:  Cardiomegaly. Again noted is a pericardial drain. Resolution of pulmonary   vascular congestion since the prior study. The lungs are clear. Small   left pleural effusion, unchanged..    Impression:  Resolution of pulmonary vascular congestion since the prior study.   Otherwise stable.    < end of copied text >      Today's EKG:  < from: 12 Lead ECG (19 @ 10:41) >    Diagnosis Line Normal sinus rhythm  Rightward axis  Low voltage QRS  Nonspecific T wave abnormality  Abnormal ECG      < end of copied text >      PAST MEDICAL & SURGICAL HISTORY:  Heart failure  Cardiomegaly  Nonsmoker  Diabetes mellitus  Hypertension

## 2019-10-03 NOTE — PROGRESS NOTE ADULT - SUBJECTIVE AND OBJECTIVE BOX
INTERVAL HPI/OVERNIGHT EVENTS:  follow Up T2DM    Pt reports less coughing today    feels better       MEDICATIONS  (STANDING):  cefTRIAXone   IVPB 2000 milliGRAM(s) IV Intermittent every 24 hours  chlorhexidine 2% Cloths 1 Application(s) Topical <User Schedule>  colchicine 0.6 milliGRAM(s) Oral two times a day  enoxaparin Injectable 40 milliGRAM(s) SubCutaneous every 12 hours  furosemide   Injectable 20 milliGRAM(s) IV Push two times a day  ibuprofen  Tablet. 600 milliGRAM(s) Oral every 6 hours  insulin lispro (HumaLOG) corrective regimen sliding scale   SubCutaneous Before meals and at bedtime  metFORMIN 1000 milliGRAM(s) Oral two times a day  metoprolol tartrate 25 milliGRAM(s) Oral every 6 hours  pantoprazole    Tablet 40 milliGRAM(s) Oral before breakfast  saccharomyces boulardii 250 milliGRAM(s) Oral two times a day  sodium chloride 0.9% lock flush 3 milliLiter(s) IV Push every 8 hours  sodium chloride 0.9% lock flush 3 milliLiter(s) IV Push every 8 hours    MEDICATIONS  (PRN):  guaiFENesin    Syrup 100 milliGRAM(s) Oral every 6 hours PRN Cough  oxyCODONE    5 mG/acetaminophen 325 mG 1 Tablet(s) Oral every 4 hours PRN Moderate Pain (4 - 6)  oxyCODONE    5 mG/acetaminophen 325 mG 2 Tablet(s) Oral every 4 hours PRN Severe Pain (7 - 10)  sodium chloride 0.9% lock flush 15 milliLiter(s) IV Push every 1 hour PRN Pre/post blood products, medications, blood draw, and to maintain line patency      Allergies    No Known Allergies    Intolerances    [facility] patient (Unknown)      Review of systems:    Vital Signs Last 24 Hrs  T(C): 36.9 (03 Oct 2019 20:16), Max: 36.9 (03 Oct 2019 05:44)  T(F): 98.5 (03 Oct 2019 20:16), Max: 98.5 (03 Oct 2019 05:44)  HR: 83 (03 Oct 2019 20:16) (83 - 92)  BP: 98/62 (03 Oct 2019 20:16) (98/62 - 150/84)  BP(mean): --  RR: 17 (03 Oct 2019 20:16) (17 - 20)  SpO2: 96% (03 Oct 2019 20:16) (96% - 100%)    PHYSICAL EXAM:      Constitutional: NAD, well-groomed, well-developed  HEENT: PERRLA, EOMI, no exophalmos  Neck: No LAD, No JVD, trachea midline, no thyroid enlargement  Respiratory: CTAB  Cardiovascular: S1 and S2, RRR, no M/G/R  Extremities: No peripheral edema, no pedal lesions  Vascular: 2+ peripheral pulses        LABS:                        10.9   15.44 )-----------( 531      ( 03 Oct 2019 06:13 )             36.1     10-03    135  |  100  |  15.0  ----------------------------<  126<H>  4.2   |  25.0  |  0.75    Ca    8.6      03 Oct 2019 06:13  Phos  3.5     10-03  Mg     1.9     10-03    TPro  6.1<L>  /  Alb  2.5<L>  /  TBili  0.5  /  DBili  x   /  AST  14  /  ALT  14  /  AlkPhos  118  10-03          CAPILLARY BLOOD GLUCOSE  CAPILLARY BLOOD GLUCOSE      POCT Blood Glucose.: 138 mg/dL (03 Oct 2019 21:50)  POCT Blood Glucose.: 104 mg/dL (03 Oct 2019 17:07)  POCT Blood Glucose.: 126 mg/dL (03 Oct 2019 11:52)  POCT Blood Glucose.: 128 mg/dL (03 Oct 2019 08:57)      RADIOLOGY & ADDITIONAL TESTS:

## 2019-10-03 NOTE — PROGRESS NOTE ADULT - ASSESSMENT
44 year old Male with PMH of  cardiomegaly, DM II, HTN, and recurrent pericardial effusions with pericardiocentesis x2 (serous drainage), most recently at [facility] where a residual posterior moderate pericardial effusion was seen on TTE after the drain was removed on 9/26/19. Patient was then transferred to [facility] on 9/27 for further management. Patient underwent a pericardial window and pericardial biopsy on 9/30/19 with Dr. Peters. Postoperative course complicated by some AF/SVT now SR. 10/1 one missed beat noted but no bradycardia and has not recurred.

## 2019-10-04 LAB
ANION GAP SERPL CALC-SCNC: 11 MMOL/L — SIGNIFICANT CHANGE UP (ref 5–17)
B BURGDOR AB SER-IMP: NEGATIVE — SIGNIFICANT CHANGE UP
B BURGDOR18KD IGG SER QL IB: PRESENT
B BURGDOR23KD IGG SER QL IB: SIGNIFICANT CHANGE UP
B BURGDOR23KD IGM SER QL IB: SIGNIFICANT CHANGE UP
B BURGDOR28KD IGG SER QL IB: SIGNIFICANT CHANGE UP
B BURGDOR30KD IGG SER QL IB: SIGNIFICANT CHANGE UP
B BURGDOR31KD IGG SER QL IB: SIGNIFICANT CHANGE UP
B BURGDOR39KD IGG SER QL IB: SIGNIFICANT CHANGE UP
B BURGDOR39KD IGM SER QL IB: SIGNIFICANT CHANGE UP
B BURGDOR41KD IGG SER QL IB: PRESENT
B BURGDOR41KD IGM SER QL IB: PRESENT
B BURGDOR45KD IGG SER QL IB: SIGNIFICANT CHANGE UP
B BURGDOR58KD IGG SER QL IB: SIGNIFICANT CHANGE UP
B BURGDOR66KD IGG SER QL IB: SIGNIFICANT CHANGE UP
B BURGDOR93KD IGG SER QL IB: SIGNIFICANT CHANGE UP
BUN SERPL-MCNC: 14 MG/DL — SIGNIFICANT CHANGE UP (ref 8–20)
CALCIUM SERPL-MCNC: 8.3 MG/DL — LOW (ref 8.6–10.2)
CHLORIDE SERPL-SCNC: 98 MMOL/L — SIGNIFICANT CHANGE UP (ref 98–107)
CO2 SERPL-SCNC: 25 MMOL/L — SIGNIFICANT CHANGE UP (ref 22–29)
CREAT SERPL-MCNC: 0.76 MG/DL — SIGNIFICANT CHANGE UP (ref 0.5–1.3)
GLUCOSE BLDC GLUCOMTR-MCNC: 101 MG/DL — HIGH (ref 70–99)
GLUCOSE BLDC GLUCOMTR-MCNC: 110 MG/DL — HIGH (ref 70–99)
GLUCOSE BLDC GLUCOMTR-MCNC: 124 MG/DL — HIGH (ref 70–99)
GLUCOSE BLDC GLUCOMTR-MCNC: 159 MG/DL — HIGH (ref 70–99)
GLUCOSE SERPL-MCNC: 115 MG/DL — SIGNIFICANT CHANGE UP (ref 70–115)
HCT VFR BLD CALC: 34.7 % — LOW (ref 39–50)
HGB BLD-MCNC: 10.5 G/DL — LOW (ref 13–17)
LYME WB IGM INTERPRETATION: NEGATIVE — SIGNIFICANT CHANGE UP
MAGNESIUM SERPL-MCNC: 1.8 MG/DL — SIGNIFICANT CHANGE UP (ref 1.6–2.6)
MCHC RBC-ENTMCNC: 25.1 PG — LOW (ref 27–34)
MCHC RBC-ENTMCNC: 30.3 GM/DL — LOW (ref 32–36)
MCV RBC AUTO: 83 FL — SIGNIFICANT CHANGE UP (ref 80–100)
PHOSPHATE SERPL-MCNC: 3.5 MG/DL — SIGNIFICANT CHANGE UP (ref 2.4–4.7)
PLATELET # BLD AUTO: 522 K/UL — HIGH (ref 150–400)
POTASSIUM SERPL-MCNC: 4 MMOL/L — SIGNIFICANT CHANGE UP (ref 3.5–5.3)
POTASSIUM SERPL-SCNC: 4 MMOL/L — SIGNIFICANT CHANGE UP (ref 3.5–5.3)
RBC # BLD: 4.18 M/UL — LOW (ref 4.2–5.8)
RBC # FLD: 15.4 % — HIGH (ref 10.3–14.5)
SODIUM SERPL-SCNC: 134 MMOL/L — LOW (ref 135–145)
WBC # BLD: 13.68 K/UL — HIGH (ref 3.8–10.5)
WBC # FLD AUTO: 13.68 K/UL — HIGH (ref 3.8–10.5)

## 2019-10-04 PROCEDURE — 71045 X-RAY EXAM CHEST 1 VIEW: CPT | Mod: 26

## 2019-10-04 PROCEDURE — 99232 SBSQ HOSP IP/OBS MODERATE 35: CPT

## 2019-10-04 RX ORDER — MAGNESIUM SULFATE 500 MG/ML
2 VIAL (ML) INJECTION ONCE
Refills: 0 | Status: COMPLETED | OUTPATIENT
Start: 2019-10-04 | End: 2019-10-04

## 2019-10-04 RX ADMIN — CEFTRIAXONE 100 MILLIGRAM(S): 500 INJECTION, POWDER, FOR SOLUTION INTRAMUSCULAR; INTRAVENOUS at 14:51

## 2019-10-04 RX ADMIN — Medication 600 MILLIGRAM(S): at 13:07

## 2019-10-04 RX ADMIN — METFORMIN HYDROCHLORIDE 1000 MILLIGRAM(S): 850 TABLET ORAL at 05:33

## 2019-10-04 RX ADMIN — Medication 600 MILLIGRAM(S): at 17:45

## 2019-10-04 RX ADMIN — Medication 600 MILLIGRAM(S): at 13:35

## 2019-10-04 RX ADMIN — Medication 25 MILLIGRAM(S): at 23:58

## 2019-10-04 RX ADMIN — ENOXAPARIN SODIUM 40 MILLIGRAM(S): 100 INJECTION SUBCUTANEOUS at 09:45

## 2019-10-04 RX ADMIN — Medication 0.6 MILLIGRAM(S): at 17:45

## 2019-10-04 RX ADMIN — Medication 250 MILLIGRAM(S): at 05:33

## 2019-10-04 RX ADMIN — CHLORHEXIDINE GLUCONATE 1 APPLICATION(S): 213 SOLUTION TOPICAL at 05:34

## 2019-10-04 RX ADMIN — Medication 600 MILLIGRAM(S): at 06:17

## 2019-10-04 RX ADMIN — SODIUM CHLORIDE 3 MILLILITER(S): 9 INJECTION INTRAMUSCULAR; INTRAVENOUS; SUBCUTANEOUS at 13:06

## 2019-10-04 RX ADMIN — SODIUM CHLORIDE 3 MILLILITER(S): 9 INJECTION INTRAMUSCULAR; INTRAVENOUS; SUBCUTANEOUS at 06:18

## 2019-10-04 RX ADMIN — Medication 600 MILLIGRAM(S): at 00:16

## 2019-10-04 RX ADMIN — Medication 25 MILLIGRAM(S): at 13:07

## 2019-10-04 RX ADMIN — Medication 600 MILLIGRAM(S): at 05:33

## 2019-10-04 RX ADMIN — SODIUM CHLORIDE 3 MILLILITER(S): 9 INJECTION INTRAMUSCULAR; INTRAVENOUS; SUBCUTANEOUS at 21:40

## 2019-10-04 RX ADMIN — ENOXAPARIN SODIUM 40 MILLIGRAM(S): 100 INJECTION SUBCUTANEOUS at 21:41

## 2019-10-04 RX ADMIN — METFORMIN HYDROCHLORIDE 1000 MILLIGRAM(S): 850 TABLET ORAL at 17:46

## 2019-10-04 RX ADMIN — Medication 0.6 MILLIGRAM(S): at 05:33

## 2019-10-04 RX ADMIN — PANTOPRAZOLE SODIUM 40 MILLIGRAM(S): 20 TABLET, DELAYED RELEASE ORAL at 05:33

## 2019-10-04 RX ADMIN — Medication 25 MILLIGRAM(S): at 17:46

## 2019-10-04 RX ADMIN — Medication 600 MILLIGRAM(S): at 18:05

## 2019-10-04 RX ADMIN — SODIUM CHLORIDE 3 MILLILITER(S): 9 INJECTION INTRAMUSCULAR; INTRAVENOUS; SUBCUTANEOUS at 13:05

## 2019-10-04 RX ADMIN — Medication 2: at 21:41

## 2019-10-04 RX ADMIN — Medication 25 MILLIGRAM(S): at 05:33

## 2019-10-04 RX ADMIN — Medication 50 GRAM(S): at 09:42

## 2019-10-04 RX ADMIN — Medication 20 MILLIGRAM(S): at 17:46

## 2019-10-04 RX ADMIN — Medication 20 MILLIGRAM(S): at 05:33

## 2019-10-04 RX ADMIN — Medication 250 MILLIGRAM(S): at 17:45

## 2019-10-04 RX ADMIN — Medication 600 MILLIGRAM(S): at 00:45

## 2019-10-04 NOTE — PROGRESS NOTE ADULT - SUBJECTIVE AND OBJECTIVE BOX
Subjective: Pt sitting up in chair.  Denies SOB or CP.  NAD noted.      Tele:  SR                        T(F): 97.6 (10-04-19 @ 05:31), Max: 98.5 (10-03-19 @ 20:16)  HR: 85 (10-04-19 @ 05:31) (83 - 92)  BP: 130/85 (10-04-19 @ 05:31) (98/62 - 150/84)  RR: 16 (10-04-19 @ 05:31) (16 - 19)  SpO2: 96% (10-04-19 @ 05:31) (96% - 100%) on room air at rest.      Daily     Daily Weight in k.7 (04 Oct 2019 05:52)    LV EF: 65%    No Known Allergies      10-04    134<L>  |  98  |  14.0  ----------------------------<  115  4.0   |  25.0  |  0.76    Ca    8.3<L>      04 Oct 2019 06:00  Phos  3.5     10-04  Mg     1.8     10-04    TPro  6.1<L>  /  Alb  2.5<L>  /  TBili  0.5  /  DBili  x   /  AST  14  /  ALT  14  /  AlkPhos  118  10-03                               10.5   13.68 )-----------( 522      ( 04 Oct 2019 06:00 )             34.7               CAPILLARY BLOOD GLUCOSE  POCT Blood Glucose.: 110 mg/dL (04 Oct 2019 08:18)  POCT Blood Glucose.: 138 mg/dL (03 Oct 2019 21:50)  POCT Blood Glucose.: 104 mg/dL (03 Oct 2019 17:07)  POCT Blood Glucose.: 126 mg/dL (03 Oct 2019 11:52)           CXR: < from: Xray Chest 1 View- PORTABLE-Routine (10.04.19 @ 05:07) >  EXAM:  XR CHEST PORTABLE ROUTINE 1V                          PROCEDURE DATE:  10/04/2019      INTERPRETATION:  CHEST AP PORTABLE:    History: Pericardial drain via left thorax.     Date and time of exam: 10/4/2019 3:50 AM.    Technique: A single AP view of the chest was obtained.    Comparison exam: 10/3/2019 3:51 AM.    Findings:  Cardiomegaly. A pericardial drain remains in place. The right lung is   clear. The left lung is clear. There is a small left pleural effusion,   unchanged. No evidence of pneumothorax..    Impression:  Stable exam without significant change since the previous study.    RENETTA ZAMBRANO M.D., ATTENDING RADIOLOGIST  This document has been electronically signed. Oct  4 2019  7:20AM      < end of copied text >      I&O's Detail    03 Oct 2019 07:01  -  04 Oct 2019 07:00  --------------------------------------------------------  IN:    Oral Fluid: 480 mL  Total IN: 480 mL    OUT:    Chest Tube: 60 mL    Voided: 3675 mL  Total OUT: 3735 mL    Total NET: -3255 mL      CHEST TUBE:  [x ] YES [ ] NO  OUTPUT:   60ml over the last 24 hours    AIR LEAKS:  [ ] YES [x ] NO        Active Medications:  cefTRIAXone   IVPB 2000 milliGRAM(s) IV Intermittent every 24 hours  chlorhexidine 2% Cloths 1 Application(s) Topical <User Schedule>  colchicine 0.6 milliGRAM(s) Oral two times a day  enoxaparin Injectable 40 milliGRAM(s) SubCutaneous every 12 hours  furosemide   Injectable 20 milliGRAM(s) IV Push two times a day  guaiFENesin    Syrup 100 milliGRAM(s) Oral every 6 hours PRN  ibuprofen  Tablet. 600 milliGRAM(s) Oral every 6 hours  insulin lispro (HumaLOG) corrective regimen sliding scale   SubCutaneous Before meals and at bedtime  metFORMIN 1000 milliGRAM(s) Oral two times a day  metoprolol tartrate 25 milliGRAM(s) Oral every 6 hours  oxyCODONE    5 mG/acetaminophen 325 mG 1 Tablet(s) Oral every 4 hours PRN  oxyCODONE    5 mG/acetaminophen 325 mG 2 Tablet(s) Oral every 4 hours PRN  pantoprazole    Tablet 40 milliGRAM(s) Oral before breakfast  saccharomyces boulardii 250 milliGRAM(s) Oral two times a day  sodium chloride 0.9% lock flush 3 milliLiter(s) IV Push every 8 hours  sodium chloride 0.9% lock flush 15 milliLiter(s) IV Push every 1 hour PRN  sodium chloride 0.9% lock flush 3 milliLiter(s) IV Push every 8 hours      Physical Exam:    Neuro: AAOX3.  Non focal.    Pulm: Diminished BLL.  + Left pigtail to waterseal.  No crepitus.  No airleak.    CV: RRR.  +S1+S2.    Abd: Soft/NT/ND.  +BS. +BM this AM per pt.    Extremities: +1 edema BLE.  +pp.    Incision(s): Left thoracotomy site with dsd c/d/i.                    PAST MEDICAL & SURGICAL HISTORY:  Heart failure  Cardiomegaly  Nonsmoker  Diabetes mellitus  Hypertension

## 2019-10-04 NOTE — PROGRESS NOTE ADULT - ASSESSMENT
44 year old Male with PMH of  cardiomegaly, DM II, HTN, and recurrent pericardial effusions with pericardiocentesis x2 (serous drainage), most recently at [facility] where a residual posterior moderate pericardial effusion was seen on TTE after the drain was removed on 9/26/19. Patient was then transferred to [facility] on 9/27 for further management. Patient underwent a pericardial window and pericardial biopsy on 9/30/19 with Dr. Peters. Postoperative course complicated by some AF/SVT now SR. 10/1 one missed beat noted but no bradycardia and has not recurred.

## 2019-10-04 NOTE — PROGRESS NOTE ADULT - SUBJECTIVE AND OBJECTIVE BOX
[facility]  INFECTIOUS DISEASES AND INTERNAL MEDICINE at [place]  =======================================================  Carlo Hernandez MD  Diplomates American Board of Internal Medicine and Infectious Diseases  Telephone [phone]  Fax            [phone]  =======================================================    [facility]-[id number]  MIRANDA RAYO   follow up for:  + lyme test; pericarditis  patient seen and examined.     no new issues  still with chest tube in place    I have personally reviewed the labs and data; pertinent labs and data are listed in this note; please see below.   ===================================================  REVIEW OF SYSTEMS:  CONSTITUTIONAL:  No Fever or chills  HEENT:  No diplopia or blurred vision.  No earache, sore throat or runny nose.  CARDIOVASCULAR:  No pressure, squeezing, strangling, tightness, heaviness or aching about the chest, neck, axilla or epigastrium.  RESPIRATORY:  No cough, shortness of breath  GASTROINTESTINAL:  No nausea, vomiting or diarrhea.  GENITOURINARY:  No dysuria, frequency or urgency. No Blood in urine  MUSCULOSKELETAL:  no joint aches, no muscle pain  SKIN:  No change in skin, hair or nails.  NEUROLOGIC:  No Headaches, seizures or weakness.  PSYCHIATRIC:  No disorder of thought or mood.  ENDOCRINE:  No heat or cold intolerance  HEMATOLOGICAL:  No easy bruising or bleeding.   =======================================================  Allergies  No Known Allergies    Antibiotics:  cefTRIAXone   IVPB 2000 milliGRAM(s) IV Intermittent every 24 hours    Other medications:  chlorhexidine 2% Cloths 1 Application(s) Topical <User Schedule>  colchicine 0.6 milliGRAM(s) Oral two times a day  enoxaparin Injectable 40 milliGRAM(s) SubCutaneous every 12 hours  furosemide   Injectable 20 milliGRAM(s) IV Push two times a day  ibuprofen  Tablet. 600 milliGRAM(s) Oral every 6 hours  insulin lispro (HumaLOG) corrective regimen sliding scale   SubCutaneous Before meals and at bedtime  metFORMIN 1000 milliGRAM(s) Oral two times a day  metoprolol tartrate 25 milliGRAM(s) Oral every 6 hours  pantoprazole    Tablet 40 milliGRAM(s) Oral before breakfast  saccharomyces boulardii 250 milliGRAM(s) Oral two times a day  sodium chloride 0.9% lock flush 3 milliLiter(s) IV Push every 8 hours  sodium chloride 0.9% lock flush 3 milliLiter(s) IV Push every 8 hours    Antibiotics Course:  cefTRIAXone   IVPB   100 mL/Hr (10-03-19 @ 14:39)   100 mL/Hr (10-02-19 @ 13:25)   100 mL/Hr (10-01-19 @ 13:54)   100 mL/Hr (09-30-19 @ 15:09)   100 mL/Hr (09-29-19 @ 14:47)      ======================================================  Physical Exam:  ============  T(F): 97.6 (04 Oct 2019 05:31), Max: 98.5 (03 Oct 2019 20:16)  HR: 85 (04 Oct 2019 05:31)  BP: 130/85 (04 Oct 2019 05:31)  RR: 16 (04 Oct 2019 05:31)  SpO2: 96% (04 Oct 2019 05:31) (96% - 100%)  temp max in last 48H T(F): , Max: 98.7 (10-02-19 @ 21:51)    General:  No acute distress.  OBESE  Eye: Pupils are equal, round and reactive to light, Extraocular movements are intact, Normal conjunctiva.  HENT: Normocephalic, Oral mucosa is moist, No pharyngeal erythema, No sinus tenderness.  Neck: Supple, No lymphadenopathy.  Respiratory: Lungs are clear to auscultation, Respirations are non-labored.  LEFT sided chest tube  Cardiovascular: Normal rate, Regular rhythm,   Gastrointestinal: Soft, Non-tender, Non-distended, Normal bowel sounds.  Genitourinary: No costovertebral angle tenderness.  Lymphatics: No lymphadenopathy neck,   Musculoskeletal: Normal range of motion, Normal strength.  Integumentary: No rash.  Neurologic: Alert, Oriented, No focal deficits, Cranial Nerves II-XII are grossly intact.  Psychiatric: Appropriate mood & affect.    =======================================================  Labs:                        10.5   13.68 )-----------( 522      ( 04 Oct 2019 06:00 )             34.7       WBC Count: 13.68 K/uL (10-04-19 @ 06:00)  WBC Count: 15.44 K/uL (10-03-19 @ 06:13)  WBC Count: 15.88 K/uL (10-02-19 @ 06:14)  WBC Count: 13.73 K/uL (10-01-19 @ 03:05)  WBC Count: 15.20 K/uL (09-30-19 @ 10:50)  WBC Count: 16.58 K/uL (09-30-19 @ 02:14)      10-04    134<L>  |  98  |  14.0  ----------------------------<  115  4.0   |  25.0  |  0.76    Ca    8.3<L>      04 Oct 2019 06:00  Phos  3.5     10-04  Mg     1.8     10-04    TPro  6.1<L>  /  Alb  2.5<L>  /  TBili  0.5  /  DBili  x   /  AST  14  /  ALT  14  /  AlkPhos  118  10-03      Culture - Body Fluid with Gram Stain (collected 09-30-19 @ 12:24)  Source: .Body Fluid Pericardial Effusion  Gram Stain (09-30-19 @ 12:48):    Few White blood cells    No organisms seen

## 2019-10-04 NOTE — PROGRESS NOTE ADULT - PROBLEM SELECTOR PLAN 5
continue GI ppx with Protonix IVP  continue dvt ppx with SCD boots, discuss restarting chemical DVT ppx at this time given pericardial effusion continue GI ppx with Protonix IVP  continue dvt ppx with SCD boots and lovenox BID per Dr. Peters.

## 2019-10-04 NOTE — PROGRESS NOTE ADULT - ASSESSMENT
This 44 year old male with history of obesity, IDDM, HTN, and anemia.  In march while he was in [place] he stated he wasn't feeling well and there was prescribed blood pressure medication.  He was told to follow up when he got home but he was feeling well and he never did.  He started to see swelling in his legs and started to feel more short of breath.  He followed up with cardiologist and had an echo.  His cardiologist was trying to reach him, he finally got a message days later that he had fluid around the heart and needed to be drained.  He was admitted to [facility] 15 days ago, he had a pericardial drainage for 700cc's serous drainage. He was started on medications for pericarditis and was discharged after 4 days. After follow up with cardiologist, he was found to have another accumulation of pericardial effusion.  He was sent to [facility]. On 9/24/19 he underwent a Right heart cath and had another pericardiocentesis for 700 cc's.  The drain was D/C'd on  9/26/19, repeat echo showed posterior moderate effusion.  He was then transported to [facility] for evaluation for pericardial window on 9/27/19.    Pending pericardial window on 9/30/19  in terms of his pericarditis, not entirely clear that this is Lyme disease.    his Lyme c6 is positive, but can be related to prior tick bite. he has never been previously treated for Lyme.     - repeat Lyme C6 is lower, western blot is in process  - Coxsackie serology for some strains slight elevated, no convincing for new infection leading to pericarditis  - HIV screen in non-reactive  - Continue ceftriaxone 2 grams Q 24H; currently day 6 of 14 days. then stop; 10/13/19; IF patient unable to be covered for IV antibiotics at home, may need to consider ORAL DOXYCYCLINE  - s/p midline    - follow up on surgical pathology

## 2019-10-04 NOTE — PROGRESS NOTE ADULT - PROBLEM SELECTOR PLAN 1
Pericardial window via thoracotomy on 9/30/19  Fluid was sent for cytology   Follow up pericardial biopsy  Pt tested positive for Lyme Dx, ID is following viral serologies sent and started on ceftriaxone, may be past exposure.  ID followup appreciated.  Continue colchicine/ibuoprofen for pericarditis

## 2019-10-05 DIAGNOSIS — I31.3 PERICARDIAL EFFUSION (NONINFLAMMATORY): ICD-10-CM

## 2019-10-05 DIAGNOSIS — I45.9 CONDUCTION DISORDER, UNSPECIFIED: ICD-10-CM

## 2019-10-05 DIAGNOSIS — I45.5 OTHER SPECIFIED HEART BLOCK: ICD-10-CM

## 2019-10-05 LAB
ALBUMIN SERPL ELPH-MCNC: 2.7 G/DL — LOW (ref 3.3–5.2)
ALP SERPL-CCNC: 114 U/L — SIGNIFICANT CHANGE UP (ref 40–120)
ALT FLD-CCNC: 13 U/L — SIGNIFICANT CHANGE UP
ANION GAP SERPL CALC-SCNC: 11 MMOL/L — SIGNIFICANT CHANGE UP (ref 5–17)
ANION GAP SERPL CALC-SCNC: 9 MMOL/L — SIGNIFICANT CHANGE UP (ref 5–17)
AST SERPL-CCNC: 15 U/L — SIGNIFICANT CHANGE UP
BILIRUB SERPL-MCNC: 0.3 MG/DL — LOW (ref 0.4–2)
BUN SERPL-MCNC: 13 MG/DL — SIGNIFICANT CHANGE UP (ref 8–20)
BUN SERPL-MCNC: 14 MG/DL — SIGNIFICANT CHANGE UP (ref 8–20)
CALCIUM SERPL-MCNC: 8.5 MG/DL — LOW (ref 8.6–10.2)
CALCIUM SERPL-MCNC: 8.8 MG/DL — SIGNIFICANT CHANGE UP (ref 8.6–10.2)
CHLORIDE SERPL-SCNC: 100 MMOL/L — SIGNIFICANT CHANGE UP (ref 98–107)
CHLORIDE SERPL-SCNC: 97 MMOL/L — LOW (ref 98–107)
CO2 SERPL-SCNC: 27 MMOL/L — SIGNIFICANT CHANGE UP (ref 22–29)
CO2 SERPL-SCNC: 27 MMOL/L — SIGNIFICANT CHANGE UP (ref 22–29)
CREAT SERPL-MCNC: 0.67 MG/DL — SIGNIFICANT CHANGE UP (ref 0.5–1.3)
CREAT SERPL-MCNC: 0.8 MG/DL — SIGNIFICANT CHANGE UP (ref 0.5–1.3)
GLUCOSE BLDC GLUCOMTR-MCNC: 104 MG/DL — HIGH (ref 70–99)
GLUCOSE BLDC GLUCOMTR-MCNC: 106 MG/DL — HIGH (ref 70–99)
GLUCOSE BLDC GLUCOMTR-MCNC: 113 MG/DL — HIGH (ref 70–99)
GLUCOSE BLDC GLUCOMTR-MCNC: 115 MG/DL — HIGH (ref 70–99)
GLUCOSE SERPL-MCNC: 104 MG/DL — SIGNIFICANT CHANGE UP (ref 70–115)
GLUCOSE SERPL-MCNC: 109 MG/DL — SIGNIFICANT CHANGE UP (ref 70–115)
HCT VFR BLD CALC: 35.7 % — LOW (ref 39–50)
HCT VFR BLD CALC: 38 % — LOW (ref 39–50)
HGB BLD-MCNC: 10.8 G/DL — LOW (ref 13–17)
HGB BLD-MCNC: 11.4 G/DL — LOW (ref 13–17)
MAGNESIUM SERPL-MCNC: 2.1 MG/DL — SIGNIFICANT CHANGE UP (ref 1.8–2.6)
MCHC RBC-ENTMCNC: 25.1 PG — LOW (ref 27–34)
MCHC RBC-ENTMCNC: 25.1 PG — LOW (ref 27–34)
MCHC RBC-ENTMCNC: 30 GM/DL — LOW (ref 32–36)
MCHC RBC-ENTMCNC: 30.3 GM/DL — LOW (ref 32–36)
MCV RBC AUTO: 82.8 FL — SIGNIFICANT CHANGE UP (ref 80–100)
MCV RBC AUTO: 83.7 FL — SIGNIFICANT CHANGE UP (ref 80–100)
PHOSPHATE SERPL-MCNC: 4 MG/DL — SIGNIFICANT CHANGE UP (ref 2.4–4.7)
PLATELET # BLD AUTO: 580 K/UL — HIGH (ref 150–400)
PLATELET # BLD AUTO: 594 K/UL — HIGH (ref 150–400)
POTASSIUM SERPL-MCNC: 3.8 MMOL/L — SIGNIFICANT CHANGE UP (ref 3.5–5.3)
POTASSIUM SERPL-MCNC: 4 MMOL/L — SIGNIFICANT CHANGE UP (ref 3.5–5.3)
POTASSIUM SERPL-SCNC: 3.8 MMOL/L — SIGNIFICANT CHANGE UP (ref 3.5–5.3)
POTASSIUM SERPL-SCNC: 4 MMOL/L — SIGNIFICANT CHANGE UP (ref 3.5–5.3)
PROT SERPL-MCNC: 6.1 G/DL — LOW (ref 6.6–8.7)
RBC # BLD: 4.31 M/UL — SIGNIFICANT CHANGE UP (ref 4.2–5.8)
RBC # BLD: 4.54 M/UL — SIGNIFICANT CHANGE UP (ref 4.2–5.8)
RBC # FLD: 15.3 % — HIGH (ref 10.3–14.5)
RBC # FLD: 15.5 % — HIGH (ref 10.3–14.5)
SODIUM SERPL-SCNC: 135 MMOL/L — SIGNIFICANT CHANGE UP (ref 135–145)
SODIUM SERPL-SCNC: 136 MMOL/L — SIGNIFICANT CHANGE UP (ref 135–145)
WBC # BLD: 12.59 K/UL — HIGH (ref 3.8–10.5)
WBC # BLD: 13.41 K/UL — HIGH (ref 3.8–10.5)
WBC # FLD AUTO: 12.59 K/UL — HIGH (ref 3.8–10.5)
WBC # FLD AUTO: 13.41 K/UL — HIGH (ref 3.8–10.5)

## 2019-10-05 PROCEDURE — 99222 1ST HOSP IP/OBS MODERATE 55: CPT

## 2019-10-05 PROCEDURE — 99232 SBSQ HOSP IP/OBS MODERATE 35: CPT

## 2019-10-05 PROCEDURE — 71045 X-RAY EXAM CHEST 1 VIEW: CPT | Mod: 26

## 2019-10-05 PROCEDURE — 93010 ELECTROCARDIOGRAM REPORT: CPT

## 2019-10-05 RX ORDER — ALBUMIN HUMAN 25 %
250 VIAL (ML) INTRAVENOUS ONCE
Refills: 0 | Status: DISCONTINUED | OUTPATIENT
Start: 2019-10-05 | End: 2019-10-05

## 2019-10-05 RX ORDER — POTASSIUM CHLORIDE 20 MEQ
40 PACKET (EA) ORAL ONCE
Refills: 0 | Status: COMPLETED | OUTPATIENT
Start: 2019-10-05 | End: 2019-10-05

## 2019-10-05 RX ORDER — METOPROLOL TARTRATE 50 MG
25 TABLET ORAL THREE TIMES A DAY
Refills: 0 | Status: DISCONTINUED | OUTPATIENT
Start: 2019-10-05 | End: 2019-10-06

## 2019-10-05 RX ORDER — SODIUM CHLORIDE 9 MG/ML
250 INJECTION INTRAMUSCULAR; INTRAVENOUS; SUBCUTANEOUS ONCE
Refills: 0 | Status: DISCONTINUED | OUTPATIENT
Start: 2019-10-05 | End: 2019-10-05

## 2019-10-05 RX ADMIN — SODIUM CHLORIDE 3 MILLILITER(S): 9 INJECTION INTRAMUSCULAR; INTRAVENOUS; SUBCUTANEOUS at 22:00

## 2019-10-05 RX ADMIN — METFORMIN HYDROCHLORIDE 1000 MILLIGRAM(S): 850 TABLET ORAL at 17:33

## 2019-10-05 RX ADMIN — Medication 600 MILLIGRAM(S): at 14:15

## 2019-10-05 RX ADMIN — Medication 600 MILLIGRAM(S): at 06:21

## 2019-10-05 RX ADMIN — CHLORHEXIDINE GLUCONATE 1 APPLICATION(S): 213 SOLUTION TOPICAL at 08:41

## 2019-10-05 RX ADMIN — SODIUM CHLORIDE 3 MILLILITER(S): 9 INJECTION INTRAMUSCULAR; INTRAVENOUS; SUBCUTANEOUS at 06:21

## 2019-10-05 RX ADMIN — SODIUM CHLORIDE 3 MILLILITER(S): 9 INJECTION INTRAMUSCULAR; INTRAVENOUS; SUBCUTANEOUS at 13:42

## 2019-10-05 RX ADMIN — Medication 0.6 MILLIGRAM(S): at 06:14

## 2019-10-05 RX ADMIN — METFORMIN HYDROCHLORIDE 1000 MILLIGRAM(S): 850 TABLET ORAL at 08:38

## 2019-10-05 RX ADMIN — Medication 600 MILLIGRAM(S): at 19:26

## 2019-10-05 RX ADMIN — Medication 600 MILLIGRAM(S): at 00:01

## 2019-10-05 RX ADMIN — Medication 600 MILLIGRAM(S): at 00:00

## 2019-10-05 RX ADMIN — CEFTRIAXONE 100 MILLIGRAM(S): 500 INJECTION, POWDER, FOR SOLUTION INTRAMUSCULAR; INTRAVENOUS at 13:47

## 2019-10-05 RX ADMIN — Medication 20 MILLIGRAM(S): at 06:15

## 2019-10-05 RX ADMIN — Medication 25 MILLIGRAM(S): at 22:02

## 2019-10-05 RX ADMIN — Medication 250 MILLIGRAM(S): at 17:33

## 2019-10-05 RX ADMIN — Medication 600 MILLIGRAM(S): at 06:14

## 2019-10-05 RX ADMIN — Medication 40 MILLIEQUIVALENT(S): at 15:38

## 2019-10-05 RX ADMIN — Medication 20 MILLIGRAM(S): at 17:33

## 2019-10-05 RX ADMIN — Medication 600 MILLIGRAM(S): at 13:47

## 2019-10-05 RX ADMIN — ENOXAPARIN SODIUM 40 MILLIGRAM(S): 100 INJECTION SUBCUTANEOUS at 10:43

## 2019-10-05 RX ADMIN — PANTOPRAZOLE SODIUM 40 MILLIGRAM(S): 20 TABLET, DELAYED RELEASE ORAL at 06:15

## 2019-10-05 RX ADMIN — Medication 0.6 MILLIGRAM(S): at 17:33

## 2019-10-05 RX ADMIN — ENOXAPARIN SODIUM 40 MILLIGRAM(S): 100 INJECTION SUBCUTANEOUS at 22:02

## 2019-10-05 RX ADMIN — Medication 600 MILLIGRAM(S): at 19:27

## 2019-10-05 RX ADMIN — Medication 250 MILLIGRAM(S): at 06:14

## 2019-10-05 NOTE — PROGRESS NOTE ADULT - ASSESSMENT
T2DM - cont curren RX    BS under excellent control with  1000 mg MFN bid   cont Sliding scale coverage but pt has not required this

## 2019-10-05 NOTE — CONSULT NOTE ADULT - PROBLEM SELECTOR RECOMMENDATION 2
Avoid all av jarvis blockers for now Pauses could be from lyme pericarditis. Continue aggressive treatment.  Would continue monitor for now   Average hr during night was in the 90's no bradycardia other than above event  Avoid all av jarvis blockers for now  thyroid function tests ok Unlikely due to lyme because it is only happening at night with no pr prolongation  May be related to WALKER  suggest out patient sleep study  likely related to high vagal tone  Average hr during night was in the 90's no bradycardia other than above event  May restart beta blockers at lopressor 50 bid

## 2019-10-05 NOTE — CHART NOTE - NSCHARTNOTEFT_GEN_A_CORE
Subjective: pt seen and examined, no complaints, ROS - .     called by RN for pt with heart block , then 6.25 sec ventricular pause.  pt seen in bed with  no chest pain or sob   Pox on tele >95 %      Appearance: Normal	  HEENT:   Normal oral mucosa, PERRL, EOMI	  Lymphatic: No lymphadenopathy , no edema  Cardiovascular: Normal S1 S2, No JVD, No murmurs , Peripheral pulses palpable 2+ bilaterally  Respiratory: Lungs clear to auscultation, normal effort 	  Gastrointestinal:  Soft, Non-tender, + BS	  Skin: No rashes, No ecchymoses, No cyanosis, warm to touch  Musculoskeletal: Normal range of motion, normal strength  Psychiatry:  Mood & affect appropriate    TELEMETRY: 	  nsr          ASSESSMENT/PLAN: 	44y Male  s/p pericardial window,     Hold AVnodal   cont tele    GI / DVT prophylaxis.   keep K>4, mag >2.0

## 2019-10-05 NOTE — CONSULT NOTE ADULT - SUBJECTIVE AND OBJECTIVE BOX
This is a 45y/o male with PMH of DM, HTN and anemia with hx of       [place]phid [place] with findings of adhesion.  A Left thoracotomy and creation of pericardial window with 550 cc of drainage  		    Patient is a 44 year old male with history of IDDM, HTN, and anemia.  In march while he was in [place] he stated he wasn't feeling well and there was prescribed blood pressure medication.  He was told to follow up when he got home but he was feeling well and he never did.  He started to see swelling in his legs and started to feel more short of breath.  He followed up with cardiologist and had an echo.  His cardiologist was trying to reach him, he finally got a message days later that he had fluid around the heart and needed to be drained.  He was admitted to [facility] 15 days ago, he had a pericardial drainage for 700cc's serous drainage. He was started on medications for pericarditis and was discharged after 4 days. After follow up with cardiologist, he was found to have another accumulation of pericardial effusion.  He was sent to [facility]. On 9/24/19 he underwent a Right heart cath and had another pericardialcentesis for 700 cc's.  The drain was D/C'd on  9/26/19, repeat echo showed posterior moderate effusion.  He was then transported to [place] for evaluation for pericardial window.  He denied and recent viral syndrome, denies, cough, dizziness, SOB. (27 Sep 2019 17:18)    PMH  Heart failure  Cardiomegaly  Insulin requiring Diabetes mellitus  Hypertension      PREVIOUS DIAGNOSTIC TESTING:      ECHO  FINDINGS:    STRESS  FINDINGS:    CATHETERIZATION  FINDINGS:      Allergies    No Known Allergies    Intolerances    [facility] patient (Unknown)      MEDICATIONS  (STANDING):  cefTRIAXone   IVPB 2000 milliGRAM(s) IV Intermittent every 24 hours  chlorhexidine 2% Cloths 1 Application(s) Topical <User Schedule>  colchicine 0.6 milliGRAM(s) Oral two times a day  enoxaparin Injectable 40 milliGRAM(s) SubCutaneous every 12 hours  furosemide   Injectable 20 milliGRAM(s) IV Push two times a day  ibuprofen  Tablet. 600 milliGRAM(s) Oral every 6 hours  insulin lispro (HumaLOG) corrective regimen sliding scale   SubCutaneous Before meals and at bedtime  metFORMIN 1000 milliGRAM(s) Oral two times a day  pantoprazole    Tablet 40 milliGRAM(s) Oral before breakfast  saccharomyces boulardii 250 milliGRAM(s) Oral two times a day  sodium chloride 0.9% lock flush 3 milliLiter(s) IV Push every 8 hours  sodium chloride 0.9% lock flush 3 milliLiter(s) IV Push every 8 hours    MEDICATIONS  (PRN):  guaiFENesin    Syrup 100 milliGRAM(s) Oral every 6 hours PRN Cough  oxyCODONE    5 mG/acetaminophen 325 mG 1 Tablet(s) Oral every 4 hours PRN Moderate Pain (4 - 6)  oxyCODONE    5 mG/acetaminophen 325 mG 2 Tablet(s) Oral every 4 hours PRN Severe Pain (7 - 10)  sodium chloride 0.9% lock flush 15 milliLiter(s) IV Push every 1 hour PRN Pre/post blood products, medications, blood draw, and to maintain line patency      FAMILY HISTORY:      SOCIAL HISTORY:    CIGARETTES:    ALCOHOL:    REVIEW OF SYSTEMS:  CONSTITUTIONAL: No fever, weight loss, or fatigue  EYES: No eye pain, visual disturbances, or discharge  ENMT:  No difficulty hearing, tinnitus, vertigo; No sinus or throat pain  NECK: No pain or stiffness  RESPIRATORY: No cough, wheezing, chills or hemoptysis; No Shortness of Breath  CARDIOVASCULAR: No chest pain, palpitations, passing out, dizziness, or leg swelling  GASTROINTESTINAL: No abdominal or epigastric pain. No nausea, vomiting, or hematemesis; No diarrhea or constipation. No melena or hematochezia.  GENITOURINARY: No dysuria, frequency, hematuria, or incontinence  NEUROLOGICAL: No headaches, memory loss, loss of strength, numbness, or tremors  SKIN: No itching, burning, rashes, or lesions   LYMPH Nodes: No enlarged glands  ENDOCRINE: No heat or cold intolerance; No hair loss  MUSCULOSKELETAL: No joint pain or swelling; No muscle, back, or extremity pain  PSYCHIATRIC: No depression, anxiety, mood swings, or difficulty sleeping  HEME/LYMPH: No easy bruising, or bleeding gums  ALLERY AND IMMUNOLOGIC: No hives or eczema	    Vital Signs Last 24 Hrs  T(C): 36.9 (05 Oct 2019 10:40), Max: 36.9 (05 Oct 2019 10:40)  T(F): 98.4 (05 Oct 2019 10:40), Max: 98.4 (05 Oct 2019 10:40)  HR: 88 (05 Oct 2019 10:40) (38 - 92)  BP: 124/75 (05 Oct 2019 10:40) (94/69 - 124/82)  BP(mean): --  RR: 18 (05 Oct 2019 10:40) (17 - 18)  SpO2: 98% (05 Oct 2019 10:40) (96% - 100%)    Daily     Daily     I&O's Detail    04 Oct 2019 07:01  -  05 Oct 2019 07:00  --------------------------------------------------------  IN:    Oral Fluid: 900 mL  Total IN: 900 mL    OUT:    Chest Tube: 10 mL    Voided: 3800 mL  Total OUT: 3810 mL    Total NET: -2910 mL          PHYSICAL EXAM:  Appearance: Normal, well nourished	  HEENT:   Normal oral mucosa, PERRL, EOMI, sclera non-icteric	  Lymphatic: No cervical lymphadenopathy  Cardiovascular: Normal S1 S2, No JVD, No cardiac murmurs, No carotid bruits, No peripheral edema  Respiratory: Lungs clear to auscultation	  Psychiatry: A & O x 3, Mood & affect appropriate  Gastrointestinal:  Soft, Non-tender, + BS, no bruits	  Skin: No rashes, No ecchymoses, No cyanosis  Neurologic: Grossly non-focal with full strength in all four extremities  Extremities: Normal range of motion, No clubbing, cyanosis or edema  Vascular: Peripheral pulses palpable 2+ bilaterally      INTERPRETATION OF TELEMETRY:  Echo:  < from: TTE Echo Complete w/Doppler (09.27.19 @ 15:23) >   1. Left ventricular ejection fraction, by visual estimation, is 55 to   60%.   2. Normal global left ventricular systolic function.   3. Though limited in views, the LV appears normal in size and systolic   function. LV is seen best in PLAX and PSAX views. Recommend Definity if   clinically indictated.   4. A small-moderate sized pericardial effusion is present without Rv   collapse.   5. Sclerotic aortic valve with normal opening.        ECG:    LABS:                        10.8   12.59 )-----------( 580      ( 05 Oct 2019 05:53 )             35.7     10-05    136  |  100  |  13.0  ----------------------------<  104  4.0   |  27.0  |  0.67    Ca    8.5<L>      05 Oct 2019 05:53  Phos  4.0     10-05  Mg     2.1     10-05    TPro  6.1<L>  /  Alb  2.7<L>  /  TBili  0.3<L>  /  DBili  x   /  AST  15  /  ALT  13  /  AlkPhos  114  10-05            I&O's Summary    04 Oct 2019 07:01  -  05 Oct 2019 07:00  --------------------------------------------------------  IN: 900 mL / OUT: 3810 mL / NET: -2910 mL      BNP  RADIOLOGY & ADDITIONAL STUDIES: This is a 45y/o male with PMH of DM, HTN and anemia with hx of pericaridal effusion [facility] 30 days ago, he had a pericardial drainage for 700cc's serous drainage.  started on meds for pericarditis and then represented with a reaccumulation and readmitted and drained 700cc of seous drainage.  On 9/24/19 he underwent a Right heart cath and had another pericardiocentesis for 700 cc's.  The drain was D/C'd on  9/26/19, repeat echo showed posterior moderate effusion.  He was referred  [place] for evaluation and is s/p subxiphoid exploration with findings of adhesion.  A Left thoracotomy and creation of pericardial window with 550 cc of drainage        		        PMH  Heart failure  Cardiomegaly  Insulin requiring Diabetes mellitus  Hypertension      PREVIOUS DIAGNOSTIC TESTING:      ECHO  FINDINGS:    STRESS  FINDINGS:    CATHETERIZATION  FINDINGS:      Allergies    No Known Allergies    Intolerances    [facility] patient (Unknown)      MEDICATIONS  (STANDING):  cefTRIAXone   IVPB 2000 milliGRAM(s) IV Intermittent every 24 hours  chlorhexidine 2% Cloths 1 Application(s) Topical <User Schedule>  colchicine 0.6 milliGRAM(s) Oral two times a day  enoxaparin Injectable 40 milliGRAM(s) SubCutaneous every 12 hours  furosemide   Injectable 20 milliGRAM(s) IV Push two times a day  ibuprofen  Tablet. 600 milliGRAM(s) Oral every 6 hours  insulin lispro (HumaLOG) corrective regimen sliding scale   SubCutaneous Before meals and at bedtime  metFORMIN 1000 milliGRAM(s) Oral two times a day  pantoprazole    Tablet 40 milliGRAM(s) Oral before breakfast  saccharomyces boulardii 250 milliGRAM(s) Oral two times a day  sodium chloride 0.9% lock flush 3 milliLiter(s) IV Push every 8 hours  sodium chloride 0.9% lock flush 3 milliLiter(s) IV Push every 8 hours    MEDICATIONS  (PRN):  guaiFENesin    Syrup 100 milliGRAM(s) Oral every 6 hours PRN Cough  oxyCODONE    5 mG/acetaminophen 325 mG 1 Tablet(s) Oral every 4 hours PRN Moderate Pain (4 - 6)  oxyCODONE    5 mG/acetaminophen 325 mG 2 Tablet(s) Oral every 4 hours PRN Severe Pain (7 - 10)  sodium chloride 0.9% lock flush 15 milliLiter(s) IV Push every 1 hour PRN Pre/post blood products, medications, blood draw, and to maintain line patency      FAMILY HISTORY:      SOCIAL HISTORY:    CIGARETTES:    ALCOHOL:    REVIEW OF SYSTEMS:  CONSTITUTIONAL: No fever, weight loss, or fatigue  EYES: No eye pain, visual disturbances, or discharge  ENMT:  No difficulty hearing, tinnitus, vertigo; No sinus or throat pain  NECK: No pain or stiffness  RESPIRATORY: No cough, wheezing, chills or hemoptysis; No Shortness of Breath  CARDIOVASCULAR: No chest pain, palpitations, passing out, dizziness, or leg swelling  GASTROINTESTINAL: No abdominal or epigastric pain. No nausea, vomiting, or hematemesis; No diarrhea or constipation. No melena or hematochezia.  GENITOURINARY: No dysuria, frequency, hematuria, or incontinence  NEUROLOGICAL: No headaches, memory loss, loss of strength, numbness, or tremors  SKIN: No itching, burning, rashes, or lesions   LYMPH Nodes: No enlarged glands  ENDOCRINE: No heat or cold intolerance; No hair loss  MUSCULOSKELETAL: No joint pain or swelling; No muscle, back, or extremity pain  PSYCHIATRIC: No depression, anxiety, mood swings, or difficulty sleeping  HEME/LYMPH: No easy bruising, or bleeding gums  ALLERY AND IMMUNOLOGIC: No hives or eczema	    Vital Signs Last 24 Hrs  T(C): 36.9 (05 Oct 2019 10:40), Max: 36.9 (05 Oct 2019 10:40)  T(F): 98.4 (05 Oct 2019 10:40), Max: 98.4 (05 Oct 2019 10:40)  HR: 88 (05 Oct 2019 10:40) (38 - 92)  BP: 124/75 (05 Oct 2019 10:40) (94/69 - 124/82)  BP(mean): --  RR: 18 (05 Oct 2019 10:40) (17 - 18)  SpO2: 98% (05 Oct 2019 10:40) (96% - 100%)    Daily     Daily     I&O's Detail    04 Oct 2019 07:01  -  05 Oct 2019 07:00  --------------------------------------------------------  IN:    Oral Fluid: 900 mL  Total IN: 900 mL    OUT:    Chest Tube: 10 mL    Voided: 3800 mL  Total OUT: 3810 mL    Total NET: -2910 mL          PHYSICAL EXAM:  Appearance: Normal, well nourished	  HEENT:   Normal oral mucosa, PERRL, EOMI, sclera non-icteric	  Lymphatic: No cervical lymphadenopathy  Cardiovascular: Normal S1 S2, No JVD, No cardiac murmurs, No carotid bruits, No peripheral edema  Respiratory: Lungs clear to auscultation	  Psychiatry: A & O x 3, Mood & affect appropriate  Gastrointestinal:  Soft, Non-tender, + BS, no bruits	  Skin: No rashes, No ecchymoses, No cyanosis  Neurologic: Grossly non-focal with full strength in all four extremities  Extremities: Normal range of motion, No clubbing, cyanosis or edema  Vascular: Peripheral pulses palpable 2+ bilaterally      INTERPRETATION OF TELEMETRY:  Echo:  < from: TTE Echo Complete w/Doppler (09.27.19 @ 15:23) >   1. Left ventricular ejection fraction, by visual estimation, is 55 to   60%.   2. Normal global left ventricular systolic function.   3. Though limited in views, the LV appears normal in size and systolic   function. LV is seen best in PLAX and PSAX views. Recommend Definity if   clinically indictated.   4. A small-moderate sized pericardial effusion is present without Rv   collapse.   5. Sclerotic aortic valve with normal opening.        ECG:    LABS:                        10.8   12.59 )-----------( 580      ( 05 Oct 2019 05:53 )             35.7     10-05    136  |  100  |  13.0  ----------------------------<  104  4.0   |  27.0  |  0.67    Ca    8.5<L>      05 Oct 2019 05:53  Phos  4.0     10-05  Mg     2.1     10-05    TPro  6.1<L>  /  Alb  2.7<L>  /  TBili  0.3<L>  /  DBili  x   /  AST  15  /  ALT  13  /  AlkPhos  114  10-05            I&O's Summary    04 Oct 2019 07:01  -  05 Oct 2019 07:00  --------------------------------------------------------  IN: 900 mL / OUT: 3810 mL / NET: -2910 mL      BNP  RADIOLOGY & ADDITIONAL STUDIES: This is a 43y/o male with PMH of DM, HTN and anemia with hx of pericaridal effusion [facility] 30 days ago, he had a pericardial drainage for 700cc's serous drainage.  started on meds for pericarditis and then represented with a reaccumulation and readmitted and drained 700cc of seous drainage.  On 9/24/19 he underwent a Right heart cath and had another pericardiocentesis for 700 cc's.  The drain was D/C'd on  9/26/19, repeat echo showed posterior moderate effusion.  He was referred  [facility] for evaluation and is s/p subxiphoid exploration with findings of adhesion. A Left thoracotomy and creation of pericardial window with 550 cc of drainage  Pericardial drain remains in  Post course significant for a AF/SVT now SR.    Asked to see patient for a 6 second pause while sleeping Patient does not recall waking up during episode  O2 sat was 96% with episode  Metoprolol 25mg q6h was discontinued due to event.    Patient works in [employer] and frequently has tics on skin but no rash.    Titers suspicious for lyme, past infection .  Currently being treated with  iv rocephin     		      PMH  Pericardial effusion  Obesity  Cardiomegaly  Insulin requiring Diabetes mellitus  Hypertension    PSH  Thorcotomy    Allergies:    No Known Allergies    MEDICATIONS  (STANDING):  cefTRIAXone   IVPB 2000 milliGRAM(s) IV Intermittent every 24 hours  colchicine 0.6 milliGRAM(s) Oral two times a day  enoxaparin Injectable 40 milliGRAM(s) SubCutaneous every 12 hours  furosemide   Injectable 20 milliGRAM(s) IV Push two times a day  ibuprofen  Tablet. 600 milliGRAM(s) Oral every 6 hours  insulin lispro (HumaLOG) corrective regimen sliding scale   SubCutaneous Before meals and at bedtime  metFORMIN 1000 milliGRAM(s) Oral two times a day  pantoprazole    Tablet 40 milliGRAM(s) Oral before breakfast  saccharomyces boulardii 250 milliGRAM(s) Oral two times a day      FAMILY HISTORY:  Mother and sister with Diabetes  Denies any hx of Heart problems  SOCIAL HISTORY:  Never smoked No etoh  , Works in irrigations  Born in [place]      REVIEW OF SYSTEMS:  CONSTITUTIONAL: No fever, weight loss, or fatigue  EYES: No eye pain, visual disturbances, or discharge  NECK: No pain or stiffness  RESPIRATORY: No cough, wheezing, chills or hemoptysis; Mild sob  CARDIOVASCULAR: No chest pain, palpitations, passing out, dizziness, ++ leg edema  GASTROINTESTINAL: No abdominal or epigastric pain. No nausea, vomiting, or hematemesis; No diarrhea or constipation. No melena or hematochezia.  GENITOURINARY: No dysuria, frequency, hematuria, or incontinence  NEUROLOGICAL: No headaches, memory loss, loss of strength, numbness, or tremors  SKIN: No itching, burning, rashes, or lesions   LYMPH Nodes: No enlarged glands  ENDOCRINE: No heat or cold intolerance; No hair loss  MUSCULOSKELETAL: No joint pain or swelling; No muscle, back, or extremity pain  PSYCHIATRIC: No depression, anxiety, mood swings, or difficulty sleeping  HEME/LYMPH: No easy bruising, or bleeding gums  ALLERY AND IMMUNOLOGIC: No hives or eczema	    Vital Signs Last 24 Hrs  T(C): 36.9 (05 Oct 2019 10:40), Max: 36.9 (05 Oct 2019 10:40)  T(F): 98.4 (05 Oct 2019 10:40), Max: 98.4 (05 Oct 2019 10:40)  HR: 88 (05 Oct 2019 10:40) (38 - 92)  BP: 124/75 (05 Oct 2019 10:40) (94/69 - 124/82)  BP(mean): --  RR: 18 (05 Oct 2019 10:40) (17 - 18)  SpO2: 98% (05 Oct 2019 10:40) (96% - 100%)    I&O's Detail    04 Oct 2019 07:01  -  05 Oct 2019 07:00  --------------------------------------------------------  IN:    Oral Fluid: 900 mL  Total IN: 900 mL    OUT:    Chest Tube: 10 mL    Voided: 3800 mL  Total OUT: 3810 mL  Total NET: -2910 mL      PHYSICAL EXAM:  Appearance: Normal, well nourished in nad	  HEENT:   Normal oral mucosa, PERRL, EOMI, sclera non-icteric	  Lymphatic: No cervical lymphadenopathy  Cardiovascular: Normal S1 S2 regular  Respiratory: Lungs clear to auscultation	Left pigtail to waterseal  Psychiatry: A & O x 3, Mood & affect appropriate  Gastrointestinal:  Soft, Non-tender, + BS, no bruits	  Skin: No rashes, No ecchymoses, No cyanosis  Neurologic: Grossly non-focal with full strength in all four extremities  Extremities: Normal range of motion, No clubbing, cyanosis or edema  Vascular: Peripheral pulses palpable 2+ bilaterally Edema bilateral lower ext      INTERPRETATION OF TELEMETRY:  Echo:  < from: TTE Echo Complete w/Doppler (09.27.19 @ 15:23) >   1. Left ventricular ejection fraction, by visual estimation, is 55 to   60%.   2. Normal global left ventricular systolic function.   3. Though limited in views, the LV appears normal in size and systolic   function. LV is seen best in PLAX and PSAX views. Recommend Definity if   clinically indictated.   4. A small-moderate sized pericardial effusion is present without Rv   collapse.   5. Sclerotic aortic valve with normal opening.        ECG:    LABS:          Tsh 3.68               10.8   12.59 )-----------( 580      ( 05 Oct 2019 05:53 )             35.7     10-05    136  |  100  |  13.0  ----------------------------<  104  4.0   |  27.0  |  0.67    Ca    8.5<L>      05 Oct 2019 05:53  Phos  4.0     10-05  Mg     2.1     10-05    TPro  6.1<L>  /  Alb  2.7<L>  /  TBili  0.3<L>  /  DBili  x   /  AST  15  /  ALT  13  /  AlkPhos  114  10-05      I&O's Summary    04 Oct 2019 07:01  -  05 Oct 2019 07:00  --------------------------------------------------------  IN: 900 mL / OUT: 3810 mL / NET: -2910 mL  CXR   Cardiomegaly. A pericardial drain remains in place. The right lung is   clear.   The left lung is clear. There is a small left pleural effusion,   unchanged.   No evidence of pneumothorax..   Impression:   Stable exam without significant change since the previous study.. This is a 43y/o male with PMH of DM, HTN and anemia with hx of pericaridal effusion [facility] 30 days ago, he had a pericardial drainage for 700cc's serous drainage.  started on meds for pericarditis and then represented with a reaccumulation and readmitted and drained 700cc of seous drainage.  On 9/24/19 he underwent a Right heart cath and had another pericardiocentesis for 700 cc's.  The drain was D/C'd on  9/26/19, repeat echo showed posterior moderate effusion.  He was referred  [facility] for evaluation and is s/p subxiphoid exploration with findings of adhesion. A Left thoracotomy and creation of pericardial window with 550 cc of drainage  Pericardial drain remains in  Post course significant for a AF/SVT now SR.    Asked to see patient for a 6 second pause while sleeping Patient does not recall waking up during episode  O2 sat was 96% with episode  Metoprolol 25mg q6h was discontinued due to event.  Also had a 6 second pause with possible 2nd degree heart block    Patient works in [employer] out [employer] and frequently has tics on skin but no rash.    Titers suspicious for lyme, past infection .  Currently being treated with  iv rocephin     		      PMH  Pericardial effusion  Obesity  Cardiomegaly  Insulin requiring Diabetes mellitus  Hypertension    PSH  Thorcotomy    Allergies:    No Known Allergies    MEDICATIONS  (STANDING):  cefTRIAXone   IVPB 2000 milliGRAM(s) IV Intermittent every 24 hours  colchicine 0.6 milliGRAM(s) Oral two times a day  enoxaparin Injectable 40 milliGRAM(s) SubCutaneous every 12 hours  furosemide   Injectable 20 milliGRAM(s) IV Push two times a day  ibuprofen  Tablet. 600 milliGRAM(s) Oral every 6 hours  insulin lispro (HumaLOG) corrective regimen sliding scale   SubCutaneous Before meals and at bedtime  metFORMIN 1000 milliGRAM(s) Oral two times a day  pantoprazole    Tablet 40 milliGRAM(s) Oral before breakfast  saccharomyces boulardii 250 milliGRAM(s) Oral two times a day      FAMILY HISTORY:  Mother and sister with Diabetes  Denies any hx of Heart problems  SOCIAL HISTORY:  Never smoked No etoh  , Works in irrigations  Born in [place]      REVIEW OF SYSTEMS:  CONSTITUTIONAL: No fever, weight loss, or fatigue  EYES: No eye pain, visual disturbances, or discharge  NECK: No pain or stiffness  RESPIRATORY: No cough, wheezing, chills or hemoptysis; Mild sob  CARDIOVASCULAR: No chest pain, palpitations, passing out, dizziness, ++ leg edema  GASTROINTESTINAL: No abdominal or epigastric pain. No nausea, vomiting, or hematemesis; No diarrhea or constipation. No melena or hematochezia.  GENITOURINARY: No dysuria, frequency, hematuria, or incontinence  NEUROLOGICAL: No headaches, memory loss, loss of strength, numbness, or tremors  SKIN: No itching, burning, rashes, or lesions   LYMPH Nodes: No enlarged glands  ENDOCRINE: No heat or cold intolerance; No hair loss  MUSCULOSKELETAL: No joint pain or swelling; No muscle, back, or extremity pain  PSYCHIATRIC: No depression, anxiety, mood swings, or difficulty sleeping  HEME/LYMPH: No easy bruising, or bleeding gums  ALLERY AND IMMUNOLOGIC: No hives or eczema	    Vital Signs Last 24 Hrs  T(C): 36.9 (05 Oct 2019 10:40), Max: 36.9 (05 Oct 2019 10:40)  T(F): 98.4 (05 Oct 2019 10:40), Max: 98.4 (05 Oct 2019 10:40)  HR: 88 (05 Oct 2019 10:40) (38 - 92)  BP: 124/75 (05 Oct 2019 10:40) (94/69 - 124/82)  BP(mean): --  RR: 18 (05 Oct 2019 10:40) (17 - 18)  SpO2: 98% (05 Oct 2019 10:40) (96% - 100%)    I&O's Detail    04 Oct 2019 07:01  -  05 Oct 2019 07:00  --------------------------------------------------------  IN:    Oral Fluid: 900 mL  Total IN: 900 mL    OUT:    Chest Tube: 10 mL    Voided: 3800 mL  Total OUT: 3810 mL  Total NET: -2910 mL      PHYSICAL EXAM:  Appearance: Normal, well nourished in nad	  HEENT:   Normal oral mucosa, PERRL, EOMI, sclera non-icteric	  Lymphatic: No cervical lymphadenopathy  Cardiovascular: Normal S1 S2 regular  Respiratory: Lungs clear to auscultation	Left pigtail to waterseal  Psychiatry: A & O x 3, Mood & affect appropriate  Gastrointestinal:  Soft, Non-tender, + BS, no bruits	  Skin: No rashes, No ecchymoses, No cyanosis  Neurologic: Grossly non-focal with full strength in all four extremities  Extremities: Normal range of motion, No clubbing, cyanosis or edema  Vascular: Peripheral pulses palpable 2+ bilaterally Edema bilateral lower ext      INTERPRETATION OF TELEMETRY:  Echo:  < from: TTE Echo Complete w/Doppler (09.27.19 @ 15:23) >   1. Left ventricular ejection fraction, by visual estimation, is 55 to   60%.   2. Normal global left ventricular systolic function.   3. Though limited in views, the LV appears normal in size and systolic   function. LV is seen best in PLAX and PSAX views. Recommend Definity if   clinically indictated.   4. A small-moderate sized pericardial effusion is present without Rv   collapse.   5. Sclerotic aortic valve with normal opening.        ECG:    LABS:          Tsh 3.68               10.8   12.59 )-----------( 580      ( 05 Oct 2019 05:53 )             35.7     10-05    136  |  100  |  13.0  ----------------------------<  104  4.0   |  27.0  |  0.67    Ca    8.5<L>      05 Oct 2019 05:53  Phos  4.0     10-05  Mg     2.1     10-05    TPro  6.1<L>  /  Alb  2.7<L>  /  TBili  0.3<L>  /  DBili  x   /  AST  15  /  ALT  13  /  AlkPhos  114  10-05      I&O's Summary    04 Oct 2019 07:01  -  05 Oct 2019 07:00  --------------------------------------------------------  IN: 900 mL / OUT: 3810 mL / NET: -2910 mL  CXR   Cardiomegaly. A pericardial drain remains in place. The right lung is   clear.   The left lung is clear. There is a small left pleural effusion,   unchanged.   No evidence of pneumothorax..   Impression:   Stable exam without significant change since the previous study.. This is a 43y/o male with PMH of DM, HTN and anemia with hx of pericaridal effusion [facility] 30 days ago, he had a pericardial drainage for 700cc's serous drainage.  started on meds for pericarditis and then represented with a reaccumulation and readmitted and drained 700cc of seous drainage.  On 9/24/19 he underwent a Right heart cath and had another pericardiocentesis for 700 cc's.  The drain was D/C'd on  9/26/19, repeat echo showed posterior moderate effusion.  He was referred  [place] for evaluation and is s/p subxiphoid exploration with findings of adhesion. A Left thoracotomy and creation of pericardial window with 550 cc of drainage  Pericardial drain remains in  Post course significant for a AF/SVT now SR.    Asked to see patient for a 6 second pause while sleeping Patient does not recall waking up during episode  O2 sat was 96% with episode  Metoprolol 25mg q6h was discontinued due to event.  Also had a 6 second pause with possible 2nd degree heart block    Patient works in [employer] out [employer] and frequently has tics on skin but no rash.    Titers suspicious for lyme, past infection .  Currently being treated with  iv rocephin     		      PMH  Pericardial effusion  Obesity  Cardiomegaly  Insulin requiring Diabetes mellitus  Hypertension    PSH  Thorcotomy    Allergies:    No Known Allergies    MEDICATIONS  (STANDING):  cefTRIAXone   IVPB 2000 milliGRAM(s) IV Intermittent every 24 hours  colchicine 0.6 milliGRAM(s) Oral two times a day  enoxaparin Injectable 40 milliGRAM(s) SubCutaneous every 12 hours  furosemide   Injectable 20 milliGRAM(s) IV Push two times a day  ibuprofen  Tablet. 600 milliGRAM(s) Oral every 6 hours  insulin lispro (HumaLOG) corrective regimen sliding scale   SubCutaneous Before meals and at bedtime  metFORMIN 1000 milliGRAM(s) Oral two times a day  pantoprazole    Tablet 40 milliGRAM(s) Oral before breakfast  saccharomyces boulardii 250 milliGRAM(s) Oral two times a day      FAMILY HISTORY:  Mother and sister with Diabetes  Denies any hx of Heart problems  SOCIAL HISTORY:  Never smoked No etoh  , Works in irrigations  Born in [place]      REVIEW OF SYSTEMS:  CONSTITUTIONAL: No fever, weight loss, or fatigue  EYES: No eye pain, visual disturbances, or discharge  NECK: No pain or stiffness  RESPIRATORY: No cough, wheezing, chills or hemoptysis; Mild sob  CARDIOVASCULAR: No chest pain, palpitations, passing out, dizziness, ++ leg edema  GASTROINTESTINAL: No abdominal or epigastric pain. No nausea, vomiting, or hematemesis; No diarrhea or constipation. No melena or hematochezia.  GENITOURINARY: No dysuria, frequency, hematuria, or incontinence  NEUROLOGICAL: No headaches, memory loss, loss of strength, numbness, or tremors  SKIN: No itching, burning, rashes, or lesions   LYMPH Nodes: No enlarged glands  ENDOCRINE: No heat or cold intolerance; No hair loss  MUSCULOSKELETAL: No joint pain or swelling; No muscle, back, or extremity pain  PSYCHIATRIC: No depression, anxiety, mood swings, or difficulty sleeping  HEME/LYMPH: No easy bruising, or bleeding gums  ALLERY AND IMMUNOLOGIC: No hives or eczema	    Vital Signs Last 24 Hrs  T(C): 36.9 (05 Oct 2019 10:40), Max: 36.9 (05 Oct 2019 10:40)  T(F): 98.4 (05 Oct 2019 10:40), Max: 98.4 (05 Oct 2019 10:40)  HR: 88 (05 Oct 2019 10:40) (38 - 92)  BP: 124/75 (05 Oct 2019 10:40) (94/69 - 124/82)  BP(mean): --  RR: 18 (05 Oct 2019 10:40) (17 - 18)  SpO2: 98% (05 Oct 2019 10:40) (96% - 100%)    I&O's Detail    04 Oct 2019 07:01  -  05 Oct 2019 07:00  --------------------------------------------------------  IN:    Oral Fluid: 900 mL  Total IN: 900 mL    OUT:    Chest Tube: 10 mL    Voided: 3800 mL  Total OUT: 3810 mL  Total NET: -2910 mL    EKG Nsr RADHA .16/.04/.40 non specific st changes    PHYSICAL EXAM:  Appearance: Normal, well nourished in nad	  HEENT:   Normal oral mucosa, PERRL, EOMI, sclera non-icteric	  Lymphatic: No cervical lymphadenopathy  Cardiovascular: Normal S1 S2 regular  Respiratory: Lungs clear to auscultation	Left pigtail to waterseal  Psychiatry: A & O x 3, Mood & affect appropriate  Gastrointestinal:  Soft, Non-tender, + BS, no bruits	  Skin: No rashes, No ecchymoses, No cyanosis  Neurologic: Grossly non-focal with full strength in all four extremities  Extremities: Normal range of motion, No clubbing, cyanosis or edema  Vascular: Peripheral pulses palpable 2+ bilaterally Edema bilateral lower ext      INTERPRETATION OF TELEMETRY:  Echo:  < from: TTE Echo Complete w/Doppler (09.27.19 @ 15:23) >   1. Left ventricular ejection fraction, by visual estimation, is 55 to   60%.   2. Normal global left ventricular systolic function.   3. Though limited in views, the LV appears normal in size and systolic   function. LV is seen best in PLAX and PSAX views. Recommend Definity if   clinically indictated.   4. A small-moderate sized pericardial effusion is present without Rv   collapse.   5. Sclerotic aortic valve with normal opening.        ECG:    LABS:          Tsh 3.68               10.8   12.59 )-----------( 580      ( 05 Oct 2019 05:53 )             35.7     10-05    136  |  100  |  13.0  ----------------------------<  104  4.0   |  27.0  |  0.67    Ca    8.5<L>      05 Oct 2019 05:53  Phos  4.0     10-05  Mg     2.1     10-05    TPro  6.1<L>  /  Alb  2.7<L>  /  TBili  0.3<L>  /  DBili  x   /  AST  15  /  ALT  13  /  AlkPhos  114  10-05      I&O's Summary    04 Oct 2019 07:01  -  05 Oct 2019 07:00  --------------------------------------------------------  IN: 900 mL / OUT: 3810 mL / NET: -2910 mL  CXR   Cardiomegaly. A pericardial drain remains in place. The right lung is   clear.   The left lung is clear. There is a small left pleural effusion,   unchanged.   No evidence of pneumothorax..   Impression:   Stable exam without significant change since the previous study.. This is a 43y/o male with PMH of DM, HTN and anemia with hx of pericaridal effusion [facility] 30 days ago, he had a pericardial drainage for 700cc's serous drainage.  started on meds for pericarditis and then represented with a reaccumulation and readmitted and drained 700cc of seous drainage.  On 9/24/19 he underwent a Right heart cath and had another pericardiocentesis for 700 cc's.  The drain was D/C'd on  9/26/19, repeat echo showed posterior moderate effusion.  He was referred  [place] for evaluation and is s/p subxiphoid exploration with findings of adhesion. A Left thoracotomy and creation of pericardial window with 550 cc of drainage  Pericardial drain remains in  Post course significant for a AF/SVT now SR.    Asked to see patient for pauses seen on telemetry while sleeping. Patient does not recall waking up during episode. O2 sat was 96% with episode.  Metoprolol 25mg q6h was discontinued due to event.  Review of telemetry demonstrates both episodes happened while patient was sleeping and both occurred with sinus rate slowing, eventual AV block, and then slow increase of sinus rate with resumption of 1:1 conduction. 1 strip also demonstrated MO prolongation upon return of 1:1 conduction.    Patient works in [employer] out [employer] and frequently has tics on skin but no rash.    Titers suspicious for lyme, past infection .  Currently being treated with  iv rocephin     PMH  Pericardial effusion  Obesity  Cardiomegaly  Insulin requiring Diabetes mellitus  Hypertension    PSH  Thorcotomy    Allergies:    No Known Allergies    MEDICATIONS  (STANDING):  cefTRIAXone   IVPB 2000 milliGRAM(s) IV Intermittent every 24 hours  colchicine 0.6 milliGRAM(s) Oral two times a day  enoxaparin Injectable 40 milliGRAM(s) SubCutaneous every 12 hours  furosemide   Injectable 20 milliGRAM(s) IV Push two times a day  ibuprofen  Tablet. 600 milliGRAM(s) Oral every 6 hours  insulin lispro (HumaLOG) corrective regimen sliding scale   SubCutaneous Before meals and at bedtime  metFORMIN 1000 milliGRAM(s) Oral two times a day  pantoprazole    Tablet 40 milliGRAM(s) Oral before breakfast  saccharomyces boulardii 250 milliGRAM(s) Oral two times a day      FAMILY HISTORY:  Mother and sister with Diabetes  Denies any hx of Heart problems  SOCIAL HISTORY:  Never smoked No etoh  , Works in irrigations  Born in [place]      REVIEW OF SYSTEMS:  CONSTITUTIONAL: No fever, weight loss, or fatigue  EYES: No eye pain, visual disturbances, or discharge  NECK: No pain or stiffness  RESPIRATORY: No cough, wheezing, chills or hemoptysis; Mild sob  CARDIOVASCULAR: No chest pain, palpitations, passing out, dizziness, ++ leg edema  GASTROINTESTINAL: No abdominal or epigastric pain. No nausea, vomiting, or hematemesis; No diarrhea or constipation. No melena or hematochezia.  GENITOURINARY: No dysuria, frequency, hematuria, or incontinence  NEUROLOGICAL: No headaches, memory loss, loss of strength, numbness, or tremors  SKIN: No itching, burning, rashes, or lesions   LYMPH Nodes: No enlarged glands  ENDOCRINE: No heat or cold intolerance; No hair loss  MUSCULOSKELETAL: No joint pain or swelling; No muscle, back, or extremity pain  PSYCHIATRIC: No depression, anxiety, mood swings, or difficulty sleeping  HEME/LYMPH: No easy bruising, or bleeding gums  ALLERY AND IMMUNOLOGIC: No hives or eczema	    Vital Signs Last 24 Hrs  T(C): 36.9 (05 Oct 2019 10:40), Max: 36.9 (05 Oct 2019 10:40)  T(F): 98.4 (05 Oct 2019 10:40), Max: 98.4 (05 Oct 2019 10:40)  HR: 88 (05 Oct 2019 10:40) (38 - 92)  BP: 124/75 (05 Oct 2019 10:40) (94/69 - 124/82)  BP(mean): --  RR: 18 (05 Oct 2019 10:40) (17 - 18)  SpO2: 98% (05 Oct 2019 10:40) (96% - 100%)    I&O's Detail    04 Oct 2019 07:01  -  05 Oct 2019 07:00  --------------------------------------------------------  IN:    Oral Fluid: 900 mL  Total IN: 900 mL    OUT:    Chest Tube: 10 mL    Voided: 3800 mL  Total OUT: 3810 mL  Total NET: -2910 mL    EKG Nsr RADHA .16/.04/.40 non specific st changes    PHYSICAL EXAM:  Appearance: Normal, well nourished in nad	  HEENT:   Normal oral mucosa, PERRL, EOMI, sclera non-icteric	  Lymphatic: No cervical lymphadenopathy  Cardiovascular: Normal S1 S2 regular  Respiratory: Lungs clear to auscultation	Left pigtail to waterseal  Psychiatry: A & O x 3, Mood & affect appropriate  Gastrointestinal:  Soft, Non-tender, + BS, no bruits	  Skin: No rashes, No ecchymoses, No cyanosis  Neurologic: Grossly non-focal with full strength in all four extremities  Extremities: Normal range of motion, No clubbing, cyanosis or edema  Vascular: Peripheral pulses palpable 2+ bilaterally Edema bilateral lower ext      INTERPRETATION OF TELEMETRY:  Echo:  < from: TTE Echo Complete w/Doppler (09.27.19 @ 15:23) >   1. Left ventricular ejection fraction, by visual estimation, is 55 to   60%.   2. Normal global left ventricular systolic function.   3. Though limited in views, the LV appears normal in size and systolic   function. LV is seen best in PLAX and PSAX views. Recommend Definity if   clinically indictated.   4. A small-moderate sized pericardial effusion is present without Rv   collapse.   5. Sclerotic aortic valve with normal opening.        ECG:    LABS:          Tsh 3.68               10.8   12.59 )-----------( 580      ( 05 Oct 2019 05:53 )             35.7     10-05    136  |  100  |  13.0  ----------------------------<  104  4.0   |  27.0  |  0.67    Ca    8.5<L>      05 Oct 2019 05:53  Phos  4.0     10-05  Mg     2.1     10-05    TPro  6.1<L>  /  Alb  2.7<L>  /  TBili  0.3<L>  /  DBili  x   /  AST  15  /  ALT  13  /  AlkPhos  114  10-05      I&O's Summary    04 Oct 2019 07:01  -  05 Oct 2019 07:00  --------------------------------------------------------  IN: 900 mL / OUT: 3810 mL / NET: -2910 mL  CXR   Cardiomegaly. A pericardial drain remains in place. The right lung is   clear.   The left lung is clear. There is a small left pleural effusion,   unchanged.   No evidence of pneumothorax..   Impression:   Stable exam without significant change since the previous study..

## 2019-10-05 NOTE — PROGRESS NOTE ADULT - SUBJECTIVE AND OBJECTIVE BOX
Subjective:    VITAL SIGNS  Vital Signs Last 24 Hrs  T(C): 36.9 (10-05-19 @ 10:40), Max: 36.9 (10-05-19 @ 10:40)  T(F): 98.4 (10-05-19 @ 10:40), Max: 98.4 (10-05-19 @ 10:40)  HR: 88 (10-05-19 @ 10:40) (38 - 92)  BP: 124/75 (10-05-19 @ 10:40) (94/69 - 124/82)  RR: 18 (10-05-19 @ 10:40) (17 - 18)  SpO2: 98% (10-05-19 @ 10:40) (96% - 100%)  on (O2)              Telemetry/Alarms:    LVEF:     MEDICATIONS  cefTRIAXone   IVPB 2000 milliGRAM(s) IV Intermittent every 24 hours  chlorhexidine 2% Cloths 1 Application(s) Topical <User Schedule>  colchicine 0.6 milliGRAM(s) Oral two times a day  enoxaparin Injectable 40 milliGRAM(s) SubCutaneous every 12 hours  furosemide   Injectable 20 milliGRAM(s) IV Push two times a day  guaiFENesin    Syrup 100 milliGRAM(s) Oral every 6 hours PRN  ibuprofen  Tablet. 600 milliGRAM(s) Oral every 6 hours  insulin lispro (HumaLOG) corrective regimen sliding scale   SubCutaneous Before meals and at bedtime  metFORMIN 1000 milliGRAM(s) Oral two times a day  oxyCODONE    5 mG/acetaminophen 325 mG 1 Tablet(s) Oral every 4 hours PRN  oxyCODONE    5 mG/acetaminophen 325 mG 2 Tablet(s) Oral every 4 hours PRN  pantoprazole    Tablet 40 milliGRAM(s) Oral before breakfast  saccharomyces boulardii 250 milliGRAM(s) Oral two times a day  sodium chloride 0.9% lock flush 3 milliLiter(s) IV Push every 8 hours  sodium chloride 0.9% lock flush 15 milliLiter(s) IV Push every 1 hour PRN  sodium chloride 0.9% lock flush 3 milliLiter(s) IV Push every 8 hours      PHYSICAL EXAM  General: well nourished, well developed, no acute distress  Neurology: alert and oriented x 3, nonfocal, no gross deficits  Respiratory: clear to auscultation bilaterally  CV: regular rate and rhythm, normal S1, S2  Abdomen: soft, nontender, nondistended, positive bowel sounds, last bowel movement   Extremities: warm, well perfused. no edema. + DP pulses  Incisions: midline sternal incision, + mepilex, c/d/i. sternum stable.  Chest tubes:   Epicardial Wires:    > EPM (settings) / isolated      10-04 @ 07:01  -  10-05 @ 07:00  --------------------------------------------------------  IN: 900 mL / OUT: 3810 mL / NET: -2910 mL        Weights:  Daily     Daily   Admit Wt: Drug Dosing Weight  Height (cm): 172 (30 Sep 2019 06:51)  Weight (kg): 111 (30 Sep 2019 06:51)  BMI (kg/m2): 37.5 (30 Sep 2019 06:51)  BSA (m2): 2.22 (30 Sep 2019 06:51)    All laboratory results, radiology and medications reviewed.    LABS  10-05    136  |  100  |  13.0  ----------------------------<  104  4.0   |  27.0  |  0.67    Ca    8.5<L>      05 Oct 2019 05:53  Phos  4.0     10-05  Mg     2.1     10-05    TPro  6.1<L>  /  Alb  2.7<L>  /  TBili  0.3<L>  /  DBili  x   /  AST  15  /  ALT  13  /  AlkPhos  114  10-05                                 10.8   12.59 )-----------( 580      ( 05 Oct 2019 05:53 )             35.7            Bilirubin Total, Serum: 0.3 mg/dL (10-05 @ 05:53)    CAPILLARY BLOOD GLUCOSE      POCT Blood Glucose.: 113 mg/dL (05 Oct 2019 08:27)  POCT Blood Glucose.: 159 mg/dL (04 Oct 2019 21:31)  POCT Blood Glucose.: 124 mg/dL (04 Oct 2019 17:07)  POCT Blood Glucose.: 101 mg/dL (04 Oct 2019 12:23)           Today's CXR:    Today's EKG:    PAST MEDICAL & SURGICAL HISTORY:  Heart failure  Cardiomegaly  Nonsmoker  Diabetes mellitus  Hypertension Subjective: Patient seen at bedside with  #[id number]. Patient reports feeling "very good" denies cp but reports mild sob with ambulation    VITAL SIGNS  Vital Signs Last 24 Hrs  T(C): 36.9 (10-05-19 @ 10:40), Max: 36.9 (10-05-19 @ 10:40)  T(F): 98.4 (10-05-19 @ 10:40), Max: 98.4 (10-05-19 @ 10:40)  HR: 88 (10-05-19 @ 10:40) (38 - 92)  BP: 124/75 (10-05-19 @ 10:40) (94/69 - 124/82)  RR: 18 (10-05-19 @ 10:40) (17 - 18)  SpO2: 98% (10-05-19 @ 10:40) (96% - 100%)  on ra)              Telemetry/Alarms:  ST but with two episodes of heart block  LVEF: 65    MEDICATIONS  cefTRIAXone   IVPB 2000 milliGRAM(s) IV Intermittent every 24 hours  chlorhexidine 2% Cloths 1 Application(s) Topical <User Schedule>  colchicine 0.6 milliGRAM(s) Oral two times a day  enoxaparin Injectable 40 milliGRAM(s) SubCutaneous every 12 hours  furosemide   Injectable 20 milliGRAM(s) IV Push two times a day  guaiFENesin    Syrup 100 milliGRAM(s) Oral every 6 hours PRN  ibuprofen  Tablet. 600 milliGRAM(s) Oral every 6 hours  insulin lispro (HumaLOG) corrective regimen sliding scale   SubCutaneous Before meals and at bedtime  metFORMIN 1000 milliGRAM(s) Oral two times a day  oxyCODONE    5 mG/acetaminophen 325 mG 1 Tablet(s) Oral every 4 hours PRN  oxyCODONE    5 mG/acetaminophen 325 mG 2 Tablet(s) Oral every 4 hours PRN  pantoprazole    Tablet 40 milliGRAM(s) Oral before breakfast  saccharomyces boulardii 250 milliGRAM(s) Oral two times a day  sodium chloride 0.9% lock flush 3 milliLiter(s) IV Push every 8 hours  sodium chloride 0.9% lock flush 15 milliLiter(s) IV Push every 1 hour PRN  sodium chloride 0.9% lock flush 3 milliLiter(s) IV Push every 8 hours      PHYSICAL EXAM  General: well nourished, well developed, no acute distress  Neurology: alert and oriented x 3, nonfocal, no gross deficits  Respiratory: diminished left base  CV: regular rate and rhythm, normal S1, S2  Abdomen: soft, nontender, nondistended, positive bowel sounds, last bowel movement   Extremities: warm, well perfused. 2+ edema. + DP pulses  Incisions: midline subxyphoid incision, + mepilex > removed + staples; left anterior thoracotomy incision mepilex removed + staples, c/d/i.   Chest tubes: left pleural tubes      10-04 @ 07:01  -  10-05 @ 07:00  --------------------------------------------------------  IN: 900 mL / OUT: 3810 mL / NET: -2910 mL        Weights:  Daily     Daily   Admit Wt: Drug Dosing Weight  Height (cm): 172 (30 Sep 2019 06:51)  Weight (kg): 111 (30 Sep 2019 06:51)  BMI (kg/m2): 37.5 (30 Sep 2019 06:51)  BSA (m2): 2.22 (30 Sep 2019 06:51)    All laboratory results, radiology and medications reviewed.    LABS  10-05    136  |  100  |  13.0  ----------------------------<  104  4.0   |  27.0  |  0.67    Ca    8.5<L>      05 Oct 2019 05:53  Phos  4.0     10-05  Mg     2.1     10-05    TPro  6.1<L>  /  Alb  2.7<L>  /  TBili  0.3<L>  /  DBili  x   /  AST  15  /  ALT  13  /  AlkPhos  114  10-05                                 10.8   12.59 )-----------( 580      ( 05 Oct 2019 05:53 )             35.7            Bilirubin Total, Serum: 0.3 mg/dL (10-05 @ 05:53)    CAPILLARY BLOOD GLUCOSE      POCT Blood Glucose.: 113 mg/dL (05 Oct 2019 08:27)  POCT Blood Glucose.: 159 mg/dL (04 Oct 2019 21:31)  POCT Blood Glucose.: 124 mg/dL (04 Oct 2019 17:07)  POCT Blood Glucose.: 101 mg/dL (04 Oct 2019 12:23)           Today's CXR:   < from: Xray Chest 1 View- PORTABLE-Routine (10.05.19 @ 06:18) >    INTERPRETATION:  Portable chest radiograph        CLINICAL INFORMATION: LEFT chest tube. Follow-up.    TECHNIQUE:  Portable  AP view of the chest wasobtained.    COMPARISON: No previous examinations are available for review.    FINDINGS: There is no interval change.  Cardiomegaly. A pericardial drain remains in place. The right lung is   clear.   The left lung is clear. There is a small left pleural effusion,   unchanged.   No evidence of pneumothorax..     Impression:   Stable exam without significant change since the previous study..     < end of copied text >    Today's EKG:   < from: 12 Lead ECG (10.03.19 @ 07:20) >    Diagnosis Line Normal sinus rhythm  Rightward axis  T wave abnormality, consider anterolateral ischemia    < end of copied text >    PAST MEDICAL & SURGICAL HISTORY:  Heart failure  Cardiomegaly  Nonsmoker  Diabetes mellitus  Hypertension

## 2019-10-05 NOTE — CONSULT NOTE ADULT - ATTENDING COMMENTS
I have personally seen, examined, and participated in the care of this patient. I have reviewed all pertinent clinical information, including history, physical exam, plan, and the PA/NP's note and agree except as noted below.    44 year old male with DM, HTN and anemia who has had pericarditis complicated by pericardial effusion s/p pericardial window with post-op course notable for AF/SVT for which he was started on Metoprolol 25mg q6h with return of NSR. He has had pauses on telemetry which are consistent with elevated vagal tone resulting in AV block. This response is physiologic and does not warrant pacemaker. Although he has Lyme disease, Lyme disease would present with AV conduction disease at all times of the day and not just at night. He has a normal CO interval at baseline.    Recommendations:  - No pacemaker indication  - OK to resume BB  - Sleep study    Thank you for this consultation. EP will sign off. Please contact EP team with any questions.    Vincent Gonsalez MD  Clinical Cardiac Electrophysiology

## 2019-10-05 NOTE — PROGRESS NOTE ADULT - SUBJECTIVE AND OBJECTIVE BOX
INTERVAL HPI/OVERNIGHT EVENTS:  followup T2DM   Pt repots to be felling better   MEDICATIONS  (STANDING):  cefTRIAXone   IVPB 2000 milliGRAM(s) IV Intermittent every 24 hours  chlorhexidine 2% Cloths 1 Application(s) Topical <User Schedule>  colchicine 0.6 milliGRAM(s) Oral two times a day  enoxaparin Injectable 40 milliGRAM(s) SubCutaneous every 12 hours  furosemide   Injectable 20 milliGRAM(s) IV Push two times a day  ibuprofen  Tablet. 600 milliGRAM(s) Oral every 6 hours  insulin lispro (HumaLOG) corrective regimen sliding scale   SubCutaneous Before meals and at bedtime  metFORMIN 1000 milliGRAM(s) Oral two times a day  metoprolol tartrate 25 milliGRAM(s) Oral three times a day  pantoprazole    Tablet 40 milliGRAM(s) Oral before breakfast  saccharomyces boulardii 250 milliGRAM(s) Oral two times a day  sodium chloride 0.9% lock flush 3 milliLiter(s) IV Push every 8 hours  sodium chloride 0.9% lock flush 3 milliLiter(s) IV Push every 8 hours    MEDICATIONS  (PRN):  guaiFENesin    Syrup 100 milliGRAM(s) Oral every 6 hours PRN Cough  oxyCODONE    5 mG/acetaminophen 325 mG 1 Tablet(s) Oral every 4 hours PRN Moderate Pain (4 - 6)  oxyCODONE    5 mG/acetaminophen 325 mG 2 Tablet(s) Oral every 4 hours PRN Severe Pain (7 - 10)  sodium chloride 0.9% lock flush 15 milliLiter(s) IV Push every 1 hour PRN Pre/post blood products, medications, blood draw, and to maintain line patency      Allergies    No Known Allergies    Intolerances    [facility] patient (Unknown)      Review of systems:  No chest pain no pressure non/v or abd pain  no loose BM   Vital Signs Last 24 Hrs  T(C): 36.9 (05 Oct 2019 21:58), Max: 36.9 (05 Oct 2019 10:40)  T(F): 98.5 (05 Oct 2019 21:58), Max: 98.5 (05 Oct 2019 21:58)  HR: 93 (05 Oct 2019 21:58) (38 - 93)  BP: 125/85 (05 Oct 2019 21:58) (108/75 - 125/85)  BP(mean): --  RR: 16 (05 Oct 2019 21:58) (16 - 18)  SpO2: 100% (05 Oct 2019 21:58) (96% - 100%)    PHYSICAL EXAM:      Constitutional: NAD, well-groomed, well-developed  HEENT: PERRLA, EOMI, no exophalmos  Neck: No LAD, No JVD, trachea midline, no thyroid enlargement    Respiratory: CTAB  Cardiovascular: S1 and S2, RRR, no M/G/R    Extremities: + bilateral ACE bandages in place       LABS:                        11.4   13.41 )-----------( 594      ( 05 Oct 2019 13:55 )             38.0     10-05    135  |  97<L>  |  14.0  ----------------------------<  109  3.8   |  27.0  |  0.80    Ca    8.8      05 Oct 2019 13:55  Phos  4.0     10-05  Mg     2.1     10-05    TPro  6.1<L>  /  Alb  2.7<L>  /  TBili  0.3<L>  /  DBili  x   /  AST  15  /  ALT  13  /  AlkPhos  114  10-05          CAPILLARY BLOOD GLUCOSE    CAPILLARY BLOOD GLUCOSE      POCT Blood Glucose.: 115 mg/dL (05 Oct 2019 21:19)  POCT Blood Glucose.: 104 mg/dL (05 Oct 2019 17:19)  POCT Blood Glucose.: 106 mg/dL (05 Oct 2019 12:32)  POCT Blood Glucose.: 113 mg/dL (05 Oct 2019 08:27)    RADIOLOGY & ADDITIONAL TESTS:

## 2019-10-05 NOTE — CONSULT NOTE ADULT - ASSESSMENT
T2DM   controlled    will cont outpt RX when able to    right now cont basal bolus     Mild elevated TSH - can repeat in 2 days
This 44 year old male with history of obesity, IDDM, HTN, and anemia.  In march while he was in [place] he stated he wasn't feeling well and there was prescribed blood pressure medication.  He was told to follow up when he got home but he was feeling well and he never did.  He started to see swelling in his legs and started to feel more short of breath.  He followed up with cardiologist and had an echo.  His cardiologist was trying to reach him, he finally got a message days later that he had fluid around the heart and needed to be drained.  He was admitted to [facility] 15 days ago, he had a pericardial drainage for 700cc's serous drainage. He was started on medications for pericarditis and was discharged after 4 days. After follow up with cardiologist, he was found to have another accumulation of pericardial effusion.  He was sent to [facility]. On 9/24/19 he underwent a Right heart cath and had another pericardiocentesis for 700 cc's.  The drain was D/C'd on  9/26/19, repeat echo showed posterior moderate effusion.  He was then transported to [place] for evaluation for pericardial window on 9/27/19.    Pending pericardial window on 9/30/19  in terms of his pericarditis, not entirely clear that this is Lyme disease.    his Lyme c6 is positive, but can be related to prior tick bite. he has never been previously treated for Lyme.     - check Lyme western blot  - check other viral etiologies  - screen HIV  - start ceftriaxone 2 grams Q 24H for now.       - follow up all outstanding cultures  - trend temperature and WBC curve  - repeat cultures from blood and all sources if febrile.
43 y/o male with PMH of DM, HTN and anemia with hx of pericardial effusion [facility] 30 days ago, he had a pericardial drainage for 700cc's serous drainage.  started on meds for pericarditis and then represented with a reaccumulation and readmitted and drained 700cc of seous drainage.  On 9/24/19 he underwent a Right heart cath and had another pericardiocentesis for 700 cc's.  The drain was DC/d  on  9/26/19, repeat echo showed posterior moderate effusion.  He was referred  [place] for evaluation and is s/p subxiphoid exploration with findings of adhesion. A Left thoracotomy and creation of pericardial window with 550 cc of drainage  Pericardial drain remains in  Post course significant for a AF/SVT now SR.    Now with a 6.08 minute pause at 2:48 am sleeping during event with adequate saturation.  On metoprolol 25mg q6h during event

## 2019-10-05 NOTE — PROGRESS NOTE ADULT - PROBLEM SELECTOR PLAN 1
Pericardial window via thoracotomy on 9/30/19  Cytology and pathology negative for malignant cells.  Surgical pathology: chronic inflammation and granulation tissue  Pt tested positive for Lyme Dx, ID is following viral serologies sent and started on ceftriaxone, may be past exposure.  ID followup appreciated.  Continue colchicine/ibuoprofen for pericarditis  Will leave in tubes per Lorraine, possibly check JAMES monday

## 2019-10-05 NOTE — PROGRESS NOTE ADULT - ASSESSMENT
44 year old Male with PMH of  cardiomegaly, DM II, HTN, and recurrent pericardial effusions with pericardiocentesis x2 (serous drainage), most recently at [place] where a residual posterior moderate pericardial effusion was seen on TTE after the drain was removed on 9/26/19. Patient was then transferred to [facility] on 9/27 for further management. Patient underwent a pericardial window and pericardial biopsy on 9/30/19 with Dr. Peters. Postoperative course complicated by some AF/SVT now SR. 10/1 one missed beat noted but no bradycardia and has not recurred. 10/5 two episodes of heart block noted. Echo: small - moderate pericardial effusion

## 2019-10-06 LAB
ANION GAP SERPL CALC-SCNC: 13 MMOL/L — SIGNIFICANT CHANGE UP (ref 5–17)
BUN SERPL-MCNC: 13 MG/DL — SIGNIFICANT CHANGE UP (ref 8–20)
CALCIUM SERPL-MCNC: 8.7 MG/DL — SIGNIFICANT CHANGE UP (ref 8.6–10.2)
CHLORIDE SERPL-SCNC: 97 MMOL/L — LOW (ref 98–107)
CO2 SERPL-SCNC: 27 MMOL/L — SIGNIFICANT CHANGE UP (ref 22–29)
CREAT SERPL-MCNC: 0.82 MG/DL — SIGNIFICANT CHANGE UP (ref 0.5–1.3)
GLUCOSE BLDC GLUCOMTR-MCNC: 100 MG/DL — HIGH (ref 70–99)
GLUCOSE BLDC GLUCOMTR-MCNC: 115 MG/DL — HIGH (ref 70–99)
GLUCOSE BLDC GLUCOMTR-MCNC: 149 MG/DL — HIGH (ref 70–99)
GLUCOSE BLDC GLUCOMTR-MCNC: 98 MG/DL — SIGNIFICANT CHANGE UP (ref 70–99)
GLUCOSE SERPL-MCNC: 88 MG/DL — SIGNIFICANT CHANGE UP (ref 70–115)
HCT VFR BLD CALC: 34.9 % — LOW (ref 39–50)
HGB BLD-MCNC: 10.7 G/DL — LOW (ref 13–17)
MAGNESIUM SERPL-MCNC: 1.9 MG/DL — SIGNIFICANT CHANGE UP (ref 1.6–2.6)
MCHC RBC-ENTMCNC: 25.2 PG — LOW (ref 27–34)
MCHC RBC-ENTMCNC: 30.7 GM/DL — LOW (ref 32–36)
MCV RBC AUTO: 82.3 FL — SIGNIFICANT CHANGE UP (ref 80–100)
PLATELET # BLD AUTO: 547 K/UL — HIGH (ref 150–400)
POTASSIUM SERPL-MCNC: 3.7 MMOL/L — SIGNIFICANT CHANGE UP (ref 3.5–5.3)
POTASSIUM SERPL-SCNC: 3.7 MMOL/L — SIGNIFICANT CHANGE UP (ref 3.5–5.3)
RBC # BLD: 4.24 M/UL — SIGNIFICANT CHANGE UP (ref 4.2–5.8)
RBC # FLD: 15.5 % — HIGH (ref 10.3–14.5)
SODIUM SERPL-SCNC: 137 MMOL/L — SIGNIFICANT CHANGE UP (ref 135–145)
WBC # BLD: 11.57 K/UL — HIGH (ref 3.8–10.5)
WBC # FLD AUTO: 11.57 K/UL — HIGH (ref 3.8–10.5)

## 2019-10-06 PROCEDURE — 71045 X-RAY EXAM CHEST 1 VIEW: CPT | Mod: 26

## 2019-10-06 RX ORDER — POTASSIUM CHLORIDE 20 MEQ
40 PACKET (EA) ORAL ONCE
Refills: 0 | Status: COMPLETED | OUTPATIENT
Start: 2019-10-06 | End: 2019-10-06

## 2019-10-06 RX ORDER — MAGNESIUM SULFATE 500 MG/ML
2 VIAL (ML) INJECTION ONCE
Refills: 0 | Status: COMPLETED | OUTPATIENT
Start: 2019-10-06 | End: 2019-10-06

## 2019-10-06 RX ORDER — METOPROLOL TARTRATE 50 MG
25 TABLET ORAL
Refills: 0 | Status: DISCONTINUED | OUTPATIENT
Start: 2019-10-06 | End: 2019-10-08

## 2019-10-06 RX ADMIN — ENOXAPARIN SODIUM 40 MILLIGRAM(S): 100 INJECTION SUBCUTANEOUS at 23:07

## 2019-10-06 RX ADMIN — Medication 250 MILLIGRAM(S): at 17:32

## 2019-10-06 RX ADMIN — CHLORHEXIDINE GLUCONATE 1 APPLICATION(S): 213 SOLUTION TOPICAL at 11:04

## 2019-10-06 RX ADMIN — Medication 250 MILLIGRAM(S): at 06:03

## 2019-10-06 RX ADMIN — Medication 600 MILLIGRAM(S): at 00:30

## 2019-10-06 RX ADMIN — SODIUM CHLORIDE 3 MILLILITER(S): 9 INJECTION INTRAMUSCULAR; INTRAVENOUS; SUBCUTANEOUS at 13:16

## 2019-10-06 RX ADMIN — Medication 600 MILLIGRAM(S): at 11:35

## 2019-10-06 RX ADMIN — Medication 20 MILLIGRAM(S): at 06:04

## 2019-10-06 RX ADMIN — SODIUM CHLORIDE 3 MILLILITER(S): 9 INJECTION INTRAMUSCULAR; INTRAVENOUS; SUBCUTANEOUS at 06:08

## 2019-10-06 RX ADMIN — CEFTRIAXONE 100 MILLIGRAM(S): 500 INJECTION, POWDER, FOR SOLUTION INTRAMUSCULAR; INTRAVENOUS at 15:33

## 2019-10-06 RX ADMIN — Medication 50 GRAM(S): at 13:35

## 2019-10-06 RX ADMIN — Medication 600 MILLIGRAM(S): at 06:04

## 2019-10-06 RX ADMIN — Medication 20 MILLIGRAM(S): at 17:45

## 2019-10-06 RX ADMIN — Medication 600 MILLIGRAM(S): at 06:08

## 2019-10-06 RX ADMIN — Medication 25 MILLIGRAM(S): at 17:32

## 2019-10-06 RX ADMIN — Medication 0.6 MILLIGRAM(S): at 17:32

## 2019-10-06 RX ADMIN — METFORMIN HYDROCHLORIDE 1000 MILLIGRAM(S): 850 TABLET ORAL at 17:32

## 2019-10-06 RX ADMIN — SODIUM CHLORIDE 3 MILLILITER(S): 9 INJECTION INTRAMUSCULAR; INTRAVENOUS; SUBCUTANEOUS at 22:54

## 2019-10-06 RX ADMIN — Medication 600 MILLIGRAM(S): at 11:06

## 2019-10-06 RX ADMIN — Medication 0.6 MILLIGRAM(S): at 06:04

## 2019-10-06 RX ADMIN — METFORMIN HYDROCHLORIDE 1000 MILLIGRAM(S): 850 TABLET ORAL at 08:42

## 2019-10-06 RX ADMIN — Medication 600 MILLIGRAM(S): at 23:40

## 2019-10-06 RX ADMIN — Medication 600 MILLIGRAM(S): at 00:27

## 2019-10-06 RX ADMIN — Medication 40 MILLIEQUIVALENT(S): at 13:34

## 2019-10-06 RX ADMIN — PANTOPRAZOLE SODIUM 40 MILLIGRAM(S): 20 TABLET, DELAYED RELEASE ORAL at 06:03

## 2019-10-06 RX ADMIN — SODIUM CHLORIDE 3 MILLILITER(S): 9 INJECTION INTRAMUSCULAR; INTRAVENOUS; SUBCUTANEOUS at 06:07

## 2019-10-06 RX ADMIN — ENOXAPARIN SODIUM 40 MILLIGRAM(S): 100 INJECTION SUBCUTANEOUS at 11:06

## 2019-10-06 RX ADMIN — Medication 600 MILLIGRAM(S): at 23:07

## 2019-10-06 RX ADMIN — Medication 600 MILLIGRAM(S): at 17:32

## 2019-10-06 RX ADMIN — Medication 600 MILLIGRAM(S): at 19:01

## 2019-10-06 NOTE — PROGRESS NOTE ADULT - SUBJECTIVE AND OBJECTIVE BOX
Subjective:  "Im doing ok, when is the tube coming out ?"  OOB family at bedside    Tele:    SR   60s   2.7 sec pause this am                         T(C): 36.4 (10-06-19 @ 06:00), Max: 36.9 (10-05-19 @ 21:58)  HR: 66 (10-06-19 @ 06:00) (66 - 93)  BP: 117/80 (10-06-19 @ 06:00) (109/77 - 125/85)  RR: 16 (10-06-19 @ 06:00) (16 - 18)  SpO2: 100% (10-06-19 @ 06:00) (97% - 100%)    LVEF: 65%      10-06    137  |  97<L>  |  13.0  ----------------------------<  88  3.7   |  27.0  |  0.82    Ca    8.7      06 Oct 2019 06:34  Phos  4.0     10-05  Mg     1.9     10-06    TPro  6.1<L>  /  Alb  2.7<L>  /  TBili  0.3<L>  /  DBili  x   /  AST  15  /  ALT  13  /  AlkPhos  114  10-05                               10.7   11.57 )-----------( 547      ( 06 Oct 2019 06:34 )             34.9            CAPILLARY BLOOD GLUCOSE      POCT Blood Glucose.: 100 mg/dL (06 Oct 2019 08:23)  POCT Blood Glucose.: 115 mg/dL (05 Oct 2019 21:19)  POCT Blood Glucose.: 104 mg/dL (05 Oct 2019 17:19)  POCT Blood Glucose.: 106 mg/dL (05 Oct 2019 12:32)           CXR: < from: Xray Chest 1 View- PORTABLE-Routine (10.05.19 @ 06:18) >  Cardiomegaly. A pericardial drain remains in place. The right lung is   clear.   The left lung is clear. There is a small left pleural effusion,   unchanged.   No evidence of pneumothorax..       < end of copied text >        Assessment    General: well nourished, well developed, no acute distress  Neurology: alert and oriented x 3, nonfocal, no gross deficits  Respiratory: diminished left base  CV: regular rate and rhythm, normal S1, S2  Abdomen: soft, nontender, nondistended, positive bowel sounds, last bowel movement this am  Extremities: warm, well perfused. 2+ edema. + DP pulses  Incisions: midline subxyphoid incision,  + staples; left anterior thoracotomy incision > + staples, c/d/i.   Chest tubes: left pleural tube mod serous fluid draining        MEDICATIONS  (STANDING):  cefTRIAXone   IVPB 2000 milliGRAM(s) IV Intermittent every 24 hours  chlorhexidine 2% Cloths 1 Application(s) Topical <User Schedule>  colchicine 0.6 milliGRAM(s) Oral two times a day  enoxaparin Injectable 40 milliGRAM(s) SubCutaneous every 12 hours  furosemide   Injectable 20 milliGRAM(s) IV Push two times a day  ibuprofen  Tablet. 600 milliGRAM(s) Oral every 6 hours  insulin lispro (HumaLOG) corrective regimen sliding scale   SubCutaneous Before meals and at bedtime  magnesium sulfate  IVPB 2 Gram(s) IV Intermittent once  metFORMIN 1000 milliGRAM(s) Oral two times a day  metoprolol tartrate 25 milliGRAM(s) Oral three times a day  pantoprazole    Tablet 40 milliGRAM(s) Oral before breakfast  potassium chloride    Tablet ER 40 milliEquivalent(s) Oral once  saccharomyces boulardii 250 milliGRAM(s) Oral two times a day  sodium chloride 0.9% lock flush 3 milliLiter(s) IV Push every 8 hours  sodium chloride 0.9% lock flush 3 milliLiter(s) IV Push every 8 hours       PAST MEDICAL & SURGICAL HISTORY:  Heart failure  Cardiomegaly  Nonsmoker  Diabetes mellitus  Hypertension

## 2019-10-06 NOTE — PROGRESS NOTE ADULT - ASSESSMENT
44 year old Male with PMH of  cardiomegaly, DM II, HTN, and recurrent pericardial effusions with pericardiocentesis x2 (serous drainage), most recently at [facility] where a residual posterior moderate pericardial effusion was seen on TTE after the drain was removed on 9/26/19. Patient was then transferred to [facility] on 9/27 for further management. S/P  pericardial window and pericardial biopsy on 9/30/19 with Dr. Peters. Postoperative course complicated by some AF/SVT now SR. 10/1 one missed beat noted but no bradycardia and has not recurred. 10/5 two episodes of heart block noted. Echo: small - moderate pericardial effusion  10/6    2.7 Pause> EPS following, betablocker reduced bid

## 2019-10-07 LAB
-  AMPICILLIN: SIGNIFICANT CHANGE UP
-  TETRACYCLINE: SIGNIFICANT CHANGE UP
-  VANCOMYCIN: SIGNIFICANT CHANGE UP
ALBUMIN SERPL ELPH-MCNC: 2.7 G/DL — LOW (ref 3.3–5.2)
ALP SERPL-CCNC: 88 U/L — SIGNIFICANT CHANGE UP (ref 40–120)
ALT FLD-CCNC: 19 U/L — SIGNIFICANT CHANGE UP
ANION GAP SERPL CALC-SCNC: 14 MMOL/L — SIGNIFICANT CHANGE UP (ref 5–17)
AST SERPL-CCNC: 36 U/L — SIGNIFICANT CHANGE UP
BILIRUB SERPL-MCNC: 0.3 MG/DL — LOW (ref 0.4–2)
BUN SERPL-MCNC: 13 MG/DL — SIGNIFICANT CHANGE UP (ref 8–20)
CALCIUM SERPL-MCNC: 8.6 MG/DL — SIGNIFICANT CHANGE UP (ref 8.6–10.2)
CHLORIDE SERPL-SCNC: 97 MMOL/L — LOW (ref 98–107)
CO2 SERPL-SCNC: 26 MMOL/L — SIGNIFICANT CHANGE UP (ref 22–29)
CREAT SERPL-MCNC: 0.86 MG/DL — SIGNIFICANT CHANGE UP (ref 0.5–1.3)
GLUCOSE BLDC GLUCOMTR-MCNC: 114 MG/DL — HIGH (ref 70–99)
GLUCOSE BLDC GLUCOMTR-MCNC: 163 MG/DL — HIGH (ref 70–99)
GLUCOSE BLDC GLUCOMTR-MCNC: 98 MG/DL — SIGNIFICANT CHANGE UP (ref 70–99)
GLUCOSE BLDC GLUCOMTR-MCNC: 99 MG/DL — SIGNIFICANT CHANGE UP (ref 70–99)
GLUCOSE SERPL-MCNC: 89 MG/DL — SIGNIFICANT CHANGE UP (ref 70–115)
HCT VFR BLD CALC: 33.8 % — LOW (ref 39–50)
HGB BLD-MCNC: 10.4 G/DL — LOW (ref 13–17)
MCHC RBC-ENTMCNC: 25.3 PG — LOW (ref 27–34)
MCHC RBC-ENTMCNC: 30.8 GM/DL — LOW (ref 32–36)
MCV RBC AUTO: 82.2 FL — SIGNIFICANT CHANGE UP (ref 80–100)
METHOD TYPE: SIGNIFICANT CHANGE UP
PLATELET # BLD AUTO: 524 K/UL — HIGH (ref 150–400)
POTASSIUM SERPL-MCNC: 4.1 MMOL/L — SIGNIFICANT CHANGE UP (ref 3.5–5.3)
POTASSIUM SERPL-SCNC: 4.1 MMOL/L — SIGNIFICANT CHANGE UP (ref 3.5–5.3)
PROT SERPL-MCNC: 6 G/DL — LOW (ref 6.6–8.7)
RBC # BLD: 4.11 M/UL — LOW (ref 4.2–5.8)
RBC # FLD: 15.6 % — HIGH (ref 10.3–14.5)
SODIUM SERPL-SCNC: 137 MMOL/L — SIGNIFICANT CHANGE UP (ref 135–145)
WBC # BLD: 11.37 K/UL — HIGH (ref 3.8–10.5)
WBC # FLD AUTO: 11.37 K/UL — HIGH (ref 3.8–10.5)

## 2019-10-07 PROCEDURE — 99232 SBSQ HOSP IP/OBS MODERATE 35: CPT

## 2019-10-07 PROCEDURE — 71045 X-RAY EXAM CHEST 1 VIEW: CPT | Mod: 26

## 2019-10-07 RX ORDER — FUROSEMIDE 40 MG
40 TABLET ORAL
Refills: 0 | Status: DISCONTINUED | OUTPATIENT
Start: 2019-10-07 | End: 2019-10-08

## 2019-10-07 RX ADMIN — SODIUM CHLORIDE 3 MILLILITER(S): 9 INJECTION INTRAMUSCULAR; INTRAVENOUS; SUBCUTANEOUS at 22:10

## 2019-10-07 RX ADMIN — Medication 250 MILLIGRAM(S): at 06:02

## 2019-10-07 RX ADMIN — PANTOPRAZOLE SODIUM 40 MILLIGRAM(S): 20 TABLET, DELAYED RELEASE ORAL at 06:02

## 2019-10-07 RX ADMIN — Medication 40 MILLIGRAM(S): at 17:04

## 2019-10-07 RX ADMIN — ENOXAPARIN SODIUM 40 MILLIGRAM(S): 100 INJECTION SUBCUTANEOUS at 23:11

## 2019-10-07 RX ADMIN — Medication 0.6 MILLIGRAM(S): at 17:04

## 2019-10-07 RX ADMIN — METFORMIN HYDROCHLORIDE 1000 MILLIGRAM(S): 850 TABLET ORAL at 17:04

## 2019-10-07 RX ADMIN — Medication 2: at 12:45

## 2019-10-07 RX ADMIN — Medication 600 MILLIGRAM(S): at 17:04

## 2019-10-07 RX ADMIN — Medication 25 MILLIGRAM(S): at 17:04

## 2019-10-07 RX ADMIN — Medication 0.6 MILLIGRAM(S): at 06:02

## 2019-10-07 RX ADMIN — SODIUM CHLORIDE 3 MILLILITER(S): 9 INJECTION INTRAMUSCULAR; INTRAVENOUS; SUBCUTANEOUS at 13:20

## 2019-10-07 RX ADMIN — Medication 600 MILLIGRAM(S): at 06:30

## 2019-10-07 RX ADMIN — Medication 600 MILLIGRAM(S): at 12:45

## 2019-10-07 RX ADMIN — CEFTRIAXONE 100 MILLIGRAM(S): 500 INJECTION, POWDER, FOR SOLUTION INTRAMUSCULAR; INTRAVENOUS at 13:43

## 2019-10-07 RX ADMIN — ENOXAPARIN SODIUM 40 MILLIGRAM(S): 100 INJECTION SUBCUTANEOUS at 10:14

## 2019-10-07 RX ADMIN — Medication 600 MILLIGRAM(S): at 06:02

## 2019-10-07 RX ADMIN — METFORMIN HYDROCHLORIDE 1000 MILLIGRAM(S): 850 TABLET ORAL at 10:14

## 2019-10-07 RX ADMIN — Medication 25 MILLIGRAM(S): at 06:02

## 2019-10-07 RX ADMIN — Medication 600 MILLIGRAM(S): at 13:47

## 2019-10-07 RX ADMIN — Medication 600 MILLIGRAM(S): at 23:11

## 2019-10-07 RX ADMIN — SODIUM CHLORIDE 3 MILLILITER(S): 9 INJECTION INTRAMUSCULAR; INTRAVENOUS; SUBCUTANEOUS at 06:04

## 2019-10-07 RX ADMIN — Medication 600 MILLIGRAM(S): at 18:06

## 2019-10-07 RX ADMIN — CHLORHEXIDINE GLUCONATE 1 APPLICATION(S): 213 SOLUTION TOPICAL at 06:03

## 2019-10-07 RX ADMIN — Medication 250 MILLIGRAM(S): at 17:04

## 2019-10-07 RX ADMIN — Medication 20 MILLIGRAM(S): at 06:03

## 2019-10-07 NOTE — PROGRESS NOTE ADULT - ASSESSMENT
44 year old  male with history of Type 2 DM and recurrent pericardial effusion admitted for pericardial window, found to have + lyme titer.  His glycemic control is acceptable at this time.     1.  Type 2 DM-  continue metformin and SSI.    2.  Pericardial effusion-  as per CT surgery.  S/p pericardial window and now on Colchicine.    3.  Lyme- on Ceftriaxone as per ID.

## 2019-10-07 NOTE — PROGRESS NOTE ADULT - PROBLEM SELECTOR PLAN 1
Pericardial window via thoracotomy on 9/30/19  Cytology and pathology negative for malignant cells.  Surgical pathology: chronic inflammation and granulation tissue  Pt tested positive for Lyme Dx, ID is following viral serologies sent and started on ceftriaxone, may be past exposure.  ID followup appreciated.  Continue colchicine/ibuoprofen for pericarditis  Will leave in tube per Lorraine  Increase diuretics

## 2019-10-07 NOTE — PROGRESS NOTE ADULT - ASSESSMENT
44 year old Male with PMH of  cardiomegaly, DM II, HTN, and recurrent pericardial effusions with pericardiocentesis x2 (serous drainage), most recently at [facility] where a residual posterior moderate pericardial effusion was seen on TTE after the drain was removed on 9/26/19. Patient was then transferred to [facility] on 9/27 for further management. S/P  pericardial window and pericardial biopsy on 9/30/19 with Dr. Peters. Postoperative course complicated by some AF/SVT now SR. 10/1 one missed beat noted but no bradycardia and has not recurred. 10/5 two episodes of heart block noted. Echo: small - moderate pericardial effusion  10/6    2.7 Pause> EPS following, betablocker reduced bid  10/7 Maintain pleural tube high output  Increase diuresis

## 2019-10-07 NOTE — PROGRESS NOTE ADULT - SUBJECTIVE AND OBJECTIVE BOX
Subjective:  "Im ok, hungry"  OOB chair , wife visited    Tele:    SR  70s                            T(C): 36.9 (10-07-19 @ 10:23), Max: 36.9 (10-07-19 @ 10:23)  HR: 79 (10-07-19 @ 10:23) (79 - 93)  BP: 106/75 (10-07-19 @ 10:23) (106/75 - 122/58)  RR: 18 (10-07-19 @ 10:23) (16 - 18)  SpO2: 96% (10-07-19 @ 10:23) (96% - 100%)     LVEF: 65%      10-07  137  |  97<L>  |  13.0  ----------------------------<  89  4.1   |  26.0  |  0.86    Ca    8.6      07 Oct 2019 05:59  Mg     1.9     10-06    TPro  6.0<L>  /  Alb  2.7<L>  /  TBili  0.3<L>  /  DBili  x   /  AST  36  /  ALT  19  /  AlkPhos  88  10-07                               10.4   11.37 )-----------( 524      ( 07 Oct 2019 05:59 )             33.8            CAPILLARY BLOOD GLUCOSE      POCT Blood Glucose.: 163 mg/dL (07 Oct 2019 12:10)  POCT Blood Glucose.: 99 mg/dL (07 Oct 2019 08:09)  POCT Blood Glucose.: 115 mg/dL (06 Oct 2019 20:57)  POCT Blood Glucose.: 149 mg/dL (06 Oct 2019 17:07)           CXR: < from: Xray Chest 1 View- PORTABLE-Routine (10.07.19 @ 05:24) >  Cardio megaly. No hilar or mediastinal abnormality. The lungs are clear.   Again noted is a pericardial drain. No evidence of pneumothorax..      < end of copied text >        Assessment    General: well nourished, well developed, no acute distress  Neurology: alert and oriented x 3, nonfocal, no gross deficits  Respiratory: diminished left base  CV: regular rate and rhythm, normal S1, S2  Abdomen: soft, nontender, nondistended, positive bowel sounds, last bowel movement this am  Extremities: warm, well perfused. 2+ edema. + DP pulses  Incisions: midline subxyphoid staples  > removed ; left anterior thoracotomy incision > + staples, c/d/i.   Chest tubes: left pleural tube mod serous fluid draining      MEDICATIONS  (STANDING):  cefTRIAXone   IVPB 2000 milliGRAM(s) IV Intermittent every 24 hours  chlorhexidine 2% Cloths 1 Application(s) Topical <User Schedule>  colchicine 0.6 milliGRAM(s) Oral two times a day  enoxaparin Injectable 40 milliGRAM(s) SubCutaneous every 12 hours  furosemide   Injectable 40 milliGRAM(s) IV Push two times a day  ibuprofen  Tablet. 600 milliGRAM(s) Oral every 6 hours  insulin lispro (HumaLOG) corrective regimen sliding scale   SubCutaneous Before meals and at bedtime  metFORMIN 1000 milliGRAM(s) Oral two times a day  metoprolol tartrate 25 milliGRAM(s) Oral two times a day  pantoprazole    Tablet 40 milliGRAM(s) Oral before breakfast  saccharomyces boulardii 250 milliGRAM(s) Oral two times a day  sodium chloride 0.9% lock flush 3 milliLiter(s) IV Push every 8 hours  sodium chloride 0.9% lock flush 3 milliLiter(s) IV Push every 8 hours       PAST MEDICAL & SURGICAL HISTORY:  Heart failure  Cardiomegaly  Nonsmoker  Diabetes mellitus  Hypertension

## 2019-10-07 NOTE — PROGRESS NOTE ADULT - SUBJECTIVE AND OBJECTIVE BOX
Seen for follow up of Type 2 DM    Interval HPI/ Overnight Events:  Denies any associated CP, SOB    MEDICATIONS  (STANDING):  cefTRIAXone   IVPB 2000 milliGRAM(s) IV Intermittent every 24 hours  chlorhexidine 2% Cloths 1 Application(s) Topical <User Schedule>  colchicine 0.6 milliGRAM(s) Oral two times a day  enoxaparin Injectable 40 milliGRAM(s) SubCutaneous every 12 hours  furosemide   Injectable 40 milliGRAM(s) IV Push two times a day  ibuprofen  Tablet. 600 milliGRAM(s) Oral every 6 hours  insulin lispro (HumaLOG) corrective regimen sliding scale   SubCutaneous Before meals and at bedtime  metFORMIN 1000 milliGRAM(s) Oral two times a day  metoprolol tartrate 25 milliGRAM(s) Oral two times a day  pantoprazole    Tablet 40 milliGRAM(s) Oral before breakfast  saccharomyces boulardii 250 milliGRAM(s) Oral two times a day  sodium chloride 0.9% lock flush 3 milliLiter(s) IV Push every 8 hours  sodium chloride 0.9% lock flush 3 milliLiter(s) IV Push every 8 hours    Vital Signs Last 24 Hrs  T(C): 36.7 (07 Oct 2019 15:16), Max: 36.9 (07 Oct 2019 10:23)  T(F): 98 (07 Oct 2019 15:16), Max: 98.4 (07 Oct 2019 10:23)  HR: 90 (07 Oct 2019 15:16) (79 - 93)  BP: 99/66 (07 Oct 2019 15:16) (99/66 - 122/58)  RR: 17 (07 Oct 2019 15:16) (16 - 18)  SpO2: 100% (07 Oct 2019 15:16) (96% - 100%)    PE:  Gen: AAO, NAD  HEENT:  sclera anicteric, MMM  Neck:  no thyromegaly, no LAD  CV:  nl S1 + S2, RRR, no m/r/g  Resp:  nl respiratory effort, CTA b/l  GI/ Abd: soft, NT/ ND, BS +  MS:  no c/c/e, nl muscle tone  Skin:   + acanthosis nigricans on posterior neck  Psych: affect appropriate    LABS:  Hemoglobin A1C, Whole Blood: 7.1 % (09.28.19 @ 04:08)    10-07    137  |  97<L>  |  13.0  ----------------------------<  89  4.1   |  26.0  |  0.86    Ca    8.6      07 Oct 2019 05:59  Mg     1.9     10-06    TPro  6.0<L>  /  Alb  2.7<L>  /  TBili  0.3<L>  /  DBili  x   /  AST  36  /  ALT  19  /  AlkPhos  88  10-07                          10.4   11.37 )-----------( 524      ( 07 Oct 2019 05:59 )             33.8     CAPILLARY BLOOD GLUCOSE  POCT Blood Glucose.: 163 mg/dL (07 Oct 2019 12:10)  POCT Blood Glucose.: 99 mg/dL (07 Oct 2019 08:09)  POCT Blood Glucose.: 115 mg/dL (06 Oct 2019 20:57)  POCT Blood Glucose.: 149 mg/dL (06 Oct 2019 17:07)

## 2019-10-08 ENCOUNTER — TRANSCRIPTION ENCOUNTER (OUTPATIENT)
Age: 44
End: 2019-10-08

## 2019-10-08 VITALS
DIASTOLIC BLOOD PRESSURE: 72 MMHG | TEMPERATURE: 99 F | HEART RATE: 87 BPM | RESPIRATION RATE: 18 BRPM | OXYGEN SATURATION: 100 % | SYSTOLIC BLOOD PRESSURE: 107 MMHG

## 2019-10-08 LAB
ALBUMIN SERPL ELPH-MCNC: 2.6 G/DL — LOW (ref 3.3–5.2)
ALP SERPL-CCNC: 99 U/L — SIGNIFICANT CHANGE UP (ref 40–120)
ALT FLD-CCNC: 34 U/L — SIGNIFICANT CHANGE UP
ANION GAP SERPL CALC-SCNC: 10 MMOL/L — SIGNIFICANT CHANGE UP (ref 5–17)
AST SERPL-CCNC: 53 U/L — HIGH
BILIRUB SERPL-MCNC: 0.3 MG/DL — LOW (ref 0.4–2)
BUN SERPL-MCNC: 13 MG/DL — SIGNIFICANT CHANGE UP (ref 8–20)
CALCIUM SERPL-MCNC: 8.8 MG/DL — SIGNIFICANT CHANGE UP (ref 8.6–10.2)
CHLORIDE SERPL-SCNC: 100 MMOL/L — SIGNIFICANT CHANGE UP (ref 98–107)
CO2 SERPL-SCNC: 28 MMOL/L — SIGNIFICANT CHANGE UP (ref 22–29)
CREAT SERPL-MCNC: 0.83 MG/DL — SIGNIFICANT CHANGE UP (ref 0.5–1.3)
GLUCOSE BLDC GLUCOMTR-MCNC: 120 MG/DL — HIGH (ref 70–99)
GLUCOSE BLDC GLUCOMTR-MCNC: 80 MG/DL — SIGNIFICANT CHANGE UP (ref 70–99)
GLUCOSE BLDC GLUCOMTR-MCNC: 91 MG/DL — SIGNIFICANT CHANGE UP (ref 70–99)
GLUCOSE SERPL-MCNC: 97 MG/DL — SIGNIFICANT CHANGE UP (ref 70–115)
HCT VFR BLD CALC: 34.3 % — LOW (ref 39–50)
HGB BLD-MCNC: 10.3 G/DL — LOW (ref 13–17)
MCHC RBC-ENTMCNC: 25.2 PG — LOW (ref 27–34)
MCHC RBC-ENTMCNC: 30 GM/DL — LOW (ref 32–36)
MCV RBC AUTO: 83.9 FL — SIGNIFICANT CHANGE UP (ref 80–100)
PLATELET # BLD AUTO: 518 K/UL — HIGH (ref 150–400)
POTASSIUM SERPL-MCNC: 4.1 MMOL/L — SIGNIFICANT CHANGE UP (ref 3.5–5.3)
POTASSIUM SERPL-SCNC: 4.1 MMOL/L — SIGNIFICANT CHANGE UP (ref 3.5–5.3)
PROT SERPL-MCNC: 6 G/DL — LOW (ref 6.6–8.7)
RBC # BLD: 4.09 M/UL — LOW (ref 4.2–5.8)
RBC # FLD: 15.7 % — HIGH (ref 10.3–14.5)
SODIUM SERPL-SCNC: 138 MMOL/L — SIGNIFICANT CHANGE UP (ref 135–145)
WBC # BLD: 12.45 K/UL — HIGH (ref 3.8–10.5)
WBC # FLD AUTO: 12.45 K/UL — HIGH (ref 3.8–10.5)

## 2019-10-08 PROCEDURE — 71045 X-RAY EXAM CHEST 1 VIEW: CPT | Mod: 26,77

## 2019-10-08 PROCEDURE — 99232 SBSQ HOSP IP/OBS MODERATE 35: CPT

## 2019-10-08 PROCEDURE — 71045 X-RAY EXAM CHEST 1 VIEW: CPT | Mod: 26

## 2019-10-08 RX ORDER — SACCHAROMYCES BOULARDII 250 MG
1 POWDER IN PACKET (EA) ORAL
Qty: 42 | Refills: 0
Start: 2019-10-08 | End: 2019-10-28

## 2019-10-08 RX ORDER — LINEZOLID 600 MG/300ML
600 INJECTION, SOLUTION INTRAVENOUS EVERY 12 HOURS
Refills: 0 | Status: DISCONTINUED | OUTPATIENT
Start: 2019-10-08 | End: 2019-10-08

## 2019-10-08 RX ORDER — CARVEDILOL PHOSPHATE 80 MG/1
0.5 CAPSULE, EXTENDED RELEASE ORAL
Qty: 0 | Refills: 0 | DISCHARGE

## 2019-10-08 RX ORDER — FUROSEMIDE 40 MG
1 TABLET ORAL
Qty: 34 | Refills: 0
Start: 2019-10-08 | End: 2019-11-07

## 2019-10-08 RX ORDER — INSULIN NPH HUM/REG INSULIN HM 70-30/ML
12 VIAL (ML) SUBCUTANEOUS
Qty: 0 | Refills: 0 | DISCHARGE

## 2019-10-08 RX ORDER — POTASSIUM CHLORIDE 20 MEQ
20 PACKET (EA) ORAL ONCE
Refills: 0 | Status: COMPLETED | OUTPATIENT
Start: 2019-10-08 | End: 2019-10-08

## 2019-10-08 RX ORDER — FERROUS SULFATE 325(65) MG
1 TABLET ORAL
Qty: 0 | Refills: 0 | DISCHARGE

## 2019-10-08 RX ORDER — METFORMIN HYDROCHLORIDE 850 MG/1
1 TABLET ORAL
Qty: 0 | Refills: 0 | DISCHARGE

## 2019-10-08 RX ORDER — METFORMIN HYDROCHLORIDE 850 MG/1
1 TABLET ORAL
Qty: 60 | Refills: 1
Start: 2019-10-08 | End: 2019-12-06

## 2019-10-08 RX ORDER — FUROSEMIDE 40 MG
1 TABLET ORAL
Qty: 0 | Refills: 0 | DISCHARGE

## 2019-10-08 RX ORDER — COLCHICINE 0.6 MG
1 TABLET ORAL
Qty: 0 | Refills: 0 | DISCHARGE
Start: 2019-10-08

## 2019-10-08 RX ORDER — METOPROLOL TARTRATE 50 MG
1 TABLET ORAL
Qty: 60 | Refills: 1
Start: 2019-10-08 | End: 2019-12-06

## 2019-10-08 RX ORDER — LOSARTAN/HYDROCHLOROTHIAZIDE 100MG-25MG
1 TABLET ORAL
Qty: 0 | Refills: 0 | DISCHARGE

## 2019-10-08 RX ORDER — COLCHICINE 0.6 MG
1 TABLET ORAL
Qty: 0 | Refills: 0 | DISCHARGE

## 2019-10-08 RX ORDER — IBUPROFEN 200 MG
1 TABLET ORAL
Qty: 0 | Refills: 0 | DISCHARGE

## 2019-10-08 RX ORDER — LINEZOLID 600 MG/300ML
1 INJECTION, SOLUTION INTRAVENOUS
Qty: 28 | Refills: 0
Start: 2019-10-08 | End: 2019-10-21

## 2019-10-08 RX ORDER — IBUPROFEN 200 MG
1 TABLET ORAL
Qty: 28 | Refills: 0
Start: 2019-10-08 | End: 2019-10-14

## 2019-10-08 RX ADMIN — Medication 600 MILLIGRAM(S): at 13:24

## 2019-10-08 RX ADMIN — Medication 600 MILLIGRAM(S): at 18:15

## 2019-10-08 RX ADMIN — Medication 600 MILLIGRAM(S): at 00:12

## 2019-10-08 RX ADMIN — SODIUM CHLORIDE 3 MILLILITER(S): 9 INJECTION INTRAMUSCULAR; INTRAVENOUS; SUBCUTANEOUS at 13:22

## 2019-10-08 RX ADMIN — CHLORHEXIDINE GLUCONATE 1 APPLICATION(S): 213 SOLUTION TOPICAL at 05:46

## 2019-10-08 RX ADMIN — Medication 250 MILLIGRAM(S): at 17:14

## 2019-10-08 RX ADMIN — Medication 250 MILLIGRAM(S): at 05:41

## 2019-10-08 RX ADMIN — Medication 20 MILLIEQUIVALENT(S): at 12:23

## 2019-10-08 RX ADMIN — Medication 25 MILLIGRAM(S): at 17:15

## 2019-10-08 RX ADMIN — LINEZOLID 600 MILLIGRAM(S): 600 INJECTION, SOLUTION INTRAVENOUS at 17:14

## 2019-10-08 RX ADMIN — Medication 600 MILLIGRAM(S): at 12:23

## 2019-10-08 RX ADMIN — Medication 40 MILLIGRAM(S): at 17:15

## 2019-10-08 RX ADMIN — METFORMIN HYDROCHLORIDE 1000 MILLIGRAM(S): 850 TABLET ORAL at 09:05

## 2019-10-08 RX ADMIN — Medication 0.6 MILLIGRAM(S): at 17:14

## 2019-10-08 RX ADMIN — Medication 40 MILLIGRAM(S): at 05:41

## 2019-10-08 RX ADMIN — SODIUM CHLORIDE 3 MILLILITER(S): 9 INJECTION INTRAMUSCULAR; INTRAVENOUS; SUBCUTANEOUS at 05:40

## 2019-10-08 RX ADMIN — METFORMIN HYDROCHLORIDE 1000 MILLIGRAM(S): 850 TABLET ORAL at 17:14

## 2019-10-08 RX ADMIN — SODIUM CHLORIDE 3 MILLILITER(S): 9 INJECTION INTRAMUSCULAR; INTRAVENOUS; SUBCUTANEOUS at 05:41

## 2019-10-08 RX ADMIN — Medication 0.6 MILLIGRAM(S): at 05:41

## 2019-10-08 RX ADMIN — Medication 600 MILLIGRAM(S): at 05:42

## 2019-10-08 RX ADMIN — ENOXAPARIN SODIUM 40 MILLIGRAM(S): 100 INJECTION SUBCUTANEOUS at 09:11

## 2019-10-08 RX ADMIN — Medication 25 MILLIGRAM(S): at 05:41

## 2019-10-08 RX ADMIN — CEFTRIAXONE 100 MILLIGRAM(S): 500 INJECTION, POWDER, FOR SOLUTION INTRAMUSCULAR; INTRAVENOUS at 14:32

## 2019-10-08 RX ADMIN — Medication 600 MILLIGRAM(S): at 17:14

## 2019-10-08 RX ADMIN — PANTOPRAZOLE SODIUM 40 MILLIGRAM(S): 20 TABLET, DELAYED RELEASE ORAL at 05:41

## 2019-10-08 NOTE — DISCHARGE NOTE PROVIDER - NSDCACTIVITY_GEN_ALL_CORE
Do not drive or operate machinery/Walking - Indoors allowed/No heavy lifting/straining/Do not make important decisions/Walking - Outdoors allowed/Showering allowed/Stairs allowed

## 2019-10-08 NOTE — PROGRESS NOTE ADULT - PROBLEM SELECTOR PLAN 2
Patient is normotensive, became slightly tachycardic so was started back on home carvedilol   Also restarted on colchicine/ibuoprofen over weekend
Patient is normotensive, became tachycardic and was started back on home carvedilol
Continue lopressor bid
Currently normotensive  Holding off on restarting home HTN agents given pericardial effusion
Patient is normotensive, became tachycardic and was started back on home carvedilol
Currently normotensive  Holding off on restarting home HTN agents given pericardial effusion

## 2019-10-08 NOTE — DISCHARGE NOTE PROVIDER - NSDCCPCAREPLAN_GEN_ALL_CORE_FT
PRINCIPAL DISCHARGE DIAGNOSIS  Diagnosis: Pericardial effusion  Assessment and Plan of Treatment: LEAVE CHEST TUBE DRESSING IN PLACE FOR 48 HOURS. MAY REMOVE AND SHOWER ON THURSDAY  1. Take ALL of your medications as ordered. Fill your prescriptions the day you are discharged and take according to the schedule you were given.  If you have any questions or are unable to fill the prescriptions, please call the office right away at [phone].  2. Shower daily. Clean all incisions daily while showering with warm water and mild soap, pat dry with a clean towel and do not cover with any dressings unless instructed to. No bathing, swimming in a pool or the ocean until instructed by MD.  DO NOT use creams or lotions on the wound.  3. We advise that you do not drive until instructed by MD.   4. You may not return to work until instructed by MD.   5. Please eat a low fat, low cholesterol, low salt diet. (No added/extra salt).   6. Weigh yourself every day in the morning and record it in the weight log in your red folder. Notify the office of any weight gain more than 2-3 pounds in 24 hours.  **Please LIMIT your fluid intake to less than a liter a day.**  7. Continue breathing exercises several times a day. Continue to use your heart pillow when coughing.  8. No heavy lifting nothing greater than 5 pounds until cleared by MD.   9. Call / Notify MD any fever greater than 101.0, any drainage from incisions or if they become red, hot or very tender to the touch.  10. Increase activity as tolerated. Walk indoors and/or outdoors at least 3 times a day.      SECONDARY DISCHARGE DIAGNOSES  Diagnosis: Heart block  Assessment and Plan of Treatment: No pacemaker needed at this time. Please follow up with your cardiologist.    Diagnosis: Essential hypertension  Assessment and Plan of Treatment: Take all medications as prescribed.    Diagnosis: Type 2 diabetes mellitus without complication, with long-term current use of insulin  Assessment and Plan of Treatment: It is extremely important to follow a diabetic (consistent carbohydrates, LOW/NO ADDED SUGAR) diet and monitor your glucose (sugar) levels. You have an increased risk of complications, including wound infections, due to the diabetes.  1. Check your glucoses 3-4 times daily before meals and bedtime.   2. Keep a log of your glucose levels every day.   3. Make an appointment to meet with your primary care provider or endocrinologist within a week.   4. Take ALL of your medications as prescribed. Only hold medications for low glucose levels (< 80) or if you are symptomatic (dizzy, sweating, lightheaded).  5. Ideally, we would like all of the glucose levels to be between .   You may have been discharged on different medications than you were taking before. Your body reacts differently to the medications after surgery. Please continue to take the medications as prescribed and notify the office, nurse practitioner or your PCP if you have any concerns right away.    Diagnosis: Pulmonary congestion  Assessment and Plan of Treatment: Take all medications as prescribed.

## 2019-10-08 NOTE — PROGRESS NOTE ADULT - REASON FOR ADMISSION
Pericardial effusion

## 2019-10-08 NOTE — CHART NOTE - NSCHARTNOTEFT_GEN_A_CORE
Source: Patient [x ]  Family [ ]   other [x ]EMR     Current Diet: DASH/TLC/ CON CHO     PO intake:  %     Source for PO intake [x ] Patient [ ] family [x ] chart [ ] staff [ ] other      Current Weight:   ( 10/7) 241 lbs  (10/6) 241 lbs  (10/4) 229.6 lbs  (10/3) 242.9 lbs  (10/2) 243.1 lbs     ? accuracy in wts, appear to be stable, fluctuating a couple pounds 2/2 fluid retention        Pertinent Medications: MEDICATIONS  (STANDING):  cefTRIAXone   IVPB 2000 milliGRAM(s) IV Intermittent every 24 hours  chlorhexidine 2% Cloths 1 Application(s) Topical <User Schedule>  colchicine 0.6 milliGRAM(s) Oral two times a day  enoxaparin Injectable 40 milliGRAM(s) SubCutaneous every 12 hours  furosemide   Injectable 40 milliGRAM(s) IV Push two times a day  ibuprofen  Tablet. 600 milliGRAM(s) Oral every 6 hours  insulin lispro (HumaLOG) corrective regimen sliding scale   SubCutaneous Before meals and at bedtime  metFORMIN 1000 milliGRAM(s) Oral two times a day  metoprolol tartrate 25 milliGRAM(s) Oral two times a day  pantoprazole    Tablet 40 milliGRAM(s) Oral before breakfast  potassium chloride    Tablet ER 20 milliEquivalent(s) Oral once  saccharomyces boulardii 250 milliGRAM(s) Oral two times a day  sodium chloride 0.9% lock flush 3 milliLiter(s) IV Push every 8 hours  sodium chloride 0.9% lock flush 3 milliLiter(s) IV Push every 8 hours    MEDICATIONS  (PRN):  guaiFENesin    Syrup 100 milliGRAM(s) Oral every 6 hours PRN Cough  oxyCODONE    5 mG/acetaminophen 325 mG 1 Tablet(s) Oral every 4 hours PRN Moderate Pain (4 - 6)  oxyCODONE    5 mG/acetaminophen 325 mG 2 Tablet(s) Oral every 4 hours PRN Severe Pain (7 - 10)  sodium chloride 0.9% lock flush 15 milliLiter(s) IV Push every 1 hour PRN Pre/post blood products, medications, blood draw, and to maintain line patency    Pertinent Labs: CBC Full  -  ( 08 Oct 2019 05:45 )  WBC Count : 12.45 K/uL  RBC Count : 4.09 M/uL  Hemoglobin : 10.3 g/dL  Hematocrit : 34.3 %  Platelet Count - Automated : 518 K/uL  Mean Cell Volume : 83.9 fl  Mean Cell Hemoglobin : 25.2 pg  Mean Cell Hemoglobin Concentration : 30.0 gm/dL    10-08 Na138 mmol/L Glu 97 mg/dL K+ 4.1 mmol/L Cr  0.83 mg/dL BUN 13.0 mg/dL Phos n/a   Alb 2.6 g/dL<L> PAB n/a         Skin: 1+ generalized edema , 2+ R/L leg edema, surgical incision; L anterior chest,  substernal.     Nutrition focused physical exam conducted - found signs of malnutrition [ ]absent [x ]present    Subcutaneous fat loss: [ ] Orbital fat pads region, [ ]Buccal fat region, [ ]Triceps region,  [ ]Ribs region    Muscle wasting: [ ]Temples region, [ ]Clavicle region, [ ]Shoulder region, [ ]Scapula region, [ ]Interosseous region,  [ ]thigh region, [ ]Calf region    Estimated Needs:   [x ] no change since previous assessment  [ ] recalculated:     Current Nutrition Diagnosis:  Pt meets criteria for Overweight/Obesity related to energy intake exceeding expenditure as evidenced by BMI 37.5.   Goal/Expected Outcome weight loss 1-2 lbs/ week.  Pt currently S/P  pericardial window and pericardial biopsy. Spoke with pt at bedside states good po intake/ appetite, observed breakfast tray ~75 consumed. Last BM noted on 10/6.      Recommendations:   MVI daily   Vit C daily   Monitor wts   Monitoring and Evaluation:   [x ] PO intake [ x] Tolerance to diet prescription [X] Weights  [X] Follow up per protocol [X] Labs: Source: Patient [x ]  Family [ ]   other [x ]EMR     Pt with pericardial effusion    Current Diet: Diet, DASH/TLC:   Sodium & Cholesterol Restricted  Consistent Carbohydrate {No Snacks} (10-01-19 @ 00:49)    PO intake:  Pt reports good po intake at meals.    Current Weight:   ( 10/7) 241 lbs  (10/6) 241 lbs  (10/4) 229.6 lbs  (10/3) 242.9 lbs  (10/2) 243.1 lbs     Some wt fluctuations noted, but pt appears to be maintaining wt at this time- will continue to monitor.    Pertinent Medications: MEDICATIONS  (STANDING):  cefTRIAXone   IVPB 2000 milliGRAM(s) IV Intermittent every 24 hours  chlorhexidine 2% Cloths 1 Application(s) Topical <User Schedule>  colchicine 0.6 milliGRAM(s) Oral two times a day  enoxaparin Injectable 40 milliGRAM(s) SubCutaneous every 12 hours  furosemide   Injectable 40 milliGRAM(s) IV Push two times a day  ibuprofen  Tablet. 600 milliGRAM(s) Oral every 6 hours  insulin lispro (HumaLOG) corrective regimen sliding scale   SubCutaneous Before meals and at bedtime  metFORMIN 1000 milliGRAM(s) Oral two times a day  metoprolol tartrate 25 milliGRAM(s) Oral two times a day  pantoprazole    Tablet 40 milliGRAM(s) Oral before breakfast  potassium chloride    Tablet ER 20 milliEquivalent(s) Oral once  saccharomyces boulardii 250 milliGRAM(s) Oral two times a day  sodium chloride 0.9% lock flush 3 milliLiter(s) IV Push every 8 hours  sodium chloride 0.9% lock flush 3 milliLiter(s) IV Push every 8 hours    MEDICATIONS  (PRN):  guaiFENesin    Syrup 100 milliGRAM(s) Oral every 6 hours PRN Cough  oxyCODONE    5 mG/acetaminophen 325 mG 1 Tablet(s) Oral every 4 hours PRN Moderate Pain (4 - 6)  oxyCODONE    5 mG/acetaminophen 325 mG 2 Tablet(s) Oral every 4 hours PRN Severe Pain (7 - 10)  sodium chloride 0.9% lock flush 15 milliLiter(s) IV Push every 1 hour PRN Pre/post blood products, medications, blood draw, and to maintain line patency    Pertinent Labs: CBC Full  -  ( 08 Oct 2019 05:45 )  WBC Count : 12.45 K/uL  RBC Count : 4.09 M/uL  Hemoglobin : 10.3 g/dL  Hematocrit : 34.3 %  Platelet Count - Automated : 518 K/uL  Mean Cell Volume : 83.9 fl  Mean Cell Hemoglobin : 25.2 pg  Mean Cell Hemoglobin Concentration : 30.0 gm/dL    10-08 Na138 mmol/L Glu 97 mg/dL K+ 4.1 mmol/L Cr  0.83 mg/dL BUN 13.0 mg/dL Phos n/a   Alb 2.6 g/dL<L> PAB n/a         Skin: 1+ generalized edema , 2+ R/L leg edema, surgical incision; L anterior chest,  substernal.     Nutrition focused physical exam conducted - found signs of malnutrition [x ]absent [ ]present    Subcutaneous fat loss: [ ] Orbital fat pads region, [ ]Buccal fat region, [ ]Triceps region,  [ ]Ribs region    Muscle wasting: [ ]Temples region, [ ]Clavicle region, [ ]Shoulder region, [ ]Scapula region, [ ]Interosseous region,  [ ]thigh region, [ ]Calf region    Estimated Needs:   [x ] no change since previous assessment  [ ] recalculated:     Current Nutrition Diagnosis:  Pt remains at nutrition risk secondary to obesity related to energy intake exceeds expenditure as evidenced by pt with BMI 37.5.  Pt continues with good po intake at this time.      Recommendations:   Continue with diet rx  RX: MVI and Vit C 500mg daily   Monitor daily wt     Monitoring and Evaluation:   [x ] PO intake [ x] Tolerance to diet prescription [X] Weights  [X] Follow up per protocol [X] Labs:

## 2019-10-08 NOTE — PROGRESS NOTE ADULT - PROBLEM SELECTOR PLAN 4
Endocrine consult called  would be a new diagnosis on this admission if required treatment
Endocrine consult called, still pending  would be a new diagnosis on this admission if required treatment
Endocrine consult appreciated.  Plan to repeat TSH in AM.
Endocrine consult appreciated.  Repeat TSH normal. Will defer treatment for now.
TSH elevated, T3T4 ordered for AM labs  Endocrine consult called  would be a new diagnosis on this admission if required treatment
Endocrine consult appreciated.  Repeat TSH normal. Will defer treatment for now.
TSH elevated, T3T4 ordered, results pending  Endocrine consult called  would be a new diagnosis on this admission if required treatment

## 2019-10-08 NOTE — DISCHARGE NOTE PROVIDER - CARE PROVIDER_API CALL
Scott Peters)  Surgery; Thoracic and Cardiac Surgery  [address] [place] [address]  Phone: [phone]  Fax: [phone]  Follow Up Time: 2 weeks    Chaz Castro)  Cardiology  [address] [place] [address]  Phone: [phone]  Fax: [phone]  Follow Up Time: 2 weeks

## 2019-10-08 NOTE — PROGRESS NOTE ADULT - SUBJECTIVE AND OBJECTIVE BOX
Subjective: "i am ok" Patient hopeful to go home today. Denies cp but reports mild sob.    VITAL SIGNS  Vital Signs Last 24 Hrs  T(C): 36.9 (10-08-19 @ 15:08), Max: 36.9 (10-08-19 @ 15:08)  T(F): 98.5 (10-08-19 @ 15:08), Max: 98.5 (10-08-19 @ 15:08)  HR: 88 (10-08-19 @ 15:08) (82 - 88)  BP: 109/71 (10-08-19 @ 15:08) (98/63 - 110/62)  RR: 18 (10-08-19 @ 15:08) (16 - 19)  SpO2: 100% (10-08-19 @ 15:08) (97% - 100%)  on ra             Telemetry/Alarms:  SR 70  LVEF: 65    MEDICATIONS  cefTRIAXone   IVPB 2000 milliGRAM(s) IV Intermittent every 24 hours  chlorhexidine 2% Cloths 1 Application(s) Topical <User Schedule>  colchicine 0.6 milliGRAM(s) Oral two times a day  enoxaparin Injectable 40 milliGRAM(s) SubCutaneous every 12 hours  furosemide   Injectable 40 milliGRAM(s) IV Push two times a day  guaiFENesin    Syrup 100 milliGRAM(s) Oral every 6 hours PRN  ibuprofen  Tablet. 600 milliGRAM(s) Oral every 6 hours  insulin lispro (HumaLOG) corrective regimen sliding scale   SubCutaneous Before meals and at bedtime  linezolid    Tablet 600 milliGRAM(s) Oral every 12 hours  metFORMIN 1000 milliGRAM(s) Oral two times a day  metoprolol tartrate 25 milliGRAM(s) Oral two times a day  oxyCODONE    5 mG/acetaminophen 325 mG 1 Tablet(s) Oral every 4 hours PRN  oxyCODONE    5 mG/acetaminophen 325 mG 2 Tablet(s) Oral every 4 hours PRN  pantoprazole    Tablet 40 milliGRAM(s) Oral before breakfast  saccharomyces boulardii 250 milliGRAM(s) Oral two times a day  sodium chloride 0.9% lock flush 15 milliLiter(s) IV Push every 1 hour PRN  sodium chloride 0.9% lock flush 3 milliLiter(s) IV Push every 8 hours  sodium chloride 0.9% lock flush 3 milliLiter(s) IV Push every 8 hours      PHYSICAL EXAM  General: well nourished, well developed, no acute distress  Neurology: alert and oriented x 3, nonfocal, no gross deficits  Respiratory: diminished left base with crackles  CV: regular rate and rhythm, normal S1, S2  Abdomen: soft, nontender, nondistended, positive bowel sounds, last bowel movement 10/6  Extremities: warm, well perfused. moderate edema. + DP pulses  Incisions: midline subxyphoid incision, c/d/i. left ant thor incision c/d/i, staples removed  Chest tubes: no air leak, minimal serous drainage today > removed      10-07 @ 07:01  -  10-08 @ 07:00  --------------------------------------------------------  IN: 1380 mL / OUT: 4800 mL / NET: -3420 mL    10-08 @ 07:01  -  10-08 @ 15:45  --------------------------------------------------------  IN: 480 mL / OUT: 2075 mL / NET: -1595 mL        Weights:  Daily     Daily   Admit Wt: Drug Dosing Weight  Height (cm): 172 (30 Sep 2019 06:51)  Weight (kg): 111 (30 Sep 2019 06:51)  BMI (kg/m2): 37.5 (30 Sep 2019 06:51)  BSA (m2): 2.22 (30 Sep 2019 06:51)    All laboratory results, radiology and medications reviewed.    LABS  10-08    138  |  100  |  13.0  ----------------------------<  97  4.1   |  28.0  |  0.83    Ca    8.8      08 Oct 2019 05:45    TPro  6.0<L>  /  Alb  2.6<L>  /  TBili  0.3<L>  /  DBili  x   /  AST  53<H>  /  ALT  34  /  AlkPhos  99  10-08                                 10.3   12.45 )-----------( 518      ( 08 Oct 2019 05:45 )             34.3            Bilirubin Total, Serum: 0.3 mg/dL (10-08 @ 05:45)    CAPILLARY BLOOD GLUCOSE      POCT Blood Glucose.: 120 mg/dL (08 Oct 2019 12:13)  POCT Blood Glucose.: 80 mg/dL (08 Oct 2019 08:16)  POCT Blood Glucose.: 98 mg/dL (07 Oct 2019 22:07)  POCT Blood Glucose.: 114 mg/dL (07 Oct 2019 17:00)           Today's CXR: < from: Xray Chest 1 View- PORTABLE-Routine (10.08.19 @ 05:28) >    Findings:  Cardiomegaly. No hilar or mediastinal abnormality. The lungs are clear.   No evidence of a pleural effusion. No change in lines or tubes.    Impression:  Stable exam without significantchange since the previous study..    < end of copied text >      Today's EKG: < from: 12 Lead ECG (10.05.19 @ 15:04) >    Diagnosis Line Normal sinus rhythm  Possible Left atrial enlargement  Nonspecific T wave abnormality    < end of copied text >      PAST MEDICAL & SURGICAL HISTORY:  Heart failure  Cardiomegaly  Nonsmoker  Diabetes mellitus  Hypertension

## 2019-10-08 NOTE — PROGRESS NOTE ADULT - PROBLEM SELECTOR PLAN 3
A1C 7.1 on Metformin and PO agent  Continue LUCERO ACHS  Resume Metformin once pericardial effusion is drained  Endocrine consulted
A1C 7.1 on Metformin and PO agent  Continue LUCERO ACHS  Consider restarting Metformin  Endocrine consulted, did not see patient, will need to follow up
A1C 7.1 on Metformin and PO agent  Continue LUCERO ACHS  Continue Metformin.  Endocrine consult appreciated.  Currently well controlled
A1C 7.1 on Metformin and PO agent  Continue LUCERO ACHS  Resume Metformin once pericardial effusion is drained  Endocrine consult called
A1C 7.1 on Metformin and PO agent  Continue LUCERO ACHS  Will restart home dose Metformin.  Endocrine consult appreciated.
A1C 7.1 on Metformin and PO agent  Continue LUCERO ACHS  Continue Metformin.  Endocrine consult appreciated.  Currently well controlled
A1C 7.1 on Metformin and PO agent  Continue LUCERO ACHS  Resume Metformin once pericardial effusion is drained  Endocrine consulted

## 2019-10-08 NOTE — DISCHARGE NOTE PROVIDER - HOSPITAL COURSE
44 year old Male with PMH of  cardiomegaly, DM II, HTN, and recurrent pericardial effusions with pericardiocentesis x2 (serous drainage), most recently at [facility] where a residual posterior moderate pericardial effusion was seen on TTE after the drain was removed on 9/26/19. Patient was then transferred to [facility] on 9/27 for further management. S/P  pericardial window and pericardial biopsy on 9/30/19 with Dr. Peters. Postoperative course complicated by some AF/SVT now SR. 10/1 one missed beat noted but no bradycardia and has not recurred. 10/5 two episodes of heart block noted. Echo: small - moderate pericardial effusion    10/6    2.7 Pause> EPS following, betablocker reduced bid    10/7 Maintain pleural tube high output  Increase diuresis

## 2019-10-08 NOTE — PROGRESS NOTE ADULT - SUBJECTIVE AND OBJECTIVE BOX
[facility]  INFECTIOUS DISEASES AND INTERNAL MEDICINE at [place]  =======================================================  Carlo Hernandez MD  Diplomates American Board of Internal Medicine and Infectious Diseases  =======================================================    MIRANDA RAYO [id number]    Follow up for:  + lyme test; pericarditis/pleural effusion   patient seen and examined.     Still has the chest tube, wound looks fine. No new complaint.   No fever. No SOB or any problems with his breathing. No chest pain.     Allergies:  No Known Allergies  [facility] patient (Unknown)    Antibiotics:  cefTRIAXone   IVPB 2000 milliGRAM(s) IV Intermittent every 24 hours     REVIEW OF SYSTEMS:  CONSTITUTIONAL:  No Fever or chills  HEENT:   No diplopia or blurred vision.  No earache, sore throat or runny nose.  CARDIOVASCULAR:  No chest pain or SOB  RESPIRATORY:  No cough, shortness of breath, PND or orthopnea.  GASTROINTESTINAL:  No nausea, vomiting or diarrhea.  GENITOURINARY:  No dysuria, frequency or urgency. No Blood in urine  MUSCULOSKELETAL:  no joint aches, no muscle pain  SKIN:  No change in skin, hair or nails.  NEUROLOGIC:  No paresthesias or weakness.  PSYCHIATRIC:  No disorder of thought or mood.  ENDOCRINE:  No heat or cold intolerance, polyuria or polydipsia.  HEMATOLOGICAL:  No easy bruising or bleeding.      Physical Exam:  Vital Signs Last 24 Hrs  T(C): 36.7 (08 Oct 2019 10:50), Max: 36.8 (07 Oct 2019 22:36)  T(F): 98.1 (08 Oct 2019 10:50), Max: 98.2 (07 Oct 2019 22:36)  HR: 83 (08 Oct 2019 10:50) (82 - 90)  BP: 104/70 (08 Oct 2019 10:50) (98/63 - 110/62)  BP(mean): --  RR: 19 (08 Oct 2019 10:50) (16 - 19)  SpO2: 97% (08 Oct 2019 10:50) (97% - 100%)  GEN: NAD, obese  HEENT: normocephalic and atraumatic. EOMI. SADA.    NECK: Supple.  No lymphadenopathy   LUNGS: Clear to auscultation.  HEART: Regular rate and rhythm without murmur. wounds are clean, chest tube left   ABDOMEN: Soft, nontender, and nondistended.  Positive bowel sounds.    : No CVA tenderness  EXTREMITIES: Without any cyanosis, clubbing, rash, lesions or edema.  MSK: no joint swelling  NEUROLOGIC: grossly intact.  PSYCHIATRIC: Appropriate affect .  SKIN: No ulceration or induration present.      Labs:  10-08    138  |  100  |  13.0  ----------------------------<  97  4.1   |  28.0  |  0.83    Ca    8.8      08 Oct 2019 05:45    TPro  6.0<L>  /  Alb  2.6<L>  /  TBili  0.3<L>  /  DBili  x   /  AST  53<H>  /  ALT  34  /  AlkPhos  99  10-08                        10.3   12.45 )-----------( 518      ( 08 Oct 2019 05:45 )             34.3     LIVER FUNCTIONS - ( 08 Oct 2019 05:45 )  Alb: 2.6 g/dL / Pro: 6.0 g/dL / ALK PHOS: 99 U/L / ALT: 34 U/L / AST: 53 U/L / GGT: x           RECENT CULTURES:  09-30 @ 12:24 .Body Fluid Pericardial Effusion Enterococcus faecalis    Enterococcus faecalis Growing in broth media only  Candida species Growing in broth media only  Culture in progress    Few White blood cells  No organisms seen    09-29 @ 14:34      NotDete    09-27 @ 19:11      [facility]    All imaging and data are reviewed.     Assessment and plan:   43 y/o man with DM, HTN, and anemia had leg edema and SOB. Found with pericardial effusion. S/P pericardiocentesis for 700 cc's in [facility].  The drain was D/C'd on 9/26/19, repeat echo showed posterior moderate effusion.  He was then transported to [place] for evaluation for pericardial window on 9/27/19. Pericardial window on 9/30/19  Since doesn't meet the criteria for new lyme infection and he usually lyme causes AV node block or cardiomyopathy most of the time, less likely lyme causing his problem.   Etiology unclear at this time.     - Pericardial fluid culture grew Enterococcus, Sensitive to Ampicillin and Vancomycin of CARRIE 2  - Coxsackie serology for some strains slightly elevated, unlikely causing infection   - HIV screen is non-reactive  - Will add QuantiFeron test   - Lyme serology possibly old infection.   - Surgical pathology result noted, chronic inflammation and granulation tissue, neg for malignancy.   - Continue ceftriaxone 2 grams Q 24H; stop; 10/13/19, after that switch to po doxycycline 100mg q12h to complete total 4 weeks.   - Will add Linezolid 600mg q12h for enterococcus infection.    - Has a midline    Will follow.

## 2019-10-08 NOTE — PROGRESS NOTE ADULT - PROBLEM SELECTOR PROBLEM 6
Pulmonary congestion

## 2019-10-08 NOTE — DISCHARGE NOTE PROVIDER - PROVIDER TOKENS
PROVIDER:[TOKEN:[[id number]],FOLLOWUP:[2 weeks]],PROVIDER:[TOKEN:[[id number]],FOLLOWUP:[2 weeks]]

## 2019-10-08 NOTE — DISCHARGE NOTE NURSING/CASE MANAGEMENT/SOCIAL WORK - PATIENT PORTAL LINK FT
You can access the FollowMyHealth Patient Portal offered by [facility] by registering at the following website: [email or web address]. By joining [facility]’s FollowMyHealth portal, you will also be able to view your health information using other applications (apps) compatible with our system.

## 2019-10-08 NOTE — PROGRESS NOTE ADULT - PROBLEM SELECTOR PLAN 7
EP following  Continue betablocker but at bid instead tid>observe tele  replete K for hypokalemia
EP following  Continue betablocker but at bid instead tid>observe tele  replete mag/K for hypokalemia and hypomagnesmia
EP following  Continue betablocker but at bid instead tid>observe tele  replete mag/K for hypokalemia and hypomagnesmia
EP consult called

## 2019-10-08 NOTE — PROGRESS NOTE ADULT - PROBLEM SELECTOR PLAN 6
Discuss restarting home lasix dose
Continue IV Lasix BID.    Discussed with Dr. Peters in AM rounds.
Patient is on Lasix PO at home  Lasix IVP administered today given pulmonary congestion with appropriate response and no change in hemodynamics  Monitor hemodynamics closely and dose lasix daily PRN  Potassium repleted
Patient is on Lasix PO at home  Lasix IVP administered yesterday given pulmonary congestion with appropriate response and no change in hemodynamics
and lower extremity edema   Continue IV Lasix BID.    Discussed with Dr. Peters in AM rounds.
and lower extremity edema   Continue IV Lasix BID.    Discussed with Dr. Peters in AM rounds.
and lower extremity edema improving  Continue IV Lasix BID.
and lower extremity edema improving  Continue IV Lasix BID.    Discussed with Dr. Peters in AM rounds.
Patient is on Lasix PO at home  Lasix IVP administered yesterday given pulmonary congestion with appropriate response and no change in hemodynamics  Monitor hemodynamics closely and dose lasix daily PRN  Potassium repleted

## 2019-10-08 NOTE — PROGRESS NOTE ADULT - PROVIDER SPECIALTY LIST ADULT
CT Surgery
Endocrinology
Infectious Disease
CT Surgery

## 2019-10-08 NOTE — DISCHARGE NOTE PROVIDER - NSDCFUADDAPPT_GEN_ALL_CORE_FT
PLEASE MAKE AN APPOINTMENT WITH ECHOCARDIOGRAM IN DR LANDAVERDE OFFICE IN 2 WEEKS.  Please call the office to follow up with Dr Peters in two weeks.

## 2019-10-08 NOTE — DISCHARGE NOTE PROVIDER - NSDCFUADDINST_GEN_ALL_CORE_FT
Please call the Cardiothoracic Surgery office at [phone] if you are experiencing any shortness of breath, chest pain, fevers or chills, drainage from the incisions, persistent nausea, vomiting or if you have any questions about your medications. If the symptoms are severe, call 911 and go to the nearest hospital. You can also call [phone] for an emergency [facility] ambulance, which will take you to the closest [facility].    If you need any assistance for making any appointments for a new consult or referral in any specialty, please call our [facility] Clinical Coordination Center at [phone].

## 2019-10-08 NOTE — PROGRESS NOTE ADULT - ASSESSMENT
44 year old Male with PMH of  cardiomegaly, DM II, HTN, and recurrent pericardial effusions with pericardiocentesis x2 (serous drainage), most recently at [facility] where a residual posterior moderate pericardial effusion was seen on TTE after the drain was removed on 9/26/19. Patient was then transferred to [facility] on 9/27 for further management. S/P  pericardial window and pericardial biopsy on 9/30/19 with Dr. Peters. Postoperative course complicated by some AF/SVT now SR. 10/1 one missed beat noted but no bradycardia and has not recurred. 10/5 two episodes of heart block noted. Echo: small - moderate pericardial effusion  10/6    2.7 Pause> EPS following, betablocker reduced bid  10/7 Maintain pleural tube high output  Increase diuresis    Overall plan  Chest tube removed today  Follow up CXR  Switch to PO abx  Discharge home today  D/W Dr Peters

## 2019-10-08 NOTE — PROGRESS NOTE ADULT - PROBLEM SELECTOR PLAN 1
Pericardial window via thoracotomy on 9/30/19  Cytology and pathology negative for malignant cells.  Surgical pathology: chronic inflammation and granulation tissue  Pt tested positive for Lyme Dx, ID is following viral serologies sent and started on ceftriaxone, may be past exposure.  ID followup appreciated.  PLAN for discharge: DOXYCYCLINE and ZYVOX PO  Continue colchicine/ibuoprofen for pericarditis  May d/c tube today as drainage throughout day is decreased.  Maintain higher diuretic regimen at home (Torsemide 40)

## 2019-10-08 NOTE — PROGRESS NOTE ADULT - PROBLEM SELECTOR PROBLEM 1
Pericardial effusion without cardiac tamponade

## 2019-10-11 PROBLEM — I10 ESSENTIAL (PRIMARY) HYPERTENSION: Chronic | Status: ACTIVE | Noted: 2019-09-27

## 2019-10-11 PROBLEM — I51.7 CARDIOMEGALY: Chronic | Status: ACTIVE | Noted: 2019-09-27

## 2019-10-11 PROBLEM — Z78.9 OTHER SPECIFIED HEALTH STATUS: Chronic | Status: ACTIVE | Noted: 2019-09-27

## 2019-10-11 PROBLEM — E11.9 TYPE 2 DIABETES MELLITUS WITHOUT COMPLICATIONS: Chronic | Status: ACTIVE | Noted: 2019-09-27

## 2019-10-11 PROBLEM — I50.9 HEART FAILURE, UNSPECIFIED: Chronic | Status: ACTIVE | Noted: 2019-09-27

## 2019-10-11 LAB
CULTURE RESULTS: SIGNIFICANT CHANGE UP
METHOD TYPE: SIGNIFICANT CHANGE UP
ORGANISM # SPEC MICROSCOPIC CNT: SIGNIFICANT CHANGE UP
SPECIMEN SOURCE: SIGNIFICANT CHANGE UP

## 2019-10-16 LAB — COMPLEMENT FACTOR I RESULT: SIGNIFICANT CHANGE UP

## 2019-10-21 ENCOUNTER — APPOINTMENT (OUTPATIENT)
Dept: CARDIOLOGY | Facility: CLINIC | Age: 44
End: 2019-10-21
Payer: SELF-PAY

## 2019-10-21 VITALS
DIASTOLIC BLOOD PRESSURE: 80 MMHG | HEART RATE: 79 BPM | WEIGHT: 248 LBS | HEIGHT: 68 IN | SYSTOLIC BLOOD PRESSURE: 140 MMHG | OXYGEN SATURATION: 98 % | BODY MASS INDEX: 37.59 KG/M2

## 2019-10-21 PROCEDURE — 99024 POSTOP FOLLOW-UP VISIT: CPT

## 2019-10-21 RX ORDER — LOSARTAN POTASSIUM AND HYDROCHLOROTHIAZIDE 25; 100 MG/1; MG/1
100-25 TABLET ORAL DAILY
Qty: 90 | Refills: 3 | Status: DISCONTINUED | COMMUNITY
Start: 2019-09-23 | End: 2019-10-21

## 2019-10-21 RX ORDER — CARVEDILOL 6.25 MG/1
6.25 TABLET, FILM COATED ORAL TWICE DAILY
Qty: 180 | Refills: 3 | Status: DISCONTINUED | COMMUNITY
Start: 2019-09-23 | End: 2019-10-21

## 2019-10-21 RX ORDER — HUMAN INSULIN 100 [IU]/ML
(70-30) 100 INJECTION, SUSPENSION SUBCUTANEOUS TWICE DAILY
Refills: 0 | Status: DISCONTINUED | COMMUNITY
Start: 2019-09-09 | End: 2019-10-21

## 2019-10-23 ENCOUNTER — APPOINTMENT (OUTPATIENT)
Dept: CARDIOTHORACIC SURGERY | Facility: CLINIC | Age: 44
End: 2019-10-23

## 2019-10-23 VITALS
HEART RATE: 97 BPM | HEIGHT: 68 IN | TEMPERATURE: 98 F | RESPIRATION RATE: 18 BRPM | WEIGHT: 246 LBS | DIASTOLIC BLOOD PRESSURE: 94 MMHG | SYSTOLIC BLOOD PRESSURE: 150 MMHG | BODY MASS INDEX: 37.28 KG/M2 | OXYGEN SATURATION: 95 %

## 2019-10-23 DIAGNOSIS — E11.9 TYPE 2 DIABETES MELLITUS W/OUT COMPLICATIONS: ICD-10-CM

## 2019-10-30 ENCOUNTER — MEDICATION RENEWAL (OUTPATIENT)
Age: 44
End: 2019-10-30

## 2019-10-30 ENCOUNTER — OUTPATIENT (OUTPATIENT)
Dept: OUTPATIENT SERVICES | Facility: HOSPITAL | Age: 44
LOS: 1 days | End: 2019-10-30

## 2019-10-31 PROCEDURE — 82435 ASSAY OF BLOOD CHLORIDE: CPT

## 2019-10-31 PROCEDURE — 83880 ASSAY OF NATRIURETIC PEPTIDE: CPT

## 2019-10-31 PROCEDURE — 87641 MR-STAPH DNA AMP PROBE: CPT

## 2019-10-31 PROCEDURE — 87640 STAPH A DNA AMP PROBE: CPT

## 2019-10-31 PROCEDURE — 84134 ASSAY OF PREALBUMIN: CPT

## 2019-10-31 PROCEDURE — 86703 HIV-1/HIV-2 1 RESULT ANTBDY: CPT

## 2019-10-31 PROCEDURE — 80061 LIPID PANEL: CPT

## 2019-10-31 PROCEDURE — 82803 BLOOD GASES ANY COMBINATION: CPT

## 2019-10-31 PROCEDURE — 83615 LACTATE (LD) (LDH) ENZYME: CPT

## 2019-10-31 PROCEDURE — 82947 ASSAY GLUCOSE BLOOD QUANT: CPT

## 2019-10-31 PROCEDURE — T1013: CPT

## 2019-10-31 PROCEDURE — 87581 M.PNEUMON DNA AMP PROBE: CPT

## 2019-10-31 PROCEDURE — 97163 PT EVAL HIGH COMPLEX 45 MIN: CPT

## 2019-10-31 PROCEDURE — 83735 ASSAY OF MAGNESIUM: CPT

## 2019-10-31 PROCEDURE — 88112 CYTOPATH CELL ENHANCE TECH: CPT

## 2019-10-31 PROCEDURE — 93306 TTE W/DOPPLER COMPLETE: CPT

## 2019-10-31 PROCEDURE — 84132 ASSAY OF SERUM POTASSIUM: CPT

## 2019-10-31 PROCEDURE — 84295 ASSAY OF SERUM SODIUM: CPT

## 2019-10-31 PROCEDURE — 85014 HEMATOCRIT: CPT

## 2019-10-31 PROCEDURE — 80048 BASIC METABOLIC PNL TOTAL CA: CPT

## 2019-10-31 PROCEDURE — 86658 ENTEROVIRUS ANTIBODY: CPT

## 2019-10-31 PROCEDURE — 80053 COMPREHEN METABOLIC PANEL: CPT

## 2019-10-31 PROCEDURE — 93308 TTE F-UP OR LMTD: CPT

## 2019-10-31 PROCEDURE — 84436 ASSAY OF TOTAL THYROXINE: CPT

## 2019-10-31 PROCEDURE — 86617 LYME DISEASE ANTIBODY: CPT

## 2019-10-31 PROCEDURE — 87798 DETECT AGENT NOS DNA AMP: CPT

## 2019-10-31 PROCEDURE — 83605 ASSAY OF LACTIC ACID: CPT

## 2019-10-31 PROCEDURE — 86225 DNA ANTIBODY NATIVE: CPT

## 2019-10-31 PROCEDURE — 87633 RESP VIRUS 12-25 TARGETS: CPT

## 2019-10-31 PROCEDURE — 97530 THERAPEUTIC ACTIVITIES: CPT

## 2019-10-31 PROCEDURE — 84443 ASSAY THYROID STIM HORMONE: CPT

## 2019-10-31 PROCEDURE — 82330 ASSAY OF CALCIUM: CPT

## 2019-10-31 PROCEDURE — 83036 HEMOGLOBIN GLYCOSYLATED A1C: CPT

## 2019-10-31 PROCEDURE — 71045 X-RAY EXAM CHEST 1 VIEW: CPT

## 2019-10-31 PROCEDURE — 97116 GAIT TRAINING THERAPY: CPT

## 2019-10-31 PROCEDURE — 84480 ASSAY TRIIODOTHYRONINE (T3): CPT

## 2019-10-31 PROCEDURE — 86140 C-REACTIVE PROTEIN: CPT

## 2019-10-31 PROCEDURE — 85027 COMPLETE CBC AUTOMATED: CPT

## 2019-10-31 PROCEDURE — 86923 COMPATIBILITY TEST ELECTRIC: CPT

## 2019-10-31 PROCEDURE — 86901 BLOOD TYPING SEROLOGIC RH(D): CPT

## 2019-10-31 PROCEDURE — 85610 PROTHROMBIN TIME: CPT

## 2019-10-31 PROCEDURE — 85652 RBC SED RATE AUTOMATED: CPT

## 2019-10-31 PROCEDURE — 36415 COLL VENOUS BLD VENIPUNCTURE: CPT

## 2019-10-31 PROCEDURE — 86850 RBC ANTIBODY SCREEN: CPT

## 2019-10-31 PROCEDURE — 81003 URINALYSIS AUTO W/O SCOPE: CPT

## 2019-10-31 PROCEDURE — 93005 ELECTROCARDIOGRAM TRACING: CPT

## 2019-10-31 PROCEDURE — 93312 ECHO TRANSESOPHAGEAL: CPT

## 2019-10-31 PROCEDURE — 93970 EXTREMITY STUDY: CPT

## 2019-10-31 PROCEDURE — 86618 LYME DISEASE ANTIBODY: CPT

## 2019-10-31 PROCEDURE — 88305 TISSUE EXAM BY PATHOLOGIST: CPT

## 2019-10-31 PROCEDURE — 87498 ENTEROVIRUS PROBE&REVRS TRNS: CPT

## 2019-10-31 PROCEDURE — 87205 SMEAR GRAM STAIN: CPT

## 2019-10-31 PROCEDURE — 87070 CULTURE OTHR SPECIMN AEROBIC: CPT

## 2019-10-31 PROCEDURE — 87186 SC STD MICRODIL/AGAR DIL: CPT

## 2019-10-31 PROCEDURE — 85730 THROMBOPLASTIN TIME PARTIAL: CPT

## 2019-10-31 PROCEDURE — 87075 CULTR BACTERIA EXCEPT BLOOD: CPT

## 2019-10-31 PROCEDURE — 86900 BLOOD TYPING SEROLOGIC ABO: CPT

## 2019-10-31 PROCEDURE — 84100 ASSAY OF PHOSPHORUS: CPT

## 2019-10-31 PROCEDURE — 86038 ANTINUCLEAR ANTIBODIES: CPT

## 2019-10-31 PROCEDURE — 82962 GLUCOSE BLOOD TEST: CPT

## 2019-10-31 PROCEDURE — 87486 CHLMYD PNEUM DNA AMP PROBE: CPT

## 2019-11-04 ENCOUNTER — MEDICATION RENEWAL (OUTPATIENT)
Age: 44
End: 2019-11-04

## 2019-11-05 ENCOUNTER — MEDICATION RENEWAL (OUTPATIENT)
Age: 44
End: 2019-11-05

## 2019-11-07 ENCOUNTER — MEDICATION RENEWAL (OUTPATIENT)
Age: 44
End: 2019-11-07

## 2019-11-12 ENCOUNTER — INPATIENT (INPATIENT)
Facility: HOSPITAL | Age: 44
LOS: 7 days | Discharge: HOME | DRG: 286 | End: 2019-11-20
Attending: THORACIC SURGERY (CARDIOTHORACIC VASCULAR SURGERY) | Admitting: THORACIC SURGERY (CARDIOTHORACIC VASCULAR SURGERY)
Payer: MEDICAID

## 2019-11-12 ENCOUNTER — APPOINTMENT (OUTPATIENT)
Dept: CARDIOLOGY | Facility: CLINIC | Age: 44
End: 2019-11-12
Payer: SELF-PAY

## 2019-11-12 VITALS
RESPIRATION RATE: 20 BRPM | SYSTOLIC BLOOD PRESSURE: 144 MMHG | HEIGHT: 68 IN | OXYGEN SATURATION: 100 % | TEMPERATURE: 98 F | HEART RATE: 93 BPM | DIASTOLIC BLOOD PRESSURE: 96 MMHG | WEIGHT: 246.04 LBS

## 2019-11-12 DIAGNOSIS — Z29.9 ENCOUNTER FOR PROPHYLACTIC MEASURES, UNSPECIFIED: ICD-10-CM

## 2019-11-12 DIAGNOSIS — I10 ESSENTIAL (PRIMARY) HYPERTENSION: ICD-10-CM

## 2019-11-12 DIAGNOSIS — I31.3 PERICARDIAL EFFUSION (NONINFLAMMATORY): ICD-10-CM

## 2019-11-12 DIAGNOSIS — E11.9 TYPE 2 DIABETES MELLITUS WITHOUT COMPLICATIONS: ICD-10-CM

## 2019-11-12 DIAGNOSIS — A69.20 LYME DISEASE, UNSPECIFIED: ICD-10-CM

## 2019-11-12 LAB
ALBUMIN SERPL ELPH-MCNC: 3.7 G/DL — SIGNIFICANT CHANGE UP (ref 3.3–5.2)
ALP SERPL-CCNC: 94 U/L — SIGNIFICANT CHANGE UP (ref 40–120)
ALT FLD-CCNC: 12 U/L — SIGNIFICANT CHANGE UP
ANION GAP SERPL CALC-SCNC: 12 MMOL/L — SIGNIFICANT CHANGE UP (ref 5–17)
APTT BLD: 33.7 SEC — SIGNIFICANT CHANGE UP (ref 27.5–36.3)
AST SERPL-CCNC: 16 U/L — SIGNIFICANT CHANGE UP
BASOPHILS # BLD AUTO: 0.04 K/UL — SIGNIFICANT CHANGE UP (ref 0–0.2)
BASOPHILS NFR BLD AUTO: 0.4 % — SIGNIFICANT CHANGE UP (ref 0–2)
BILIRUB SERPL-MCNC: 0.2 MG/DL — LOW (ref 0.4–2)
BLD GP AB SCN SERPL QL: SIGNIFICANT CHANGE UP
BUN SERPL-MCNC: 6 MG/DL — LOW (ref 8–20)
CALCIUM SERPL-MCNC: 9.3 MG/DL — SIGNIFICANT CHANGE UP (ref 8.6–10.2)
CHLORIDE SERPL-SCNC: 102 MMOL/L — SIGNIFICANT CHANGE UP (ref 98–107)
CO2 SERPL-SCNC: 27 MMOL/L — SIGNIFICANT CHANGE UP (ref 22–29)
CREAT SERPL-MCNC: 0.54 MG/DL — SIGNIFICANT CHANGE UP (ref 0.5–1.3)
EOSINOPHIL # BLD AUTO: 0.18 K/UL — SIGNIFICANT CHANGE UP (ref 0–0.5)
EOSINOPHIL NFR BLD AUTO: 1.8 % — SIGNIFICANT CHANGE UP (ref 0–6)
GLUCOSE SERPL-MCNC: 105 MG/DL — SIGNIFICANT CHANGE UP (ref 70–115)
HBA1C BLD-MCNC: 6.4 % — HIGH (ref 4–5.6)
HCT VFR BLD CALC: 37 % — LOW (ref 39–50)
HGB BLD-MCNC: 10.9 G/DL — LOW (ref 13–17)
IMM GRANULOCYTES NFR BLD AUTO: 0.7 % — SIGNIFICANT CHANGE UP (ref 0–1.5)
INR BLD: 1.13 RATIO — SIGNIFICANT CHANGE UP (ref 0.88–1.16)
LYMPHOCYTES # BLD AUTO: 2.17 K/UL — SIGNIFICANT CHANGE UP (ref 1–3.3)
LYMPHOCYTES # BLD AUTO: 22 % — SIGNIFICANT CHANGE UP (ref 13–44)
MAGNESIUM SERPL-MCNC: 2 MG/DL — SIGNIFICANT CHANGE UP (ref 1.6–2.6)
MCHC RBC-ENTMCNC: 24.9 PG — LOW (ref 27–34)
MCHC RBC-ENTMCNC: 29.5 GM/DL — LOW (ref 32–36)
MCV RBC AUTO: 84.7 FL — SIGNIFICANT CHANGE UP (ref 80–100)
MONOCYTES # BLD AUTO: 0.55 K/UL — SIGNIFICANT CHANGE UP (ref 0–0.9)
MONOCYTES NFR BLD AUTO: 5.6 % — SIGNIFICANT CHANGE UP (ref 2–14)
NEUTROPHILS # BLD AUTO: 6.87 K/UL — SIGNIFICANT CHANGE UP (ref 1.8–7.4)
NEUTROPHILS NFR BLD AUTO: 69.5 % — SIGNIFICANT CHANGE UP (ref 43–77)
NT-PROBNP SERPL-SCNC: 747 PG/ML — HIGH (ref 0–300)
PHOSPHATE SERPL-MCNC: 4 MG/DL — SIGNIFICANT CHANGE UP (ref 2.4–4.7)
PLATELET # BLD AUTO: 434 K/UL — HIGH (ref 150–400)
POTASSIUM SERPL-MCNC: 4.1 MMOL/L — SIGNIFICANT CHANGE UP (ref 3.5–5.3)
POTASSIUM SERPL-SCNC: 4.1 MMOL/L — SIGNIFICANT CHANGE UP (ref 3.5–5.3)
PREALB SERPL-MCNC: 17 MG/DL — LOW (ref 18–38)
PROT SERPL-MCNC: 7.3 G/DL — SIGNIFICANT CHANGE UP (ref 6.6–8.7)
PROTHROM AB SERPL-ACNC: 13 SEC — HIGH (ref 10–12.9)
RBC # BLD: 4.37 M/UL — SIGNIFICANT CHANGE UP (ref 4.2–5.8)
RBC # FLD: 16.9 % — HIGH (ref 10.3–14.5)
SODIUM SERPL-SCNC: 141 MMOL/L — SIGNIFICANT CHANGE UP (ref 135–145)
TROPONIN T SERPL-MCNC: <0.01 NG/ML — SIGNIFICANT CHANGE UP (ref 0–0.06)
TSH SERPL-MCNC: 3.28 UIU/ML — SIGNIFICANT CHANGE UP (ref 0.27–4.2)
WBC # BLD: 9.88 K/UL — SIGNIFICANT CHANGE UP (ref 3.8–10.5)
WBC # FLD AUTO: 9.88 K/UL — SIGNIFICANT CHANGE UP (ref 3.8–10.5)

## 2019-11-12 PROCEDURE — 93306 TTE W/DOPPLER COMPLETE: CPT | Mod: 26

## 2019-11-12 PROCEDURE — 99285 EMERGENCY DEPT VISIT HI MDM: CPT

## 2019-11-12 PROCEDURE — 93308 TTE F-UP OR LMTD: CPT

## 2019-11-12 PROCEDURE — 93010 ELECTROCARDIOGRAM REPORT: CPT

## 2019-11-12 PROCEDURE — 71045 X-RAY EXAM CHEST 1 VIEW: CPT | Mod: 26

## 2019-11-12 RX ORDER — CHLORHEXIDINE GLUCONATE 213 G/1000ML
15 SOLUTION TOPICAL
Refills: 0 | Status: DISCONTINUED | OUTPATIENT
Start: 2019-11-12 | End: 2019-11-15

## 2019-11-12 RX ORDER — FUROSEMIDE 40 MG
40 TABLET ORAL DAILY
Refills: 0 | Status: DISCONTINUED | OUTPATIENT
Start: 2019-11-13 | End: 2019-11-16

## 2019-11-12 RX ORDER — METOPROLOL TARTRATE 50 MG
25 TABLET ORAL
Refills: 0 | Status: DISCONTINUED | OUTPATIENT
Start: 2019-11-12 | End: 2019-11-20

## 2019-11-12 RX ORDER — CHLORHEXIDINE GLUCONATE 213 G/1000ML
1 SOLUTION TOPICAL
Refills: 0 | Status: DISCONTINUED | OUTPATIENT
Start: 2019-11-12 | End: 2019-11-15

## 2019-11-12 RX ORDER — PANTOPRAZOLE SODIUM 20 MG/1
40 TABLET, DELAYED RELEASE ORAL
Refills: 0 | Status: DISCONTINUED | OUTPATIENT
Start: 2019-11-12 | End: 2019-11-20

## 2019-11-12 RX ORDER — SODIUM CHLORIDE 9 MG/ML
3 INJECTION INTRAMUSCULAR; INTRAVENOUS; SUBCUTANEOUS EVERY 8 HOURS
Refills: 0 | Status: DISCONTINUED | OUTPATIENT
Start: 2019-11-12 | End: 2019-11-20

## 2019-11-12 RX ADMIN — SODIUM CHLORIDE 3 MILLILITER(S): 9 INJECTION INTRAMUSCULAR; INTRAVENOUS; SUBCUTANEOUS at 21:16

## 2019-11-12 NOTE — H&P ADULT - NSHPREVIEWOFSYSTEMS_GEN_ALL_CORE
Review of Systems  GENERAL:  Fevers[] chills[] sweats[] fatigue[] weight loss[] weight gain []                                      [ x] NEGATIVE  NEURO:  parathesias[] seizures []  syncope []  confusion []                                                                [x ] NEGATIVE                 EYES: glasses[]  blurry vision[]  discharge[] pain[] glaucoma []                                                           [ x] NEGATIVE                 ENMT:  difficulty hearing []  vertigo[]  dysphagia[] epistaxis[] recent dental work []                     [x ] NEGATIVE                 CV:  chest pain[] palpitations[] JOHNSON [] diaphoresis [] edema[]                                                           [ x] NEGATIVE                                 RESPIRATORY:  wheezing[] SOB[x] cough [] sputum[] hemoptysis[]  Mild SOB on exertion                  [ ] NEGATIVE               GI:  nausea[]  vomiting []  diarrhea[] constipation [] melena []                                                          [ x] NEGATIVE            : hematuria[ ]  dysuria[ ] urgency[] incontinence[]                                                                          [x ] NEGATIVE                    MUSKULOSKELETAL:  arthritis[ ]  joint swelling [ ] muscle weakness [ ]                                            [ x] NEGATIVE                     SKIN/BREAST:  rash[ ] itching [ ]  hair loss[ ] masses[ ]                                                                          [ x] NEGATIVE                     PSYCH:  dementia [ ] depresion [ ] anxiety[ ]                                                                                          [x ] NEGATIVE                        HEME/LYMPH:  bruises easily[ ] enlarged lymph nodes[ ] tender lymph nodes[ ]                           [ x] NEGATIVE                      ENDOCRINE:  cold intolerance[ ] heat intolerance[ ] polydipsia[ ]                                                      [x ] NEGATIVE

## 2019-11-12 NOTE — ED ADULT NURSE NOTE - ED STAT RN HANDOFF DETAILS
Pt handed off to RN Jada in stable condition. Pt placed on cardiac monitor, oriented to unit, plan of care explained. Call bell given to pt and call bell system explained to pt. No apparent distress noted at this time. Receiving RN without any questions or concerns at time of handoff.

## 2019-11-12 NOTE — ED ADULT NURSE NOTE - OBJECTIVE STATEMENT
Assumed pt care, pt lying in stretcher, no acute distress noted. Pt with cardiac hx of cardiac window sx, and follows cardiologist. Sent to ED today for labs/fluid overload. Pt denies chest pain, denies shortness of breath, respirations are spontaneous even and unlabored with fine crackles noted throughout lung fields, heart rate is regular rhythm normal rate, VS stable as noted in flow sheet. Pt with positive distal pulses and brisk cap refill. Pt A+Ox4 with no complaints. Pt is ambulatory and independent.

## 2019-11-12 NOTE — H&P ADULT - NSHPPHYSICALEXAM_GEN_ALL_CORE
Neurology: A&Ox3, nonfocal, MARIA x 4  Respiratory: CTA B/L  CV: RRR, S1S2, no murmurs.  Abdominal: Soft, obese, NT +BS.  Extremities: Moderate BLE edema, + peripheral pulses  Scar of subxiphoid pericardial window noted and well healed.

## 2019-11-12 NOTE — H&P ADULT - PROBLEM SELECTOR PLAN 1
Recurrent pericardial effusion s/p subxiphoid pericardial window 9/30/19.  Admit to CTS service to 4 CTU to Dr. Peters.  Plan for TTE tonight.  Will order Chest CT with IV contrast as well.  Will need a left heart cath ( had a right heart cath by Dr. Castro at another institution).  Will call cardiology ([facility])  No diuretics.

## 2019-11-12 NOTE — PATIENT PROFILE ADULT - NSPROEDALEARNPREFOTH_GEN_A_NUR
written material/verbal instruction/audio/group instruction/skill demonstration/individual instruction

## 2019-11-12 NOTE — H&P ADULT - ASSESSMENT
44 year old male with PMH as above.   S/P  subxiphoid pericardial window  and pericardial biopsy on 9/30/19 at [facility] with Dr. Peters .  Postoperative course complicated by some AF/SVT which converted to SR.  Was found to be + lymes on that admission and has completed his course of IV and PO antibiotics.  Now presenting to the ER from Dr. Castro office s/p in office TTE that showed a large pericardial effusion.  CTs notified by Dr. Castro.

## 2019-11-12 NOTE — ED PROVIDER NOTE - CLINICAL SUMMARY MEDICAL DECISION MAKING FREE TEXT BOX
44 year old with hx of pericardial window now with pericardial effusion.  Patient seen by CT surgery team and admitted.

## 2019-11-12 NOTE — ED ADULT NURSE REASSESSMENT NOTE - NS ED NURSE REASSESS COMMENT FT1
Pt transported to Echo on cardiac monitor with RN and transporter, pt at echo at this time, exam in progress, remains A+Ox4, denies pain. 4CTU called to give report at this time as pt is assigned bed.

## 2019-11-12 NOTE — ED PROVIDER NOTE - OBJECTIVE STATEMENT
This patient is a 44 year old man with hx of DM II HTN, and recurrent pericardial effusions s/p subxiphoid pericardial window on 9/30/19 at [facility] with Dr. Peters.  Patient reporting dyspnea only with extreme exertion.  He had a follow-up visit with his cardiologist Dr. Castro today and was found to have a pericardial effusion on ECHO.  He denies fever and chest pain as well as SOB at rest.

## 2019-11-12 NOTE — H&P ADULT - PROBLEM SELECTOR PLAN 5
Lovenox deferred with pericardial effusion.  Will add protonix for GI prophylaxis.    Discussed with Dr. Peters.

## 2019-11-12 NOTE — H&P ADULT - NSICDXPASTMEDICALHX_GEN_ALL_CORE_FT
PAST MEDICAL HISTORY:  Cardiomegaly     Diabetes mellitus     Heart failure     Hypertension     Nonsmoker

## 2019-11-12 NOTE — ED ADULT TRIAGE NOTE - CHIEF COMPLAINT QUOTE
c/o sent [facility] , fluid in the heart c/o sent from [facility] , fluid in the heart

## 2019-11-12 NOTE — H&P ADULT - HISTORY OF PRESENT ILLNESS
44 year old Male with PMH of  cardiomegaly, DM II, HTN, and recurrent pericardial effusions with pericardiocentesis x2 (serous drainage), Most recently is S/P  subxiphoid pericardial window  and pericardial biopsy on 9/30/19 at [facility] with Dr. Peters .  Postoperative course complicated by some AF/SVT which converted to SR.  Was found to be + lymes on that admission and has completed his course of IV and PO antibiotics.  Now presenting to the ER from Dr. Castro office s/p in office TTE that showed a large pericardial effusion.  Currently sitting up in stretcher in ED.  Denies CP.  Reports mild shortness of breath on exertion that is unchanged over the last few weeks.  He states that his visit with Dr. Castro today was a scheduled routine visit.  NAD noted.  Wife at bedside.

## 2019-11-12 NOTE — PATIENT PROFILE ADULT - NSPROEDALEARNPREF_GEN_A_NUR
individual instruction/audio/written material/skill demonstration/group instruction/verbal instruction

## 2019-11-13 DIAGNOSIS — R60.0 LOCALIZED EDEMA: ICD-10-CM

## 2019-11-13 LAB
ALBUMIN SERPL ELPH-MCNC: 3.4 G/DL — SIGNIFICANT CHANGE UP (ref 3.3–5.2)
ALP SERPL-CCNC: 84 U/L — SIGNIFICANT CHANGE UP (ref 40–120)
ALT FLD-CCNC: 11 U/L — SIGNIFICANT CHANGE UP
ANION GAP SERPL CALC-SCNC: 12 MMOL/L — SIGNIFICANT CHANGE UP (ref 5–17)
APPEARANCE UR: CLEAR — SIGNIFICANT CHANGE UP
AST SERPL-CCNC: 14 U/L — SIGNIFICANT CHANGE UP
BASOPHILS # BLD AUTO: 0.05 K/UL — SIGNIFICANT CHANGE UP (ref 0–0.2)
BASOPHILS NFR BLD AUTO: 0.5 % — SIGNIFICANT CHANGE UP (ref 0–2)
BILIRUB SERPL-MCNC: 0.3 MG/DL — LOW (ref 0.4–2)
BILIRUB UR-MCNC: NEGATIVE — SIGNIFICANT CHANGE UP
BUN SERPL-MCNC: 5 MG/DL — LOW (ref 8–20)
CALCIUM SERPL-MCNC: 9.2 MG/DL — SIGNIFICANT CHANGE UP (ref 8.6–10.2)
CHLORIDE SERPL-SCNC: 104 MMOL/L — SIGNIFICANT CHANGE UP (ref 98–107)
CO2 SERPL-SCNC: 27 MMOL/L — SIGNIFICANT CHANGE UP (ref 22–29)
COLOR SPEC: YELLOW — SIGNIFICANT CHANGE UP
CREAT SERPL-MCNC: 0.53 MG/DL — SIGNIFICANT CHANGE UP (ref 0.5–1.3)
DIFF PNL FLD: NEGATIVE — SIGNIFICANT CHANGE UP
EOSINOPHIL # BLD AUTO: 0.13 K/UL — SIGNIFICANT CHANGE UP (ref 0–0.5)
EOSINOPHIL NFR BLD AUTO: 1.3 % — SIGNIFICANT CHANGE UP (ref 0–6)
GLUCOSE BLDC GLUCOMTR-MCNC: 134 MG/DL — HIGH (ref 70–99)
GLUCOSE BLDC GLUCOMTR-MCNC: 194 MG/DL — HIGH (ref 70–99)
GLUCOSE BLDC GLUCOMTR-MCNC: 95 MG/DL — SIGNIFICANT CHANGE UP (ref 70–99)
GLUCOSE SERPL-MCNC: 126 MG/DL — HIGH (ref 70–115)
GLUCOSE UR QL: NEGATIVE MG/DL — SIGNIFICANT CHANGE UP
HCT VFR BLD CALC: 34.4 % — LOW (ref 39–50)
HGB BLD-MCNC: 10.3 G/DL — LOW (ref 13–17)
IMM GRANULOCYTES NFR BLD AUTO: 0.7 % — SIGNIFICANT CHANGE UP (ref 0–1.5)
KETONES UR-MCNC: NEGATIVE — SIGNIFICANT CHANGE UP
LEUKOCYTE ESTERASE UR-ACNC: NEGATIVE — SIGNIFICANT CHANGE UP
LYMPHOCYTES # BLD AUTO: 1.84 K/UL — SIGNIFICANT CHANGE UP (ref 1–3.3)
LYMPHOCYTES # BLD AUTO: 18.4 % — SIGNIFICANT CHANGE UP (ref 13–44)
MCHC RBC-ENTMCNC: 24.9 PG — LOW (ref 27–34)
MCHC RBC-ENTMCNC: 29.9 GM/DL — LOW (ref 32–36)
MCV RBC AUTO: 83.3 FL — SIGNIFICANT CHANGE UP (ref 80–100)
MONOCYTES # BLD AUTO: 0.53 K/UL — SIGNIFICANT CHANGE UP (ref 0–0.9)
MONOCYTES NFR BLD AUTO: 5.3 % — SIGNIFICANT CHANGE UP (ref 2–14)
MRSA PCR RESULT.: SIGNIFICANT CHANGE UP
NEUTROPHILS # BLD AUTO: 7.4 K/UL — SIGNIFICANT CHANGE UP (ref 1.8–7.4)
NEUTROPHILS NFR BLD AUTO: 73.8 % — SIGNIFICANT CHANGE UP (ref 43–77)
NITRITE UR-MCNC: NEGATIVE — SIGNIFICANT CHANGE UP
PH UR: 7 — SIGNIFICANT CHANGE UP (ref 5–8)
PLATELET # BLD AUTO: 422 K/UL — HIGH (ref 150–400)
POTASSIUM SERPL-MCNC: 4.6 MMOL/L — SIGNIFICANT CHANGE UP (ref 3.5–5.3)
POTASSIUM SERPL-SCNC: 4.6 MMOL/L — SIGNIFICANT CHANGE UP (ref 3.5–5.3)
PROT SERPL-MCNC: 6.8 G/DL — SIGNIFICANT CHANGE UP (ref 6.6–8.7)
PROT UR-MCNC: NEGATIVE MG/DL — SIGNIFICANT CHANGE UP
RBC # BLD: 4.13 M/UL — LOW (ref 4.2–5.8)
RBC # FLD: 17.2 % — HIGH (ref 10.3–14.5)
S AUREUS DNA NOSE QL NAA+PROBE: SIGNIFICANT CHANGE UP
SODIUM SERPL-SCNC: 143 MMOL/L — SIGNIFICANT CHANGE UP (ref 135–145)
SP GR SPEC: 1 — LOW (ref 1.01–1.02)
UROBILINOGEN FLD QL: NEGATIVE MG/DL — SIGNIFICANT CHANGE UP
WBC # BLD: 10.02 K/UL — SIGNIFICANT CHANGE UP (ref 3.8–10.5)
WBC # FLD AUTO: 10.02 K/UL — SIGNIFICANT CHANGE UP (ref 3.8–10.5)

## 2019-11-13 PROCEDURE — 71260 CT THORAX DX C+: CPT | Mod: 26

## 2019-11-13 PROCEDURE — 93970 EXTREMITY STUDY: CPT | Mod: 26

## 2019-11-13 PROCEDURE — 93880 EXTRACRANIAL BILAT STUDY: CPT | Mod: 26

## 2019-11-13 RX ORDER — SODIUM CHLORIDE 9 MG/ML
1000 INJECTION, SOLUTION INTRAVENOUS
Refills: 0 | Status: DISCONTINUED | OUTPATIENT
Start: 2019-11-13 | End: 2019-11-20

## 2019-11-13 RX ORDER — DEXTROSE 50 % IN WATER 50 %
12.5 SYRINGE (ML) INTRAVENOUS ONCE
Refills: 0 | Status: DISCONTINUED | OUTPATIENT
Start: 2019-11-13 | End: 2019-11-20

## 2019-11-13 RX ORDER — DEXTROSE 50 % IN WATER 50 %
25 SYRINGE (ML) INTRAVENOUS ONCE
Refills: 0 | Status: DISCONTINUED | OUTPATIENT
Start: 2019-11-13 | End: 2019-11-20

## 2019-11-13 RX ORDER — DEXTROSE 50 % IN WATER 50 %
15 SYRINGE (ML) INTRAVENOUS ONCE
Refills: 0 | Status: DISCONTINUED | OUTPATIENT
Start: 2019-11-13 | End: 2019-11-20

## 2019-11-13 RX ORDER — INSULIN LISPRO 100/ML
VIAL (ML) SUBCUTANEOUS
Refills: 0 | Status: DISCONTINUED | OUTPATIENT
Start: 2019-11-13 | End: 2019-11-20

## 2019-11-13 RX ORDER — GLUCAGON INJECTION, SOLUTION 0.5 MG/.1ML
1 INJECTION, SOLUTION SUBCUTANEOUS ONCE
Refills: 0 | Status: DISCONTINUED | OUTPATIENT
Start: 2019-11-13 | End: 2019-11-20

## 2019-11-13 RX ADMIN — Medication 25 MILLIGRAM(S): at 17:39

## 2019-11-13 RX ADMIN — SODIUM CHLORIDE 3 MILLILITER(S): 9 INJECTION INTRAMUSCULAR; INTRAVENOUS; SUBCUTANEOUS at 21:29

## 2019-11-13 RX ADMIN — Medication 40 MILLIGRAM(S): at 06:04

## 2019-11-13 RX ADMIN — CHLORHEXIDINE GLUCONATE 15 MILLILITER(S): 213 SOLUTION TOPICAL at 17:39

## 2019-11-13 RX ADMIN — SODIUM CHLORIDE 3 MILLILITER(S): 9 INJECTION INTRAMUSCULAR; INTRAVENOUS; SUBCUTANEOUS at 06:05

## 2019-11-13 RX ADMIN — SODIUM CHLORIDE 3 MILLILITER(S): 9 INJECTION INTRAMUSCULAR; INTRAVENOUS; SUBCUTANEOUS at 09:07

## 2019-11-13 RX ADMIN — CHLORHEXIDINE GLUCONATE 1 APPLICATION(S): 213 SOLUTION TOPICAL at 17:21

## 2019-11-13 RX ADMIN — CHLORHEXIDINE GLUCONATE 1 APPLICATION(S): 213 SOLUTION TOPICAL at 06:30

## 2019-11-13 RX ADMIN — PANTOPRAZOLE SODIUM 40 MILLIGRAM(S): 20 TABLET, DELAYED RELEASE ORAL at 06:04

## 2019-11-13 RX ADMIN — CHLORHEXIDINE GLUCONATE 15 MILLILITER(S): 213 SOLUTION TOPICAL at 06:35

## 2019-11-13 RX ADMIN — Medication 2: at 17:43

## 2019-11-13 RX ADMIN — Medication 25 MILLIGRAM(S): at 06:05

## 2019-11-13 NOTE — PROGRESS NOTE ADULT - PROBLEM SELECTOR PLAN 1
Recurrent pericardial effusion s/p subxiphoid pericardial window 9/30/19.  Preop workup. PFT's and carotid US ordered.  Plan for cardiac cath today with Dr. Castro per Dr. Peters.  Currently NPO.  CT chest with IV contrast deferred for now with plan for cath> will need at some point.

## 2019-11-13 NOTE — PROGRESS NOTE ADULT - SUBJECTIVE AND OBJECTIVE BOX
Subjective: Pt sitting up in chair.  Wife at bedside.  Denies CP or SOB.  NAD noted.      Tele: SR                         T(F): 98.7 (11-13-19 @ 06:03), Max: 99 (11-12-19 @ 19:35)  HR: 98 (11-13-19 @ 06:03) (84 - 116)  BP: 146/85 (11-13-19 @ 06:03) (142/88 - 152/84)  RR: 18 (11-13-19 @ 06:03) (18 - 20)  SpO2: 95% (11-13-19 @ 06:03) (94% - 100%) on room air at rest.    Weight (kg): 111.6 (11-12 @ 17:34)    Daily Height in cm: 172.72 (12 Nov 2019 17:34)    Daily     LV EF: 60%    No Known Allergies      11-13    143  |  104  |  5.0<L>  ----------------------------<  126<H>  4.6   |  27.0  |  0.53    Ca    9.2      13 Nov 2019 06:21  Phos  4.0     11-12  Mg     2.0     11-12    TPro  6.8  /  Alb  3.4  /  TBili  0.3<L>  /  DBili  x   /  AST  14  /  ALT  11  /  AlkPhos  84  11-13                               10.3   10.02 )-----------( 422      ( 13 Nov 2019 06:21 )             34.4        PT/INR - ( 12 Nov 2019 19:32 )   PT: 13.0 sec;   INR: 1.13 ratio         PTT - ( 12 Nov 2019 19:32 )  PTT:33.7 sec         CXR: awaiting official read    I&O's Detail      Active Medications:  chlorhexidine 0.12% Liquid 15 milliLiter(s) Oral Mucosa two times a day  chlorhexidine 4% Liquid 1 Application(s) Topical two times a day  dextrose 40% Gel 15 Gram(s) Oral once PRN  dextrose 5%. 1000 milliLiter(s) IV Continuous <Continuous>  dextrose 50% Injectable 12.5 Gram(s) IV Push once  dextrose 50% Injectable 25 Gram(s) IV Push once  dextrose 50% Injectable 25 Gram(s) IV Push once  furosemide    Tablet 40 milliGRAM(s) Oral daily  glucagon  Injectable 1 milliGRAM(s) IntraMuscular once PRN  insulin lispro (HumaLOG) corrective regimen sliding scale   SubCutaneous Before meals and at bedtime  metoprolol tartrate 25 milliGRAM(s) Oral two times a day  pantoprazole    Tablet 40 milliGRAM(s) Oral before breakfast  sodium chloride 0.9% lock flush 3 milliLiter(s) IV Push every 8 hours      Physical Exam:    Neuro: AAOX3.  Non focal.    Pulm: Diminished BLL.    CV: RRR.  +S1+S2.    Abd: Soft/NT/ND.  +BS.     Extremities: Moderate edema BLE.  +pp.    Incision(s): Subxiphoid pericardial window incision scar noted.  Well healed.                   PAST MEDICAL & SURGICAL HISTORY:  Heart failure  Cardiomegaly  Nonsmoker  Diabetes mellitus  Hypertension  No significant past surgical history

## 2019-11-13 NOTE — PROGRESS NOTE ADULT - ASSESSMENT
44 year old male with PMH as above.   S/P  subxiphoid pericardial window  and pericardial biopsy on 9/30/19 at [facility] with Dr. Peters .  Postoperative course complicated by some AF/SVT which converted to SR.  Was found to be + lymes on that admission and has completed his course of IV and PO antibiotics.  Now presenting to the ER from Dr. Castro office s/p in office TTE that showed a large pericardial effusion.  CTs notified by Dr. Castro.

## 2019-11-14 DIAGNOSIS — J98.59 OTHER DISEASES OF MEDIASTINUM, NOT ELSEWHERE CLASSIFIED: ICD-10-CM

## 2019-11-14 LAB
ALBUMIN SERPL ELPH-MCNC: 3.7 G/DL — SIGNIFICANT CHANGE UP (ref 3.3–5.2)
ALP SERPL-CCNC: 89 U/L — SIGNIFICANT CHANGE UP (ref 40–120)
ALT FLD-CCNC: 10 U/L — SIGNIFICANT CHANGE UP
ANION GAP SERPL CALC-SCNC: 12 MMOL/L — SIGNIFICANT CHANGE UP (ref 5–17)
AST SERPL-CCNC: 13 U/L — SIGNIFICANT CHANGE UP
BILIRUB SERPL-MCNC: 0.4 MG/DL — SIGNIFICANT CHANGE UP (ref 0.4–2)
BUN SERPL-MCNC: 5 MG/DL — LOW (ref 8–20)
CALCIUM SERPL-MCNC: 9.4 MG/DL — SIGNIFICANT CHANGE UP (ref 8.6–10.2)
CHLORIDE SERPL-SCNC: 102 MMOL/L — SIGNIFICANT CHANGE UP (ref 98–107)
CO2 SERPL-SCNC: 28 MMOL/L — SIGNIFICANT CHANGE UP (ref 22–29)
CREAT SERPL-MCNC: 0.68 MG/DL — SIGNIFICANT CHANGE UP (ref 0.5–1.3)
GLUCOSE BLDC GLUCOMTR-MCNC: 120 MG/DL — HIGH (ref 70–99)
GLUCOSE BLDC GLUCOMTR-MCNC: 143 MG/DL — HIGH (ref 70–99)
GLUCOSE BLDC GLUCOMTR-MCNC: 193 MG/DL — HIGH (ref 70–99)
GLUCOSE BLDC GLUCOMTR-MCNC: 74 MG/DL — SIGNIFICANT CHANGE UP (ref 70–99)
GLUCOSE SERPL-MCNC: 117 MG/DL — HIGH (ref 70–115)
HCT VFR BLD CALC: 37.8 % — LOW (ref 39–50)
HGB BLD-MCNC: 11.4 G/DL — LOW (ref 13–17)
MAGNESIUM SERPL-MCNC: 2 MG/DL — SIGNIFICANT CHANGE UP (ref 1.6–2.6)
MCHC RBC-ENTMCNC: 25.3 PG — LOW (ref 27–34)
MCHC RBC-ENTMCNC: 30.2 GM/DL — LOW (ref 32–36)
MCV RBC AUTO: 83.8 FL — SIGNIFICANT CHANGE UP (ref 80–100)
PHOSPHATE SERPL-MCNC: 4.7 MG/DL — SIGNIFICANT CHANGE UP (ref 2.4–4.7)
PLATELET # BLD AUTO: 444 K/UL — HIGH (ref 150–400)
POTASSIUM SERPL-MCNC: 4.2 MMOL/L — SIGNIFICANT CHANGE UP (ref 3.5–5.3)
POTASSIUM SERPL-SCNC: 4.2 MMOL/L — SIGNIFICANT CHANGE UP (ref 3.5–5.3)
PROT SERPL-MCNC: 7 G/DL — SIGNIFICANT CHANGE UP (ref 6.6–8.7)
RBC # BLD: 4.51 M/UL — SIGNIFICANT CHANGE UP (ref 4.2–5.8)
RBC # FLD: 17.1 % — HIGH (ref 10.3–14.5)
SODIUM SERPL-SCNC: 142 MMOL/L — SIGNIFICANT CHANGE UP (ref 135–145)
WBC # BLD: 10.74 K/UL — HIGH (ref 3.8–10.5)
WBC # FLD AUTO: 10.74 K/UL — HIGH (ref 3.8–10.5)

## 2019-11-14 PROCEDURE — 71045 X-RAY EXAM CHEST 1 VIEW: CPT | Mod: 26

## 2019-11-14 PROCEDURE — 74177 CT ABD & PELVIS W/CONTRAST: CPT | Mod: 26

## 2019-11-14 PROCEDURE — 99221 1ST HOSP IP/OBS SF/LOW 40: CPT

## 2019-11-14 RX ADMIN — Medication 25 MILLIGRAM(S): at 05:53

## 2019-11-14 RX ADMIN — SODIUM CHLORIDE 3 MILLILITER(S): 9 INJECTION INTRAMUSCULAR; INTRAVENOUS; SUBCUTANEOUS at 05:53

## 2019-11-14 RX ADMIN — PANTOPRAZOLE SODIUM 40 MILLIGRAM(S): 20 TABLET, DELAYED RELEASE ORAL at 05:53

## 2019-11-14 RX ADMIN — CHLORHEXIDINE GLUCONATE 15 MILLILITER(S): 213 SOLUTION TOPICAL at 05:54

## 2019-11-14 RX ADMIN — Medication 2: at 13:18

## 2019-11-14 RX ADMIN — CHLORHEXIDINE GLUCONATE 15 MILLILITER(S): 213 SOLUTION TOPICAL at 17:30

## 2019-11-14 RX ADMIN — Medication 40 MILLIGRAM(S): at 05:53

## 2019-11-14 RX ADMIN — Medication 25 MILLIGRAM(S): at 17:30

## 2019-11-14 RX ADMIN — SODIUM CHLORIDE 3 MILLILITER(S): 9 INJECTION INTRAMUSCULAR; INTRAVENOUS; SUBCUTANEOUS at 17:29

## 2019-11-14 RX ADMIN — SODIUM CHLORIDE 3 MILLILITER(S): 9 INJECTION INTRAMUSCULAR; INTRAVENOUS; SUBCUTANEOUS at 21:12

## 2019-11-14 NOTE — PROGRESS NOTE ADULT - SUBJECTIVE AND OBJECTIVE BOX
Subjective: Patient seen with wife at beside with interpretation by Cricket Weinstein. Patient states that he has no symptoms today and is still confused why he was urgently brought into the hospital. Patient denies chest pain, dizziness, dyspnea, shortness of breath.     Patient appears in no acute distress, all questions answered regarding reason for admission, concerns about current effusion. Patient was informed that Hematology/oncology will be called for the inflammation that was found on his recent CT.    Pertinent events of the past 24 hours: none    VITAL SIGNS  Vital Signs Last 24 Hrs  T(C): 36.9 (19 @ 09:56), Max: 36.9 (19 @ 09:56)  T(F): 98.5 (19 @ 09:56), Max: 98.5 (19 @ 09:56)  HR: 87 (19 @ 09:56) (78 - 96)  BP: 124/83 (19 @ 09:56) (124/83 - 149/88)  RR: 20 (19 @ 09:56) (16 - 20)  SpO2: 100% (19 @ 09:56) (97% - 100%)  on (O2)              Telemetry/Alarms:  Sinus rhythm with PVC  LVEF: 60%    MEDICATIONS  chlorhexidine 0.12% Liquid 15 milliLiter(s) Oral Mucosa two times a day  chlorhexidine 4% Liquid 1 Application(s) Topical two times a day  dextrose 40% Gel 15 Gram(s) Oral once PRN  dextrose 5%. 1000 milliLiter(s) IV Continuous <Continuous>  dextrose 50% Injectable 12.5 Gram(s) IV Push once  dextrose 50% Injectable 25 Gram(s) IV Push once  dextrose 50% Injectable 25 Gram(s) IV Push once  furosemide    Tablet 40 milliGRAM(s) Oral daily  glucagon  Injectable 1 milliGRAM(s) IntraMuscular once PRN  insulin lispro (HumaLOG) corrective regimen sliding scale   SubCutaneous Before meals and at bedtime  metoprolol tartrate 25 milliGRAM(s) Oral two times a day  pantoprazole    Tablet 40 milliGRAM(s) Oral before breakfast  sodium chloride 0.9% lock flush 3 milliLiter(s) IV Push every 8 hours      PHYSICAL EXAM  General: well nourished, well developed, no acute distress  Neurology: alert and oriented x 3, nonfocal, no gross deficits  Respiratory: clear to auscultation bilaterally  CV: regular rate and rhythm, normal S1, S2, no rub appreciated, no JVD  Abdomen: soft, nontender, nondistended, positive bowel sounds  Extremities: warm, well perfused. +1 lower extremity edema. + DP pulses         @ 07:01  -   @ 07:00  --------------------------------------------------------  IN: 360 mL / OUT: 1101 mL / NET: -741 mL     @ 07: @ 13:31  --------------------------------------------------------  IN: 260 mL / OUT: 300 mL / NET: -40 mL        Weights:  Daily     Daily Weight in k.2 (2019 06:30)  Admit Wt: Drug Dosing Weight  Height (cm): 172.72 (2019 17:34)  Weight (kg): 111.6 (2019 17:34)  BMI (kg/m2): 37.4 (2019 17:34)  BSA (m2): 2.23 (2019 17:34)    All laboratory results, radiology and medications reviewed.    LABS      142  |  102  |  5.0<L>  ----------------------------<  117<H>  4.2   |  28.0  |  0.68    Ca    9.4      2019 05:31  Phos  4.7       Mg     2.0         TPro  7.0  /  Alb  3.7  /  TBili  0.4  /  DBili  x   /  AST  13  /  ALT  10  /  AlkPhos  89                                   11.4   10.74 )-----------( 444      ( 2019 05:31 )             37.8          PT/INR - ( 2019 19:32 )   PT: 13.0 sec;   INR: 1.13 ratio         PTT - ( 2019 19:32 )  PTT:33.7 sec  Bilirubin Total, Serum: 0.4 mg/dL ( @ 05:31)    CAPILLARY BLOOD GLUCOSE      POCT Blood Glucose.: 193 mg/dL (2019 12:20)  POCT Blood Glucose.: 120 mg/dL (2019 08:06)  POCT Blood Glucose.: 134 mg/dL (2019 22:10)  POCT Blood Glucose.: 194 mg/dL (2019 17:31)           Last CXR:   < from: Xray Chest 1 View AP/PA. (19 @ 19:23) >  PROCEDURE DATE:  2019        INTERPRETATION:  TECHNIQUE: Single portable view of the chest.    COMPARISON: 10/8/2019    CLINICAL HISTORY: preop    FINDINGS:    Single frontal view of thechest demonstrates small left lower lobe   effusion/atelectasis. The cardiomediastinal silhouette is enlarged. No   acute osseous abnormalities. Overlying EKG leads and wires are noted    IMPRESSION: Cardiomegaly. Small left lower lobe effusion/atelectasis.    < end of copied text >      Last EKG:   < from: 12 Lead ECG (19 @ 17:52) >  Ventricular Rate 83 BPM    Atrial Rate 83 BPM    P-R Interval 156 ms    QRS Duration 84 ms    Q-T Interval 400 ms    QTC Calculation(Bezet) 470 ms    P Axis 40 degrees    R Axis 33 degrees    T Axis 33 degrees    Diagnosis Line Normal sinus rhythm  Possible Left atrial enlargement  Nonspecific T wave abnormality  Abnormal ECG    Confirmed by EUGENIA SHELDON ([id number]) on 2019 12:12:38 PM    < end of copied text >    Chest CT:   < from: CT Chest w/ IV Cont (19 @ 15:18) >  IMPRESSION:     Moderate pericardial effusion. Poorly defined thickening of the   pericardium raising concern for an infiltrative process which extends   superiorly surrounding the aorta, SVC and pulmonary veins. Poorly defined   infiltrative process in the retroperitoneum and surrounding both kidneys   with partially imaged bilateral hydronephrosis. This raises concern for   an underlying infiltrative process. A neoplastic processes within the   differential diagnosis. Clinical correlation is necessary. This was   discussed with BENJI Beavers at 3:56 PM on 2019.    Small right-sided pulmonary nodules. Metastatic disease cannot be   excluded.      < end of copied text >          PAST MEDICAL & SURGICAL HISTORY:  Heart failure  Cardiomegaly  Nonsmoker  Diabetes mellitus  Hypertension  No significant past surgical history

## 2019-11-14 NOTE — CONSULT NOTE ADULT - ASSESSMENT
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  Now with moderate pericardial effusion. Review of the cytology and pathology from the prior her cardiocentesis showed inflammatory change only.   Suggest discharge, and followup with cardiology and infectious disease as an outpatient.  Please reconsult in the future p.r.n.

## 2019-11-14 NOTE — PROGRESS NOTE ADULT - ASSESSMENT
44 year old male with PMH as above.   S/P subxiphoid pericardial window and pericardial biopsy on 9/30/19 at [facility] with Dr. Peters .  Postoperative course complicated by AF/SVT which converted to SR.  Was found to be + lymes on that admission and has completed his course of IV and PO antibiotics.  Patient presented to the ER on 11/12 from Dr. Castro office s/p in office TTE that showed an increased pericardial effusion. Patient found to have infiltrate on Chest CT performed 11/13 that is concerning for infiltrative process / neoplastic disease, hematology/ oncology consulted pending recommendations.  IR was consulted for pericardial effusion, no intervention at this time.

## 2019-11-14 NOTE — PROGRESS NOTE ADULT - PROBLEM SELECTOR PLAN 7
Lovenox deferred with pericardial effusion.  Protonix for GI prophylaxis.    Disposition: Will continue to monitor  Discussed with Dr. Peters.

## 2019-11-14 NOTE — PROGRESS NOTE ADULT - PROBLEM SELECTOR PLAN 1
Recurrent pericardial effusion s/p subxiphoid pericardial window 9/30/19.  CT on 11/13 show moderate effusion, discussed with Dr. Gresham states no window for intervention based on CT review.  No plan for cardiac cath at this time.

## 2019-11-14 NOTE — CONSULT NOTE ADULT - SUBJECTIVE AND OBJECTIVE BOX
REASON FOR CONSULTATION - R/O malignancy    HPI:  44 year old Male with PMH of  cardiomegaly, DM II, HTN, and recurrent pericardial effusions with pericardiocentesis x2 (serous drainage), Most recently is S/P  subxiphoid pericardial window  and pericardial biopsy on 9/30/19 at [facility] with Dr. Peters .  Postoperative course complicated by some AF/SVT which converted to SR.  Was found to be + lymes on that admission and has completed his course of IV and PO antibiotics.  Now presenting to the ER from Dr. Castro office s/p in office TTE that showed a large pericardial effusion.  Currently sitting up in stretcher in ED.  Denies CP.  Reports mild shortness of breath on exertion that is unchanged over the last few weeks.  He states that his visit with Dr. Castro today was a scheduled routine visit.  NAD noted.  Wife at bedside. (12 Nov 2019 18:52)    Chest CT at [place] shows a moderate sized pericardial effusion. poorly defined thickening of the pericardium raised concern for an infiltrative process, extending superiorly surrounding the aorta SVC and pulmonary veins. Likewise, a poorly defined infiltrative process was mentioned in the retroperitoneum and surrounding both kidneys with partially imaged. Bilateral hydronephrosis, again, raising concern for an underlying infiltrative process.  REVIEW OF SYSTEMS:    CONSTITUTIONAL: No fever, weight loss, or fatigue  EYES: No eye pain, visual disturbances, or discharge  ENMT:  No difficulty hearing, tinnitus, vertigo; No sinus or throat pain  NECK: No pain or stiffness  RESPIRATORY: JOHNSON  CARDIOVASCULAR: No chest pain, palpitations, dizziness, or leg swelling  GASTROINTESTINAL: No abdominal or epigastric pain. No nausea, vomiting, or hematemesis; No diarrhea or constipation. No melena or hematochezia.  GENITOURINARY: No dysuria, frequency, hematuria, or incontinence  NEUROLOGICAL: No headaches, memory loss, loss of strength, numbness, or tremors  SKIN: No itching, burning, rashes, or lesions   LYMPH NODES: No enlarged glands  ENDOCRINE: No heat or cold intolerance; No hair loss  MUSCULOSKELETAL: No joint pain or swelling; No muscle, back, or extremity pain  PSYCHIATRIC: No depression, anxiety, mood swings, or difficulty sleeping  HEME/LYMPH: No easy bruising, or bleeding gums      PAST MEDICAL & SURGICAL HISTORY:  Heart failure  Cardiomegaly  Nonsmoker  Diabetes mellitus  Hypertension  No significant past surgical history      SOCIAL HISTORY:    FAMILY HISTORY:  No pertinent family history in first degree relatives      SOCIAL HISTORY:    Allergies    No Known Allergies    Intolerances    [facility] patient (Unknown)      MEDICATIONS  (STANDING):  chlorhexidine 0.12% Liquid 15 milliLiter(s) Oral Mucosa two times a day  chlorhexidine 4% Liquid 1 Application(s) Topical two times a day  dextrose 5%. 1000 milliLiter(s) (50 mL/Hr) IV Continuous <Continuous>  dextrose 50% Injectable 12.5 Gram(s) IV Push once  dextrose 50% Injectable 25 Gram(s) IV Push once  dextrose 50% Injectable 25 Gram(s) IV Push once  furosemide    Tablet 40 milliGRAM(s) Oral daily  insulin lispro (HumaLOG) corrective regimen sliding scale   SubCutaneous Before meals and at bedtime  metoprolol tartrate 25 milliGRAM(s) Oral two times a day  pantoprazole    Tablet 40 milliGRAM(s) Oral before breakfast  sodium chloride 0.9% lock flush 3 milliLiter(s) IV Push every 8 hours    MEDICATIONS  (PRN):  dextrose 40% Gel 15 Gram(s) Oral once PRN Blood Glucose LESS THAN 70 milliGRAM(s)/deciliter  glucagon  Injectable 1 milliGRAM(s) IntraMuscular once PRN Glucose LESS THAN 70 milligrams/deciliter      Vital Signs Last 24 Hrs  T(C): 36.8 (14 Nov 2019 15:12), Max: 36.9 (14 Nov 2019 09:56)  T(F): 98.2 (14 Nov 2019 15:12), Max: 98.5 (14 Nov 2019 09:56)  HR: 89 (14 Nov 2019 15:12) (78 - 95)  BP: 129/86 (14 Nov 2019 15:12) (124/83 - 146/82)  BP(mean): --  RR: 18 (14 Nov 2019 15:12) (16 - 20)  SpO2: 99% (14 Nov 2019 15:12) (97% - 100%)    PHYSICAL EXAM:    GENERAL: NAD, well-groomed, well-developed  HEAD:  Atraumatic, Normocephalic  EYES: EOMI, PERRLA, conjunctiva and sclera clear  ENMT: No tonsillar erythema, exudates, or enlargement; Moist mucous membranes, Good dentition, No lesions  NECK: Supple, No JVD, Normal thyroid  NERVOUS SYSTEM:  Alert & Oriented X3, Good concentration; Motor Strength 5/5 B/L upper and lower extremities; DTRs 2+ intact and symmetric  CHEST/LUNG: Clear to percussion bilaterally; No rales, rhonchi, wheezing, or rubs  HEART: Regular rate and rhythm; No murmurs, rubs, or gallops  ABDOMEN: Soft, Nontender, Nondistended; Bowel sounds present  EXTREMITIES:  2+ Peripheral Pulses, No clubbing, cyanosis, or edema  LYMPH: No lymphadenopathy noted  SKIN: No rashes or lesions      LABS:                        11.4   10.74 )-----------( 444      ( 14 Nov 2019 05:31 )             37.8     11-14    142  |  102  |  5.0<L>  ----------------------------<  117<H>  4.2   |  28.0  |  0.68    Ca    9.4      14 Nov 2019 05:31  Phos  4.7     11-14  Mg     2.0     11-14    TPro  7.0  /  Alb  3.7  /  TBili  0.4  /  DBili  x   /  AST  13  /  ALT  10  /  AlkPhos  89  11-14    PT/INR - ( 12 Nov 2019 19:32 )   PT: 13.0 sec;   INR: 1.13 ratio         PTT - ( 12 Nov 2019 19:32 )  PTT:33.7 sec    RADIOLOGY & ADDITIONAL STUDIES:    PATHOLOGY: Pericardial fluid cytology and pericardial biopsy from the original pericardial window showed inflammatory changes only.

## 2019-11-14 NOTE — PROGRESS NOTE ADULT - PROBLEM SELECTOR PLAN 2
CT on 11/13 show moderate effusion with infiltrative process surrounding pulmonary vasculature extended to the abdomen, Repeat CT Abd/pelvis with contrast ordered 11/14 pending results

## 2019-11-15 ENCOUNTER — RESULT REVIEW (OUTPATIENT)
Age: 44
End: 2019-11-15

## 2019-11-15 LAB
ANION GAP SERPL CALC-SCNC: 12 MMOL/L — SIGNIFICANT CHANGE UP (ref 5–17)
APPEARANCE UR: CLEAR — SIGNIFICANT CHANGE UP
B PERT IGG+IGM PNL SER: ABNORMAL
BILIRUB UR-MCNC: NEGATIVE — SIGNIFICANT CHANGE UP
BUN SERPL-MCNC: 6 MG/DL — LOW (ref 8–20)
CALCIUM SERPL-MCNC: 9.4 MG/DL — SIGNIFICANT CHANGE UP (ref 8.6–10.2)
CHLORIDE SERPL-SCNC: 101 MMOL/L — SIGNIFICANT CHANGE UP (ref 98–107)
CO2 SERPL-SCNC: 29 MMOL/L — SIGNIFICANT CHANGE UP (ref 22–29)
COLOR FLD: YELLOW
COLOR SPEC: YELLOW — SIGNIFICANT CHANGE UP
CREAT ?TM UR-MCNC: 16 MG/DL — SIGNIFICANT CHANGE UP
CREAT SERPL-MCNC: 0.63 MG/DL — SIGNIFICANT CHANGE UP (ref 0.5–1.3)
DIFF PNL FLD: NEGATIVE — SIGNIFICANT CHANGE UP
EOSINOPHIL # FLD: 1 % — SIGNIFICANT CHANGE UP
FLUID INTAKE SUBSTANCE CLASS: SIGNIFICANT CHANGE UP
FLUID SEGMENTED GRANULOCYTES: 2 % — SIGNIFICANT CHANGE UP
GLUCOSE BLDC GLUCOMTR-MCNC: 113 MG/DL — HIGH (ref 70–99)
GLUCOSE BLDC GLUCOMTR-MCNC: 119 MG/DL — HIGH (ref 70–99)
GLUCOSE BLDC GLUCOMTR-MCNC: 131 MG/DL — HIGH (ref 70–99)
GLUCOSE BLDC GLUCOMTR-MCNC: 145 MG/DL — HIGH (ref 70–99)
GLUCOSE SERPL-MCNC: 122 MG/DL — HIGH (ref 70–115)
GLUCOSE UR QL: NEGATIVE MG/DL — SIGNIFICANT CHANGE UP
GRAM STN FLD: SIGNIFICANT CHANGE UP
HCT VFR BLD CALC: 39.3 % — SIGNIFICANT CHANGE UP (ref 39–50)
HGB BLD-MCNC: 11.9 G/DL — LOW (ref 13–17)
KETONES UR-MCNC: NEGATIVE — SIGNIFICANT CHANGE UP
LEUKOCYTE ESTERASE UR-ACNC: NEGATIVE — SIGNIFICANT CHANGE UP
LYMPHOCYTES # FLD: 63 % — SIGNIFICANT CHANGE UP
MCHC RBC-ENTMCNC: 25.4 PG — LOW (ref 27–34)
MCHC RBC-ENTMCNC: 30.3 GM/DL — LOW (ref 32–36)
MCV RBC AUTO: 83.8 FL — SIGNIFICANT CHANGE UP (ref 80–100)
MESOTHL CELL # FLD: 1 % — SIGNIFICANT CHANGE UP
MONOS+MACROS # FLD: 33 % — SIGNIFICANT CHANGE UP
NITRITE UR-MCNC: NEGATIVE — SIGNIFICANT CHANGE UP
PH UR: 7 — SIGNIFICANT CHANGE UP (ref 5–8)
PLATELET # BLD AUTO: 454 K/UL — HIGH (ref 150–400)
POTASSIUM SERPL-MCNC: 4.6 MMOL/L — SIGNIFICANT CHANGE UP (ref 3.5–5.3)
POTASSIUM SERPL-SCNC: 4.6 MMOL/L — SIGNIFICANT CHANGE UP (ref 3.5–5.3)
PROT UR-MCNC: NEGATIVE MG/DL — SIGNIFICANT CHANGE UP
RBC # BLD: 4.69 M/UL — SIGNIFICANT CHANGE UP (ref 4.2–5.8)
RBC # FLD: 17.4 % — HIGH (ref 10.3–14.5)
RCV VOL RI: 189 /UL — HIGH (ref 0–5)
SODIUM SERPL-SCNC: 142 MMOL/L — SIGNIFICANT CHANGE UP (ref 135–145)
SODIUM UR-SCNC: <30 MMOL/L — SIGNIFICANT CHANGE UP
SP GR SPEC: 1 — LOW (ref 1.01–1.02)
SPECIMEN SOURCE FLD: SIGNIFICANT CHANGE UP
SPECIMEN SOURCE: SIGNIFICANT CHANGE UP
TOTAL NUCLEATED CELL COUNT, BODY FLUID: 237 /UL — HIGH (ref 0–5)
TUBE TYPE: SIGNIFICANT CHANGE UP
UROBILINOGEN FLD QL: NEGATIVE MG/DL — SIGNIFICANT CHANGE UP
WBC # BLD: 11.74 K/UL — HIGH (ref 3.8–10.5)
WBC # FLD AUTO: 11.74 K/UL — HIGH (ref 3.8–10.5)

## 2019-11-15 PROCEDURE — 99222 1ST HOSP IP/OBS MODERATE 55: CPT

## 2019-11-15 PROCEDURE — 33010: CPT

## 2019-11-15 PROCEDURE — 76775 US EXAM ABDO BACK WALL LIM: CPT | Mod: 26

## 2019-11-15 PROCEDURE — 75989 ABSCESS DRAINAGE UNDER X-RAY: CPT | Mod: 26

## 2019-11-15 PROCEDURE — 99223 1ST HOSP IP/OBS HIGH 75: CPT

## 2019-11-15 RX ADMIN — SODIUM CHLORIDE 3 MILLILITER(S): 9 INJECTION INTRAMUSCULAR; INTRAVENOUS; SUBCUTANEOUS at 06:38

## 2019-11-15 RX ADMIN — SODIUM CHLORIDE 3 MILLILITER(S): 9 INJECTION INTRAMUSCULAR; INTRAVENOUS; SUBCUTANEOUS at 13:10

## 2019-11-15 RX ADMIN — CHLORHEXIDINE GLUCONATE 15 MILLILITER(S): 213 SOLUTION TOPICAL at 06:38

## 2019-11-15 RX ADMIN — Medication 25 MILLIGRAM(S): at 06:38

## 2019-11-15 RX ADMIN — Medication 25 MILLIGRAM(S): at 17:31

## 2019-11-15 RX ADMIN — Medication 40 MILLIGRAM(S): at 06:39

## 2019-11-15 RX ADMIN — PANTOPRAZOLE SODIUM 40 MILLIGRAM(S): 20 TABLET, DELAYED RELEASE ORAL at 06:38

## 2019-11-15 RX ADMIN — SODIUM CHLORIDE 3 MILLILITER(S): 9 INJECTION INTRAMUSCULAR; INTRAVENOUS; SUBCUTANEOUS at 21:39

## 2019-11-15 NOTE — CONSULT NOTE ADULT - ASSESSMENT
44 year old Male with PMH of  cardiomegaly, DM II, HTN, and recurrent pericardial effusions with pericardiocentesis x2 (serous drainage), Most recently is S/P  subxiphoid pericardial window  and pericardial biopsy on 9/30/19 at [facility] with Dr. Peters .  Postoperative course complicated by some AF/SVT which converted to SR.  Was found to be + lyme C6 /(-) by Western blot criteria on that admission and has completed his course of IV and PO antibiotics.    Now presenting to the ER from Dr. Castro office s/p in office TTE that showed a large pericardial effusion.  Was there for a routine visit and denies any symptoms-no fever, dyspnea, chest pain, weight loss, night sweats    Overall HTN, recurrent pericardial effusion, leukocytosis  - Afebrile, non toxic, no symptoms, mild leukocytosis-doubt purulent pericarditis. Despite completing treated for Lyme disease, suspicion for Lyme was low-unlikely that recurrence is related to that  - Suspect underlying autoimmune disorder or malignancy, ? amyloidosis  - Observe off Abx- if spikes fever send BCX x 2 and then start broad spectrum Abx  - f/u pericardial fluid analysis and cultures  - Check HIV, Quantiferon, RPR, DREAD  - Hemonc f/u re CT a/p findings of retroperitoneal soft tissue surrounding kidneys and ureter  - Suggest Rheum eval  - Trend Fever  - Trend Leukocytosis    d/w CT team  Will Follow

## 2019-11-15 NOTE — CONSULT NOTE ADULT - SUBJECTIVE AND OBJECTIVE BOX
[facility]  INFECTIOUS DISEASES AND INTERNAL MEDICINE at [place]  =======================================================  Carlo Hernandez MD  Diplomates American Board of Internal Medicine and Infectious Diseases  Tel: [phone]      Fax: [phone]  =======================================================      MRN-[id number]  MIRANDA RAYO is a 44y  Male     CC: Patient is a 44y old  Male who presents with a chief complaint of pericardial effusion sent form cardiologist office (15 Nov 2019 15:48)    HPI:  44 year old Male with PMH of  cardiomegaly, DM II, HTN, and recurrent pericardial effusions with pericardiocentesis x2 (serous drainage), Most recently is S/P  subxiphoid pericardial window  and pericardial biopsy on 19 at [facility] with Dr. Peters .  Postoperative course complicated by some AF/SVT which converted to SR.  Was found to be + lymes on that admission and has completed his course of IV and PO antibiotics.  Now presenting to the ER from Dr. Castro office s/p in office TTE that showed a large pericardial effusion.  Currently sitting up in stretcher in ED.  Denies CP.  Reports mild shortness of breath on exertion that is unchanged over the last few weeks.  He states that his visit with Dr. Castro today was a scheduled routine visit.  NAD noted.  Wife at bedside. (2019 18:52)      PAST MEDICAL & SURGICAL HISTORY:  Heart failure  Cardiomegaly  Nonsmoker  Diabetes mellitus  Hypertension  No significant past surgical history      Social Hx:non smoker, no etoh or drug use    FAMILY HISTORY:  No pertinent family history in first degree relatives      Allergies    No Known Allergies    Intolerances    [facility] patient (Unknown)      MEDICATIONS  (STANDING):  dextrose 5%. 1000 milliLiter(s) (50 mL/Hr) IV Continuous <Continuous>  dextrose 50% Injectable 12.5 Gram(s) IV Push once  dextrose 50% Injectable 25 Gram(s) IV Push once  dextrose 50% Injectable 25 Gram(s) IV Push once  furosemide    Tablet 40 milliGRAM(s) Oral daily  insulin lispro (HumaLOG) corrective regimen sliding scale   SubCutaneous Before meals and at bedtime  metoprolol tartrate 25 milliGRAM(s) Oral two times a day  pantoprazole    Tablet 40 milliGRAM(s) Oral before breakfast  sodium chloride 0.9% lock flush 3 milliLiter(s) IV Push every 8 hours    MEDICATIONS  (PRN):  dextrose 40% Gel 15 Gram(s) Oral once PRN Blood Glucose LESS THAN 70 milliGRAM(s)/deciliter  glucagon  Injectable 1 milliGRAM(s) IntraMuscular once PRN Glucose LESS THAN 70 milligrams/deciliter      ANTIMICROBIALS:      OTHER MEDS: MEDICATIONS  (STANDING):  dextrose 40% Gel 15 once PRN  dextrose 50% Injectable 12.5 once  dextrose 50% Injectable 25 once  dextrose 50% Injectable 25 once  furosemide    Tablet 40 daily  glucagon  Injectable 1 once PRN  insulin lispro (HumaLOG) corrective regimen sliding scale  Before meals and at bedtime  metoprolol tartrate 25 two times a day  pantoprazole    Tablet 40 before breakfast             REVIEW OF SYSTEMS:  CONSTITUTIONAL:  No Fever or chills  HEENT:  No diplopia or blurred vision.  No earache, sore throat or runny nose.  CARDIOVASCULAR:  No pressure, squeezing, strangling, tightness, heaviness or aching about the chest, neck, axilla or epigastrium.  RESPIRATORY:  No cough, shortness of breath  GASTROINTESTINAL:  No nausea, vomiting or diarrhea.  GENITOURINARY:  No dysuria, frequency or urgency. No Blood in urine  MUSCULOSKELETAL:  no joint aches, no muscle pain  SKIN:  No change in skin, hair or nails.  NEUROLOGIC:  No Headaches, seizures or weakness.  PSYCHIATRIC:  No disorder of thought or mood.  ENDOCRINE:  No heat or cold intolerance  HEMATOLOGICAL:  No easy bruising or bleeding.           I&O's Detail    2019 07:01  -  15 Nov 2019 07:00  --------------------------------------------------------  IN:    Oral Fluid: 740 mL  Total IN: 740 mL    OUT:    Voided: 1450 mL  Total OUT: 1450 mL    Total NET: -710 mL      15 Nov 2019 07:01  -  15 Nov 2019 21:47  --------------------------------------------------------  IN:    Oral Fluid: 1140 mL  Total IN: 1140 mL    OUT:    Voided: 2000 mL  Total OUT: 2000 mL    Total NET: -860 mL            Physical Exam:  Vital Signs Last 24 Hrs  T(C): 36.6 (15 Nov 2019 15:51), Max: 36.7 (15 Nov 2019 06:36)  T(F): 97.9 (15 Nov 2019 15:51), Max: 98.1 (15 Nov 2019 06:36)  HR: 100 (15 Nov 2019 17:30) (90 - 100)  BP: 120/87 (15 Nov 2019 17:30) (120/87 - 136/84)  BP(mean): --  RR: 17 (15 Nov 2019 15:51) (17 - 18)  SpO2: 100% (15 Nov 2019 15:51) (99% - 100%)    GEN: NAD, pleasant  HEENT: normocephalic and atraumatic. EOMI. PERRL.  Anicteric  NECK: Supple.   LUNGS: Clear to auscultation.  HEART: Regular rate and rhythm without murmur.  ABDOMEN: Soft, nontender, and nondistended.  Positive bowel sounds.    : No CVA tenderness  EXTREMITIES: Without any edema.  MSK: No joint swelling  NEUROLOGIC: Cranial nerves II through XII are grossly intact. No Focal Deficits  PSYCHIATRIC: Appropriate affect .  SKIN: No Rash        Labs:  11-15    142  |  101  |  6.0<L>  ----------------------------<  122<H>  4.6   |  29.0  |  0.63    Ca    9.4      15 Nov 2019 06:14  Phos  4.7     11-14  Mg     2.0     11-14    TPro  7.0  /  Alb  3.7  /  TBili  0.4  /  DBili  x   /  AST  13  /  ALT  10  /  AlkPhos  89  11-                          11.9   11.74 )-----------( 454      ( 15 Nov 2019 06:14 )             39.3         Urinalysis Basic - ( 15 Nov 2019 14:13 )    Color: Yellow / Appearance: Clear / S.005 / pH: x  Gluc: x / Ketone: Negative  / Bili: Negative / Urobili: Negative mg/dL   Blood: x / Protein: Negative mg/dL / Nitrite: Negative   Leuk Esterase: Negative / RBC: x / WBC x   Sq Epi: x / Non Sq Epi: x / Bacteria: x      LIVER FUNCTIONS - ( 2019 05:31 )  Alb: 3.7 g/dL / Pro: 7.0 g/dL / ALK PHOS: 89 U/L / ALT: 10 U/L / AST: 13 U/L / GGT: x               CAPILLARY BLOOD GLUCOSE      POCT Blood Glucose.: 145 mg/dL (15 Nov 2019 21:07)  POCT Blood Glucose.: 119 mg/dL (15 Nov 2019 17:13)  POCT Blood Glucose.: 131 mg/dL (15 Nov 2019 12:20)  POCT Blood Glucose.: 113 mg/dL (15 Nov 2019 08:43)        RECENT CULTURES:  1115 @ 16:06 .Body Fluid       Few WBC's  No organisms seen            Radiology:  < from: CT Abdomen and Pelvis w/ Oral Cont and w/ IV Cont (19 @ 17:24) >  IMPRESSION:     Infiltrative retroperitoneal soft tissue surrounding both kidneys and   ureters causing moderate bilateral hydronephrosis. Differential diagnoses   includes retroperitoneal fibrosis/IgG 4 autoimmune disease as well as   non-Langerhans-cell histiocytosis (Rosai-Chele, [place]).          < end of copied text >    < from: US Renal (11.15.19 @ 13:49) >  IMPRESSION:    Examination limited secondary to overlying bowel gas.    Moderate bilateral hydronephrosis.    Bladder wall thickening; correlation is recommended with a urinalysis for   cystitis.      < end of copied text >    < from: CT Chest w/ IV Cont (19 @ 15:18) >  IMPRESSION:     Moderate pericardial effusion. Poorly defined thickening of the   pericardium raising concern for an infiltrative process which extends   superiorly surrounding the aorta, SVC and pulmonary veins. Poorly defined   infiltrative process in the retroperitoneum and surrounding both kidneys   with partially imaged bilateral hydronephrosis. This raises concern for   an underlying infiltrative process. A neoplastic processes within the   differential diagnosis. Clinical correlation is necessary. This was   discussed with BENJI Beavers at 3:56 PM on 2019.    Small right-sided pulmonary nodules. Metastatic disease cannot be   excluded.    < end of copied text >

## 2019-11-15 NOTE — CONSULT NOTE ADULT - NEGATIVE GENERAL GENITOURINARY SYMPTOMS
no urinary hesitancy/normal urinary frequency/no bladder infections/no dysuria/no hematuria/no flank pain R/no flank pain L/no nocturia

## 2019-11-15 NOTE — PROGRESS NOTE ADULT - ASSESSMENT
44 year old male with PMH as above.  S/P subxiphoid pericardial window and pericardial biopsy on 9/30/19 at [facility] with Dr. Peters .  Postoperative course complicated by AF/SVT which converted to SR.  Was found to be + lymes on that admission and has completed his course of IV and PO antibiotics.  Patient presented to the ER on 11/12 from Dr. Castro office s/p in office TTE that showed an increased pericardial effusion. Patient found to have infiltrate on Chest CT performed 11/13 that is concerning for infiltrative process / neoplastic disease, hematology/ oncology consulted pending recommendations.  IR was consulted for pericardial effusion, no intervention at this time. Catscan of abdomen pelvis with contrast on 11/14 show infiltrative process progressing to the retroperitoneal soft tissue surrounding both kidneys causing bilateral hydronephrosis. 44 year old male with PMH as above.  S/P subxiphoid pericardial window and pericardial biopsy on 9/30/19 at [facility] with Dr. Peters .  Postoperative course complicated by AF/SVT which converted to SR.  Was found to be + lymes on that admission and has completed his course of IV and PO antibiotics.  Patient presented to the ER on 11/12 from Dr. Castro office s/p in office TTE that showed an increased pericardial effusion. Patient found to have infiltrate on Chest CT performed 11/13 that is concerning for infiltrative process / neoplastic disease, hematology/ oncology recommend ID follow up as outpaitent.  IR was consulted for pericardial effusion, no intervention at this time. Catscan of abdomen pelvis with contrast on 11/14 show infiltrative process progressing to the retroperitoneal soft tissue surrounding both kidneys causing moderate bilateral hydronephrosis.

## 2019-11-15 NOTE — CONSULT NOTE ADULT - SUBJECTIVE AND OBJECTIVE BOX
This is the Subsequent Medicare Annual Wellness Exam, performed 12 months or more after the Initial AWV or the last Subsequent AWV I have reviewed the patient's medical history in detail and updated the computerized patient record. History Past Medical History:  
Diagnosis Date  Anxiety  Cancer (HCC)   
 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   
 heart murmur  Psychiatric disorder   
 anxiety  Skin cancer  Stroke ([facility] [address] Past Surgical History:  
Procedure Laterality Date  HX GYN  1970s  
 bladder tack in [facility]  HX OTHER SURGICAL  1980  
 cystocele  HX UROLOGICAL    
 bladder tac Current Outpatient Medications Medication Sig Dispense Refill  loratadine 10 mg cap Take 10 mg by mouth daily.  sertraline (ZOLOFT) 50 mg tablet Take 1 Tab by mouth daily. 90 Tab 0  ibuprofen (MOTRIN) 200 mg tablet Take 200 mg by mouth every eight (8) hours as needed for Pain.  simethicone (GAS RELIEF) 125 mg capsule Take 125 mg by mouth four (4) times daily as needed for Flatulence.  acetaminophen (TYLENOL) 650 mg TbER Take 650 mg by mouth every eight (8) hours. Arthritis  fluticasone (24 HOUR ALLERGY RELIEF) 50 mcg/actuation nasal spray 2 Sprays by Both Nostrils route daily.  dicyclomine (BENTYL) 10 mg capsule TAKE ONE CAPSULE BY MOUTH THREE TIMES A DAY AS NEEDED 90 Cap 0 Allergies Allergen Reactions  Other Medication Anaphylaxis MRI Dye  
 Ciprofloxacin Anxiety  Epinephrine Palpitations  Ivp [Fd And C Blue No.1] Swelling Facial swelling many years ago, now only has oral contrast  
 Macrobid [Nitrofurantoin Monohyd/M-Cryst] Other (comments) Whole body starts shaking  Other Medication Palpitations  Prednisone Nausea and Vomiting Family History Problem Relation Age of Onset  Heart Attack Father  Heart Attack Brother Social History Tobacco Use  Smoking status: Never Smoker  Smokeless tobacco: Never Used Substance Use Topics  Alcohol use: No  
 
Patient Active Problem List  
Diagnosis Code  Depression F32.9  
 HLD (hyperlipidemia) E78.5  Osteopenia M85.80  Diverticulosis K57.90  Allergic rhinitis J30.9  Hearing loss of both ears H91.93  
 OA (osteoarthritis) M19.90  PND (post-nasal drip) R09.82  
 Anxiety F41.9  Nephrolithiasis N20.0  IBS (irritable bowel syndrome) K58.9  Cerebral microvascular disease I67.9  CKD (chronic kidney disease) stage 3, GFR 30-59 ml/min ([facility]) N18.3  DNR (do not resuscitate) Z66  
 ESTHER (obstructive sleep apnea) G47.33  
 Chronic right shoulder pain M25.511, G89.29 Depression Risk Factor Screening: PHQ over the last two weeks 11/12/2018 PHQ Not Done (No Data) Little interest or pleasure in doing things - Feeling down, depressed, irritable, or hopeless - Total Score PHQ 2 - Alcohol Risk Factor Screening: You do not drink alcohol or very rarely. Functional Ability and Level of Safety:  
Hearing Loss Hearing is good. Activities of Daily Living The home contains: handrails Patient does total self care Fall Risk Fall Risk Assessment, last 12 mths 10/22/2018 Able to walk? Yes Fall in past 12 months? No  
Fall with injury? -  
Number of falls in past 12 months - Fall Risk Score -  
 
 
Abuse Screen Patient is not abused Cognitive Screening Evaluation of Cognitive Function: 
Has your family/caregiver stated any concerns about your memory: no 
Normal 
 
Patient Care Team  
Patient Care Team: 
Bam Morales MD as PCP - General (Internal Medicine) Librado Patton MD as Consulting Provider (Gastroenterology) Bam Morales MD (Internal Medicine) Luci Doyle RN as Ambulatory Care Navigator Assessment/Plan Education and counseling provided: 
Are appropriate based on today's review and evaluation End-of-Life planning (with patient's consent) Diagnoses and all orders for this visit: 
 
1. Medicare annual wellness visit, subsequent 2. DNR (do not resuscitate) discussion 
-     DO NOT RESUSCITATE Health Maintenance Due Topic Date Due  Shingrix Vaccine Age 50> (1 of 2) 03/13/1981  GLAUCOMA SCREENING Q2Y  01/01/2018 HPI: HPI:  44 year old Male with PMH of  cardiomegaly, DM II, HTN, and recurrent pericardial effusions with pericardiocentesis x2 (serous drainage), Most recently is S/P  subxiphoid pericardial window  and pericardial biopsy on 9/30/19 at [facility] with Dr. Peters .  Postoperative course complicated by some AF/SVT which converted to SR.  Was found to be + lymes on that admission and has completed his course of IV and PO antibiotics.  Now presenting to the ER from Dr. Castro office s/p in office TTE that showed a large pericardial effusion.  Currently sitting up in stretcher in ED.  Denies CP.  Reports mild shortness of breath on exertion that is unchanged over the last few weeks.  He states that his visit with Dr. Castro today was a scheduled routine visit.  NAD noted.  Wife at bedside. (12 Nov 2019 18:52)    Urology: called to evaluate pt for b/l hydronephrosis on CT scan.  Pt resting comfortably in bed, no complaints of pain or discomfort.  Pt denies any difficulties urinating, no frequency, urgency or hesitancy.  No change in stream, feels empty after voiding, no hematuria, renal stones or h/o UTI's.  He has no c/o back pain. Pt has never needed to see a urologist in the past.    PAST MEDICAL & SURGICAL HISTORY:  Heart failure  Cardiomegaly  Nonsmoker  Diabetes mellitus  Hypertension  No significant past surgical history      dextrose 40% Gel 15 Gram(s) Oral once PRN  dextrose 5%. 1000 milliLiter(s) IV Continuous <Continuous>  dextrose 50% Injectable 12.5 Gram(s) IV Push once  dextrose 50% Injectable 25 Gram(s) IV Push once  dextrose 50% Injectable 25 Gram(s) IV Push once  furosemide    Tablet 40 milliGRAM(s) Oral daily  glucagon  Injectable 1 milliGRAM(s) IntraMuscular once PRN  insulin lispro (HumaLOG) corrective regimen sliding scale   SubCutaneous Before meals and at bedtime  metoprolol tartrate 25 milliGRAM(s) Oral two times a day  pantoprazole    Tablet 40 milliGRAM(s) Oral before breakfast  sodium chloride 0.9% lock flush 3 milliLiter(s) IV Push every 8 hours      No Known Allergies  [facility] patient (Unknown)      T(C): 36.6 (11-15-19 @ 11:00), Max: 36.8 (11-14-19 @ 15:12)  HR: 92 (11-15-19 @ 11:00) (89 - 95)  BP: 123/84 (11-15-19 @ 11:00) (123/84 - 148/90)  RR: 18 (11-15-19 @ 11:00) (17 - 18)  SpO2: 99% (11-15-19 @ 11:00) (99% - 100%)      11-14-19 @ 07:01  -  11-15-19 @ 07:00  --------------------------------------------------------  IN: 740 mL / OUT: 1450 mL / NET: -710 mL    11-15-19 @ 07:01  -  11-15-19 @ 13:28  --------------------------------------------------------  IN: 540 mL / OUT: 0 mL / NET: 540 mL                              11.9   11.74 )-----------( 454      ( 15 Nov 2019 06:14 )             39.3       11-15    142  |  101  |  6.0<L>  ----------------------------<  122<H>  4.6   |  29.0  |  0.63    Ca    9.4      15 Nov 2019 06:14  Phos  4.7     11-14  Mg     2.0     11-14    TPro  7.0  /  Alb  3.7  /  TBili  0.4  /  DBili  x   /  AST  13  /  ALT  10  /  AlkPhos  89  11-14    Radiology: < from: CT Abdomen and Pelvis w/ Oral Cont and w/ IV Cont (11.14.19 @ 17:24) >   EXAM:  CT ABDOMEN AND PELVIS OC IC                          PROCEDURE DATE:  11/14/2019          INTERPRETATION:  CT ABDOMEN AND PELVIS WITH ORAL CONTRAST WITH IV CONTRAST    CLINICAL INFORMATION: Bilateral hydronephrosis and retroperitoneal soft   tissue seen on chest CT      COMPARISON: Chest CT 11/13/2019    PROCEDURE:   CT of the Abdomen and Pelvis was performed with intravenous contrast.  Intravenous contrast: 90 cc Omnipaque 300  Oral contrast: positive contrast was administered.  Sagittal and coronal reformats were performed.    FINDINGS:    LOWER CHEST: Small bilateral pleural effusions again noted. Small   loculated pericardial effusion with pericardial thickening again noted.    LIVER: Normal.  BILE DUCTS: Nondilated.  GALLBLADDER: Normal.  SPLEEN: Normal.  PANCREAS: Normal.  ADRENALS: Normal.  KIDNEYS/URETERS: Moderate bilateral perinephric stranding. Moderate   bilateral hydronephrosis without hydroureter. Abnormal retroperitoneal   soft tissue encasing both ureters.    BLADDER: Underdistended limiting evaluation.  REPRODUCTIVE ORGANS: Unremarkable prostate and seminal vesicles.    BOWEL: No bowel-related abnormality. Specifically, no evidence of acute   diverticulitis. Normal appendix and ileocecal region. No bowel   obstruction or bowel inflammation.  PERITONEUM: No free air or ascites.  VESSELS:  Normal caliber aorta.  RETROPERITONEUM/LYMPH NODES: No adenopathy or mass. Infiltrative   retroperitoneal soft tissue.    ABDOMINAL WALL: Normal.  BONES: No acute bony abnormality.    IMPRESSION:     Infiltrative retroperitoneal soft tissue surrounding both kidneys and   ureters causing moderate bilateral hydronephrosis. Differential diagnoses   includes retroperitoneal fibrosis/IgG 4 autoimmune disease as well as   non-Langerhans-cell histiocytosis (Rosai-Chele, [place]).    < end of copied text >

## 2019-11-15 NOTE — CONSULT NOTE ADULT - ASSESSMENT
43 yo male with pericardial effusion, recent pericardial window, recently treated for Lyme disease, incidental finding of b/l hydronephrosis, normal renal function  - poss retroperitoneal fibrosis  - would recommend renal US to reassess hydronephrosis in 2-3 weeks  - can follow as OP, does not need ureteral stents at this time  - case discussed with Dr. Kline

## 2019-11-15 NOTE — PROGRESS NOTE ADULT - PROBLEM SELECTOR PLAN 1
Recurrent pericardial effusion s/p subxiphoid pericardial window 9/30/19.  CT on 11/13 show moderate effusion, discussed with Dr. Gresham states no window for intervention based on CT review.  No plan for cardiac cath at this time. Recurrent pericardial effusion s/p subxiphoid pericardial window 9/30/19.  CT on 11/13 show moderate effusion, discussed with Dr. Gresham from IR states no window for intervention based on CT review, will discuss with Dr. Peters regarding definite management.   No plan for cardiac cath at this time.

## 2019-11-15 NOTE — PROGRESS NOTE ADULT - SUBJECTIVE AND OBJECTIVE BOX
Vascular & Interventional Radiology Post-Procedure Note    Pre-Procedure Diagnosis:  loculated pericardial effusion  Post-Procedure Diagnosis: Same as pre.  Indications for Procedure: ____    : silke  Assistant(s): ____    Procedure Details/Findings: 8f drain placed in right loculated pericadial effusion    Access (if applicable): ____    Complications: 0  Estimated Blood Loss: Minimal  Anethesia: 1% Lidocane SQ  Specimen: as requested  Contrast: 0  Sedation: 0  Patient Condition/Disposition: Stable    Plan:     management of the pericadial drain per ct surgery

## 2019-11-15 NOTE — PROGRESS NOTE ADULT - SUBJECTIVE AND OBJECTIVE BOX
Subjective: Patient seen with this AM with interpretation by Leatha. Patient states that he continues to be asymptomatic, denies fevers or chills, chest pain, shortness of breath, dizziness, dysuria or difficulty voiding. Patient states that his swelling continues to deacrease since his last admission in his legs and his exercise tolerance has increased - completing 4-5 laps around the floor without fatigue. Patient would like to go home so he can get back to work.    Patient appears to be in no acute distress, seated comfortably in bed. All questions answered with interpretation.      Pertinent events of the past 24 hours: none    VITAL SIGNS  Vital Signs Last 24 Hrs  T(C): 36.9 (19 @ 09:56), Max: 36.9 (19 @ 09:56)  T(F): 98.5 (19 @ 09:56), Max: 98.5 (19 @ 09:56)  HR: 87 (19 @ 09:56) (78 - 96)  BP: 124/83 (19 @ 09:56) (124/83 - 149/88)  RR: 20 (19 @ 09:56) (16 - 20)  SpO2: 100% (19 @ 09:56) (97% - 100%)  on (O2)              Telemetry/Alarms:  Sinus rhythm  LVEF: 60%    MEDICATIONS  chlorhexidine 0.12% Liquid 15 milliLiter(s) Oral Mucosa two times a day  chlorhexidine 4% Liquid 1 Application(s) Topical two times a day  dextrose 40% Gel 15 Gram(s) Oral once PRN  dextrose 5%. 1000 milliLiter(s) IV Continuous <Continuous>  dextrose 50% Injectable 12.5 Gram(s) IV Push once  dextrose 50% Injectable 25 Gram(s) IV Push once  dextrose 50% Injectable 25 Gram(s) IV Push once  furosemide    Tablet 40 milliGRAM(s) Oral daily  glucagon  Injectable 1 milliGRAM(s) IntraMuscular once PRN  insulin lispro (HumaLOG) corrective regimen sliding scale   SubCutaneous Before meals and at bedtime  metoprolol tartrate 25 milliGRAM(s) Oral two times a day  pantoprazole    Tablet 40 milliGRAM(s) Oral before breakfast  sodium chloride 0.9% lock flush 3 milliLiter(s) IV Push every 8 hours      PHYSICAL EXAM  General: well nourished, well developed, no acute distress  Neurology: alert and oriented x 3, nonfocal, no gross deficits  Respiratory: clear to auscultation bilaterally  CV: regular rate and rhythm, normal S1, S2, no rub appreciated, no JVD noted  Abdomen: soft, nontender, nondistended, positive bowel sounds  Extremities: warm, well perfused. +1 lower extremity edema. + DP pulses        13 @ 07: @ 07:00  --------------------------------------------------------  IN: 360 mL / OUT: 1101 mL / NET: -741 mL     @ 07: @ 13:31  --------------------------------------------------------  IN: 260 mL / OUT: 300 mL / NET: -40 mL        Weights:  Daily     Daily Weight in k.2 (2019 06:30)  Admit Wt: Drug Dosing Weight  Height (cm): 172.72 (2019 17:34)  Weight (kg): 111.6 (2019 17:34)  BMI (kg/m2): 37.4 (2019 17:34)  BSA (m2): 2.23 (2019 17:34)    All laboratory results, radiology and medications reviewed.    LABS      142  |  102  |  5.0<L>  ----------------------------<  117<H>  4.2   |  28.0  |  0.68    Ca    9.4      2019 05:31  Phos  4.7       Mg     2.0         TPro  7.0  /  Alb  3.7  /  TBili  0.4  /  DBili  x   /  AST  13  /  ALT  10  /  AlkPhos  89                                   11.4   10.74 )-----------( 444      ( 2019 05:31 )             37.8          PT/INR - ( 2019 19:32 )   PT: 13.0 sec;   INR: 1.13 ratio         PTT - ( 2019 19:32 )  PTT:33.7 sec  Bilirubin Total, Serum: 0.4 mg/dL ( @ 05:31)    CAPILLARY BLOOD GLUCOSE      POCT Blood Glucose.: 193 mg/dL (2019 12:20)  POCT Blood Glucose.: 120 mg/dL (2019 08:06)  POCT Blood Glucose.: 134 mg/dL (2019 22:10)  POCT Blood Glucose.: 194 mg/dL (2019 17:31)           Last CXR:   < from: Xray Chest 1 View- PORTABLE-Routine (19 @ 07:25) >  PROCEDURE DATE:  2019      INTERPRETATION:  XR CHEST    History: Preoperative.    Technique: Single AP view of the chest.    Comparison: Chest x-ray 2019 and CT chest 2019    Findings:    Lungs/Pleura:  Left pleural effusion. Right pleural effusion on the prior   chest CT not seen on x-ray.    Heart/Mediastinum:  The cardiac silhouette is enlarged which is at least   partially related to the pericardial effusion.    Bones:  The visualized osseous structures are within normal limits for   age.    Impression:  Left pleural effusion.    Right pleural effusion on the prior CT chest 2019 not seen on the   current study, likely due to AP technique.    < end of copied text >        Last EKG:   < from: 12 Lead ECG (19 @ 17:52) >  Ventricular Rate 83 BPM    Atrial Rate 83 BPM    P-R Interval 156 ms    QRS Duration 84 ms    Q-T Interval 400 ms    QTC Calculation(Bezet) 470 ms    P Axis 40 degrees    R Axis 33 degrees    T Axis 33 degrees    Diagnosis Line Normal sinus rhythm  Possible Left atrial enlargement  Nonspecific T wave abnormality  Abnormal ECG    Confirmed by EUGENIA SHELDON ([id number]) on 2019 12:12:38 PM    < end of copied text >      Chest CT:   < from: CT Chest w/ IV Cont (19 @ 15:18) >  IMPRESSION:     Moderate pericardial effusion. Poorly defined thickening of the   pericardium raising concern for an infiltrative process which extends   superiorly surrounding the aorta, SVC and pulmonary veins. Poorly defined   infiltrative process in the retroperitoneum and surrounding both kidneys   with partially imaged bilateral hydronephrosis. This raises concern for   an underlying infiltrative process. A neoplastic processes within the   differential diagnosis. Clinical correlation is necessary. This was   discussed with BENJI Beavers at 3:56 PM on 2019.    Small right-sided pulmonary nodules. Metastatic disease cannot be   excluded.      < end of copied text >    Abdomen Pelvis CT w/ contrast:  < from: CT Abdomen and Pelvis w/ Oral Cont and w/ IV Cont (19 @ 17:24) >  IMPRESSION:     Infiltrative retroperitoneal soft tissue surrounding both kidneys and   ureters causing moderate bilateral hydronephrosis. Differential diagnoses   includes retroperitoneal fibrosis/IgG 4 autoimmune disease as well as   non-Langerhans-cell histiocytosis (Rosai-Chele, [place]heim-[place]).    < end of copied text >          PAST MEDICAL & SURGICAL HISTORY:  Heart failure  Cardiomegaly  Nonsmoker  Diabetes mellitus  Hypertension  No significant past surgical history Patient seen with this AM with interpretation by Leatha. Patient states that he continues to be asymptomatic, denies fevers or chills, chest pain, shortness of breath, dizziness, dysuria or difficulty voiding. Patient states that his swelling continues to deacrease since his last admission in his legs and his exercise tolerance has increased - completing 4-5 laps around the floor without fatigue. Patient would like to go home so he can get back to work.    Patient appears to be in no acute distress, seated comfortably in bed. All questions answered with interpretation.    Pertinent events of the past 24 hours: none      VITAL SIGNS  Vital Signs Last 24 Hrs  T(C): 36.7 (11-15-19 @ 06:36), Max: 36.9 (19 @ 09:56)  T(F): 98.1 (11-15-19 @ 06:36), Max: 98.5 (19 @ 09:56)  HR: 95 (11-15-19 @ 06:36) (87 - 95)  BP: 125/87 (11-15-19 @ 06:36) (124/83 - 148/90)  RR: 17 (11-15-19 @ 06:36) (17 - 20)  SpO2: 100% (11-15-19 @ 06:36) (99% - 100%)  on (O2)              Telemetry/Alarms: Sinus Rhythm  LVEF: 60%    MEDICATIONS  chlorhexidine 0.12% Liquid 15 milliLiter(s) Oral Mucosa two times a day  chlorhexidine 4% Liquid 1 Application(s) Topical two times a day  dextrose 40% Gel 15 Gram(s) Oral once PRN  dextrose 5%. 1000 milliLiter(s) IV Continuous <Continuous>  dextrose 50% Injectable 12.5 Gram(s) IV Push once  dextrose 50% Injectable 25 Gram(s) IV Push once  dextrose 50% Injectable 25 Gram(s) IV Push once  furosemide    Tablet 40 milliGRAM(s) Oral daily  glucagon  Injectable 1 milliGRAM(s) IntraMuscular once PRN  insulin lispro (HumaLOG) corrective regimen sliding scale   SubCutaneous Before meals and at bedtime  metoprolol tartrate 25 milliGRAM(s) Oral two times a day  pantoprazole    Tablet 40 milliGRAM(s) Oral before breakfast  sodium chloride 0.9% lock flush 3 milliLiter(s) IV Push every 8 hours      PHYSICAL EXAM  General: well nourished, well developed, no acute distress  Neurology: alert and oriented x 3, nonfocal, no gross deficits  Respiratory: clear to auscultation bilaterally  CV: regular rate and rhythm, normal S1, S2 , no rub appreciated, no JVD  Abdomen: soft, nontender, nondistended, positive bowel sounds, last bowel movement this AM  Extremities: warm, well perfused. +1 LE edema + DP pulses      - @ 07:01  -  11-15 @ 07:00  --------------------------------------------------------  IN: 740 mL / OUT: 1450 mL / NET: -710 mL        Weights:  Daily     Daily Weight in k.2 (15 Nov 2019 06:47)  Admit Wt: Drug Dosing Weight  Height (cm): 172.72 (2019 17:34)  Weight (kg): 111.6 (2019 17:34)  BMI (kg/m2): 37.4 (2019 17:34)  BSA (m2): 2.23 (2019 17:34)    All laboratory results, radiology and medications reviewed.    LABS  11-15    142  |  101  |  6.0<L>  ----------------------------<  122<H>  4.6   |  29.0  |  0.63    Ca    9.4      15 Nov 2019 06:14  Phos  4.7       Mg     2.0         TPro  7.0  /  Alb  3.7  /  TBili  0.4  /  DBili  x   /  AST  13  /  ALT  10  /  AlkPhos  89  11-14                                 11.9   11.74 )-----------( 454      ( 15 Nov 2019 06:14 )             39.3              CAPILLARY BLOOD GLUCOSE      POCT Blood Glucose.: 113 mg/dL (15 Nov 2019 08:43)  POCT Blood Glucose.: 143 mg/dL (2019 21:08)  POCT Blood Glucose.: 74 mg/dL (2019 17:22)  POCT Blood Glucose.: 193 mg/dL (2019 12:20)    Last EKG:  < from: 12 Lead ECG (19 @ 17:52) >  Ventricular Rate 83 BPM    Atrial Rate 83 BPM    P-R Interval 156 ms    QRS Duration 84 ms    Q-T Interval 400 ms    QTC Calculation(Bezet) 470 ms    P Axis 40 degrees    R Axis 33 degrees    T Axis 33 degrees    Diagnosis Line Normal sinus rhythm  Possible Left atrial enlargement  Nonspecific T wave abnormality  Abnormal ECG    Confirmed by EUGENIA SHELDON ([id number]) on 2019 12:12:38 PM    < end of copied text >    Last Cxr:  < from: Xray Chest 1 View- PORTABLE-Routine (19 @ 07:25) >     EXAM:  XR CHEST PORTABLE ROUTINE 1V                          PROCEDURE DATE:  2019      INTERPRETATION:  XR CHEST    History: Preoperative.    Technique: Single AP view of the chest.    Comparison: Chest x-ray 2019 and CT chest 2019    Findings:    Lungs/Pleura:  Left pleural effusion. Right pleural effusion on the prior   chest CT not seen on x-ray.    Heart/Mediastinum:  The cardiac silhouette is enlarged which is at least   partially related to the pericardial effusion.    Bones:  The visualized osseous structures are within normal limits for   age.    Impression:    Left pleural effusion.    Right pleural effusion on the prior CT chest 2019 not seen on the   current study, likely due to AP technique.    ALEJO OSBORNE   This document has been electronically signed. 2019  1:44PM    < end of copied text >        CT Chest Results:   < from: CT Chest w/ IV Cont (19 @ 15:18) >  IMPRESSION:     Moderate pericardial effusion. Poorly defined thickening of the   pericardium raising concern for an infiltrative process which extends   superiorly surrounding the aorta, SVC and pulmonary veins. Poorly defined   infiltrative process in the retroperitoneum and surrounding both kidneys   with partially imaged bilateral hydronephrosis. This raises concern for   an underlying infiltrative process. A neoplastic processes within the   differential diagnosis. Clinical correlation is necessary. This was   discussed with BENJI Beavers at 3:56 PM on 2019.    Small right-sided pulmonary nodules. Metastatic disease cannot be   excluded.    SAYRA RUIZ M.D., ATTENDING RADIOLOGIST  This document has been electronically signed. 2019  3:58PM    < end of copied text >      CT Abdomen/Pelvis:   < from: CT Abdomen and Pelvis w/ Oral Cont and w/ IV Cont (19 @ 17:24) >  IMPRESSION:     Infiltrative retroperitoneal soft tissue surrounding both kidneys and   ureters causing moderate bilateral hydronephrosis. Differential diagnoses   includes retroperitoneal fibrosis/IgG 4 autoimmune disease as well as   non-Langerhans-cell histiocytosis (Rosai-Chele, Erdheim-[place]).      ANTHONY BRONSON M.D., ATTENDING RADIOLOGIST  This document has been electronically signed. 2019  6:40PM        < end of copied text >      PAST MEDICAL & SURGICAL HISTORY:  Heart failure  Cardiomegaly  Nonsmoker  Diabetes mellitus  Hypertension  No significant past surgical history Patient seen with this AM with interpretation by Leatha. Patient states that he continues to be asymptomatic, denies fevers or chills, chest pain, shortness of breath, dizziness, dysuria or difficulty voiding. Patient states that the swelling in his legs continues to decrease since his last admission and his exercise tolerance has increased - completing 4-5 laps around the floor without fatigue. Patient is anxious to get back to work    Patient appears to be in no acute distress, seated comfortably in bed. All questions answered with interpretation.    Pertinent events of the past 24 hours: none      VITAL SIGNS  Vital Signs Last 24 Hrs  T(C): 36.7 (11-15-19 @ 06:36), Max: 36.9 (19 @ 09:56)  T(F): 98.1 (11-15-19 @ 06:36), Max: 98.5 (19 @ 09:56)  HR: 95 (11-15-19 @ 06:36) (87 - 95)  BP: 125/87 (11-15-19 @ 06:36) (124/83 - 148/90)  RR: 17 (11-15-19 @ 06:36) (17 - 20)  SpO2: 100% (11-15-19 @ 06:36) (99% - 100%)  on (O2)              Telemetry/Alarms: Sinus Rhythm  LVEF: 60%    MEDICATIONS  chlorhexidine 0.12% Liquid 15 milliLiter(s) Oral Mucosa two times a day  chlorhexidine 4% Liquid 1 Application(s) Topical two times a day  dextrose 40% Gel 15 Gram(s) Oral once PRN  dextrose 5%. 1000 milliLiter(s) IV Continuous <Continuous>  dextrose 50% Injectable 12.5 Gram(s) IV Push once  dextrose 50% Injectable 25 Gram(s) IV Push once  dextrose 50% Injectable 25 Gram(s) IV Push once  furosemide    Tablet 40 milliGRAM(s) Oral daily  glucagon  Injectable 1 milliGRAM(s) IntraMuscular once PRN  insulin lispro (HumaLOG) corrective regimen sliding scale   SubCutaneous Before meals and at bedtime  metoprolol tartrate 25 milliGRAM(s) Oral two times a day  pantoprazole    Tablet 40 milliGRAM(s) Oral before breakfast  sodium chloride 0.9% lock flush 3 milliLiter(s) IV Push every 8 hours      PHYSICAL EXAM  General: well nourished, well developed, no acute distress  Neurology: alert and oriented x 3, nonfocal, no gross deficits  Respiratory: clear to auscultation bilaterally  CV: regular rate and rhythm, normal S1, S2 , no rub appreciated, no JVD  Abdomen: soft, nontender, nondistended, positive bowel sounds, last bowel movement this AM  Extremities: warm, well perfused. +1 LE edema + DP pulses      11-14 @ 07:01  -  11-15 @ 07:00  --------------------------------------------------------  IN: 740 mL / OUT: 1450 mL / NET: -710 mL        Weights:  Daily     Daily Weight in k.2 (15 Nov 2019 06:47)  Admit Wt: Drug Dosing Weight  Height (cm): 172.72 (2019 17:34)  Weight (kg): 111.6 (2019 17:34)  BMI (kg/m2): 37.4 (2019 17:34)  BSA (m2): 2.23 (2019 17:34)    All laboratory results, radiology and medications reviewed.    LABS  11-15    142  |  101  |  6.0<L>  ----------------------------<  122<H>  4.6   |  29.0  |  0.63    Ca    9.4      15 Nov 2019 06:14  Phos  4.7       Mg     2.0         TPro  7.0  /  Alb  3.7  /  TBili  0.4  /  DBili  x   /  AST  13  /  ALT  10  /  AlkPhos  89  11-14                                 11.9   11.74 )-----------( 454      ( 15 Nov 2019 06:14 )             39.3              CAPILLARY BLOOD GLUCOSE      POCT Blood Glucose.: 113 mg/dL (15 Nov 2019 08:43)  POCT Blood Glucose.: 143 mg/dL (2019 21:08)  POCT Blood Glucose.: 74 mg/dL (2019 17:22)  POCT Blood Glucose.: 193 mg/dL (2019 12:20)    Last EKG:  < from: 12 Lead ECG (11.12.19 @ 17:52) >  Ventricular Rate 83 BPM    Atrial Rate 83 BPM    P-R Interval 156 ms    QRS Duration 84 ms    Q-T Interval 400 ms    QTC Calculation(Bezet) 470 ms    P Axis 40 degrees    R Axis 33 degrees    T Axis 33 degrees    Diagnosis Line Normal sinus rhythm  Possible Left atrial enlargement  Nonspecific T wave abnormality  Abnormal ECG    Confirmed by EUGENIA SHELDON ([id number]) on 2019 12:12:38 PM    < end of copied text >    Last Cxr:  < from: Xray Chest 1 View- PORTABLE-Routine (19 @ 07:25) >     EXAM:  XR CHEST PORTABLE ROUTINE 1V                          PROCEDURE DATE:  2019      INTERPRETATION:  XR CHEST    History: Preoperative.    Technique: Single AP view of the chest.    Comparison: Chest x-ray 2019 and CT chest 2019    Findings:    Lungs/Pleura:  Left pleural effusion. Right pleural effusion on the prior   chest CT not seen on x-ray.    Heart/Mediastinum:  The cardiac silhouette is enlarged which is at least   partially related to the pericardial effusion.    Bones:  The visualized osseous structures are within normal limits for   age.    Impression:    Left pleural effusion.    Right pleural effusion on the prior CT chest 2019 not seen on the   current study, likely due to AP technique.    ALEJO OSBORNE   This document has been electronically signed. 2019  1:44PM    < end of copied text >        CT Chest Results:   < from: CT Chest w/ IV Cont (19 @ 15:18) >  IMPRESSION:     Moderate pericardial effusion. Poorly defined thickening of the   pericardium raising concern for an infiltrative process which extends   superiorly surrounding the aorta, SVC and pulmonary veins. Poorly defined   infiltrative process in the retroperitoneum and surrounding both kidneys   with partially imaged bilateral hydronephrosis. This raises concern for   an underlying infiltrative process. A neoplastic processes within the   differential diagnosis. Clinical correlation is necessary. This was   discussed with BENJI Beavers at 3:56 PM on 2019.    Small right-sided pulmonary nodules. Metastatic disease cannot be   excluded.    SAYRA RUIZ M.D., ATTENDING RADIOLOGIST  This document has been electronically signed. 2019  3:58PM    < end of copied text >      CT Abdomen/Pelvis:   < from: CT Abdomen and Pelvis w/ Oral Cont and w/ IV Cont (19 @ 17:24) >  IMPRESSION:     Infiltrative retroperitoneal soft tissue surrounding both kidneys and   ureters causing moderate bilateral hydronephrosis. Differential diagnoses   includes retroperitoneal fibrosis/IgG 4 autoimmune disease as well as   non-Langerhans-cell histiocytosis (Rosai-Chele, [place]).      ANTHONY BRONSON M.D., ATTENDING RADIOLOGIST  This document has been electronically signed. 2019  6:40PM        < end of copied text >      PAST MEDICAL & SURGICAL HISTORY:  Heart failure  Cardiomegaly  Nonsmoker  Diabetes mellitus  Hypertension  No significant past surgical history

## 2019-11-16 LAB
ALBUMIN SERPL ELPH-MCNC: 3.7 G/DL — SIGNIFICANT CHANGE UP (ref 3.3–5.2)
ALP SERPL-CCNC: 77 U/L — SIGNIFICANT CHANGE UP (ref 40–120)
ALT FLD-CCNC: 8 U/L — SIGNIFICANT CHANGE UP
ANION GAP SERPL CALC-SCNC: 14 MMOL/L — SIGNIFICANT CHANGE UP (ref 5–17)
AST SERPL-CCNC: 13 U/L — SIGNIFICANT CHANGE UP
BILIRUB SERPL-MCNC: 0.5 MG/DL — SIGNIFICANT CHANGE UP (ref 0.4–2)
BUN SERPL-MCNC: 6 MG/DL — LOW (ref 8–20)
CALCIUM SERPL-MCNC: 9.2 MG/DL — SIGNIFICANT CHANGE UP (ref 8.6–10.2)
CHLORIDE SERPL-SCNC: 101 MMOL/L — SIGNIFICANT CHANGE UP (ref 98–107)
CO2 SERPL-SCNC: 24 MMOL/L — SIGNIFICANT CHANGE UP (ref 22–29)
CREAT SERPL-MCNC: 0.6 MG/DL — SIGNIFICANT CHANGE UP (ref 0.5–1.3)
GLUCOSE BLDC GLUCOMTR-MCNC: 128 MG/DL — HIGH (ref 70–99)
GLUCOSE BLDC GLUCOMTR-MCNC: 133 MG/DL — HIGH (ref 70–99)
GLUCOSE BLDC GLUCOMTR-MCNC: 152 MG/DL — HIGH (ref 70–99)
GLUCOSE BLDC GLUCOMTR-MCNC: 92 MG/DL — SIGNIFICANT CHANGE UP (ref 70–99)
GLUCOSE SERPL-MCNC: 121 MG/DL — HIGH (ref 70–115)
HCT VFR BLD CALC: 37.5 % — LOW (ref 39–50)
HGB BLD-MCNC: 11.6 G/DL — LOW (ref 13–17)
HIV 1 & 2 AB SERPL IA.RAPID: SIGNIFICANT CHANGE UP
HIV 1+2 AB+HIV1 P24 AG SERPL QL IA: SIGNIFICANT CHANGE UP
MAGNESIUM SERPL-MCNC: 2 MG/DL — SIGNIFICANT CHANGE UP (ref 1.6–2.6)
MCHC RBC-ENTMCNC: 25.6 PG — LOW (ref 27–34)
MCHC RBC-ENTMCNC: 30.9 GM/DL — LOW (ref 32–36)
MCV RBC AUTO: 82.8 FL — SIGNIFICANT CHANGE UP (ref 80–100)
NIGHT BLUE STAIN TISS: SIGNIFICANT CHANGE UP
PHOSPHATE 24H UR-MCNC: 5.7 MG/DL — SIGNIFICANT CHANGE UP
PHOSPHATE SERPL-MCNC: 3.8 MG/DL — SIGNIFICANT CHANGE UP (ref 2.4–4.7)
PLATELET # BLD AUTO: 430 K/UL — HIGH (ref 150–400)
POTASSIUM SERPL-MCNC: 4.1 MMOL/L — SIGNIFICANT CHANGE UP (ref 3.5–5.3)
POTASSIUM SERPL-SCNC: 4.1 MMOL/L — SIGNIFICANT CHANGE UP (ref 3.5–5.3)
PROT SERPL-MCNC: 7 G/DL — SIGNIFICANT CHANGE UP (ref 6.6–8.7)
RBC # BLD: 4.53 M/UL — SIGNIFICANT CHANGE UP (ref 4.2–5.8)
RBC # FLD: 17.4 % — HIGH (ref 10.3–14.5)
SODIUM SERPL-SCNC: 139 MMOL/L — SIGNIFICANT CHANGE UP (ref 135–145)
SPECIMEN SOURCE: SIGNIFICANT CHANGE UP
UUN UR-MCNC: 93 MG/DL — SIGNIFICANT CHANGE UP
WBC # BLD: 13.16 K/UL — HIGH (ref 3.8–10.5)
WBC # FLD AUTO: 13.16 K/UL — HIGH (ref 3.8–10.5)

## 2019-11-16 PROCEDURE — 71045 X-RAY EXAM CHEST 1 VIEW: CPT | Mod: 26

## 2019-11-16 PROCEDURE — 99024 POSTOP FOLLOW-UP VISIT: CPT

## 2019-11-16 PROCEDURE — 99223 1ST HOSP IP/OBS HIGH 75: CPT

## 2019-11-16 PROCEDURE — 99255 IP/OBS CONSLTJ NEW/EST HI 80: CPT

## 2019-11-16 PROCEDURE — 99232 SBSQ HOSP IP/OBS MODERATE 35: CPT

## 2019-11-16 PROCEDURE — 93308 TTE F-UP OR LMTD: CPT | Mod: 26

## 2019-11-16 RX ORDER — FUROSEMIDE 40 MG
40 TABLET ORAL
Refills: 0 | Status: DISCONTINUED | OUTPATIENT
Start: 2019-11-16 | End: 2019-11-20

## 2019-11-16 RX ORDER — ENOXAPARIN SODIUM 100 MG/ML
40 INJECTION SUBCUTANEOUS DAILY
Refills: 0 | Status: DISCONTINUED | OUTPATIENT
Start: 2019-11-16 | End: 2019-11-20

## 2019-11-16 RX ADMIN — SODIUM CHLORIDE 3 MILLILITER(S): 9 INJECTION INTRAMUSCULAR; INTRAVENOUS; SUBCUTANEOUS at 13:20

## 2019-11-16 RX ADMIN — SODIUM CHLORIDE 3 MILLILITER(S): 9 INJECTION INTRAMUSCULAR; INTRAVENOUS; SUBCUTANEOUS at 06:47

## 2019-11-16 RX ADMIN — PANTOPRAZOLE SODIUM 40 MILLIGRAM(S): 20 TABLET, DELAYED RELEASE ORAL at 06:46

## 2019-11-16 RX ADMIN — Medication 40 MILLIGRAM(S): at 10:33

## 2019-11-16 RX ADMIN — SODIUM CHLORIDE 3 MILLILITER(S): 9 INJECTION INTRAMUSCULAR; INTRAVENOUS; SUBCUTANEOUS at 22:22

## 2019-11-16 RX ADMIN — Medication 25 MILLIGRAM(S): at 06:46

## 2019-11-16 RX ADMIN — Medication 40 MILLIGRAM(S): at 18:07

## 2019-11-16 RX ADMIN — Medication 25 MILLIGRAM(S): at 18:07

## 2019-11-16 RX ADMIN — ENOXAPARIN SODIUM 40 MILLIGRAM(S): 100 INJECTION SUBCUTANEOUS at 22:54

## 2019-11-16 RX ADMIN — Medication 2: at 13:17

## 2019-11-16 RX ADMIN — Medication 40 MILLIGRAM(S): at 06:46

## 2019-11-16 NOTE — PROGRESS NOTE ADULT - SUBJECTIVE AND OBJECTIVE BOX
[facility]  INFECTIOUS DISEASES AND INTERNAL MEDICINE at [place]  =======================================================  Carlo Hernandez MD  Diplomates American Board of Internal Medicine and Infectious Diseases  Tel: [phone]      Fax: [phone]  =======================================================      [id number]  MIRANDA RAYO is a 44y  Male     CC: Patient is a 44y old  Male who presents with a chief complaint of pericardial effusion sent form cardiologist office (15 Nov 2019 15:48)    Follow up pericardial effusion  feels well  no complaints    PAST MEDICAL & SURGICAL HISTORY:  Heart failure  Cardiomegaly  Nonsmoker  Diabetes mellitus  Hypertension  No significant past surgical history      Social Hx:non smoker, no etoh or drug use    FAMILY HISTORY:  No pertinent family history in first degree relatives      Allergies    No Known Allergies    Intolerances    [facility] patient (Unknown)      MEDICATIONS  (STANDING):  dextrose 5%. 1000 milliLiter(s) (50 mL/Hr) IV Continuous <Continuous>  dextrose 50% Injectable 12.5 Gram(s) IV Push once  dextrose 50% Injectable 25 Gram(s) IV Push once  dextrose 50% Injectable 25 Gram(s) IV Push once  furosemide    Tablet 40 milliGRAM(s) Oral daily  insulin lispro (HumaLOG) corrective regimen sliding scale   SubCutaneous Before meals and at bedtime  metoprolol tartrate 25 milliGRAM(s) Oral two times a day  pantoprazole    Tablet 40 milliGRAM(s) Oral before breakfast  sodium chloride 0.9% lock flush 3 milliLiter(s) IV Push every 8 hours    MEDICATIONS  (PRN):  dextrose 40% Gel 15 Gram(s) Oral once PRN Blood Glucose LESS THAN 70 milliGRAM(s)/deciliter  glucagon  Injectable 1 milliGRAM(s) IntraMuscular once PRN Glucose LESS THAN 70 milligrams/deciliter      ANTIMICROBIALS:      OTHER MEDS: MEDICATIONS  (STANDING):  dextrose 40% Gel 15 once PRN  dextrose 50% Injectable 12.5 once  dextrose 50% Injectable 25 once  dextrose 50% Injectable 25 once  furosemide    Tablet 40 daily  glucagon  Injectable 1 once PRN  insulin lispro (HumaLOG) corrective regimen sliding scale  Before meals and at bedtime  metoprolol tartrate 25 two times a day  pantoprazole    Tablet 40 before breakfast             REVIEW OF SYSTEMS:  CONSTITUTIONAL:  No Fever or chills  HEENT:  No diplopia or blurred vision.  No earache, sore throat or runny nose.  CARDIOVASCULAR:  No pressure, squeezing, strangling, tightness, heaviness or aching about the chest, neck, axilla or epigastrium.  RESPIRATORY:  No cough, shortness of breath  GASTROINTESTINAL:  No nausea, vomiting or diarrhea.  GENITOURINARY:  No dysuria, frequency or urgency. No Blood in urine  MUSCULOSKELETAL:  no joint aches, no muscle pain  SKIN:  No change in skin, hair or nails.  NEUROLOGIC:  No Headaches, seizures or weakness.  PSYCHIATRIC:  No disorder of thought or mood.  ENDOCRINE:  No heat or cold intolerance  HEMATOLOGICAL:  No easy bruising or bleeding.           I&O's Detail    2019 07:  -  15 Nov 2019 07:00  --------------------------------------------------------  IN:    Oral Fluid: 740 mL  Total IN: 740 mL    OUT:    Voided: 1450 mL  Total OUT: 1450 mL    Total NET: -710 mL      15 Nov 2019 07:01  -  15 Nov 2019 21:47  --------------------------------------------------------  IN:    Oral Fluid: 1140 mL  Total IN: 1140 mL    OUT:    Voided: 2000 mL  Total OUT: 2000 mL    Total NET: -860 mL            Physical Exam:  Vital Signs Last 24 Hrs  T(C): 36.6 (15 Nov 2019 15:51), Max: 36.7 (15 Nov 2019 06:36)  T(F): 97.9 (15 Nov 2019 15:51), Max: 98.1 (15 Nov 2019 06:36)  HR: 100 (15 Nov 2019 17:30) (90 - 100)  BP: 120/87 (15 Nov 2019 17:30) (120/87 - 136/84)  BP(mean): --  RR: 17 (15 Nov 2019 15:51) (17 - 18)  SpO2: 100% (15 Nov 2019 15:51) (99% - 100%)    GEN: NAD, pleasant  HEENT: normocephalic and atraumatic. EOMI. PERRL.  Anicteric  NECK: Supple.   LUNGS: Clear to auscultation.  HEART: Regular rate and rhythm without murmur.  ABDOMEN: Soft, nontender, and nondistended.  Positive bowel sounds.    : No CVA tenderness  EXTREMITIES: Without any edema.  MSK: No joint swelling  NEUROLOGIC: Cranial nerves II through XII are grossly intact. No Focal Deficits  PSYCHIATRIC: Appropriate affect .  SKIN: No Rash        Labs:  11-15    142  |  101  |  6.0<L>  ----------------------------<  122<H>  4.6   |  29.0  |  0.63    Ca    9.4      15 Nov 2019 06:14  Phos  4.7     11-14  Mg     2.0         TPro  7.0  /  Alb  3.7  /  TBili  0.4  /  DBili  x   /  AST  13  /  ALT  10  /  AlkPhos  89  11-14                          11.9   11.74 )-----------( 454      ( 15 Nov 2019 06:14 )             39.3         Urinalysis Basic - ( 15 Nov 2019 14:13 )    Color: Yellow / Appearance: Clear / S.005 / pH: x  Gluc: x / Ketone: Negative  / Bili: Negative / Urobili: Negative mg/dL   Blood: x / Protein: Negative mg/dL / Nitrite: Negative   Leuk Esterase: Negative / RBC: x / WBC x   Sq Epi: x / Non Sq Epi: x / Bacteria: x      LIVER FUNCTIONS - ( 2019 05:31 )  Alb: 3.7 g/dL / Pro: 7.0 g/dL / ALK PHOS: 89 U/L / ALT: 10 U/L / AST: 13 U/L / GGT: x               CAPILLARY BLOOD GLUCOSE      POCT Blood Glucose.: 145 mg/dL (15 Nov 2019 21:07)  POCT Blood Glucose.: 119 mg/dL (15 Nov 2019 17:13)  POCT Blood Glucose.: 131 mg/dL (15 Nov 2019 12:20)  POCT Blood Glucose.: 113 mg/dL (15 Nov 2019 08:43)        RECENT CULTURES:  11-15 @ 16:06 .Body Fluid       Few WBC's  No organisms seen            Radiology:  < from: CT Abdomen and Pelvis w/ Oral Cont and w/ IV Cont (19 @ 17:24) >  IMPRESSION:     Infiltrative retroperitoneal soft tissue surrounding both kidneys and   ureters causing moderate bilateral hydronephrosis. Differential diagnoses   includes retroperitoneal fibrosis/IgG 4 autoimmune disease as well as   non-Langerhans-cell histiocytosis (Rosai-Chele, Erdheim-Sac).          < end of copied text >    < from: US Renal (11.15.19 @ 13:49) >  IMPRESSION:    Examination limited secondary to overlying bowel gas.    Moderate bilateral hydronephrosis.    Bladder wall thickening; correlation is recommended with a urinalysis for   cystitis.      < end of copied text >    < from: CT Chest w/ IV Cont (19 @ 15:18) >  IMPRESSION:     Moderate pericardial effusion. Poorly defined thickening of the   pericardium raising concern for an infiltrative process which extends   superiorly surrounding the aorta, SVC and pulmonary veins. Poorly defined   infiltrative process in the retroperitoneum and surrounding both kidneys   with partially imaged bilateral hydronephrosis. This raises concern for   an underlying infiltrative process. A neoplastic processes within the   differential diagnosis. Clinical correlation is necessary. This was   discussed with BENJI Beavers at 3:56 PM on 2019.    Small right-sided pulmonary nodules. Metastatic disease cannot be   excluded.    < end of copied text >

## 2019-11-16 NOTE — CONSULT NOTE ADULT - REASON FOR ADMISSION
pericardial effusion sent form cardiologist office

## 2019-11-16 NOTE — PROGRESS NOTE ADULT - PROBLEM SELECTOR PLAN 7
Lovenox may be resumed  Protonix for GI prophylaxis.    Disposition: Will continue to monitor  Discussed with Dr. Peters.

## 2019-11-16 NOTE — PROGRESS NOTE ADULT - SUBJECTIVE AND OBJECTIVE BOX
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 Abnormal retroperitoneal soft tissue encases both ureters.  I've spoken with Dr. Randall from IR, and renal MRI is suggested to further characterize these abnormalities.  Potential CT-directed biopsy to confirm retroperitoneal fibrosis and exclude any other process, once renal MRI performed.  Pericardial effusion accompanying retroperitoneal fibrosis  is reported in the literature (Exp Ther Med 2017 [place];13(6): 1673-5566).

## 2019-11-16 NOTE — PROGRESS NOTE ADULT - SUBJECTIVE AND OBJECTIVE BOX
Subjective: Patient seen and examined with  at the bedside. In NAD but patient is upset, stating that he would like to know when he may be able to go home and that he doesn't feel bad. Denies CP, SOB. Emotional support provided.    Pertinent events of the past 24 hours: IR pericardial drain placed yesterday    VITAL SIGNS  Vital Signs Last 24 Hrs  T(C): 36.7 (11-16-19 @ 15:35), Max: 36.9 (11-16-19 @ 10:44)  T(F): 98 (11-16-19 @ 15:35), Max: 98.5 (11-16-19 @ 10:44)  HR: 101 (11-16-19 @ 15:35) (94 - 101)  BP: 125/88 (11-16-19 @ 15:35) (113/82 - 125/88)  RR: 17 (11-16-19 @ 15:35) (16 - 18)  SpO2: 98% (11-16-19 @ 15:35) (97% - 100%)  on RA            Telemetry/Alarms:  SR-ST  LVEF: nl    MEDICATIONS  dextrose 40% Gel 15 Gram(s) Oral once PRN  dextrose 5%. 1000 milliLiter(s) IV Continuous <Continuous>  dextrose 50% Injectable 12.5 Gram(s) IV Push once  dextrose 50% Injectable 25 Gram(s) IV Push once  dextrose 50% Injectable 25 Gram(s) IV Push once  furosemide   Injectable 40 milliGRAM(s) IV Push two times a day  glucagon  Injectable 1 milliGRAM(s) IntraMuscular once PRN  insulin lispro (HumaLOG) corrective regimen sliding scale   SubCutaneous Before meals and at bedtime  metoprolol tartrate 25 milliGRAM(s) Oral two times a day  pantoprazole    Tablet 40 milliGRAM(s) Oral before breakfast  sodium chloride 0.9% lock flush 3 milliLiter(s) IV Push every 8 hours      PHYSICAL EXAM  General: well nourished, well developed, no acute distress  Neurology: alert and oriented x 3, nonfocal, no gross deficits  Respiratory: clear to auscultation bilaterally  CV: regular rate and rhythm, normal S1, S2  Abdomen: soft, nontender, nondistended, positive bowel sounds   Extremities: warm, well perfused. 2+ edema. + DP pulses  Incisions: pericardial drain with scant serous fluid but reportedly bloody overnight.      11-15 @ 07:01  -  11-16 @ 07:00  --------------------------------------------------------  IN: 1380 mL / OUT: 3355 mL / NET: -1975 mL        Weights:  Daily     Daily   Admit Wt: Drug Dosing Weight  Height (cm): 172.72 (12 Nov 2019 17:34)  Weight (kg): 111.6 (12 Nov 2019 17:34)  BMI (kg/m2): 37.4 (12 Nov 2019 17:34)  BSA (m2): 2.23 (12 Nov 2019 17:34)    All laboratory results, radiology and medications reviewed.    LABS  11-16    139  |  101  |  6.0<L>  ----------------------------<  121<H>  4.1   |  24.0  |  0.60    Ca    9.2      16 Nov 2019 06:31  Phos  3.8     11-16  Mg     2.0     11-16    TPro  7.0  /  Alb  3.7  /  TBili  0.5  /  DBili  x   /  AST  13  /  ALT  8   /  AlkPhos  77  11-16                                 11.6   13.16 )-----------( 430      ( 16 Nov 2019 06:31 )             37.5            Bilirubin Total, Serum: 0.5 mg/dL (11-16 @ 06:31)    CAPILLARY BLOOD GLUCOSE      POCT Blood Glucose.: 92 mg/dL (16 Nov 2019 17:16)  POCT Blood Glucose.: 152 mg/dL (16 Nov 2019 12:07)  POCT Blood Glucose.: 128 mg/dL (16 Nov 2019 08:16)  POCT Blood Glucose.: 145 mg/dL (15 Nov 2019 21:07)           Last CXR: < from: Xray Chest 1 View- PORTABLE-Urgent (11.16.19 @ 10:07) >    FINDINGS: Pericardial drain overlies the RIGHT heart. The  heart is   enlarged in transverse diameter. No hilar mass. Trachea midline.    The lungs  show ill-definedLEFT diaphragmatic contour which may indicate   a LEFT pleural effusion and/or airspace consolidation. Effusion and   airspace consolidation confirmed on prior CT scan 1/15/2019.      Visualized osseous structures are intact.        IMPRESSION:   Pericardial drain in place. Cardiomegaly. LEFT lower lobe   airspace consolidation and/or effusion..    < end of copied text >      Last EKG:   < from: 12 Lead ECG (11.12.19 @ 17:52) >    Diagnosis Line Normal sinus rhythm  Possible Left atrial enlargement  Nonspecific T wave abnormality  Abnormal ECG    < end of copied text >    < from: TTE Echo Limited or F/U (11.16.19 @ 09:08) >    Summary:   1. Technically adequate study. Limited studyfor evaluation of   pericardial effusion.   2. Normal global left ventricular systolic function.   3. Left ventricular ejection fraction, by visual estimation, is 55 to   60%.   4. Abnormal septal motion consistent with post-operative status.   5. Small pericardial effusion. IVC is mildly dilated with normal   respiratory changes. Except for respiratory variation of the tricuspid   valve inflow velocities, there is no other echocardiographic sign of   tamponade physiology.    < end of copied text >      PAST MEDICAL & SURGICAL HISTORY:  Heart failure  Cardiomegaly  Nonsmoker  Diabetes mellitus  Hypertension  No significant past surgical history

## 2019-11-16 NOTE — CONSULT NOTE ADULT - SUBJECTIVE AND OBJECTIVE BOX
HPI:    44 year old male with PMH as listed below presenting to [facility] from cardiology office, had TTE that showed large pericardial effusion. He has recurrent pericardial effusions with pericardiocentesis x2 (serous drainage), Most recently is S/P  subxiphoid pericardial window  and pericardial biopsy on 19 at [facility].  At that time found to have positive w.o for lyme- s/p course of IV and PO antibiotics.     Denies fever, chills, CP, +mild SOB. Denies hx of rashes, oral ulcers, dry eyes, dry mouth , denies diplopia, blurred vision, difficulty swallowing, changes in hearing,  +mild abd discomfort. denies nausea, vomiting, diarrhea. Denies hx of blood in urine, blood in bowels, dysuria, frequency or urgency. Denies joint pain/ joint swelling. Denies hair loss, easily bruising.     PAST MEDICAL & SURGICAL HISTORY:  Heart failure  Cardiomegaly  Nonsmoker  Diabetes mellitus 2  Hypertension      Social Hx  denies cigarrate use, alcohol or drug use    FAMILY HISTORY:  No pertinent family history in first degree relatives      Allergies    No Known Allergies    Intolerances    [facility] patient (Unknown)      MEDICATIONS  (STANDING):  dextrose 5%. 1000 milliLiter(s) (50 mL/Hr) IV Continuous <Continuous>  dextrose 50% Injectable 12.5 Gram(s) IV Push once  dextrose 50% Injectable 25 Gram(s) IV Push once  dextrose 50% Injectable 25 Gram(s) IV Push once  furosemide    Tablet 40 milliGRAM(s) Oral daily  insulin lispro (HumaLOG) corrective regimen sliding scale   SubCutaneous Before meals and at bedtime  metoprolol tartrate 25 milliGRAM(s) Oral two times a day  pantoprazole    Tablet 40 milliGRAM(s) Oral before breakfast  sodium chloride 0.9% lock flush 3 milliLiter(s) IV Push every 8 hours    MEDICATIONS  (PRN):  dextrose 40% Gel 15 Gram(s) Oral once PRN Blood Glucose LESS THAN 70 milliGRAM(s)/deciliter  glucagon  Injectable 1 milliGRAM(s) IntraMuscular once PRN Glucose LESS THAN 70 milligrams/deciliter      ANTIMICROBIALS:      OTHER MEDS: MEDICATIONS  (STANDING):  dextrose 40% Gel 15 once PRN  dextrose 50% Injectable 12.5 once  dextrose 50% Injectable 25 once  dextrose 50% Injectable 25 once  furosemide    Tablet 40 daily  glucagon  Injectable 1 once PRN  insulin lispro (HumaLOG) corrective regimen sliding scale  Before meals and at bedtime  metoprolol tartrate 25 two times a day  pantoprazole    Tablet 40 before breakfast     REVIEW OF SYSTEMS:  as above in HPI      I&O's Detail    2019 07:01  -  15 Nov 2019 07:00  --------------------------------------------------------  IN:    Oral Fluid: 740 mL  Total IN: 740 mL    OUT:    Voided: 1450 mL  Total OUT: 1450 mL    Total NET: -710 mL      15 Nov 2019 07:01  -  15 Nov 2019 21:47  --------------------------------------------------------  IN:    Oral Fluid: 1140 mL  Total IN: 1140 mL    OUT:    Voided: 2000 mL  Total OUT: 2000 mL    Total NET: -860 mL            Physical Exam:  Vital Signs Last 24 Hrs  T(C): 36.6 (15 Nov 2019 15:51), Max: 36.7 (15 Nov 2019 06:36)  T(F): 97.9 (15 Nov 2019 15:51), Max: 98.1 (15 Nov 2019 06:36)  HR: 100 (15 Nov 2019 17:30) (90 - 100)  BP: 120/87 (15 Nov 2019 17:30) (120/87 - 136/84)  BP(mean): --  RR: 17 (15 Nov 2019 15:51) (17 - 18)  SpO2: 100% (15 Nov 2019 15:51) (99% - 100%)    GEN: NAD, pleasant  HEENT: PERRL, nc/at  NECK: Supple.   LUNGS + BL BS   HEART: S1S2 present,  Regular rate and rhythm  ABDOMEN: +BS, Soft, nontender, and nondistended.   : No CVA tenderness  EXTREMITIES: Without any edema.  MSK: No joint swelling  NEUROLOGIC: Cranial nerves II through XII are grossly intact. No Focal Deficits  PSYCHIATRIC: Appropriate affect .  SKIN: No Rash        Labs:  11-15    142  |  101  |  6.0<L>  ----------------------------<  122<H>  4.6   |  29.0  |  0.63    Ca    9.4      15 Nov 2019 06:14  Phos  4.7     11  Mg     2.0         TPro  7.0  /  Alb  3.7  /  TBili  0.4  /  DBili  x   /  AST  13  /  ALT  10  /  AlkPhos  89  11-14                          11.9   11.74 )-----------( 454      ( 15 Nov 2019 06:14 )             39.3         Urinalysis Basic - ( 15 Nov 2019 14:13 )    Color: Yellow / Appearance: Clear / S.005 / pH: x  Gluc: x / Ketone: Negative  / Bili: Negative / Urobili: Negative mg/dL   Blood: x / Protein: Negative mg/dL / Nitrite: Negative   Leuk Esterase: Negative / RBC: x / WBC x   Sq Epi: x / Non Sq Epi: x / Bacteria: x      LIVER FUNCTIONS - ( 2019 05:31 )  Alb: 3.7 g/dL / Pro: 7.0 g/dL / ALK PHOS: 89 U/L / ALT: 10 U/L / AST: 13 U/L / GGT: x               CAPILLARY BLOOD GLUCOSE      POCT Blood Glucose.: 145 mg/dL (15 Nov 2019 21:07)  POCT Blood Glucose.: 119 mg/dL (15 Nov 2019 17:13)  POCT Blood Glucose.: 131 mg/dL (15 Nov 2019 12:20)  POCT Blood Glucose.: 113 mg/dL (15 Nov 2019 08:43)        RECENT CULTURES:  11-15 @ 16:06 .Body Fluid       Few WBC's  No organisms seen            Radiology:  < from: CT Abdomen and Pelvis w/ Oral Cont and w/ IV Cont (19 @ 17:24) >  IMPRESSION:     Infiltrative retroperitoneal soft tissue surrounding both kidneys and   ureters causing moderate bilateral hydronephrosis. Differential diagnoses   includes retroperitoneal fibrosis/IgG 4 autoimmune disease as well as   non-Langerhans-cell histiocytosis (Rosai-Chele, Erdheim-[place]).          < end of copied text >    < from: US Renal (11.15.19 @ 13:49) >  IMPRESSION:    Examination limited secondary to overlying bowel gas.    Moderate bilateral hydronephrosis.    Bladder wall thickening; correlation is recommended with a urinalysis for   cystitis.      < end of copied text >    < from: CT Chest w/ IV Cont (19 @ 15:18) >  IMPRESSION:     Moderate pericardial effusion. Poorly defined thickening of the   pericardium raising concern for an infiltrative process which extends   superiorly surrounding the aorta, SVC and pulmonary veins. Poorly defined   infiltrative process in the retroperitoneum and surrounding both kidneys   with partially imaged bilateral hydronephrosis. This raises concern for   an underlying infiltrative process. A neoplastic processes within the   differential diagnosis. Clinical correlation is necessary. This was   discussed with BENJI Beavers at 3:56 PM on 2019.    Small right-sided pulmonary nodules. Metastatic disease cannot be   excluded.    < end of copied text > HPI:    44 year old male with PMH as listed below presenting to [facility] from cardiology office, had TTE that showed large pericardial effusion. He has recurrent pericardial effusions with pericardiocentesis x2 (serous drainage), Most recently is S/P  subxiphoid pericardial window  and pericardial biopsy on 19 at [facility].  At that time found to have positive w.o for lyme- s/p course of IV and PO antibiotics.     denies fever, chills, denies dry eye, eye pain, eye redness, vision changes, dry mouth, oral ulcers, nasal congestion, difficulty swallowing,  denies ear pain, hearing changes, denies chest pain, +mild Sob,  +mild abd discomfort. denies nausea, vomiting, diarrhea, denies dysuria, blood in the urine, denies joint pain, joint swelling, muscle pain, muscle weakness, denies rashes, photosensitivity, hair loss, skin thickening,  denies weakness, numbness, syncope, falls, headaches,  denies bruising easily    PAST MEDICAL & SURGICAL HISTORY:  Heart failure  Cardiomegaly  Nonsmoker  Diabetes mellitus 2  Hypertension      Social Hx  denies cigarrate use, alcohol or drug use    FAMILY HISTORY:  No pertinent family history in first degree relatives      Allergies    No Known Allergies    Intolerances    [facility] patient (Unknown)      MEDICATIONS  (STANDING):  dextrose 5%. 1000 milliLiter(s) (50 mL/Hr) IV Continuous <Continuous>  dextrose 50% Injectable 12.5 Gram(s) IV Push once  dextrose 50% Injectable 25 Gram(s) IV Push once  dextrose 50% Injectable 25 Gram(s) IV Push once  furosemide    Tablet 40 milliGRAM(s) Oral daily  insulin lispro (HumaLOG) corrective regimen sliding scale   SubCutaneous Before meals and at bedtime  metoprolol tartrate 25 milliGRAM(s) Oral two times a day  pantoprazole    Tablet 40 milliGRAM(s) Oral before breakfast  sodium chloride 0.9% lock flush 3 milliLiter(s) IV Push every 8 hours    MEDICATIONS  (PRN):  dextrose 40% Gel 15 Gram(s) Oral once PRN Blood Glucose LESS THAN 70 milliGRAM(s)/deciliter  glucagon  Injectable 1 milliGRAM(s) IntraMuscular once PRN Glucose LESS THAN 70 milligrams/deciliter      ANTIMICROBIALS:      OTHER MEDS: MEDICATIONS  (STANDING):  dextrose 40% Gel 15 once PRN  dextrose 50% Injectable 12.5 once  dextrose 50% Injectable 25 once  dextrose 50% Injectable 25 once  furosemide    Tablet 40 daily  glucagon  Injectable 1 once PRN  insulin lispro (HumaLOG) corrective regimen sliding scale  Before meals and at bedtime  metoprolol tartrate 25 two times a day  pantoprazole    Tablet 40 before breakfast     REVIEW OF SYSTEMS:  as above in HPI      I&O's Detail    2019 07:01  -  15 Nov 2019 07:00  --------------------------------------------------------  IN:    Oral Fluid: 740 mL  Total IN: 740 mL    OUT:    Voided: 1450 mL  Total OUT: 1450 mL    Total NET: -710 mL      15 Nov 2019 07:01  -  15 Nov 2019 21:47  --------------------------------------------------------  IN:    Oral Fluid: 1140 mL  Total IN: 1140 mL    OUT:    Voided: 2000 mL  Total OUT: 2000 mL    Total NET: -860 mL            Physical Exam:  Vital Signs Last 24 Hrs  T(C): 36.6 (15 Nov 2019 15:51), Max: 36.7 (15 Nov 2019 06:36)  T(F): 97.9 (15 Nov 2019 15:51), Max: 98.1 (15 Nov 2019 06:36)  HR: 100 (15 Nov 2019 17:30) (90 - 100)  BP: 120/87 (15 Nov 2019 17:30) (120/87 - 136/84)  BP(mean): --  RR: 17 (15 Nov 2019 15:51) (17 - 18)  SpO2: 100% (15 Nov 2019 15:51) (99% - 100%)    GEN: NAD, pleasant  HEENT: PERRL, nc/at  NECK: Supple.   LUNGS + BL BS   HEART: S1S2 present,  Regular rate and rhythm  ABDOMEN: +BS, Soft, nontender, and nondistended.   : No CVA tenderness  EXTREMITIES: Without any edema.  MSK: No joint swelling  NEUROLOGIC: Cranial nerves II through XII are grossly intact. No Focal Deficits  PSYCHIATRIC: Appropriate affect .  SKIN: No Rash        Labs:  11-15    142  |  101  |  6.0<L>  ----------------------------<  122<H>  4.6   |  29.0  |  0.63    Ca    9.4      15 Nov 2019 06:14  Phos  4.7     14  Mg     2.0         TPro  7.0  /  Alb  3.7  /  TBili  0.4  /  DBili  x   /  AST  13  /  ALT  10  /  AlkPhos  89  11-14                          11.9   11.74 )-----------( 454      ( 15 Nov 2019 06:14 )             39.3         Urinalysis Basic - ( 15 Nov 2019 14:13 )    Color: Yellow / Appearance: Clear / S.005 / pH: x  Gluc: x / Ketone: Negative  / Bili: Negative / Urobili: Negative mg/dL   Blood: x / Protein: Negative mg/dL / Nitrite: Negative   Leuk Esterase: Negative / RBC: x / WBC x   Sq Epi: x / Non Sq Epi: x / Bacteria: x      LIVER FUNCTIONS - ( 2019 05:31 )  Alb: 3.7 g/dL / Pro: 7.0 g/dL / ALK PHOS: 89 U/L / ALT: 10 U/L / AST: 13 U/L / GGT: x               CAPILLARY BLOOD GLUCOSE      POCT Blood Glucose.: 145 mg/dL (15 Nov 2019 21:07)  POCT Blood Glucose.: 119 mg/dL (15 Nov 2019 17:13)  POCT Blood Glucose.: 131 mg/dL (15 Nov 2019 12:20)  POCT Blood Glucose.: 113 mg/dL (15 Nov 2019 08:43)        RECENT CULTURES:  11-15 @ 16:06 .Body Fluid       Few WBC's  No organisms seen            Radiology:  < from: CT Abdomen and Pelvis w/ Oral Cont and w/ IV Cont (19 @ 17:24) >  IMPRESSION:     Infiltrative retroperitoneal soft tissue surrounding both kidneys and   ureters causing moderate bilateral hydronephrosis. Differential diagnoses   includes retroperitoneal fibrosis/IgG 4 autoimmune disease as well as   non-Langerhans-cell histiocytosis (Rosai-Chele, Erdheim-[place]).          < end of copied text >    < from: US Renal (11.15.19 @ 13:49) >  IMPRESSION:    Examination limited secondary to overlying bowel gas.    Moderate bilateral hydronephrosis.    Bladder wall thickening; correlation is recommended with a urinalysis for   cystitis.      < end of copied text >    < from: CT Chest w/ IV Cont (19 @ 15:18) >  IMPRESSION:     Moderate pericardial effusion. Poorly defined thickening of the   pericardium raising concern for an infiltrative process which extends   superiorly surrounding the aorta, SVC and pulmonary veins. Poorly defined   infiltrative process in the retroperitoneum and surrounding both kidneys   with partially imaged bilateral hydronephrosis. This raises concern for   an underlying infiltrative process. A neoplastic processes within the   differential diagnosis. Clinical correlation is necessary. This was   discussed with BENJI Beavers at 3:56 PM on 2019.    Small right-sided pulmonary nodules. Metastatic disease cannot be   excluded.    < end of copied text >

## 2019-11-16 NOTE — CONSULT NOTE ADULT - ASSESSMENT
44 year old male presenting with recurrent pericardial effusions with pericardiocentesis x2 (serous drainage), Most recently is S/P  subxiphoid pericardial window and pericardial biopsy on 9/30/19.  Found to have positive w.o for lyme- s/p course of IV and PO antibiotics.   Now presenting to from Dr. Castro office s/p in office TTE that showed a large pericardial effusion.  Found to have DREAD 1:80 speckled. Dsdna: neg. CT abd with infiltrative retroperitoneal soft tissue surrounding both kidneys and ureters causing moderate bilateral hydronephrosis. Will do further w.o for underlying autoimmune disease/classic CTD.     - obtain crp, esr, RF, CCP, Hepatitis serologies, Quantiferon ,spep, upep, REGGIE, smith, rnp, c3,c4, sjogrens syndrome ab, anticentromere, antithyroid, scleroderma ab, ssa, ssb, IGG levels,   - agree with hematology, consider CT-directed biopsy to confirm retroperitoneal fibrosis and exclude any other process  - will follow along    Sara Figueroa, DO  Rheumatology 44 year old male presenting with recurrent pericardial effusions with pericardiocentesis x2 (serous drainage), Most recently is S/P  subxiphoid pericardial window and pericardial biopsy on 9/30/19.  Found to have positive w.o for lyme- s/p course of IV and PO antibiotics.   Now presenting from Dr. Castro office s/p in office TTE that showed a large pericardial effusion.  Found to have DREAD 1:80 speckled. Dsdna: neg. CT abd with infiltrative retroperitoneal soft tissue surrounding both kidneys and ureters causing moderate bilateral hydronephrosis. Will do further w.o for underlying autoimmune disease/classic CTD.     - obtain crp, esr, RF, CCP, Hepatitis serologies, Quantiferon ,spep, upep, REGGIE, smith, rnp, c3,c4, sjogrens syndrome ab, anticentromere, antithyroid, scleroderma ab, ssa, ssb, IGG levels, IgG4 index  - agree with hematology, consider CT-directed biopsy to confirm retroperitoneal fibrosis and exclude any other process  - will follow along    Sara Figueroa, DO  Rheumatology

## 2019-11-16 NOTE — PROGRESS NOTE ADULT - PROBLEM SELECTOR PLAN 2
AND Retroperitoneal fibrosis  CT on 11/13 show moderate effusion with infiltrative process surrounding pulmonary vasculature and extended to the abdomen, Repeat CT Abd/pelvis with contrast ordered 11/14  show infiltrative process in the retroperitoneal soft tissue surrounding both kidneys and ureters causing bilateral hydronephrosis. Renal ultrasound obtained, MR Renal ordered

## 2019-11-16 NOTE — PROGRESS NOTE ADULT - PROBLEM SELECTOR PLAN 4
moderate lower extremity edema.  Continue daily lasix.  LE dopplers on 11/13 show no DVT  Consider RHC

## 2019-11-16 NOTE — PROGRESS NOTE ADULT - PROBLEM SELECTOR PLAN 1
Recurrent pericardial effusion s/p subxiphoid pericardial window 9/30/19.  Percutaneous pericardial drain placed by Dr Randall 11/15 with 55 cc bloody drainage.   Echo today revealed only small pericardial effusion with no tamponade physiology.  No plan for cardiac cath at this time.

## 2019-11-16 NOTE — PROGRESS NOTE ADULT - ASSESSMENT
44 year old male with PMH as above.  S/P subxiphoid pericardial window and pericardial biopsy on 9/30/19 at [facility] with Dr. Peters .  Postoperative course complicated by AF/SVT which converted to SR.  Was found to be + lymes on that admission and has completed his course of IV and PO antibiotics.  Patient presented to the ER on 11/12 from Dr. Castro office s/p in office TTE that showed an increased pericardial effusion. Patient found to have infiltrate on Chest CT performed 11/13 that is concerning for infiltrative process / neoplastic disease, hematology/ oncology recommend ID follow up as outpaitent.  IR was consulted for pericardial effusion, no intervention at this time. Catscan of abdomen pelvis with contrast on 11/14 show infiltrative process progressing to the retroperitoneal soft tissue surrounding both kidneys causing moderate bilateral hydronephrosis.

## 2019-11-16 NOTE — PROGRESS NOTE ADULT - ASSESSMENT
44 year old Male with PMH of  cardiomegaly, DM II, HTN, and recurrent pericardial effusions with pericardiocentesis x2 (serous drainage), Most recently is S/P  subxiphoid pericardial window  and pericardial biopsy on 9/30/19 at [facility] with Dr. Peters .  Postoperative course complicated by some AF/SVT which converted to SR.  Was found to be + lyme C6 /(-) by Western blot criteria on that admission and has completed his course of IV and PO antibiotics.    Now presenting to the ER from Dr. Castro office s/p in office TTE that showed a large pericardial effusion.  Was there for a routine visit and denies any symptoms-no fever, dyspnea, chest pain, weight loss, night sweats    Overall HTN, recurrent pericardial effusion, leukocytosis  - Afebrile, non toxic, no symptoms, mild leukocytosis-doubt purulent pericarditis. Despite completing treated for Lyme disease, suspicion for Lyme was low-unlikely that recurrence is related to that  - Observe off Abx- if spikes fever send BCX x 2 and then start broad spectrum Abx  - f/u pericardial fluid analysis and cultures  - Check HIV, Quantiferon, RPR, DREAD  - Appreciate hemonc and Rheum eval  - Plan for IR biopsy  - Trend Fever  - Trend Leukocytosis    d/w CT team  Will Follow 44 year old Male with PMH of  cardiomegaly, DM II, HTN, and recurrent pericardial effusions with pericardiocentesis x2 (serous drainage), Most recently is S/P  subxiphoid pericardial window  and pericardial biopsy on 9/30/19 at [facility] with Dr. Peters .  Postoperative course complicated by some AF/SVT which converted to SR.  Was found to be + lyme C6 /(-) by Western blot criteria on that admission and has completed his course of IV and PO antibiotics.    Now presenting to the ER from Dr. Castro office s/p in office TTE that showed a large pericardial effusion.  Was there for a routine visit and denies any symptoms-no fever, dyspnea, chest pain, weight loss, night sweats    Overall HTN, recurrent pericardial effusion, leukocytosis  - Afebrile, non toxic, no symptoms, mild leukocytosis-doubt purulent pericarditis. Despite completing treated for Lyme disease, suspicion for Lyme was low-unlikely that recurrence is related to that  - WBC up likely reactive to procedure  - Observe off Abx- if spikes fever send BCX x 2 and then start broad spectrum Abx  - f/u pericardial fluid analysis and cultures  - Check HIV, Quantiferon, RPR, DREAD  - Appreciate hemonc and Rheum eval  - Plan for IR biopsy  - Trend Fever  - Trend Leukocytosis    d/w CT team  Will Follow

## 2019-11-17 LAB
CRP SERPL-MCNC: 9.61 MG/DL — HIGH (ref 0–0.4)
ERYTHROCYTE [SEDIMENTATION RATE] IN BLOOD: 48 MM/HR — HIGH (ref 0–20)
GLUCOSE BLDC GLUCOMTR-MCNC: 127 MG/DL — HIGH (ref 70–99)
GLUCOSE BLDC GLUCOMTR-MCNC: 141 MG/DL — HIGH (ref 70–99)
GLUCOSE BLDC GLUCOMTR-MCNC: 148 MG/DL — HIGH (ref 70–99)
GLUCOSE BLDC GLUCOMTR-MCNC: 310 MG/DL — HIGH (ref 70–99)
HAV IGM SER-ACNC: SIGNIFICANT CHANGE UP
HBV CORE IGM SER-ACNC: SIGNIFICANT CHANGE UP
HBV SURFACE AG SER-ACNC: SIGNIFICANT CHANGE UP
HCV AB S/CO SERPL IA: 0.13 S/CO — SIGNIFICANT CHANGE UP (ref 0–0.99)
HCV AB SERPL-IMP: SIGNIFICANT CHANGE UP
RHEUMATOID FACT SERPL-ACNC: <10 IU/ML — SIGNIFICANT CHANGE UP (ref 0–13)

## 2019-11-17 PROCEDURE — 74182 MRI ABDOMEN W/CONTRAST: CPT | Mod: 26

## 2019-11-17 PROCEDURE — 99024 POSTOP FOLLOW-UP VISIT: CPT

## 2019-11-17 RX ORDER — ACETAMINOPHEN 500 MG
650 TABLET ORAL ONCE
Refills: 0 | Status: COMPLETED | OUTPATIENT
Start: 2019-11-17 | End: 2019-11-17

## 2019-11-17 RX ADMIN — Medication 40 MILLIGRAM(S): at 05:40

## 2019-11-17 RX ADMIN — SODIUM CHLORIDE 3 MILLILITER(S): 9 INJECTION INTRAMUSCULAR; INTRAVENOUS; SUBCUTANEOUS at 12:09

## 2019-11-17 RX ADMIN — Medication 650 MILLIGRAM(S): at 22:20

## 2019-11-17 RX ADMIN — Medication 25 MILLIGRAM(S): at 05:35

## 2019-11-17 RX ADMIN — Medication 25 MILLIGRAM(S): at 18:19

## 2019-11-17 RX ADMIN — Medication 40 MILLIGRAM(S): at 18:24

## 2019-11-17 RX ADMIN — ENOXAPARIN SODIUM 40 MILLIGRAM(S): 100 INJECTION SUBCUTANEOUS at 22:01

## 2019-11-17 RX ADMIN — SODIUM CHLORIDE 3 MILLILITER(S): 9 INJECTION INTRAMUSCULAR; INTRAVENOUS; SUBCUTANEOUS at 05:34

## 2019-11-17 RX ADMIN — SODIUM CHLORIDE 3 MILLILITER(S): 9 INJECTION INTRAMUSCULAR; INTRAVENOUS; SUBCUTANEOUS at 20:32

## 2019-11-17 RX ADMIN — Medication 8: at 22:01

## 2019-11-17 RX ADMIN — Medication 650 MILLIGRAM(S): at 23:00

## 2019-11-17 RX ADMIN — PANTOPRAZOLE SODIUM 40 MILLIGRAM(S): 20 TABLET, DELAYED RELEASE ORAL at 05:35

## 2019-11-17 NOTE — PROGRESS NOTE ADULT - PROBLEM SELECTOR PLAN 4
moderate lower extremity edema.  Continue daily lasix.  LE dopplers on 11/13 show no DVT  Consider RHC moderate lower extremity edema.  Continue daily lasix.  LE dopplers on 11/13 show no DVT  Plan for Right and left heart cath 11/18 with Dr. Holly.

## 2019-11-17 NOTE — PROGRESS NOTE ADULT - PROBLEM SELECTOR PLAN 2
AND Retroperitoneal fibrosis  CT on 11/13 show moderate effusion with infiltrative process surrounding pulmonary vasculature and extended to the abdomen, Repeat CT Abd/pelvis with contrast ordered 11/14  show infiltrative process in the retroperitoneal soft tissue surrounding both kidneys and ureters causing bilateral hydronephrosis. Renal ultrasound obtained, MR Renal ordered AND Retroperitoneal fibrosis  CT on 11/13 show moderate effusion with infiltrative process surrounding pulmonary vasculature and extended to the abdomen, Repeat CT Abd/pelvis with contrast ordered 11/14  showed infiltrative process in the retroperitoneal soft tissue surrounding both kidneys and ureters causing bilateral hydronephrosis. Renal ultrasound obtained.  Renal/abd MRI ordered and done this AM. Pending reading.

## 2019-11-17 NOTE — PROGRESS NOTE ADULT - PROBLEM SELECTOR PLAN 7
Lovenox may be resumed  Protonix for GI prophylaxis.    Disposition: Will continue to monitor  Discussed with Dr. Peters. Lovenox for DVT prophylaxis.  Protonix for GI prophylaxis.      Discussed with Dr. Peters in AM rounds.

## 2019-11-17 NOTE — PROGRESS NOTE ADULT - PROBLEM SELECTOR PLAN 1
Recurrent pericardial effusion s/p subxiphoid pericardial window 9/30/19.  Percutaneous pericardial drain placed by Dr Randall 11/15 with 55 cc bloody drainage.   Echo today revealed only small pericardial effusion with no tamponade physiology.  No plan for cardiac cath at this time. Recurrent pericardial effusion s/p subxiphoid pericardial window 9/30/19.  Percutaneous pericardial drain placed by Dr Randall 11/15 with 55 cc bloody drainage.   Echo 11/16 revealed only small pericardial effusion with no tamponade physiology.  Maintain pericardial drain today per Dr. Peters.  Plan for Right and left heart cath tomorrow with Dr. Holly (spoke to him today).  Will order NPO after midnight.

## 2019-11-17 NOTE — PROGRESS NOTE ADULT - SUBJECTIVE AND OBJECTIVE BOX
Subjective: Pt sitting up in chair.  Denies CP or SOB.  NAD noted.      Tele:  SR-ST                        T(F): 98.4 (11-17-19 @ 12:06), Max: 99 (11-16-19 @ 22:51)  HR: 112 (11-17-19 @ 12:06) (101 - 112)  BP: 124/80 (11-17-19 @ 12:06) (112/62 - 125/88)  RR: 18 (11-17-19 @ 12:06) (17 - 18)  SpO2: 98% (11-17-19 @ 12:06) (95% - 98%) on room air.    Daily     Daily     LV EF: 60%    No Known Allergies      11-16    139  |  101  |  6.0<L>  ----------------------------<  121<H>  4.1   |  24.0  |  0.60    Ca    9.2      16 Nov 2019 06:31  Phos  3.8     11-16  Mg     2.0     11-16    TPro  7.0  /  Alb  3.7  /  TBili  0.5  /  DBili  x   /  AST  13  /  ALT  8   /  AlkPhos  77  11-16                               11.6   13.16 )-----------( 430      ( 16 Nov 2019 06:31 )             37.5               CAPILLARY BLOOD GLUCOSE  POCT Blood Glucose.: 148 mg/dL (17 Nov 2019 12:52)  POCT Blood Glucose.: 141 mg/dL (17 Nov 2019 08:23)  POCT Blood Glucose.: 133 mg/dL (16 Nov 2019 21:42)  POCT Blood Glucose.: 92 mg/dL (16 Nov 2019 17:16)           CXR: < from: Xray Chest 1 View- PORTABLE-Urgent (11.16.19 @ 10:07) >  EXAM:  XR CHEST PORTABLE URGENT 1V                          PROCEDURE DATE:  11/16/2019      INTERPRETATION:  Portable chest radiograph        CLINICAL INFORMATION:   Pericardial effusion. Follow-up.    TECHNIQUE:  Portable  AP view of the chest was obtained.    COMPARISON: No previous examinations are available for review.    FINDINGS: Pericardial drain overlies the RIGHT heart. The  heart is   enlarged in transverse diameter. No hilar mass. Trachea midline.    The lungs  show ill-definedLEFT diaphragmatic contour which may indicate   a LEFT pleural effusion and/or airspace consolidation. Effusion and   airspace consolidation confirmed on prior CT scan 1/15/2019.    Visualized osseous structures are intact.      IMPRESSION:   Pericardial drain in place. Cardiomegaly. LEFT lower lobe   airspace consolidation and/or effusion.  KIRSTY BAE M.D., ATTENDING RADIOLOGIST  This document has been electronically signed. Nov 16 2019  2:06PM    < end of copied text >      I&O's Detail    16 Nov 2019 07:01  -  17 Nov 2019 07:00  --------------------------------------------------------  IN:    Oral Fluid: 480 mL  Total IN: 480 mL    OUT:    Bulb: 15 mL  Total OUT: 15 mL    Total NET: 465 mL      Pericardial drain> 5ml overnight and 15ml total over the last 24 hours.    Active Medications:  dextrose 40% Gel 15 Gram(s) Oral once PRN  dextrose 5%. 1000 milliLiter(s) IV Continuous <Continuous>  dextrose 50% Injectable 12.5 Gram(s) IV Push once  dextrose 50% Injectable 25 Gram(s) IV Push once  dextrose 50% Injectable 25 Gram(s) IV Push once  enoxaparin Injectable 40 milliGRAM(s) SubCutaneous daily  furosemide   Injectable 40 milliGRAM(s) IV Push two times a day  glucagon  Injectable 1 milliGRAM(s) IntraMuscular once PRN  insulin lispro (HumaLOG) corrective regimen sliding scale   SubCutaneous Before meals and at bedtime  metoprolol tartrate 25 milliGRAM(s) Oral two times a day  pantoprazole    Tablet 40 milliGRAM(s) Oral before breakfast  sodium chloride 0.9% lock flush 3 milliLiter(s) IV Push every 8 hours      Physical Exam:    Neuro: AAOX3.  Non focal.    Pulm: Diminished BLL.    CV: RRR.  +S1+S2.    Abd: Soft/Obese/NT.  +BS.     Extremities: Moderate edema BLE.  +pp.    Incision(s): Pericardial drain to bulb with scant serosanguinous drainage.                      PAST MEDICAL & SURGICAL HISTORY:  Heart failure  Cardiomegaly  Nonsmoker  Diabetes mellitus  Hypertension  No significant past surgical history

## 2019-11-17 NOTE — PROGRESS NOTE ADULT - ASSESSMENT
44 year old male with PMH as above.  S/P subxiphoid pericardial window and pericardial biopsy on 9/30/19 at [facility] with Dr. Peters .  Postoperative course complicated by AF/SVT which converted to SR.  Was found to be + lymes on that admission and has completed his course of IV and PO antibiotics.  Patient presented to the ER on 11/12 from Dr. Castro office s/p in office TTE that showed an increased pericardial effusion. Patient found to have infiltrate on Chest CT performed 11/13 that is concerning for infiltrative process / neoplastic disease, hematology/ oncology recommend ID follow up as outpaitent.  IR was consulted for pericardial effusion, no intervention at this time. Ct scan of abdomen pelvis with contrast on 11/14 show infiltrative process progressing to the retroperitoneal soft tissue surrounding both kidneys causing moderate bilateral hydronephrosis.   11/15 IR pericardial 8 [place] drain for 55ml bloody drainage.  ID consulted regarding infiltrative process on CT > deemed likely not infectious.  Recommended recalling hem/onc.  Hem/onc recalled.  Rheumatology called.  Labs pending.  Also pending MRI renal.

## 2019-11-18 LAB
ALBUMIN SERPL ELPH-MCNC: 3.4 G/DL — SIGNIFICANT CHANGE UP (ref 3.3–5.2)
ALP SERPL-CCNC: 88 U/L — SIGNIFICANT CHANGE UP (ref 40–120)
ALT FLD-CCNC: 7 U/L — SIGNIFICANT CHANGE UP
ANION GAP SERPL CALC-SCNC: 16 MMOL/L — SIGNIFICANT CHANGE UP (ref 5–17)
AST SERPL-CCNC: 12 U/L — SIGNIFICANT CHANGE UP
BILIRUB SERPL-MCNC: 0.9 MG/DL — SIGNIFICANT CHANGE UP (ref 0.4–2)
BLD GP AB SCN SERPL QL: SIGNIFICANT CHANGE UP
BUN SERPL-MCNC: 10 MG/DL — SIGNIFICANT CHANGE UP (ref 8–20)
C3 SERPL-MCNC: 171 MG/DL — HIGH (ref 81–157)
C4 SERPL-MCNC: 40 MG/DL — HIGH (ref 13–39)
CALCIUM SERPL-MCNC: 9.2 MG/DL — SIGNIFICANT CHANGE UP (ref 8.6–10.2)
CENTROMERE AB SER-ACNC: <0.2 AI — SIGNIFICANT CHANGE UP
CHLORIDE SERPL-SCNC: 94 MMOL/L — LOW (ref 98–107)
CO2 SERPL-SCNC: 24 MMOL/L — SIGNIFICANT CHANGE UP (ref 22–29)
CREAT SERPL-MCNC: 0.56 MG/DL — SIGNIFICANT CHANGE UP (ref 0.5–1.3)
DSDNA AB FLD-ACNC: <0.2 AI — SIGNIFICANT CHANGE UP
ENA SCL70 AB SER-ACNC: 1.9 AI — HIGH
ENA SS-A AB FLD IA-ACNC: <0.2 AI — SIGNIFICANT CHANGE UP
GAMMA INTERFERON BACKGROUND BLD IA-ACNC: 0.01 IU/ML — SIGNIFICANT CHANGE UP
GLUCOSE BLDC GLUCOMTR-MCNC: 134 MG/DL — HIGH (ref 70–99)
GLUCOSE BLDC GLUCOMTR-MCNC: 162 MG/DL — HIGH (ref 70–99)
GLUCOSE BLDC GLUCOMTR-MCNC: 249 MG/DL — HIGH (ref 70–99)
GLUCOSE SERPL-MCNC: 111 MG/DL — SIGNIFICANT CHANGE UP (ref 70–115)
HCT VFR BLD CALC: 38.3 % — LOW (ref 39–50)
HGB BLD-MCNC: 12.1 G/DL — LOW (ref 13–17)
M TB IFN-G BLD-IMP: NEGATIVE — SIGNIFICANT CHANGE UP
M TB IFN-G CD4+ BCKGRND COR BLD-ACNC: 0 IU/ML — SIGNIFICANT CHANGE UP
M TB IFN-G CD4+CD8+ BCKGRND COR BLD-ACNC: 0.01 IU/ML — SIGNIFICANT CHANGE UP
MAGNESIUM SERPL-MCNC: 1.9 MG/DL — SIGNIFICANT CHANGE UP (ref 1.8–2.6)
MCHC RBC-ENTMCNC: 25.4 PG — LOW (ref 27–34)
MCHC RBC-ENTMCNC: 31.6 GM/DL — LOW (ref 32–36)
MCV RBC AUTO: 80.5 FL — SIGNIFICANT CHANGE UP (ref 80–100)
PHOSPHATE SERPL-MCNC: 3.9 MG/DL — SIGNIFICANT CHANGE UP (ref 2.4–4.7)
PLATELET # BLD AUTO: 470 K/UL — HIGH (ref 150–400)
POTASSIUM SERPL-MCNC: 3.4 MMOL/L — LOW (ref 3.5–5.3)
POTASSIUM SERPL-SCNC: 3.4 MMOL/L — LOW (ref 3.5–5.3)
PROT SERPL-MCNC: 7.4 G/DL — SIGNIFICANT CHANGE UP (ref 6.6–8.7)
QUANT TB PLUS MITOGEN MINUS NIL: 1.19 IU/ML — SIGNIFICANT CHANGE UP
RBC # BLD: 4.76 M/UL — SIGNIFICANT CHANGE UP (ref 4.2–5.8)
RBC # FLD: 16.8 % — HIGH (ref 10.3–14.5)
SODIUM SERPL-SCNC: 134 MMOL/L — LOW (ref 135–145)
T PALLIDUM AB TITR SER: NEGATIVE — SIGNIFICANT CHANGE UP
THYROGLOB AB FLD IA-ACNC: <20 IU/ML — SIGNIFICANT CHANGE UP (ref 0–40)
THYROGLOB AB SERPL-ACNC: <20 IU/ML — SIGNIFICANT CHANGE UP (ref 0–40)
THYROPEROXIDASE AB SERPL-ACNC: <10 IU/ML — SIGNIFICANT CHANGE UP (ref 0–34.9)
WBC # BLD: 17.32 K/UL — HIGH (ref 3.8–10.5)
WBC # FLD AUTO: 17.32 K/UL — HIGH (ref 3.8–10.5)

## 2019-11-18 PROCEDURE — 93460 R&L HRT ART/VENTRICLE ANGIO: CPT | Mod: 26

## 2019-11-18 PROCEDURE — 99232 SBSQ HOSP IP/OBS MODERATE 35: CPT | Mod: 25

## 2019-11-18 PROCEDURE — 71045 X-RAY EXAM CHEST 1 VIEW: CPT | Mod: 26

## 2019-11-18 PROCEDURE — 99152 MOD SED SAME PHYS/QHP 5/>YRS: CPT

## 2019-11-18 PROCEDURE — 93010 ELECTROCARDIOGRAM REPORT: CPT

## 2019-11-18 RX ORDER — POTASSIUM CHLORIDE 20 MEQ
10 PACKET (EA) ORAL ONCE
Refills: 0 | Status: COMPLETED | OUTPATIENT
Start: 2019-11-18 | End: 2019-11-18

## 2019-11-18 RX ORDER — SODIUM CHLORIDE 9 MG/ML
300 INJECTION INTRAMUSCULAR; INTRAVENOUS; SUBCUTANEOUS
Refills: 0 | Status: DISCONTINUED | OUTPATIENT
Start: 2019-11-18 | End: 2019-11-20

## 2019-11-18 RX ORDER — ASPIRIN/CALCIUM CARB/MAGNESIUM 324 MG
81 TABLET ORAL ONCE
Refills: 0 | Status: COMPLETED | OUTPATIENT
Start: 2019-11-18 | End: 2019-11-18

## 2019-11-18 RX ADMIN — PANTOPRAZOLE SODIUM 40 MILLIGRAM(S): 20 TABLET, DELAYED RELEASE ORAL at 05:08

## 2019-11-18 RX ADMIN — Medication 4: at 17:43

## 2019-11-18 RX ADMIN — Medication 25 MILLIGRAM(S): at 05:08

## 2019-11-18 RX ADMIN — Medication 100 MILLIEQUIVALENT(S): at 11:41

## 2019-11-18 RX ADMIN — Medication 81 MILLIGRAM(S): at 11:41

## 2019-11-18 RX ADMIN — SODIUM CHLORIDE 50 MILLILITER(S): 9 INJECTION INTRAMUSCULAR; INTRAVENOUS; SUBCUTANEOUS at 11:41

## 2019-11-18 RX ADMIN — Medication 40 MILLIGRAM(S): at 05:08

## 2019-11-18 RX ADMIN — Medication 25 MILLIGRAM(S): at 17:48

## 2019-11-18 RX ADMIN — SODIUM CHLORIDE 3 MILLILITER(S): 9 INJECTION INTRAMUSCULAR; INTRAVENOUS; SUBCUTANEOUS at 22:20

## 2019-11-18 RX ADMIN — ENOXAPARIN SODIUM 40 MILLIGRAM(S): 100 INJECTION SUBCUTANEOUS at 22:18

## 2019-11-18 RX ADMIN — SODIUM CHLORIDE 3 MILLILITER(S): 9 INJECTION INTRAMUSCULAR; INTRAVENOUS; SUBCUTANEOUS at 04:59

## 2019-11-18 RX ADMIN — Medication 40 MILLIGRAM(S): at 17:41

## 2019-11-18 RX ADMIN — Medication 2: at 22:17

## 2019-11-18 RX ADMIN — SODIUM CHLORIDE 3 MILLILITER(S): 9 INJECTION INTRAMUSCULAR; INTRAVENOUS; SUBCUTANEOUS at 14:45

## 2019-11-18 NOTE — PROGRESS NOTE ADULT - PROBLEM SELECTOR PLAN 2
AND Retroperitoneal fibrosis  CT on 11/13 show moderate effusion with infiltrative process surrounding pulmonary vasculature and extended to the abdomen, Repeat CT Abd/pelvis with contrast ordered 11/14  showed infiltrative process in the retroperitoneal soft tissue surrounding both kidneys and ureters causing bilateral hydronephrosis. Renal ultrasound obtained.  Renal/abd MRI ordered and done 11/17, showing retroperitoneal fibrosis, mod. margoth. hydronephrosis, fibrosis prox. SMA, RIAN, margoth. renal artery, resulting luminal narrowing

## 2019-11-18 NOTE — PROGRESS NOTE ADULT - SUBJECTIVE AND OBJECTIVE BOX
s/p RHC s/p RHC #7 RFV  s/p LHC #5 RFA w/mynx  · Subjective and Objective: 	  Patient s/p cardiac cath.   normal Coronaries.   Elevated wedge pressure of 24 mmhg.   no evidence of constriction/restriction.   Tolerated procedure well w/o complications  Seen post procedure by Dr Holly with wife present            REVIEW OF SYSTEMS:  Denies SOB, CP, NV, HA, dizziness, palpitations, site pain    PHYSICAL EXAM: A&Ox3 NAD Skin warm and dry  NEURO: Speech intact +gag +swallow Tongue midline MARIA  NECK: No JVD, trachea midline. Eupneic  HEART: RRR S1S2  bpm w/o ectopy on tele   PULMONARY:  CTA margoth  ABDOMEN: Soft nontender X4 +BS Vdg/eating  EXTREMITIES: RFV/RFA site: No bleed, hematoma, pain, ecchymosis or swelling Rt DP/PT+

## 2019-11-18 NOTE — PROGRESS NOTE ADULT - PROBLEM SELECTOR PLAN 7
Lovenox for DVT prophylaxis.  Protonix for GI prophylaxis.      Discussed with Dr. Peters in AM rounds.

## 2019-11-18 NOTE — PROGRESS NOTE ADULT - SUBJECTIVE AND OBJECTIVE BOX
Subjective: Examined the patient in bed, stated " I am feeling good". Denies any chest  pain, head ache, dizziness, shortness of breath, abdominal pain, N/V/D.     VITAL SIGNS  Vital Signs Last 24 Hrs  T(C): 37.4 (19 @ 17:00), Max: 37.4 (19 @ 17:00)  T(F): 99.3 (19 @ 17:00), Max: 99.3 (19 @ 17:00)  HR: 106 (19 @ 17:00) (98 - 118)  BP: 124/78 (19 @ 17:00) (104/68 - 178/79)  RR: 18 (19 @ 17:00) (18 - 18)  SpO2: 98% (19 @ 17:00) (97% - 100%)  on room air         Telemetry/Alarms:  Sinus Rhythm  LVEF: 60%    MEDICATIONS  dextrose 40% Gel 15 Gram(s) Oral once PRN  dextrose 5%. 1000 milliLiter(s) IV Continuous <Continuous>  dextrose 50% Injectable 12.5 Gram(s) IV Push once  dextrose 50% Injectable 25 Gram(s) IV Push once  dextrose 50% Injectable 25 Gram(s) IV Push once  enoxaparin Injectable 40 milliGRAM(s) SubCutaneous daily  furosemide   Injectable 40 milliGRAM(s) IV Push two times a day  glucagon  Injectable 1 milliGRAM(s) IntraMuscular once PRN  insulin lispro (HumaLOG) corrective regimen sliding scale   SubCutaneous Before meals and at bedtime  metoprolol tartrate 25 milliGRAM(s) Oral two times a day  pantoprazole    Tablet 40 milliGRAM(s) Oral before breakfast  sodium chloride 0.9% lock flush 3 milliLiter(s) IV Push every 8 hours  sodium chloride 0.9%. 300 milliLiter(s) IV Continuous <Continuous>      PHYSICAL EXAM  General: well nourished, well developed, no acute distress  Neurology: alert and oriented x 3, nonfocal, no gross deficits  Respiratory: clear to auscultation, diminished on LLL  CV: regular rate and rhythm, normal S1, S2. Pericardial drain to AI bulb suction, draining minimal amount of serous fluid  Abdomen: soft, nontender, nondistended, positive bowel sounds, last bowel movement today   Extremities: warm, well perfused, +1 pedal edema bilaterally, + DP pulses  Incisions: Right groin cardiac cath site, dressing dry and intact         @ 07:01  -   @ 07:00  --------------------------------------------------------  IN: 240 mL / OUT: 6 mL / NET: 234 mL     @ 07:01  -   @ 19:58  --------------------------------------------------------  IN: 0 mL / OUT: 1700 mL / NET: -1700 mL        Weights:  Daily     Daily Weight in k.3 (2019 04:11)  Admit Wt: Drug Dosing Weight  Height (cm): 172.72 (2019 17:34)  Weight (kg): 111.6 (2019 17:34)  BMI (kg/m2): 37.4 (2019 17:34)  BSA (m2): 2.23 (2019 17:34)    All laboratory results, radiology and medications reviewed.    LABS      134<L>  |  94<L>  |  10.0  ----------------------------<  111  3.4<L>   |  24.0  |  0.56    Ca    9.2      2019 06:16  Phos  3.9       Mg     1.9         TPro  7.4  /  Alb  3.4  /  TBili  0.9  /  DBili  x   /  AST  12  /  ALT  7   /  AlkPhos  88                                   12.1   17.32 )-----------( 470      ( 2019 06:16 )             38.3            Bilirubin Total, Serum: 0.9 mg/dL ( @ 06:16)    CAPILLARY BLOOD GLUCOSE      POCT Blood Glucose.: 249 mg/dL (2019 17:30)  POCT Blood Glucose.: 134 mg/dL (2019 08:35)  POCT Blood Glucose.: 310 mg/dL (2019 21:20)           Today's CXR:      < from: Xray Chest 1 View- PORTABLE-Routine (19 @ 06:12) >     EXAM:  XR CHEST PORTABLE ROUTINE 1V                          PROCEDURE DATE:  2019          INTERPRETATION:  Chest portable.    Clinical History: Shortness of breath.    Comparison: 2019.    Single AP view submitted.    Findings/  impression:    Right-sided pericardial drain unchanged.    The evaluation of the cardiomediastinal silhouette is limited on portable   technique.  Prominent cardiac silhouette.    Possible retrocardiac left lower lobe airspace consolidation, which may   reflect atelectasis. Probable small left-sided pleural effusion.    No pneumothorax noted.          < end of copied text >    PAST MEDICAL & SURGICAL HISTORY:  Heart failure  Cardiomegaly  Nonsmoker  Diabetes mellitus  Hypertension  No significant past surgical history

## 2019-11-18 NOTE — PROGRESS NOTE ADULT - PROBLEM SELECTOR PLAN 1
Recurrent pericardial effusion s/p subxiphoid pericardial window 9/30/19.  Percutaneous pericardial drain placed by Dr Randall 11/15 with 55 cc bloody drainage.   Echo 11/16 revealed only small pericardial effusion with no tamponade physiology.  Maintain pericardial drain today per Dr. Peters.  Right and left heart cath with Dr. Holly  today 11/18, normal coronaries, increased wedge pressure 24, no evidence of constriction or restriction (as per cardiology documentation, official cath report pending)

## 2019-11-18 NOTE — PROGRESS NOTE ADULT - SUBJECTIVE AND OBJECTIVE BOX
Patient s/p cardiac cath.     normal Coronaries.   Elevated wedge pressure of 24 mmhg.   no evidence of constriction/restriction.

## 2019-11-18 NOTE — PROGRESS NOTE ADULT - ASSESSMENT
s/ps/p RHC #7 RFV  s/p LHC #5 RFA w/mynx  ·	  Patient s/p cardiac cath.   normal Coronaries.   Elevated wedge pressure of 24 mmhg.   no evidence of constriction/restriction.

## 2019-11-18 NOTE — PROGRESS NOTE ADULT - SUBJECTIVE AND OBJECTIVE BOX
NPO> 8 hours  To have R&L cardiac catheterization for further evaluation  Dr Holly to consent  last lovenox 11/17   Aspirin 81 mg po x1  ASA 3  Mallampati 2  BR 0.7%  Consults with rheumatology, urology, ID noted    History of Present Illness: 	  44 year old Male with PMH of  cardiomegaly, DM II, HTN, and recurrent pericardial effusions with pericardiocentesis x2 (serous drainage), Most recently is S/P  subxiphoid pericardial window  and pericardial biopsy on 9/30/19 at [facility] with Dr. Peters .  Postoperative course complicated by some AF/SVT which converted to SR.  Was found to be + lymes on that admission and has completed his course of IV and PO antibiotics.  Now presenting to the ER from Dr. Castro office s/p in office TTE that showed a large pericardial effusion.  Currently sitting up in stretcher in ED.  Denies CP.  Reports mild shortness of breath on exertion that is unchanged over the last few weeks.  He states that his visit with Dr. Castro today was a scheduled routine visit.  NAD noted.  Wife at bedside  · Assessment		  44 year old male presenting with recurrent pericardial effusions with pericardiocentesis x2 (serous drainage), Most recently is S/P  subxiphoid pericardial window and pericardial biopsy on 9/30/19.  Found to have positive w.o for lyme- s/p course of IV and PO antibiotics.   Now presenting from Dr. Castro office s/p in office TTE that showed a large pericardial effusion.  Found to have DREAD 1:80 speckled. Dsdna: neg. CT abd with infiltrative retroperitoneal soft tissue surrounding both kidneys and ureters causing moderate bilateral hydronephrosis. Will do further w.o for underlying autoimmune disease/classic CTD.     - obtain crp, esr, RF, CCP, Hepatitis serologies, Quantiferon ,spep, upep, REGGIE, smith, rnp, c3,c4, sjogrens syndrome ab, anticentromere, antithyroid, scleroderma ab, ssa, ssb, IGG levels, IgG4 index  - agree with hematology, consider CT-directed biopsy to confirm retroperitoneal fibrosis and exclude any other process  - will follow along      REVIEW OF SYSTEMS:  Denies SOB, CP, NV, HA, dizziness, palpitations Appears in mild distress and fatigued    PHYSICAL EXAM: A&Ox3 NAD Skin warm and dry  NEURO: Speech intact +gag +swallow Tongue midline MARIA  NECK: No JVD, trachea midline. Eupneic  HEART: RRR ST on tele  PULMONARY:  +SOB at rest w/HOB at 90 degrees Pigtail chest to drainage  RR 24/min BS clear but diminished.   ABDOMEN: Soft nontender X4 +BS Vdg  EXTREMITIES:  No appreciable edema MARIA/FROM NPO> 8 hours  To have R&L cardiac catheterization for further evaluation  Dr Holly to consent  last lovenox 11/17   Aspirin 81 mg po x1  ASA 3  Mallampati 2  BR 0.7%  Consults with rheumatology, urology, ID noted    Summary:   1. Left ventricular ejection fraction, by visual estimation, is 55 to   60%.   2. Moderate pericardial effusion.   3. There is respiratory variation of mitral inflow, however there are no   other signs of RA and RV diastolic collapse.      No definite echo findings suggestive of tamponade physiology.      Needs to be clinically correlated.    History of Present Illness: 	  44 year old Male with PMH of  cardiomegaly, DM II, HTN, and recurrent pericardial effusions with pericardiocentesis x2 (serous drainage), Most recently is S/P  subxiphoid pericardial window  and pericardial biopsy on 9/30/19 at [facility] with Dr. Peters .  Postoperative course complicated by some AF/SVT which converted to SR.  Was found to be + lymes on that admission and has completed his course of IV and PO antibiotics.  Now presenting to the ER from Dr. Castro office s/p in office TTE that showed a large pericardial effusion.  Currently sitting up in stretcher in ED.  Denies CP.  Reports mild shortness of breath on exertion that is unchanged over the last few weeks.  He states that his visit with Dr. Castro today was a scheduled routine visit.  NAD noted.  Wife at bedside  · Assessment		  44 year old male presenting with recurrent pericardial effusions with pericardiocentesis x2 (serous drainage), Most recently is S/P  subxiphoid pericardial window and pericardial biopsy on 9/30/19.  Found to have positive w.o for lyme- s/p course of IV and PO antibiotics.   Now presenting from Dr. Castro office s/p in office TTE that showed a large pericardial effusion.  Found to have DREAD 1:80 speckled. Dsdna: neg. CT abd with infiltrative retroperitoneal soft tissue surrounding both kidneys and ureters causing moderate bilateral hydronephrosis. Will do further w.o for underlying autoimmune disease/classic CTD.     - obtain crp, esr, RF, CCP, Hepatitis serologies, Quantiferon ,spep, upep, REGGIE, smith, rnp, c3,c4, sjogrens syndrome ab, anticentromere, antithyroid, scleroderma ab, ssa, ssb, IGG levels, IgG4 index  - agree with hematology, consider CT-directed biopsy to confirm retroperitoneal fibrosis and exclude any other process  - will follow along      REVIEW OF SYSTEMS:  Denies SOB, CP, NV, HA, dizziness, palpitations Appears in mild distress and fatigued    PHYSICAL EXAM: A&Ox3 NAD Skin warm and dry  NEURO: Speech intact +gag +swallow Tongue midline MARIA  NECK: No JVD, trachea midline. Eupneic  HEART: RRR ST on tele  PULMONARY:  +SOB at rest w/HOB at 90 degrees Pigtail chest to drainage  RR 24/min BS clear but diminished.   ABDOMEN: Soft nontender X4 +BS Vdg  EXTREMITIES:  No appreciable edema MARIA/FROM

## 2019-11-18 NOTE — PROGRESS NOTE ADULT - ASSESSMENT
44 year old male with PMH as above.  S/P subxiphoid pericardial window and pericardial biopsy on 9/30/19 at [facility] with Dr. Peters .  Postoperative course complicated by AF/SVT which converted to SR.  Was found to be + lymes on that admission and has completed his course of IV and PO antibiotics.  Patient presented to the ER on 11/12 from Dr. Castro office s/p in office TTE that showed an increased pericardial effusion. Patient found to have infiltrate on Chest CT performed 11/13 that is concerning for infiltrative process / neoplastic disease, hematology/ oncology recommend ID follow up as outpaitent.  IR was consulted for pericardial effusion, no intervention at this time. Ct scan of abdomen pelvis with contrast on 11/14 show infiltrative process progressing to the retroperitoneal soft tissue surrounding both kidneys causing moderate bilateral hydronephrosis.   11/15 IR pericardial 8 [place] drain for 55ml bloody drainage.  ID consulted regarding infiltrative process on CT > deemed likely not infectious.  Recommended recalling hem/onc.  Hem/onc recalled.  Rheumatology called.  Labs pending.  11/17: Abd/Renal MRI done: showing retroperitoneal fibrosis, mod. margoth. hydronephrosis, fibrosis prox. SMA, RIAN, margoth. renal artery, resulting luminal narrowing  11/18: Right and left cardiac cath today, normal coronaries, increased wedge pressure 24, no evidence of constriction or restriction (as per cardiology documentation, official cath report pending)

## 2019-11-18 NOTE — PROGRESS NOTE ADULT - PROBLEM SELECTOR PLAN 4
moderate lower extremity edema.  Continue daily lasix.  LE dopplers on 11/13 show no DVT  Right and left heart cath 11/18 with Dr. Holly.

## 2019-11-19 LAB
% ALBUMIN: 47 % — SIGNIFICANT CHANGE UP
% ALPHA 1: 7.4 % — SIGNIFICANT CHANGE UP
% ALPHA 2: 13.3 % — SIGNIFICANT CHANGE UP
% BETA: 14.5 % — SIGNIFICANT CHANGE UP
% GAMMA: 17.8 % — SIGNIFICANT CHANGE UP
ALBUMIN SERPL ELPH-MCNC: 2.8 G/DL — LOW (ref 3.6–5.5)
ALBUMIN/GLOB SERPL ELPH: 0.9 RATIO — SIGNIFICANT CHANGE UP
ALPHA1 GLOB SERPL ELPH-MCNC: 0.4 G/DL — SIGNIFICANT CHANGE UP (ref 0.1–0.4)
ALPHA2 GLOB SERPL ELPH-MCNC: 0.8 G/DL — SIGNIFICANT CHANGE UP (ref 0.5–1)
ANA PAT FLD IF-IMP: ABNORMAL
ANA TITR SER: ABNORMAL
ANION GAP SERPL CALC-SCNC: 15 MMOL/L — SIGNIFICANT CHANGE UP (ref 5–17)
B-GLOBULIN SERPL ELPH-MCNC: 0.9 G/DL — SIGNIFICANT CHANGE UP (ref 0.5–1)
BUN SERPL-MCNC: 10 MG/DL — SIGNIFICANT CHANGE UP (ref 8–20)
CALCIUM SERPL-MCNC: 8.8 MG/DL — SIGNIFICANT CHANGE UP (ref 8.6–10.2)
CHLORIDE SERPL-SCNC: 95 MMOL/L — LOW (ref 98–107)
CO2 SERPL-SCNC: 24 MMOL/L — SIGNIFICANT CHANGE UP (ref 22–29)
CREAT SERPL-MCNC: 0.53 MG/DL — SIGNIFICANT CHANGE UP (ref 0.5–1.3)
GAMMA GLOBULIN: 1.1 G/DL — SIGNIFICANT CHANGE UP (ref 0.6–1.6)
GAMMA INTERFERON BACKGROUND BLD IA-ACNC: 0.02 IU/ML — SIGNIFICANT CHANGE UP
GLUCOSE BLDC GLUCOMTR-MCNC: 147 MG/DL — HIGH (ref 70–99)
GLUCOSE BLDC GLUCOMTR-MCNC: 150 MG/DL — HIGH (ref 70–99)
GLUCOSE BLDC GLUCOMTR-MCNC: 160 MG/DL — HIGH (ref 70–99)
GLUCOSE BLDC GLUCOMTR-MCNC: 231 MG/DL — HIGH (ref 70–99)
GLUCOSE SERPL-MCNC: 145 MG/DL — HIGH (ref 70–115)
HCT VFR BLD CALC: 34.7 % — LOW (ref 39–50)
HGB BLD-MCNC: 10.9 G/DL — LOW (ref 13–17)
IGA FLD-MCNC: 332 MG/DL — SIGNIFICANT CHANGE UP (ref 84–499)
IGG FLD-MCNC: 1232 MG/DL — SIGNIFICANT CHANGE UP (ref 610–1660)
IGG SERPL-MCNC: 1214 MG/DL — SIGNIFICANT CHANGE UP (ref 700–1600)
IGG1 SER-MCNC: 804 MG/DL — SIGNIFICANT CHANGE UP (ref 248–810)
IGG2 SER-MCNC: 276 MG/DL — SIGNIFICANT CHANGE UP (ref 130–555)
IGG3 SER-MCNC: 90 MG/DL — SIGNIFICANT CHANGE UP (ref 15–102)
IGG4 SER-MCNC: 34 MG/DL — SIGNIFICANT CHANGE UP (ref 2–96)
IGM SERPL-MCNC: 114 MG/DL — SIGNIFICANT CHANGE UP (ref 35–242)
INTERPRETATION SERPL IFE-IMP: SIGNIFICANT CHANGE UP
KAPPA LC SER QL IFE: 3.17 MG/DL — HIGH (ref 0.33–1.94)
KAPPA/LAMBDA FREE LIGHT CHAIN RATIO, SERUM: 1.45 RATIO — SIGNIFICANT CHANGE UP (ref 0.26–1.65)
LAMBDA LC SER QL IFE: 2.19 MG/DL — SIGNIFICANT CHANGE UP (ref 0.57–2.63)
M TB IFN-G BLD-IMP: NEGATIVE — SIGNIFICANT CHANGE UP
M TB IFN-G CD4+ BCKGRND COR BLD-ACNC: 0 IU/ML — SIGNIFICANT CHANGE UP
M TB IFN-G CD4+CD8+ BCKGRND COR BLD-ACNC: 0 IU/ML — SIGNIFICANT CHANGE UP
MAGNESIUM SERPL-MCNC: 1.9 MG/DL — SIGNIFICANT CHANGE UP (ref 1.8–2.6)
MCHC RBC-ENTMCNC: 25.5 PG — LOW (ref 27–34)
MCHC RBC-ENTMCNC: 31.4 GM/DL — LOW (ref 32–36)
MCV RBC AUTO: 81.1 FL — SIGNIFICANT CHANGE UP (ref 80–100)
PHOSPHATE SERPL-MCNC: 3.1 MG/DL — SIGNIFICANT CHANGE UP (ref 2.4–4.7)
PLATELET # BLD AUTO: 427 K/UL — HIGH (ref 150–400)
POTASSIUM SERPL-MCNC: 3.4 MMOL/L — LOW (ref 3.5–5.3)
POTASSIUM SERPL-SCNC: 3.4 MMOL/L — LOW (ref 3.5–5.3)
PROT PATTERN SERPL ELPH-IMP: SIGNIFICANT CHANGE UP
PROT SERPL-MCNC: 5.9 G/DL — LOW (ref 6–8.3)
PROT SERPL-MCNC: 5.9 G/DL — LOW (ref 6–8.3)
QUANT TB PLUS MITOGEN MINUS NIL: 1.78 IU/ML — SIGNIFICANT CHANGE UP
RBC # BLD: 4.28 M/UL — SIGNIFICANT CHANGE UP (ref 4.2–5.8)
RBC # FLD: 17 % — HIGH (ref 10.3–14.5)
SODIUM SERPL-SCNC: 134 MMOL/L — LOW (ref 135–145)
WBC # BLD: 13.87 K/UL — HIGH (ref 3.8–10.5)
WBC # FLD AUTO: 13.87 K/UL — HIGH (ref 3.8–10.5)

## 2019-11-19 PROCEDURE — 99024 POSTOP FOLLOW-UP VISIT: CPT

## 2019-11-19 PROCEDURE — 99232 SBSQ HOSP IP/OBS MODERATE 35: CPT

## 2019-11-19 PROCEDURE — 71045 X-RAY EXAM CHEST 1 VIEW: CPT | Mod: 26

## 2019-11-19 RX ADMIN — Medication 4: at 12:15

## 2019-11-19 RX ADMIN — PANTOPRAZOLE SODIUM 40 MILLIGRAM(S): 20 TABLET, DELAYED RELEASE ORAL at 05:26

## 2019-11-19 RX ADMIN — SODIUM CHLORIDE 3 MILLILITER(S): 9 INJECTION INTRAMUSCULAR; INTRAVENOUS; SUBCUTANEOUS at 12:17

## 2019-11-19 RX ADMIN — Medication 25 MILLIGRAM(S): at 17:19

## 2019-11-19 RX ADMIN — SODIUM CHLORIDE 3 MILLILITER(S): 9 INJECTION INTRAMUSCULAR; INTRAVENOUS; SUBCUTANEOUS at 21:55

## 2019-11-19 RX ADMIN — SODIUM CHLORIDE 3 MILLILITER(S): 9 INJECTION INTRAMUSCULAR; INTRAVENOUS; SUBCUTANEOUS at 05:25

## 2019-11-19 RX ADMIN — Medication 40 MILLIGRAM(S): at 17:19

## 2019-11-19 RX ADMIN — ENOXAPARIN SODIUM 40 MILLIGRAM(S): 100 INJECTION SUBCUTANEOUS at 21:55

## 2019-11-19 RX ADMIN — Medication 25 MILLIGRAM(S): at 05:26

## 2019-11-19 RX ADMIN — Medication 2: at 17:19

## 2019-11-19 RX ADMIN — Medication 40 MILLIGRAM(S): at 05:26

## 2019-11-19 NOTE — PROGRESS NOTE ADULT - PROBLEM SELECTOR PLAN 1
Recurrent pericardial effusion s/p subxiphoid pericardial window 9/30/19.  Percutaneous pericardial drain placed by Dr Randall 11/15 with 55 cc bloody drainage.   Echo 11/16 revealed only small pericardial effusion with no tamponade physiology.  Remove pericardial drain today per Dr. Peters.  Right and left heart cath yesterday with Dr. Holly normal coronaries, increased wedge pressure 24, no evidence of constriction or restriction (as per cardiology documentation, official cath report pending)

## 2019-11-19 NOTE — PROGRESS NOTE ADULT - ASSESSMENT
HPI:  44 year old Male with PMH of  cardiomegaly, DM II, HTN, and recurrent pericardial effusions with pericardiocentesis x2 (serous drainage), Most recently is S/P  subxiphoid pericardial window  and pericardial biopsy on 9/30/19 at [facility] with Dr. Peters .  Postoperative course complicated by some AF/SVT which converted to SR.  Was found to be + lymes on that admission and has completed his course of IV and PO antibiotics.  Now presenting to the ER from Dr. Castro office s/p in office TTE that showed a large pericardial effusion.  Currently sitting up in stretcher in ED.  Denies CP.  Reports mild shortness of breath on exertion that is unchanged over the last few weeks.  He states that his visit with Dr. Castro today was a scheduled routine visit.  NAD noted.  Wife at bedside. (12 Nov 2019 18:52)  Had lhc yesterday.  Patient s/p cardiac cath: Normal Coronaries, Elevated wedge pressure of 24 mmhg and no evidence of constriction/restriction.     Continue plan as per cts   Groin precautions  No lifting, no driving, no bathing, x 5 days, shower ok.

## 2019-11-19 NOTE — DIETITIAN INITIAL EVALUATION ADULT. - PROBLEM SELECTOR PLAN 1
Recurrent pericardial effusion s/p subxiphoid pericardial window 9/30/19.  Admit to CTS service to 4 CTU to Dr. Peters.  Plan for TTE tonight.  Will order Chest CT with IV contrast as well.  Will need a left heart cath ( had a right heart cath by Dr. Castro at another institution).  Will call cardiology ([facility])  No diuretics.

## 2019-11-19 NOTE — PROGRESS NOTE ADULT - PROBLEM SELECTOR PROBLEM 1
Pericardial effusion

## 2019-11-19 NOTE — PROGRESS NOTE ADULT - ASSESSMENT
44 year old Male with PMH of  cardiomegaly, DM II, HTN, and recurrent pericardial effusions with pericardiocentesis x2 (serous drainage), Most recently is S/P  subxiphoid pericardial window  and pericardial biopsy on 9/30/19 at [facility] with Dr. Peters .  Postoperative course complicated by some AF/SVT which converted to SR.  Was found to be + lyme C6 /(-) by Western blot criteria on that admission and has completed his course of IV and PO antibiotics.    Now presenting to the ER from Dr. Castro office s/p in office TTE that showed a large pericardial effusion.  Was there for a routine visit and denies any symptoms-no fever, dyspnea, chest pain, weight loss, night sweats    Overall HTN, recurrent pericardial effusion, leukocytosis  - Afebrile, non toxic, no symptoms, mild leukocytosis-doubt purulent pericarditis. Despite completing treated for Lyme disease, suspicion for Lyme was low-unlikely that recurrence is related to that  - MRi with RP fibrosis in retroperitoneum  -Right and left heart cath11/18 with Dr. Holly normal coronaries, increased wedge pressure 24, no evidence of constriction or restriction (as per cardiology documentation, official cath report pending).   - WBC up likely reactive to procedure  - f/u pericardial fluid analysis and cultures- so far negative. fungal cx pending  - HIV, Quantiferon, RPR- (-)  - Appreciate hemonc and Rheum eval. c/w further w/u as they recommend  - f/u ID as outpatient    Will sign off.

## 2019-11-19 NOTE — PROGRESS NOTE ADULT - PROBLEM SELECTOR PROBLEM 2
Lyme disease
Mediastinal fibrosis

## 2019-11-19 NOTE — PROGRESS NOTE ADULT - PROBLEM SELECTOR PLAN 6
Continue Lopressor.  DASH/TLC diet.

## 2019-11-19 NOTE — DIETITIAN INITIAL EVALUATION ADULT. - OTHER INFO
44 year old male with PMH as above.  S/P subxiphoid pericardial window and pericardial biopsy on 9/30/19 at [facility] with Dr. Peters .  Postoperative course complicated by AF/SVT which converted to SR.  Was found to be + lymes on that admission and has completed his course of IV and PO antibiotics.  Patient presented to the ER on 11/12 from Dr. Castro office s/p in office TTE that showed an increased pericardial effusion. Patient found to have infiltrate on Chest CT performed 11/13 that is concerning for infiltrative process / neoplastic disease, hematology/ oncology recommend ID follow up as outpaitent.  IR was consulted for pericardial effusion, no intervention at this time. Ct scan of abdomen pelvis with contrast on 11/14 show infiltrative process progressing to the retroperitoneal soft tissue surrounding both kidneys causing moderate bilateral hydronephrosis.   11/15 IR pericardial 8 Swiss drain for 55ml bloody drainage.  ID consulted regarding infiltrative process on CT > deemed likely not infectious.  Recommended recalling hem/onc.  Hem/onc recalled.  Rheumatology called.  Labs pending.  11/17: Abd/Renal MRI done: showing retroperitoneal fibrosis, mod. margoth. hydronephrosis, fibrosis prox. SMA, RIAN, margoth. renal artery, resulting luminal narrowing  11/18: Right and left cardiac cath today, normal coronaries, increased wedge pressure 24, no evidence of constriction or restriction (as per cardiology documentation, official cath report pending)  11/19 pericardial AI removed.  Pt reports eating well PTA, has had no recent wt changes. Pt eating 100% since admission. Verbally reviewed therapeutic diet guidelines, pt declined further education at this time.

## 2019-11-19 NOTE — PROGRESS NOTE ADULT - SUBJECTIVE AND OBJECTIVE BOX
Subjective: Pt sitting up in chair.  Denies CP or SOB.  NAD noted.      Tele:   SR-ST                       T(F): 98.7 (11-19-19 @ 10:14), Max: 99.3 (11-18-19 @ 17:00)  HR: 88 (11-19-19 @ 10:34) (88 - 106)  BP: 124/75 (11-19-19 @ 10:14) (113/75 - 141/87)  RR: 18 (11-19-19 @ 10:34) (18 - 20)  SpO2: 100% (11-19-19 @ 10:34) (97% - 100%) on room air at rest.    Daily     Daily     LV EF: 60%    No Known Allergies      11-19    134<L>  |  95<L>  |  10.0  ----------------------------<  145<H>  3.4<L>   |  24.0  |  0.53    Ca    8.8      19 Nov 2019 06:07  Phos  3.1     11-19  Mg     1.9     11-19    TPro  7.4  /  Alb  3.4  /  TBili  0.9  /  DBili  x   /  AST  12  /  ALT  7   /  AlkPhos  88  11-18                               10.9   13.87 )-----------( 427      ( 19 Nov 2019 06:07 )             34.7               CAPILLARY BLOOD GLUCOSE  POCT Blood Glucose.: 150 mg/dL (19 Nov 2019 08:03)  POCT Blood Glucose.: 162 mg/dL (18 Nov 2019 21:38)  POCT Blood Glucose.: 249 mg/dL (18 Nov 2019 17:30)           CXR:     I&O's Detail    18 Nov 2019 07:01  -  19 Nov 2019 07:00  --------------------------------------------------------  IN:    Oral Fluid: 250 mL  Total IN: 250 mL    OUT:    Voided: 4600 mL  Total OUT: 4600 mL    Total NET: -4350 mL          CHEST TUBE:  [ ] YES [ ] NO  OUTPUT:     per 24 hours    AIR LEAKS:  [ ] YES [ ] NO      IRWIN DRAIN:   [ ] YES [ ] NO  OUTPUT:     per 24 hours    EPICARDIAL WIRES:  [ ] YES [ ] NO      BOWEL MOVEMENT:  [ ] YES [ ] NO        Active Medications:  dextrose 40% Gel 15 Gram(s) Oral once PRN  dextrose 5%. 1000 milliLiter(s) IV Continuous <Continuous>  dextrose 50% Injectable 12.5 Gram(s) IV Push once  dextrose 50% Injectable 25 Gram(s) IV Push once  dextrose 50% Injectable 25 Gram(s) IV Push once  enoxaparin Injectable 40 milliGRAM(s) SubCutaneous daily  furosemide   Injectable 40 milliGRAM(s) IV Push two times a day  glucagon  Injectable 1 milliGRAM(s) IntraMuscular once PRN  insulin lispro (HumaLOG) corrective regimen sliding scale   SubCutaneous Before meals and at bedtime  metoprolol tartrate 25 milliGRAM(s) Oral two times a day  pantoprazole    Tablet 40 milliGRAM(s) Oral before breakfast  sodium chloride 0.9% lock flush 3 milliLiter(s) IV Push every 8 hours  sodium chloride 0.9%. 300 milliLiter(s) IV Continuous <Continuous>      Physical Exam:    Neuro:     Pulm:     CV:     Abd:     Extremities:    Incision(s):                           PAST MEDICAL & SURGICAL HISTORY:  Heart failure  Cardiomegaly  Nonsmoker  Diabetes mellitus  Hypertension  No significant past surgical history Subjective: Pt sitting up in chair.  Denies CP or SOB.  NAD noted.      Tele:   SR-ST                       T(F): 98.7 (11-19-19 @ 10:14), Max: 99.3 (11-18-19 @ 17:00)  HR: 88 (11-19-19 @ 10:34) (88 - 106)  BP: 124/75 (11-19-19 @ 10:14) (113/75 - 141/87)  RR: 18 (11-19-19 @ 10:34) (18 - 20)  SpO2: 100% (11-19-19 @ 10:34) (97% - 100%) on room air at rest.    Daily     Daily     LV EF: 60%    No Known Allergies      11-19    134<L>  |  95<L>  |  10.0  ----------------------------<  145<H>  3.4<L>   |  24.0  |  0.53    Ca    8.8      19 Nov 2019 06:07  Phos  3.1     11-19  Mg     1.9     11-19    TPro  7.4  /  Alb  3.4  /  TBili  0.9  /  DBili  x   /  AST  12  /  ALT  7   /  AlkPhos  88  11-18                               10.9   13.87 )-----------( 427      ( 19 Nov 2019 06:07 )             34.7               CAPILLARY BLOOD GLUCOSE  POCT Blood Glucose.: 150 mg/dL (19 Nov 2019 08:03)  POCT Blood Glucose.: 162 mg/dL (18 Nov 2019 21:38)  POCT Blood Glucose.: 249 mg/dL (18 Nov 2019 17:30)           CXR: < from: Xray Chest 1 View- PORTABLE-Routine (11.19.19 @ 05:56) >  EXAM:  XR CHEST PORTABLE ROUTINE 1V                          PROCEDURE DATE:  11/19/2019      INTERPRETATION:  Portable chest radiograph        CLINICAL INFORMATION:   Pericardial effusion. Follow-up.    TECHNIQUE:  Portable  AP view of the chest was obtained.    COMPARISON: 11/18/2019 available for review.    FINDINGS: Pericardial drain in place.    The  heart is enlarged in transverse diameter. No hilar mass. Trachea   midline.    The lungs  show LEFT lower lobe which cardiac airspace consolidation   and/or effusion.    Visualized osseous structures are intact.    IMPRESSION:   Cardiomegaly. Pericardial drain in place. LEFT lower lobe   airspace consolidation and/or effusion.    KIRSTY BAE M.D., ATTENDINGRADIOLOGIST    < end of copied text >      I&O's Detail    18 Nov 2019 07:01  -  19 Nov 2019 07:00  --------------------------------------------------------  IN:    Oral Fluid: 250 mL  Total IN: 250 mL    OUT:    Voided: 4600 mL  Total OUT: 4600 mL    Total NET: -4350 mL      Pericardial AI> No output over the last 24 hours.      Active Medications:  dextrose 40% Gel 15 Gram(s) Oral once PRN  dextrose 5%. 1000 milliLiter(s) IV Continuous <Continuous>  dextrose 50% Injectable 12.5 Gram(s) IV Push once  dextrose 50% Injectable 25 Gram(s) IV Push once  dextrose 50% Injectable 25 Gram(s) IV Push once  enoxaparin Injectable 40 milliGRAM(s) SubCutaneous daily  furosemide   Injectable 40 milliGRAM(s) IV Push two times a day  glucagon  Injectable 1 milliGRAM(s) IntraMuscular once PRN  insulin lispro (HumaLOG) corrective regimen sliding scale   SubCutaneous Before meals and at bedtime  metoprolol tartrate 25 milliGRAM(s) Oral two times a day  pantoprazole    Tablet 40 milliGRAM(s) Oral before breakfast  sodium chloride 0.9% lock flush 3 milliLiter(s) IV Push every 8 hours  sodium chloride 0.9%. 300 milliLiter(s) IV Continuous <Continuous>      Physical Exam:    Neuro: AAOX3.  Non focal    Pulm: Diminished BLL.    CV: RRR.  +S1+S2.    Abd: Soft/NT/ND.  +BS. +BM 11/18 per pt.    Extremities: Moderate edema BLE.  +pp.    Incision(s): + pericardial AI drain in place with dsd.                    PAST MEDICAL & SURGICAL HISTORY:  Heart failure  Cardiomegaly  Nonsmoker  Diabetes mellitus  Hypertension  No significant past surgical history

## 2019-11-19 NOTE — PROGRESS NOTE ADULT - REASON FOR ADMISSION
pericardial effusion sent form cardiologist office

## 2019-11-19 NOTE — PROGRESS NOTE ADULT - SUBJECTIVE AND OBJECTIVE BOX
Devon Complaint:      HPI:  44 year old Male with PMH of  cardiomegaly, DM II, HTN, and recurrent pericardial effusions with pericardiocentesis x2 (serous drainage), Most recently is S/P  subxiphoid pericardial window  and pericardial biopsy on 19 at [facility] with Dr. Peters .  Postoperative course complicated by some AF/SVT which converted to SR.  Was found to be + lymes on that admission and has completed his course of IV and PO antibiotics.  Now presenting to the ER from Dr. Castro office s/p in office TTE that showed a large pericardial effusion.  Currently sitting up in stretcher in ED.  Denies CP.  Reports mild shortness of breath on exertion that is unchanged over the last few weeks.  He states that his visit with Dr. Castro today was a scheduled routine visit.  NAD noted.  Wife at bedside. (2019 18:52)  Had lhc yesterday      PMH  Noninflammatory pericardial effusion  Heart failure  Cardiomegaly  Nonsmoker  Hypertension  Pericardial effusion  Mediastinal fibrosis  Localized edema  Essential hypertension  Type 2 diabetes mellitus without complication, with long-term current use of insulin  Lyme disease  Deep vein thrombosis (DVT) of lower extremity, unspecified chronicity, unspecified laterality, unspecified vein    REVIEW OF SYSTEMS  General: Denies fever, chills, pain, no discomfort  Respiratory and Thorax:  Denies cough, sob, or any discomfort  Cardiovascular:  Denies chest pain, palpitations or any discomfort	  Gastrointestinal:  Denies n/v/d, constipation, or any discomfort  Genitourinary:  Denies frequency, burning, or pain  Musculoskeletal:  Denies joint pain, swelling, or any discomfort   Neurological:  Denies headache, dizziness blurred vision, numbing or tingling  Psychiatric:  Denies sadness or depression  Hematology  Michael bleeding o swelling    MEDICATIONS:  furosemide   Injectable 40 milliGRAM(s) IV Push two times a day  metoprolol tartrate 25 milliGRAM(s) Oral two times a day  pantoprazole    Tablet 40 milliGRAM(s) Oral before breakfast  glucagon  Injectable 1 milliGRAM(s) IntraMuscular once PRN  insulin lispro (HumaLOG) corrective regimen sliding scale   SubCutaneous Before meals and at bedtime  dextrose 5%. 1000 milliLiter(s) IV Continuous <Continuous>  enoxaparin Injectable 40 milliGRAM(s) SubCutaneous daily  sodium chloride 0.9% lock flush 3 milliLiter(s) IV Push every 8 hours  sodium chloride 0.9%. 300 milliLiter(s) IV Continuous <Continuous>    PHYSICAL EXAM:  T(C): 36.9 (19 @ 16:13), Max: 37.4 (19 @ 22:15)  HR: 101 (19 @ 17:18) (88 - 106)  BP: 128/78 (19 @ 17:18) (113/75 - 141/87)  RR: 17 (19 @ 16:13) (17 - 20)  SpO2: 98% (19 @ 16:13) (97% - 100%)  I&O's Summary  2019 07:  -  2019 07:00  --------------------------------------------------------  IN: 250 mL / OUT: 4600 mL / NET: -4350 mL  2019 07:01  -  2019 19:24  --------------------------------------------------------  IN: 120 mL / OUT: 0 mL / NET: 120 mL  Daily Weight in k (2019 13:36)    Appearance: Normal. obese male  HEENT:   Normal oral mucosa, PERRL, EOMI	  Lymphatic: No lymphadenopathy  Cardiovascular: Normal S1 S2, No JVD, No murmurs, No edema  Respiratory: Lungs clear to auscultation	  Psychiatry: A & O x 3, Mood & affect appropriate  Gastrointestinal:  Soft, Non-tender, + BS	  Skin: No rashes, No ecchymoses, No cyanosis  Neurologic: Non-focal  Extremities: Normal range of motion, No clubbing, cyanosis or edema  Vascular: Peripheral pulses palpable 2+ bilaterally  Procedure Site:  Right groin:  No bleeding, no pain, no bruising, no hematoma, +PP, no edema.      TELEMETRY: 	 Sr 80, no acute alarms noted.       LABS:	 	                    10.9   13.87 )-----------( 427      ( 2019 06:07 )             34.7     134<L>  |  95<L>  |  10.0  ----------------------------<  145<H>  3.4<L>   |  24.0  |  0.53  Ca    8.8      2019 06:07  Phos  3.1       Mg     1.9       TPro  7.4  /  Alb  3.4  /  TBili  0.9  /  DBili  x   /  AST  12  /  ALT  7   /  AlkPhos  88

## 2019-11-19 NOTE — PROGRESS NOTE ADULT - ASSESSMENT
44 year old male with PMH as above.  S/P subxiphoid pericardial window and pericardial biopsy on 9/30/19 at [facility] with Dr. Peters .  Postoperative course complicated by AF/SVT which converted to SR.  Was found to be + lymes on that admission and has completed his course of IV and PO antibiotics.  Patient presented to the ER on 11/12 from Dr. Castro office s/p in office TTE that showed an increased pericardial effusion. Patient found to have infiltrate on Chest CT performed 11/13 that is concerning for infiltrative process / neoplastic disease, hematology/ oncology recommend ID follow up as outpaitent.  IR was consulted for pericardial effusion, no intervention at this time. Ct scan of abdomen pelvis with contrast on 11/14 show infiltrative process progressing to the retroperitoneal soft tissue surrounding both kidneys causing moderate bilateral hydronephrosis.   11/15 IR pericardial 8 [place] drain for 55ml bloody drainage.  ID consulted regarding infiltrative process on CT > deemed likely not infectious.  Recommended recalling hem/onc.  Hem/onc recalled.  Rheumatology called.  Labs pending.  11/17: Abd/Renal MRI done: showing retroperitoneal fibrosis, mod. margoth. hydronephrosis, fibrosis prox. SMA, RIAN, margoth. renal artery, resulting luminal narrowing  11/18: Right and left cardiac cath today, normal coronaries, increased wedge pressure 24, no evidence of constriction or restriction (as per cardiology documentation, official cath report pending)  11/19 pericardial AI removed.

## 2019-11-19 NOTE — PROGRESS NOTE ADULT - PROBLEM SELECTOR PROBLEM 4
Localized edema
R/O Deep vein thrombosis (DVT) of lower extremity, unspecified chronicity, unspecified laterality, unspecified vein
Localized edema

## 2019-11-19 NOTE — DIETITIAN INITIAL EVALUATION ADULT. - PERTINENT LABORATORY DATA
11-19 Na134 mmol/L<L> Glu 145 mg/dL<H> K+ 3.4 mmol/L<L> Cr  0.53 mg/dL BUN 10.0 mg/dL Phos 3.1 mg/dL Alb n/a   PAB n/a

## 2019-11-19 NOTE — PROGRESS NOTE ADULT - SUBJECTIVE AND OBJECTIVE BOX
[facility]  INFECTIOUS DISEASES AND INTERNAL MEDICINE at [place]  =======================================================  Carlo Hernandez MD  Diplomates American Board of Internal Medicine and Infectious Diseases  Tel: [phone]      Fax: [phone]  =======================================================      [id number]  MIRANDA RAYO is a 44y  Male     Follow up- pericardial effusion  feels well no complaints  concerned that Parker is not draining    FAMILY HISTORY:  No pertinent family history in first degree relatives      Allergies    No Known Allergies    Intolerances    [facility] patient (Unknown)      MEDICATIONS  (STANDING):  dextrose 5%. 1000 milliLiter(s) (50 mL/Hr) IV Continuous <Continuous>  dextrose 50% Injectable 12.5 Gram(s) IV Push once  dextrose 50% Injectable 25 Gram(s) IV Push once  dextrose 50% Injectable 25 Gram(s) IV Push once  furosemide    Tablet 40 milliGRAM(s) Oral daily  insulin lispro (HumaLOG) corrective regimen sliding scale   SubCutaneous Before meals and at bedtime  metoprolol tartrate 25 milliGRAM(s) Oral two times a day  pantoprazole    Tablet 40 milliGRAM(s) Oral before breakfast  sodium chloride 0.9% lock flush 3 milliLiter(s) IV Push every 8 hours    MEDICATIONS  (PRN):  dextrose 40% Gel 15 Gram(s) Oral once PRN Blood Glucose LESS THAN 70 milliGRAM(s)/deciliter  glucagon  Injectable 1 milliGRAM(s) IntraMuscular once PRN Glucose LESS THAN 70 milligrams/deciliter      ANTIMICROBIALS:      OTHER MEDS: MEDICATIONS  (STANDING):  dextrose 40% Gel 15 once PRN  dextrose 50% Injectable 12.5 once  dextrose 50% Injectable 25 once  dextrose 50% Injectable 25 once  furosemide    Tablet 40 daily  glucagon  Injectable 1 once PRN  insulin lispro (HumaLOG) corrective regimen sliding scale  Before meals and at bedtime  metoprolol tartrate 25 two times a day  pantoprazole    Tablet 40 before breakfast             REVIEW OF SYSTEMS:  CONSTITUTIONAL:  No Fever or chills  HEENT:  No diplopia or blurred vision.  No earache, sore throat or runny nose.  CARDIOVASCULAR:  No pressure, squeezing, strangling, tightness, heaviness or aching about the chest, neck, axilla or epigastrium.  RESPIRATORY:  No cough, shortness of breath  GASTROINTESTINAL:  No nausea, vomiting or diarrhea.  GENITOURINARY:  No dysuria, frequency or urgency. No Blood in urine  MUSCULOSKELETAL:  no joint aches, no muscle pain  SKIN:  No change in skin, hair or nails.  NEUROLOGIC:  No Headaches, seizures or weakness.  PSYCHIATRIC:  No disorder of thought or mood.  ENDOCRINE:  No heat or cold intolerance  HEMATOLOGICAL:  No easy bruising or bleeding.           I&O's Detail    2019 07:  -  15 Nov 2019 07:00  --------------------------------------------------------  IN:    Oral Fluid: 740 mL  Total IN: 740 mL    OUT:    Voided: 1450 mL  Total OUT: 1450 mL    Total NET: -710 mL      15 Nov 2019 07:01  -  15 Nov 2019 21:47  --------------------------------------------------------  IN:    Oral Fluid: 1140 mL  Total IN: 1140 mL    OUT:    Voided: 2000 mL  Total OUT: 2000 mL    Total NET: -860 mL            Physical Exam:  Vital Signs Last 24 Hrs  T(C): 36.6 (15 Nov 2019 15:51), Max: 36.7 (15 Nov 2019 06:36)  T(F): 97.9 (15 Nov 2019 15:51), Max: 98.1 (15 Nov 2019 06:36)  HR: 100 (15 Nov 2019 17:30) (90 - 100)  BP: 120/87 (15 Nov 2019 17:30) (120/87 - 136/84)  BP(mean): --  RR: 17 (15 Nov 2019 15:51) (17 - 18)  SpO2: 100% (15 Nov 2019 15:51) (99% - 100%)    GEN: NAD, pleasant  HEENT: normocephalic and atraumatic. EOMI. PERRL.  Anicteric  NECK: Supple.   LUNGS: Clear to auscultation.  HEART: Regular rate and rhythm without murmur.  ABDOMEN: Soft, nontender, and nondistended.  Positive bowel sounds.    : No CVA tenderness  EXTREMITIES: Without any edema.  MSK: No joint swelling  NEUROLOGIC: Cranial nerves II through XII are grossly intact. No Focal Deficits  PSYCHIATRIC: Appropriate affect .  SKIN: No Rash        Labs:  11-15    142  |  101  |  6.0<L>  ----------------------------<  122<H>  4.6   |  29.0  |  0.63    Ca    9.4      15 Nov 2019 06:14  Phos  4.7       Mg     2.0         TPro  7.0  /  Alb  3.7  /  TBili  0.4  /  DBili  x   /  AST  13  /  ALT  10  /  AlkPhos  89                            11.9   11.74 )-----------( 454      ( 15 Nov 2019 06:14 )             39.3         Urinalysis Basic - ( 15 Nov 2019 14:13 )    Color: Yellow / Appearance: Clear / S.005 / pH: x  Gluc: x / Ketone: Negative  / Bili: Negative / Urobili: Negative mg/dL   Blood: x / Protein: Negative mg/dL / Nitrite: Negative   Leuk Esterase: Negative / RBC: x / WBC x   Sq Epi: x / Non Sq Epi: x / Bacteria: x      LIVER FUNCTIONS - ( 2019 05:31 )  Alb: 3.7 g/dL / Pro: 7.0 g/dL / ALK PHOS: 89 U/L / ALT: 10 U/L / AST: 13 U/L / GGT: x               CAPILLARY BLOOD GLUCOSE      POCT Blood Glucose.: 145 mg/dL (15 Nov 2019 21:07)  POCT Blood Glucose.: 119 mg/dL (15 Nov 2019 17:13)  POCT Blood Glucose.: 131 mg/dL (15 Nov 2019 12:20)  POCT Blood Glucose.: 113 mg/dL (15 Nov 2019 08:43)        RECENT CULTURES:  11-15 @ 16:06 .Body Fluid       Few WBC's  No organisms seen            Radiology:  < from: CT Abdomen and Pelvis w/ Oral Cont and w/ IV Cont (19 @ 17:24) >  IMPRESSION:     Infiltrative retroperitoneal soft tissue surrounding both kidneys and   ureters causing moderate bilateral hydronephrosis. Differential diagnoses   includes retroperitoneal fibrosis/IgG 4 autoimmune disease as well as   non-Langerhans-cell histiocytosis (Rosai-Chele, Erdheim-Newberry).          < end of copied text >    < from: US Renal (11.15.19 @ 13:49) >  IMPRESSION:    Examination limited secondary to overlying bowel gas.    Moderate bilateral hydronephrosis.    Bladder wall thickening; correlation is recommended with a urinalysis for   cystitis.      < end of copied text >    < from: CT Chest w/ IV Cont (19 @ 15:18) >  IMPRESSION:     Moderate pericardial effusion. Poorly defined thickening of the   pericardium raising concern for an infiltrative process which extends   superiorly surrounding the aorta, SVC and pulmonary veins. Poorly defined   infiltrative process in the retroperitoneum and surrounding both kidneys   with partially imaged bilateral hydronephrosis. This raises concern for   an underlying infiltrative process. A neoplastic processes within the   differential diagnosis. Clinical correlation is necessary. This was   discussed with BENJI Beavers at 3:56 PM on 2019.    Small right-sided pulmonary nodules. Metastatic disease cannot be   excluded.    < end of copied text >    < from: MR Abdomen w/ IV Cont (19 @ 15:32) >  IMPRESSION:    Ill-defined soft tissue in theretroperitoneum demonstrating delayed   enhancement suggestive of retroperitoneal fibrosis. Encasement and   obstruction of both ureters causing moderate bilateral hydronephrosis.   Extensive perinephric stranding and edema is likely reactive to   obstructive uropathy. No evidence of perinephric soft tissue or mass.   Fibrotic tissue also encases the proximal SMA, RIAN, and bilateral renal   arteries with resultant luminal narrowing.    < end of copied text >

## 2019-11-19 NOTE — PROGRESS NOTE ADULT - PROBLEM SELECTOR PLAN 5
Will defer metformin for now.  LUCERO AC/HS.  Consistent carb diet.
Will defer metformin for now. Will restart on dc.  LUCERO AC/HS.  Consistent carb diet.

## 2019-11-20 ENCOUNTER — TRANSCRIPTION ENCOUNTER (OUTPATIENT)
Age: 44
End: 2019-11-20

## 2019-11-20 VITALS — DIASTOLIC BLOOD PRESSURE: 88 MMHG | TEMPERATURE: 98 F | SYSTOLIC BLOOD PRESSURE: 124 MMHG | HEART RATE: 108 BPM

## 2019-11-20 LAB
ALBUMIN SERPL ELPH-MCNC: 3 G/DL — LOW (ref 3.3–5.2)
ALP SERPL-CCNC: 94 U/L — SIGNIFICANT CHANGE UP (ref 40–120)
ALT FLD-CCNC: 12 U/L — SIGNIFICANT CHANGE UP
ANION GAP SERPL CALC-SCNC: 14 MMOL/L — SIGNIFICANT CHANGE UP (ref 5–17)
ANTI-RIBONUCLEAR PROTEIN: <0.2 AI — SIGNIFICANT CHANGE UP
AST SERPL-CCNC: 13 U/L — SIGNIFICANT CHANGE UP
BILIRUB SERPL-MCNC: 0.6 MG/DL — SIGNIFICANT CHANGE UP (ref 0.4–2)
BUN SERPL-MCNC: 10 MG/DL — SIGNIFICANT CHANGE UP (ref 8–20)
CALCIUM SERPL-MCNC: 8.7 MG/DL — SIGNIFICANT CHANGE UP (ref 8.6–10.2)
CHLORIDE SERPL-SCNC: 93 MMOL/L — LOW (ref 98–107)
CO2 SERPL-SCNC: 27 MMOL/L — SIGNIFICANT CHANGE UP (ref 22–29)
CREAT SERPL-MCNC: 0.66 MG/DL — SIGNIFICANT CHANGE UP (ref 0.5–1.3)
CULTURE RESULTS: SIGNIFICANT CHANGE UP
ENA SM AB FLD QL: <0.2 AI — SIGNIFICANT CHANGE UP
GLUCOSE BLDC GLUCOMTR-MCNC: 173 MG/DL — HIGH (ref 70–99)
GLUCOSE BLDC GLUCOMTR-MCNC: 174 MG/DL — HIGH (ref 70–99)
GLUCOSE SERPL-MCNC: 146 MG/DL — HIGH (ref 70–115)
HCT VFR BLD CALC: 36.3 % — LOW (ref 39–50)
HGB BLD-MCNC: 11.4 G/DL — LOW (ref 13–17)
MAGNESIUM SERPL-MCNC: 1.8 MG/DL — SIGNIFICANT CHANGE UP (ref 1.6–2.6)
MCHC RBC-ENTMCNC: 25.2 PG — LOW (ref 27–34)
MCHC RBC-ENTMCNC: 31.4 GM/DL — LOW (ref 32–36)
MCV RBC AUTO: 80.3 FL — SIGNIFICANT CHANGE UP (ref 80–100)
PHOSPHATE SERPL-MCNC: 3.9 MG/DL — SIGNIFICANT CHANGE UP (ref 2.4–4.7)
PLATELET # BLD AUTO: 471 K/UL — HIGH (ref 150–400)
POTASSIUM SERPL-MCNC: 3.1 MMOL/L — LOW (ref 3.5–5.3)
POTASSIUM SERPL-SCNC: 3.1 MMOL/L — LOW (ref 3.5–5.3)
PROT SERPL-MCNC: 6.6 G/DL — SIGNIFICANT CHANGE UP (ref 6.6–8.7)
RBC # BLD: 4.52 M/UL — SIGNIFICANT CHANGE UP (ref 4.2–5.8)
RBC # FLD: 16.5 % — HIGH (ref 10.3–14.5)
SODIUM SERPL-SCNC: 134 MMOL/L — LOW (ref 135–145)
SPECIMEN SOURCE: SIGNIFICANT CHANGE UP
WBC # BLD: 13.97 K/UL — HIGH (ref 3.8–10.5)
WBC # FLD AUTO: 13.97 K/UL — HIGH (ref 3.8–10.5)

## 2019-11-20 PROCEDURE — 93010 ELECTROCARDIOGRAM REPORT: CPT

## 2019-11-20 PROCEDURE — 93970 EXTREMITY STUDY: CPT

## 2019-11-20 PROCEDURE — 83036 HEMOGLOBIN GLYCOSYLATED A1C: CPT

## 2019-11-20 PROCEDURE — 99285 EMERGENCY DEPT VISIT HI MDM: CPT

## 2019-11-20 PROCEDURE — C1894: CPT

## 2019-11-20 PROCEDURE — 86038 ANTINUCLEAR ANTIBODIES: CPT

## 2019-11-20 PROCEDURE — 86703 HIV-1/HIV-2 1 RESULT ANTBDY: CPT

## 2019-11-20 PROCEDURE — 83880 ASSAY OF NATRIURETIC PEPTIDE: CPT

## 2019-11-20 PROCEDURE — 77012 CT SCAN FOR NEEDLE BIOPSY: CPT

## 2019-11-20 PROCEDURE — 93306 TTE W/DOPPLER COMPLETE: CPT

## 2019-11-20 PROCEDURE — C1729: CPT

## 2019-11-20 PROCEDURE — 71045 X-RAY EXAM CHEST 1 VIEW: CPT | Mod: 26

## 2019-11-20 PROCEDURE — 84540 ASSAY OF URINE/UREA-N: CPT

## 2019-11-20 PROCEDURE — 83735 ASSAY OF MAGNESIUM: CPT

## 2019-11-20 PROCEDURE — 74182 MRI ABDOMEN W/CONTRAST: CPT

## 2019-11-20 PROCEDURE — 84165 PROTEIN E-PHORESIS SERUM: CPT

## 2019-11-20 PROCEDURE — 86780 TREPONEMA PALLIDUM: CPT

## 2019-11-20 PROCEDURE — C1887: CPT

## 2019-11-20 PROCEDURE — C1889: CPT

## 2019-11-20 PROCEDURE — 86160 COMPLEMENT ANTIGEN: CPT

## 2019-11-20 PROCEDURE — 84134 ASSAY OF PREALBUMIN: CPT

## 2019-11-20 PROCEDURE — 94010 BREATHING CAPACITY TEST: CPT

## 2019-11-20 PROCEDURE — 76775 US EXAM ABDO BACK WALL LIM: CPT

## 2019-11-20 PROCEDURE — C1760: CPT

## 2019-11-20 PROCEDURE — 87206 SMEAR FLUORESCENT/ACID STAI: CPT

## 2019-11-20 PROCEDURE — 87075 CULTR BACTERIA EXCEPT BLOOD: CPT

## 2019-11-20 PROCEDURE — 36415 COLL VENOUS BLD VENIPUNCTURE: CPT

## 2019-11-20 PROCEDURE — 71045 X-RAY EXAM CHEST 1 VIEW: CPT

## 2019-11-20 PROCEDURE — 86431 RHEUMATOID FACTOR QUANT: CPT

## 2019-11-20 PROCEDURE — 84443 ASSAY THYROID STIM HORMONE: CPT

## 2019-11-20 PROCEDURE — 87102 FUNGUS ISOLATION CULTURE: CPT

## 2019-11-20 PROCEDURE — 84484 ASSAY OF TROPONIN QUANT: CPT

## 2019-11-20 PROCEDURE — 86480 TB TEST CELL IMMUN MEASURE: CPT

## 2019-11-20 PROCEDURE — 80074 ACUTE HEPATITIS PANEL: CPT

## 2019-11-20 PROCEDURE — 84300 ASSAY OF URINE SODIUM: CPT

## 2019-11-20 PROCEDURE — 86850 RBC ANTIBODY SCREEN: CPT

## 2019-11-20 PROCEDURE — 93880 EXTRACRANIAL BILAT STUDY: CPT

## 2019-11-20 PROCEDURE — 85730 THROMBOPLASTIN TIME PARTIAL: CPT

## 2019-11-20 PROCEDURE — T1013: CPT

## 2019-11-20 PROCEDURE — 80048 BASIC METABOLIC PNL TOTAL CA: CPT

## 2019-11-20 PROCEDURE — 86235 NUCLEAR ANTIGEN ANTIBODY: CPT

## 2019-11-20 PROCEDURE — 99152 MOD SED SAME PHYS/QHP 5/>YRS: CPT

## 2019-11-20 PROCEDURE — 85027 COMPLETE CBC AUTOMATED: CPT

## 2019-11-20 PROCEDURE — 82787 IGG 1 2 3 OR 4 EACH: CPT

## 2019-11-20 PROCEDURE — 84105 ASSAY OF URINE PHOSPHORUS: CPT

## 2019-11-20 PROCEDURE — 86255 FLUORESCENT ANTIBODY SCREEN: CPT

## 2019-11-20 PROCEDURE — 93308 TTE F-UP OR LMTD: CPT

## 2019-11-20 PROCEDURE — 87640 STAPH A DNA AMP PROBE: CPT

## 2019-11-20 PROCEDURE — 87015 SPECIMEN INFECT AGNT CONCNTJ: CPT

## 2019-11-20 PROCEDURE — 84100 ASSAY OF PHOSPHORUS: CPT

## 2019-11-20 PROCEDURE — 86900 BLOOD TYPING SEROLOGIC ABO: CPT

## 2019-11-20 PROCEDURE — 93005 ELECTROCARDIOGRAM TRACING: CPT

## 2019-11-20 PROCEDURE — 82570 ASSAY OF URINE CREATININE: CPT

## 2019-11-20 PROCEDURE — 82962 GLUCOSE BLOOD TEST: CPT

## 2019-11-20 PROCEDURE — 87389 HIV-1 AG W/HIV-1&-2 AB AG IA: CPT

## 2019-11-20 PROCEDURE — 86901 BLOOD TYPING SEROLOGIC RH(D): CPT

## 2019-11-20 PROCEDURE — 93460 R&L HRT ART/VENTRICLE ANGIO: CPT

## 2019-11-20 PROCEDURE — 86140 C-REACTIVE PROTEIN: CPT

## 2019-11-20 PROCEDURE — 89051 BODY FLUID CELL COUNT: CPT

## 2019-11-20 PROCEDURE — 80053 COMPREHEN METABOLIC PANEL: CPT

## 2019-11-20 PROCEDURE — C1769: CPT

## 2019-11-20 PROCEDURE — 99153 MOD SED SAME PHYS/QHP EA: CPT

## 2019-11-20 PROCEDURE — 87116 MYCOBACTERIA CULTURE: CPT

## 2019-11-20 PROCEDURE — 74177 CT ABD & PELVIS W/CONTRAST: CPT

## 2019-11-20 PROCEDURE — 85652 RBC SED RATE AUTOMATED: CPT

## 2019-11-20 PROCEDURE — 86800 THYROGLOBULIN ANTIBODY: CPT

## 2019-11-20 PROCEDURE — 87205 SMEAR GRAM STAIN: CPT

## 2019-11-20 PROCEDURE — 87070 CULTURE OTHR SPECIMN AEROBIC: CPT

## 2019-11-20 PROCEDURE — 85610 PROTHROMBIN TIME: CPT

## 2019-11-20 PROCEDURE — 81003 URINALYSIS AUTO W/O SCOPE: CPT

## 2019-11-20 PROCEDURE — 71260 CT THORAX DX C+: CPT

## 2019-11-20 RX ORDER — METOPROLOL TARTRATE 50 MG
1 TABLET ORAL
Qty: 0 | Refills: 0 | DISCHARGE
Start: 2019-11-20

## 2019-11-20 RX ORDER — DOCUSATE SODIUM 100 MG
1 CAPSULE ORAL
Qty: 0 | Refills: 0 | DISCHARGE

## 2019-11-20 RX ORDER — MAGNESIUM SULFATE 500 MG/ML
2 VIAL (ML) INJECTION ONCE
Refills: 0 | Status: COMPLETED | OUTPATIENT
Start: 2019-11-20 | End: 2019-11-20

## 2019-11-20 RX ORDER — FUROSEMIDE 40 MG
1 TABLET ORAL
Qty: 30 | Refills: 0
Start: 2019-11-20

## 2019-11-20 RX ORDER — POTASSIUM CHLORIDE 20 MEQ
40 PACKET (EA) ORAL EVERY 4 HOURS
Refills: 0 | Status: DISCONTINUED | OUTPATIENT
Start: 2019-11-20 | End: 2019-11-20

## 2019-11-20 RX ORDER — POTASSIUM CHLORIDE 20 MEQ
1 PACKET (EA) ORAL
Qty: 30 | Refills: 0
Start: 2019-11-20

## 2019-11-20 RX ADMIN — SODIUM CHLORIDE 3 MILLILITER(S): 9 INJECTION INTRAMUSCULAR; INTRAVENOUS; SUBCUTANEOUS at 14:05

## 2019-11-20 RX ADMIN — Medication 40 MILLIEQUIVALENT(S): at 14:04

## 2019-11-20 RX ADMIN — PANTOPRAZOLE SODIUM 40 MILLIGRAM(S): 20 TABLET, DELAYED RELEASE ORAL at 07:10

## 2019-11-20 RX ADMIN — SODIUM CHLORIDE 3 MILLILITER(S): 9 INJECTION INTRAMUSCULAR; INTRAVENOUS; SUBCUTANEOUS at 05:07

## 2019-11-20 RX ADMIN — Medication 40 MILLIGRAM(S): at 07:10

## 2019-11-20 RX ADMIN — Medication 2: at 12:33

## 2019-11-20 RX ADMIN — Medication 25 MILLIGRAM(S): at 07:11

## 2019-11-20 RX ADMIN — Medication 2: at 09:08

## 2019-11-20 RX ADMIN — Medication 50 GRAM(S): at 09:06

## 2019-11-20 RX ADMIN — Medication 40 MILLIEQUIVALENT(S): at 09:07

## 2019-11-20 NOTE — DISCHARGE NOTE PROVIDER - NSDCCPCAREPLAN_GEN_ALL_CORE_FT
PRINCIPAL DISCHARGE DIAGNOSIS  Diagnosis: Pericardial effusion  Assessment and Plan of Treatment: Pericardial effusion      SECONDARY DISCHARGE DIAGNOSES  Diagnosis: Mediastinal fibrosis  Assessment and Plan of Treatment: Mediastinal fibrosis

## 2019-11-20 NOTE — DISCHARGE NOTE PROVIDER - NSDCFUSCHEDAPPT_GEN_ALL_CORE_FT
MIRANDA RAYO ; 11/21/2019 ; NPP CT Surg [address]   MIRANDA RAYO ; 11/25/2019 ; NPP Cardio [address]

## 2019-11-20 NOTE — DISCHARGE NOTE PROVIDER - NSDCFUADDINST_GEN_ALL_CORE_FT
Please come to ED or Call Cardio thoracic office at [phone] if Chest pain, Shortness of Breath,  Nausea & vomiting, oozing from wounds, 2 lb increase in weight in 24 hours.

## 2019-11-20 NOTE — DISCHARGE NOTE PROVIDER - CARE PROVIDER_API CALL
Scott Peters)  Surgery; Thoracic and Cardiac Surgery  [address] [place] [address]  Phone: [phone]  Fax: [phone]  Follow Up Time:

## 2019-11-20 NOTE — DISCHARGE NOTE PROVIDER - NSDCMRMEDTOKEN_GEN_ALL_CORE_FT
Lasix 40 mg oral tablet: 1 tab(s) orally once a day   metoprolol tartrate 25 mg oral tablet: 1 tab(s) orally 2 times a day  potassium chloride 20 mEq oral tablet, extended release: 1 tab(s) orally once a day

## 2019-11-20 NOTE — DISCHARGE NOTE PROVIDER - NSDCACTIVITY_GEN_ALL_CORE
Walking - Outdoors allowed/Stairs allowed/Walking - Indoors allowed/No heavy lifting/straining/Do not drive or operate machinery/Showering allowed/Driving allowed

## 2019-11-20 NOTE — DISCHARGE NOTE PROVIDER - HOSPITAL COURSE
Subjective: pt in chair wife at bedside.  Pt is set up to go to [facility] on  for follow up apt.  Pt does not have insurance and cannot pay out of pocket expense     for his medical bills.  With patients findings it was explained the importance of following up at the clinic         T(C): 36.8 (19 @ 11:00), Max: 37.1 (19 @ 21:54)    HR: 99 (19 @ 11:00) (99 - 106)    BP: 122/85 (19 @ 11:00) (122/85 - 135/90)    RR: 17 (19 @ 11:00) (16 - 17)    SpO2: 99% (19 @ 11:00) (98% - 100%) RA               Daily       Daily Weight in k.6 (2019 04:55)    Admit Wt: Drug Dosing Weight    Telemetry:   SR     LVEF:  60%    MEDICATIONS    dextrose 40% Gel 15 Gram(s) Oral once PRN    dextrose 5%. 1000 milliLiter(s) IV Continuous <Continuous>    dextrose 50% Injectable 12.5 Gram(s) IV Push once    dextrose 50% Injectable 25 Gram(s) IV Push once    dextrose 50% Injectable 25 Gram(s) IV Push once    enoxaparin Injectable 40 milliGRAM(s) SubCutaneous daily    furosemide   Injectable 40 milliGRAM(s) IV Push two times a day    glucagon  Injectable 1 milliGRAM(s) IntraMuscular once PRN    insulin lispro (HumaLOG) corrective regimen sliding scale   SubCutaneous Before meals and at bedtime    metoprolol tartrate 25 milliGRAM(s) Oral two times a day    pantoprazole    Tablet 40 milliGRAM(s) Oral before breakfast    potassium chloride    Tablet ER 40 milliEquivalent(s) Oral every 4 hours    sodium chloride 0.9% lock flush 3 milliLiter(s) IV Push every 8 hours    sodium chloride 0.9%. 300 milliLiter(s) IV Continuous <Continuous>        MEDICATIONS  (PRN):    dextrose 40% Gel 15 Gram(s) Oral once PRN Blood Glucose LESS THAN 70 milliGRAM(s)/deciliter    glucagon  Injectable 1 milliGRAM(s) IntraMuscular once PRN Glucose LESS THAN 70 milligrams/deciliter    PHYSICAL EXAM    General: alert awake NAD    Respiratory: decreased at bases b/l     CV: RRR S1 S2     Abdomen:  Soft NT ND + BS     Extremities:  1+ edema b/l LE     I&O's Detail    2019 07:01  -  2019 07:00    -------------------------------------------------------    IN:      Oral Fluid: 120 mL    Total IN: 120 mL    OUT:      Voided: 1100 mL    Total OUT: 1100 mL    Total NET: -980 mL    LABS            134<L>  |  93<L>  |  10.0    ----------------------------<  146<H>    3.1<L>   |  27.0  |  0.66    Ca    8.7      2019 05:57    Phos  3.9     11-20    Mg     1.8         TPro  6.6  /  Alb  3.0<L>  /  TBili  0.6  /  DBili  x   /  AST  13  /  ALT  12  /  AlkPhos  94  11-2                    11.4     13.97 )-----------( 471      ( 2019 05:57 )               36.3     LIVER FUNCTIONS - ( 2019 05:57 )    Alb: 3.0 g/dL / Pro: 6.6 g/dL / ALK PHOS: 94 U/L / ALT: 12 U/L / AST: 13 U/L / GGT: x           44 year old male with PMH as above.  S/P subxiphoid pericardial window and pericardial biopsy on 19 at [facility] with Dr. Peters .  Postoperative course complicated by AF/SVT which converted to SR.  Was found to be + lymes on that admission and has completed his course of IV and PO antibiotics.  Patient presented to the ER on  from Dr. Castro office s/p in office TTE that showed an increased pericardial effusion. Patient found to have infiltrate on Chest CT performed  that is concerning for infiltrative process / neoplastic disease, hematology/ oncology recommend ID follow up as outpaitent.  IR was consulted for pericardial effusion, no intervention at this time. Ct scan of abdomen pelvis with contrast on  show infiltrative process progressing to the retroperitoneal soft tissue surrounding both kidneys causing moderate bilateral hydronephrosis.     11/15 IR pericardial 8 Taj[place] drain for 55ml bloody drainage.    ID consulted regarding infiltrative process on CT > deemed likely not infectious.  Recommended recalling hem/onc.  Hem/onc recalled.  Rheumatology called.  Labs pending.    : Abd/Renal MRI done: showing retroperitoneal fibrosis, mod. margoth. hydronephrosis, fibrosis prox. SMA, RIAN, margoth. renal artery, resulting luminal narrowing    : Right and left cardiac cath today, normal coronaries, increased wedge pressure 24, no evidence of constriction or restriction (as per cardiology documentation, official cath report pending)     pericardial AI removed.    Problem/Plan - 1:    ·  Problem: Pericardial effusion.  Plan: Recurrent pericardial effusion s/p subxiphoid pericardial window 19.    Percutaneous pericardial drain placed by Dr Randall 11/15 with 55 cc bloody drainage.     Echo  revealed only small pericardial effusion with no tamponade physiology.    Remove pericardial drain today per Dr. Peters.    Right and left heart cath yesterday with Dr. Holly normal coronaries, increased wedge pressure 24, no evidence of constriction or restriction (as per cardiology documentation, official cath report pending).     Problem/Plan - 2:    ·  Problem: Mediastinal fibrosis.  Plan: AND Retroperitoneal fibrosis    CT on  show moderate effusion with infiltrative process surrounding pulmonary vasculature and extended to the abdomen, Repeat CT Abd/pelvis with contrast ordered   showed infiltrative process in the retroperitoneal soft tissue surrounding both kidneys and ureters causing bilateral hydronephrosis. Renal ultrasound obtained.    Renal/abd MRI ordered and done , showing retroperitoneal fibrosis, mod. margoth. hydronephrosis, fibrosis prox. SMA, RIAN, margoth. renal artery, resulting luminal narrowing.     Problem/Plan - 3:    ·  Problem: Lyme disease.  Plan: Completed treatment    ID consulted: will observe off abx.     Problem/Plan - 4:    ·  Problem: Localized edema.  Plan: moderate lower extremity edema.    Continue daily IV lasix.    LE dopplers on  show no DVT.     Plan to discahrge home today and pt will follow up with the [facility] on .    Dr Peters aware of plan.

## 2019-11-20 NOTE — DISCHARGE NOTE NURSING/CASE MANAGEMENT/SOCIAL WORK - PATIENT PORTAL LINK FT
You can access the FollowMyHealth Patient Portal offered by [facility] by registering at the following website: [email or web address]. By joining [facility]’s FollowMyHealth portal, you will also be able to view your health information using other applications (apps) compatible with our system.

## 2019-11-20 NOTE — DISCHARGE NOTE PROVIDER - NSDCCAREPROVSEEN_GEN_ALL_CORE_FT
Scott Peters  Patient assessment, examination, discharge planning, coordination of care, patient and family education and counseling took me 45 minutes to complete.     Pt went home

## 2019-11-20 NOTE — DISCHARGE NOTE PROVIDER - NSDCCPTREATMENT_GEN_ALL_CORE_FT
PRINCIPAL PROCEDURE  Procedure: Percutaneous drainage of pericardial cavity  Findings and Treatment:

## 2019-11-21 ENCOUNTER — APPOINTMENT (OUTPATIENT)
Dept: CARDIOTHORACIC SURGERY | Facility: CLINIC | Age: 44
End: 2019-11-21

## 2019-11-25 ENCOUNTER — APPOINTMENT (OUTPATIENT)
Dept: CARDIOLOGY | Facility: CLINIC | Age: 44
End: 2019-11-25

## 2019-12-09 LAB
CULTURE RESULTS: SIGNIFICANT CHANGE UP
SPECIMEN SOURCE: SIGNIFICANT CHANGE UP

## 2019-12-27 ENCOUNTER — APPOINTMENT (OUTPATIENT)
Dept: CARDIOLOGY | Facility: CLINIC | Age: 44
End: 2019-12-27
Payer: SELF-PAY

## 2019-12-27 ENCOUNTER — NON-APPOINTMENT (OUTPATIENT)
Age: 44
End: 2019-12-27

## 2019-12-27 VITALS
HEART RATE: 95 BPM | HEIGHT: 68 IN | OXYGEN SATURATION: 99 % | WEIGHT: 238 LBS | SYSTOLIC BLOOD PRESSURE: 140 MMHG | BODY MASS INDEX: 36.07 KG/M2 | DIASTOLIC BLOOD PRESSURE: 84 MMHG

## 2019-12-27 PROCEDURE — 99211 OFF/OP EST MAY X REQ PHY/QHP: CPT

## 2019-12-27 RX ORDER — DOCUSATE SODIUM 100 MG/1
CAPSULE ORAL
Refills: 0 | Status: DISCONTINUED | COMMUNITY
End: 2019-12-27

## 2019-12-27 RX ORDER — LINEZOLID 600 MG/1
600 TABLET, FILM COATED ORAL
Qty: 180 | Refills: 3 | Status: DISCONTINUED | COMMUNITY
End: 2019-12-27

## 2019-12-27 RX ORDER — IBUPROFEN 600 MG/1
600 TABLET, FILM COATED ORAL
Qty: 90 | Refills: 1 | Status: DISCONTINUED | COMMUNITY
Start: 2019-09-23 | End: 2019-12-27

## 2019-12-27 RX ORDER — L.ACIDOPH/L.BULG/B.BIF/S.THERM 1B-250 MG
TABLET ORAL
Refills: 0 | Status: DISCONTINUED | COMMUNITY
End: 2019-12-27

## 2019-12-27 RX ORDER — METFORMIN HYDROCHLORIDE 1000 MG/1
1000 TABLET, COATED ORAL
Refills: 0 | Status: DISCONTINUED | COMMUNITY
Start: 2019-09-09 | End: 2019-12-27

## 2019-12-27 RX ORDER — COLCHICINE 0.6 MG/1
0.6 CAPSULE ORAL
Qty: 90 | Refills: 3 | Status: DISCONTINUED | COMMUNITY
Start: 2019-09-23 | End: 2019-12-27

## 2019-12-28 PROCEDURE — 71046 X-RAY EXAM CHEST 2 VIEWS: CPT | Mod: 26

## 2019-12-28 PROCEDURE — 71250 CT THORAX DX C-: CPT | Mod: 26

## 2019-12-28 PROCEDURE — 99285 EMERGENCY DEPT VISIT HI MDM: CPT

## 2019-12-29 ENCOUNTER — OUTPATIENT (OUTPATIENT)
Dept: OUTPATIENT SERVICES | Facility: HOSPITAL | Age: 44
LOS: 1 days | End: 2019-12-29

## 2019-12-29 ENCOUNTER — INPATIENT (INPATIENT)
Facility: HOSPITAL | Age: 44
LOS: 1 days | Discharge: HOME | End: 2019-12-31
Attending: FAMILY MEDICINE
Payer: MEDICAID

## 2019-12-29 PROCEDURE — 99254 IP/OBS CNSLTJ NEW/EST MOD 60: CPT

## 2019-12-30 ENCOUNTER — OUTPATIENT (OUTPATIENT)
Dept: OUTPATIENT SERVICES | Facility: HOSPITAL | Age: 44
LOS: 1 days | End: 2019-12-30

## 2019-12-30 PROCEDURE — 99233 SBSQ HOSP IP/OBS HIGH 50: CPT

## 2019-12-30 NOTE — ED ADULT NURSE NOTE - PAIN RATING/NUMBER SCALE (0-10): REST
Called pt's mom to schedule peds endo gender appt; mom declined psychology appt and scheduled appt with Dr. Fernandez in the gender clinic on Jan 24th at 10a.  Mom given instructions to gender clinic; verbalized understanding.   0

## 2019-12-31 ENCOUNTER — OUTPATIENT (OUTPATIENT)
Dept: OUTPATIENT SERVICES | Facility: HOSPITAL | Age: 44
LOS: 1 days | End: 2019-12-31

## 2019-12-31 PROCEDURE — 99233 SBSQ HOSP IP/OBS HIGH 50: CPT

## 2020-01-08 LAB
CULTURE RESULTS: SIGNIFICANT CHANGE UP
SPECIMEN SOURCE: SIGNIFICANT CHANGE UP

## 2020-01-13 ENCOUNTER — APPOINTMENT (OUTPATIENT)
Dept: CARDIOLOGY | Facility: CLINIC | Age: 45
End: 2020-01-13
Payer: SELF-PAY

## 2020-01-13 VITALS
WEIGHT: 238 LBS | BODY MASS INDEX: 35.25 KG/M2 | HEART RATE: 98 BPM | HEIGHT: 69 IN | OXYGEN SATURATION: 98 % | DIASTOLIC BLOOD PRESSURE: 70 MMHG | SYSTOLIC BLOOD PRESSURE: 126 MMHG

## 2020-01-13 PROCEDURE — 99214 OFFICE O/P EST MOD 30 MIN: CPT

## 2020-01-13 RX ORDER — METOPROLOL TARTRATE 25 MG/1
25 TABLET, FILM COATED ORAL TWICE DAILY
Qty: 60 | Refills: 2 | Status: DISCONTINUED | COMMUNITY
End: 2020-01-13

## 2020-01-13 NOTE — REASON FOR VISIT
[Follow-Up - Clinic] : a clinic follow-up of [Hypertension] : hypertension [Medication Management] : Medication management [Spouse] : spouse [FreeTextEntry1] : Pericardial effusion, effusive-contstrictive pericarditis

## 2020-01-13 NOTE — REVIEW OF SYSTEMS
[Shortness Of Breath] : no shortness of breath [see HPI] : see HPI [Feeling Fatigued] : feeling fatigued [Dyspnea on exertion] : not dyspnea during exertion [Chest Pain] : no chest pain [Lower Ext Edema] : lower extremity edema [Palpitations] : no palpitations [Negative] : Heme/Lymph

## 2020-01-13 NOTE — HISTORY OF PRESENT ILLNESS
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  His initial presentation was in 3/19 in [place].  He had a moderate effusion at that time and was discharged home without anti-inflammatory treatment.  He then presented to our practice where he was found to have a large pericardial effusion.  He went to [facility] and had a pericardial tap yielding 700 cc of fluid.  1 week later he reaccumulated and was tapped again by myself yielding an additional 700 cc of straw-colored fluid.  Echo while in the hospital showed reaccumulation of the fluid after drain removal and he was transferred to [facility] for CT Sx eval.  A pericardial window was performed by Dr. Peters and he was eventually discharged from the hospital.  He reports improvement in his symptoms with less JOHNSON but he is still getting tired easily and has LE edema.  We performed a limited TTE in the echo lab which revealed reaccumulation of fluid around with RA and RV without echo signs of tamponade.  There was less than 25% variation in his mitral inflow as well.  Parasternal long views showed septal bounce consistent with constrictive pericarditis. \par \par Since our last visit he had a pericardial window done.  He felt well for some time but developed symptoms of PND and orthopnea.  Repeat limited echo done in our office 12/27/2020 revealed trace to small pericardial effusion and large right and left plerual effusions.  He still has stage 2 diastolic dysfunction. He went to ED for IV diuresis. He was diuresed, placed back on colchicine and ibuprofen and is feeling better.

## 2020-01-13 NOTE — PHYSICAL EXAM
[Normal Appearance] : normal appearance [General Appearance - Well Developed] : well developed [General Appearance - Well Nourished] : well nourished [Well Groomed] : well groomed [No Deformities] : no deformities [General Appearance - In No Acute Distress] : no acute distress [Eyelids - No Xanthelasma] : the eyelids demonstrated no xanthelasmas [Normal Conjunctiva] : the conjunctiva exhibited no abnormalities [No Oral Pallor] : no oral pallor [No Oral Cyanosis] : no oral cyanosis [Normal Oral Mucosa] : normal oral mucosa [Respiration, Rhythm And Depth] : normal respiratory rhythm and effort [Auscultation Breath Sounds / Voice Sounds] : lungs were clear to auscultation bilaterally [Exaggerated Use Of Accessory Muscles For Inspiration] : no accessory muscle use [Heart Sounds] : normal S1 and S2 [Heart Rate And Rhythm] : heart rate and rhythm were normal [Murmurs] : no murmurs present [Bowel Sounds] : normal bowel sounds [Abdomen Soft] : soft [Abdomen Tenderness] : non-tender [Abdomen Mass (___ Cm)] : no abdominal mass palpated [FreeTextEntry1] : obese [Abnormal Walk] : normal gait [Gait - Sufficient For Exercise Testing] : the gait was sufficient for exercise testing [Cyanosis, Localized] : no localized cyanosis [Nail Clubbing] : no clubbing of the fingernails [Petechial Hemorrhages (___cm)] : no petechial hemorrhages [No Venous Stasis] : no venous stasis [Skin Color & Pigmentation] : normal skin color and pigmentation [] : no rash [Skin Lesions] : no skin lesions [No Skin Ulcers] : no skin ulcer [No Xanthoma] : no  xanthoma was observed [Oriented To Time, Place, And Person] : oriented to person, place, and time [Affect] : the affect was normal [Mood] : the mood was normal [No Anxiety] : not feeling anxious

## 2020-01-13 NOTE — DISCUSSION/SUMMARY
[___ Month(s)] : [unfilled] month(s) [FreeTextEntry1] : 44M w/ PMH as above presenting for routine follow up.  He has effusive-constrictive pericardial disease. He should remain on diuresis. He was placed back on Ibuprofen 800mg TID with colchicine 0.6mg BID. He He should have his Ibuprofen titrated down very slowly over a period of months. Colchicine should remain for at least 3 months. PPI for stomach protection. Follow up in 1 month\par

## 2020-02-24 ENCOUNTER — APPOINTMENT (OUTPATIENT)
Dept: CARDIOLOGY | Facility: CLINIC | Age: 45
End: 2020-02-24

## 2020-05-20 NOTE — PATIENT PROFILE ADULT - FLU SEASON?
Valtrex Pregnancy And Lactation Text: this medication is Pregnancy Category B and is considered safe during pregnancy. This medication is not directly found in breast milk but it's metabolite acyclovir is present. Rituxan Counseling:  I discussed with the patient the risks of Rituxan infusions. Side effects can include infusion reactions, severe drug rashes including mucocutaneous reactions, reactivation of latent hepatitis and other infections and rarely progressive multifocal leukoencephalopathy.  All of the patient's questions and concerns were addressed. Glycopyrrolate Pregnancy And Lactation Text: This medication is Pregnancy Category B and is considered safe during pregnancy. It is unknown if it is excreted breast milk. Arava Pregnancy And Lactation Text: This medication is Pregnancy Category X and is absolutely contraindicated during pregnancy. It is unknown if it is excreted in breast milk. Detail Level: Simple Griseofulvin Pregnancy And Lactation Text: This medication is Pregnancy Category X and is known to cause serious birth defects. It is unknown if this medication is excreted in breast milk but breast feeding should be avoided. Enbrel Counseling:  I discussed with the patient the risks of etanercept including but not limited to myelosuppression, immunosuppression, autoimmune hepatitis, demyelinating diseases, lymphoma, and infections.  The patient understands that monitoring is required including a PPD at baseline and must alert us or the primary physician if symptoms of infection or other concerning signs are noted. Cosentyx Pregnancy And Lactation Text: This medication is Pregnancy Category B and is considered safe during pregnancy. It is unknown if this medication is excreted in breast milk. Erivedge Counseling- I discussed with the patient the risks of Erivedge including but not limited to nausea, vomiting, diarrhea, constipation, weight loss, changes in the sense of taste, decreased appetite, muscle spasms, and hair loss.  The patient verbalized understanding of the proper use and possible adverse effects of Erivedge.  All of the patient's questions and concerns were addressed. Taltz Pregnancy And Lactation Text: The risk during pregnancy and breastfeeding is uncertain with this medication. Nsaids Pregnancy And Lactation Text: These medications are considered safe up to 30 weeks gestation. It is excreted in breast milk. Tazorac Counseling:  Patient advised that medication is irritating and drying.  Patient may need to apply sparingly and wash off after an hour before eventually leaving it on overnight.  The patient verbalized understanding of the proper use and possible adverse effects of tazorac.  All of the patient's questions and concerns were addressed. Minoxidil Pregnancy And Lactation Text: This medication has not been assigned a Pregnancy Risk Category but animal studies failed to show danger with the topical medication. It is unknown if the medication is excreted in breast milk. Cimetidine Counseling:  I discussed with the patient the risks of Cimetidine including but not limited to gynecomastia, headache, diarrhea, nausea, drowsiness, arrhythmias, pancreatitis, skin rashes, psychosis, bone marrow suppression and kidney toxicity. Fluconazole Counseling:  Patient counseled regarding adverse effects of fluconazole including but not limited to headache, diarrhea, nausea, upset stomach, liver function test abnormalities, taste disturbance, and stomach pain.  There is a rare possibility of liver failure that can occur when taking fluconazole.  The patient understands that monitoring of LFTs and kidney function test may be required, especially at baseline. The patient verbalized understanding of the proper use and possible adverse effects of fluconazole.  All of the patient's questions and concerns were addressed. Colchicine Counseling:  Patient counseled regarding adverse effects including but not limited to stomach upset (nausea, vomiting, stomach pain, or diarrhea).  Patient instructed to limit alcohol consumption while taking this medication.  Colchicine may reduce blood counts especially with prolonged use.  The patient understands that monitoring of kidney function and blood counts may be required, especially at baseline. The patient verbalized understanding of the proper use and possible adverse effects of colchicine.  All of the patient's questions and concerns were addressed. Ketoconazole Counseling:   Patient counseled regarding improving absorption with orange juice.  Adverse effects include but are not limited to breast enlargement, headache, diarrhea, nausea, upset stomach, liver function test abnormalities, taste disturbance, and stomach pain.  There is a rare possibility of liver failure that can occur when taking ketoconazole. The patient understands that monitoring of LFTs may be required, especially at baseline. The patient verbalized understanding of the proper use and possible adverse effects of ketoconazole.  All of the patient's questions and concerns were addressed. Mirvaso Pregnancy And Lactation Text: This medication has not been assigned a Pregnancy Risk Category. It is unknown if the medication is excreted in breast milk. Dapsone Counseling: I discussed with the patient the risks of dapsone including but not limited to hemolytic anemia, agranulocytosis, rashes, methemoglobinemia, kidney failure, peripheral neuropathy, headaches, GI upset, and liver toxicity.  Patients who start dapsone require monitoring including baseline LFTs and weekly CBCs for the first month, then every month thereafter.  The patient verbalized understanding of the proper use and possible adverse effects of dapsone.  All of the patient's questions and concerns were addressed. Rhofade Counseling: Rhofade is a topical medication which can decrease superficial blood flow where applied. Side effects are uncommon and include stinging, redness and allergic reactions. Wartpeel Pregnancy And Lactation Text: This medication is Pregnancy Category X and contraindicated in pregnancy and in women who may become pregnant. It is unknown if this medication is excreted in breast milk. Skyrizi Counseling: I discussed with the patient the risks of risankizumab-rzaa including but not limited to immunosuppression, and serious infections.  The patient understands that monitoring is required including a PPD at baseline and must alert us or the primary physician if symptoms of infection or other concerning signs are noted. Itraconazole Pregnancy And Lactation Text: This medication is Pregnancy Category C and it isn't know if it is safe during pregnancy. It is also excreted in breast milk. Azithromycin Counseling:  I discussed with the patient the risks of azithromycin including but not limited to GI upset, allergic reaction, drug rash, diarrhea, and yeast infections. Spironolactone Pregnancy And Lactation Text: This medication can cause feminization of the male fetus and should be avoided during pregnancy. The active metabolite is also found in breast milk. Nsaids Counseling: NSAID Counseling: I discussed with the patient that NSAIDs should be taken with food. Prolonged use of NSAIDs can result in the development of stomach ulcers.  Patient advised to stop taking NSAIDs if abdominal pain occurs.  The patient verbalized understanding of the proper use and possible adverse effects of NSAIDs.  All of the patient's questions and concerns were addressed. Cellcept Counseling:  I discussed with the patient the risks of mycophenolate mofetil including but not limited to infection/immunosuppression, GI upset, hypokalemia, hypercholesterolemia, bone marrow suppression, lymphoproliferative disorders, malignancy, GI ulceration/bleed/perforation, colitis, interstitial lung disease, kidney failure, progressive multifocal leukoencephalopathy, and birth defects.  The patient understands that monitoring is required including a baseline creatinine and regular CBC testing. In addition, patient must alert us immediately if symptoms of infection or other concerning signs are noted. High Dose Vitamin A Counseling: Side effects reviewed, pt to contact office should one occur. Cimzia Pregnancy And Lactation Text: This medication crosses the placenta but can be considered safe in certain situations. Cimzia may be excreted in breast milk. Clofazimine Counseling:  I discussed with the patient the risks of clofazimine including but not limited to skin and eye pigmentation, liver damage, nausea/vomiting, gastrointestinal bleeding and allergy. Solaraze Pregnancy And Lactation Text: This medication is Pregnancy Category B and is considered safe. There is some data to suggest avoiding during the third trimester. It is unknown if this medication is excreted in breast milk. Ketoconazole Pregnancy And Lactation Text: This medication is Pregnancy Category C and it isn't know if it is safe during pregnancy. It is also excreted in breast milk and breast feeding isn't recommended. Protopic Pregnancy And Lactation Text: This medication is Pregnancy Category C. It is unknown if this medication is excreted in breast milk when applied topically. Colchicine Pregnancy And Lactation Text: This medication is Pregnancy Category C and isn't considered safe during pregnancy. It is excreted in breast milk. Rifampin Pregnancy And Lactation Text: This medication is Pregnancy Category C and it isn't know if it is safe during pregnancy. It is also excreted in breast milk and should not be used if you are breast feeding. Yes... Terbinafine Counseling: Patient counseling regarding adverse effects of terbinafine including but not limited to headache, diarrhea, rash, upset stomach, liver function test abnormalities, itching, taste/smell disturbance, nausea, abdominal pain, and flatulence.  There is a rare possibility of liver failure that can occur when taking terbinafine.  The patient understands that a baseline LFT and kidney function test may be required. The patient verbalized understanding of the proper use and possible adverse effects of terbinafine.  All of the patient's questions and concerns were addressed. Niacinamide Counseling: I recommended taking niacin or niacinamide, also know as vitamin B3, twice daily. Recent evidence suggests that taking vitamin B3 (500 mg twice daily) can reduce the risk of actinic keratoses and non-melanoma skin cancers. Side effects of vitamin B3 include flushing and headache. Doxepin Counseling:  Patient advised that the medication is sedating and not to drive a car after taking this medication. Patient informed of potential adverse effects including but not limited to dry mouth, urinary retention, and blurry vision.  The patient verbalized understanding of the proper use and possible adverse effects of doxepin.  All of the patient's questions and concerns were addressed. Protopic Counseling: Patient may experience a mild burning sensation during topical application. Protopic is not approved in children less than 2 years of age. There have been case reports of hematologic and skin malignancies in patients using topical calcineurin inhibitors although causality is questionable. Rifampin Counseling: I discussed with the patient the risks of rifampin including but not limited to liver damage, kidney damage, red-orange body fluids, nausea/vomiting and severe allergy. Metronidazole Counseling:  I discussed with the patient the risks of metronidazole including but not limited to seizures, nausea/vomiting, a metallic taste in the mouth, nausea/vomiting and severe allergy. Doxepin Pregnancy And Lactation Text: This medication is Pregnancy Category C and it isn't known if it is safe during pregnancy. It is also excreted in breast milk and breast feeding isn't recommended. Hydroxyzine Counseling: Patient advised that the medication is sedating and not to drive a car after taking this medication.  Patient informed of potential adverse effects including but not limited to dry mouth, urinary retention, and blurry vision.  The patient verbalized understanding of the proper use and possible adverse effects of hydroxyzine.  All of the patient's questions and concerns were addressed. Xelbrindaz Pregnancy And Lactation Text: This medication is Pregnancy Category D and is not considered safe during pregnancy.  The risk during breast feeding is also uncertain. Cyclophosphamide Pregnancy And Lactation Text: This medication is Pregnancy Category D and it isn't considered safe during pregnancy. This medication is excreted in breast milk. Ivermectin Counseling:  Patient instructed to take medication on an empty stomach with a full glass of water.  Patient informed of potential adverse effects including but not limited to nausea, diarrhea, dizziness, itching, and swelling of the extremities or lymph nodes.  The patient verbalized understanding of the proper use and possible adverse effects of ivermectin.  All of the patient's questions and concerns were addressed. Cellcept Pregnancy And Lactation Text: This medication is Pregnancy Category D and isn't considered safe during pregnancy. It is unknown if this medication is excreted in breast milk. Ilumya Counseling: I discussed with the patient the risks of tildrakizumab including but not limited to immunosuppression, malignancy, posterior leukoencephalopathy syndrome, and serious infections.  The patient understands that monitoring is required including a PPD at baseline and must alert us or the primary physician if symptoms of infection or other concerning signs are noted. Finasteride Pregnancy And Lactation Text: This medication is absolutely contraindicated during pregnancy. It is unknown if it is excreted in breast milk. Topical Retinoid counseling:  Patient advised to apply a pea-sized amount only at bedtime and wait 30 minutes after washing their face before applying.  If too drying, patient may add a non-comedogenic moisturizer. The patient verbalized understanding of the proper use and possible adverse effects of retinoids.  All of the patient's questions and concerns were addressed. Dapsone Pregnancy And Lactation Text: This medication is Pregnancy Category C and is not considered safe during pregnancy or breast feeding. Doxycycline Counseling:  Patient counseled regarding possible photosensitivity and increased risk for sunburn.  Patient instructed to avoid sunlight, if possible.  When exposed to sunlight, patients should wear protective clothing, sunglasses, and sunscreen.  The patient was instructed to call the office immediately if the following severe adverse effects occur:  hearing changes, easy bruising/bleeding, severe headache, or vision changes.  The patient verbalized understanding of the proper use and possible adverse effects of doxycycline.  All of the patient's questions and concerns were addressed. Gabapentin Counseling: I discussed with the patient the risks of gabapentin including but not limited to dizziness, somnolence, fatigue and ataxia. Elidel Pregnancy And Lactation Text: This medication is Pregnancy Category C. It is unknown if this medication is excreted in breast milk. Propranolol Counseling:  I discussed with the patient the risks of propranolol including but not limited to low heart rate, low blood pressure, low blood sugar, restlessness and increased cold sensitivity. They should call the office if they experience any of these side effects. Infliximab Counseling:  I discussed with the patient the risks of infliximab including but not limited to myelosuppression, immunosuppression, autoimmune hepatitis, demyelinating diseases, lymphoma, and serious infections.  The patient understands that monitoring is required including a PPD at baseline and must alert us or the primary physician if symptoms of infection or other concerning signs are noted. Erythromycin Counseling:  I discussed with the patient the risks of erythromycin including but not limited to GI upset, allergic reaction, drug rash, diarrhea, increase in liver enzymes, and yeast infections. Arava Counseling:  Patient counseled regarding adverse effects of Arava including but not limited to nausea, vomiting, abnormalities in liver function tests. Patients may develop mouth sores, rash, diarrhea, and abnormalities in blood counts. The patient understands that monitoring is required including LFTs and blood counts.  There is a rare possibility of scarring of the liver and lung problems that can occur when taking methotrexate. Persistent nausea, loss of appetite, pale stools, dark urine, cough, and shortness of breath should be reported immediately. Patient advised to discontinue Arava treatment and consult with a physician prior to attempting conception. The patient will have to undergo a treatment to eliminate Arava from the body prior to conception. Tetracycline Counseling: Patient counseled regarding possible photosensitivity and increased risk for sunburn.  Patient instructed to avoid sunlight, if possible.  When exposed to sunlight, patients should wear protective clothing, sunglasses, and sunscreen.  The patient was instructed to call the office immediately if the following severe adverse effects occur:  hearing changes, easy bruising/bleeding, severe headache, or vision changes.  The patient verbalized understanding of the proper use and possible adverse effects of tetracycline.  All of the patient's questions and concerns were addressed. Patient understands to avoid pregnancy while on therapy due to potential birth defects. Spironolactone Counseling: Patient advised regarding risks of diarrhea, abdominal pain, hyperkalemia, birth defects (for female patients), liver toxicity and renal toxicity. The patient may need blood work to monitor liver and kidney function and potassium levels while on therapy. The patient verbalized understanding of the proper use and possible adverse effects of spironolactone.  All of the patient's questions and concerns were addressed. Tetracycline Pregnancy And Lactation Text: This medication is Pregnancy Category D and not consider safe during pregnancy. It is also excreted in breast milk. Minocycline Counseling: Patient advised regarding possible photosensitivity and discoloration of the teeth, skin, lips, tongue and gums.  Patient instructed to avoid sunlight, if possible.  When exposed to sunlight, patients should wear protective clothing, sunglasses, and sunscreen.  The patient was instructed to call the office immediately if the following severe adverse effects occur:  hearing changes, easy bruising/bleeding, severe headache, or vision changes.  The patient verbalized understanding of the proper use and possible adverse effects of minocycline.  All of the patient's questions and concerns were addressed. Drysol Pregnancy And Lactation Text: This medication is considered safe during pregnancy and breast feeding. High Dose Vitamin A Pregnancy And Lactation Text: High dose vitamin A therapy is contraindicated during pregnancy and breast feeding. Prednisone Counseling:  I discussed with the patient the risks of prolonged use of prednisone including but not limited to weight gain, insomnia, osteoporosis, mood changes, diabetes, susceptibility to infection, glaucoma and high blood pressure.  In cases where prednisone use is prolonged, patients should be monitored with blood pressure checks, serum glucose levels and an eye exam.  Additionally, the patient may need to be placed on GI prophylaxis, PCP prophylaxis, and calcium and vitamin D supplementation and/or a bisphosphonate.  The patient verbalized understanding of the proper use and the possible adverse effects of prednisone.  All of the patient's questions and concerns were addressed. Zyclara Counseling:  I discussed with the patient the risks of imiquimod including but not limited to erythema, scaling, itching, weeping, crusting, and pain.  Patient understands that the inflammatory response to imiquimod is variable from person to person and was educated regarded proper titration schedule.  If flu-like symptoms develop, patient knows to discontinue the medication and contact us. Bexarotene Counseling:  I discussed with the patient the risks of bexarotene including but not limited to hair loss, dry lips/skin/eyes, liver abnormalities, hyperlipidemia, pancreatitis, depression/suicidal ideation, photosensitivity, drug rash/allergic reactions, hypothyroidism, anemia, leukopenia, infection, cataracts, and teratogenicity.  Patient understands that they will need regular blood tests to check lipid profile, liver function tests, white blood cell count, thyroid function tests and pregnancy test if applicable. Doxycycline Pregnancy And Lactation Text: This medication is Pregnancy Category D and not consider safe during pregnancy. It is also excreted in breast milk but is considered safe for shorter treatment courses. Metronidazole Pregnancy And Lactation Text: This medication is Pregnancy Category B and considered safe during pregnancy.  It is also excreted in breast milk. Valtrex Counseling: I discussed with the patient the risks of valacyclovir including but not limited to kidney damage, nausea, vomiting and severe allergy.  The patient understands that if the infection seems to be worsening or is not improving, they are to call. Glycopyrrolate Counseling:  I discussed with the patient the risks of glycopyrrolate including but not limited to skin rash, drowsiness, dry mouth, difficulty urinating, and blurred vision. Prednisone Pregnancy And Lactation Text: This medication is Pregnancy Category C and it isn't know if it is safe during pregnancy. This medication is excreted in breast milk. 5-Fu Counseling: 5-Fluorouracil Counseling:  I discussed with the patient the risks of 5-fluorouracil including but not limited to erythema, scaling, itching, weeping, crusting, and pain. Acitretin Pregnancy And Lactation Text: This medication is Pregnancy Category X and should not be given to women who are pregnant or may become pregnant in the future. This medication is excreted in breast milk. SSKI Counseling:  I discussed with the patient the risks of SSKI including but not limited to thyroid abnormalities, metallic taste, GI upset, fever, headache, acne, arthralgias, paraesthesias, lymphadenopathy, easy bleeding, arrhythmias, and allergic reaction. Albendazole Pregnancy And Lactation Text: This medication is Pregnancy Category C and it isn't known if it is safe during pregnancy. It is also excreted in breast milk. Otezla Counseling: The side effects of Otezla were discussed with the patient, including but not limited to worsening or new depression, weight loss, diarrhea, nausea, upper respiratory tract infection, and headache. Patient instructed to call the office should any adverse effect occur.  The patient verbalized understanding of the proper use and possible adverse effects of Otezla.  All the patient's questions and concerns were addressed. Solaraze Counseling:  I discussed with the patient the risks of Solaraze including but not limited to erythema, scaling, itching, weeping, crusting, and pain. Niacinamide Pregnancy And Lactation Text: These medications are considered safe during pregnancy. Benzoyl Peroxide Pregnancy And Lactation Text: This medication is Pregnancy Category C. It is unknown if benzoyl peroxide is excreted in breast milk. Itraconazole Counseling:  I discussed with the patient the risks of itraconazole including but not limited to liver damage, nausea/vomiting, neuropathy, and severe allergy.  The patient understands that this medication is best absorbed when taken with acidic beverages such as non-diet cola or ginger ale.  The patient understands that monitoring is required including baseline LFTs and repeat LFTs at intervals.  The patient understands that they are to contact us or the primary physician if concerning signs are noted. Include Pregnancy/Lactation Warning?: No Isotretinoin Counseling: Patient should get monthly blood tests, not donate blood, not drive at night if vision affected, not share medication, and not undergo elective surgery for 6 months after tx completed. Side effects reviewed, pt to contact office should one occur. Azathioprine Counseling:  I discussed with the patient the risks of azathioprine including but not limited to myelosuppression, immunosuppression, hepatotoxicity, lymphoma, and infections.  The patient understands that monitoring is required including baseline LFTs, Creatinine, possible TPMP genotyping and weekly CBCs for the first month and then every 2 weeks thereafter.  The patient verbalized understanding of the proper use and possible adverse effects of azathioprine.  All of the patient's questions and concerns were addressed. Cimetidine Pregnancy And Lactation Text: This medication is Pregnancy Category B and is considered safe during pregnancy. It is also excreted in breast milk and breast feeding isn't recommended. Cosentyx Counseling:  I discussed with the patient the risks of Cosentyx including but not limited to worsening of Crohn's disease, immunosuppression, allergic reactions and infections.  The patient understands that monitoring is required including a PPD at baseline and must alert us or the primary physician if symptoms of infection or other concerning signs are noted. Picato Counseling:  I discussed with the patient the risks of Picato including but not limited to erythema, scaling, itching, weeping, crusting, and pain. Griseofulvin Counseling:  I discussed with the patient the risks of griseofulvin including but not limited to photosensitivity, cytopenia, liver damage, nausea/vomiting and severe allergy.  The patient understands that this medication is best absorbed when taken with a fatty meal (e.g., ice cream or french fries). Bactrim Counseling:  I discussed with the patient the risks of sulfa antibiotics including but not limited to GI upset, allergic reaction, drug rash, diarrhea, dizziness, photosensitivity, and yeast infections.  Rarely, more serious reactions can occur including but not limited to aplastic anemia, agranulocytosis, methemoglobinemia, blood dyscrasias, liver or kidney failure, lung infiltrates or desquamative/blistering drug rashes. Otezla Pregnancy And Lactation Text: This medication is Pregnancy Category C and it isn't known if it is safe during pregnancy. It is unknown if it is excreted in breast milk. Opioid Pregnancy And Lactation Text: These medications can lead to premature delivery and should be avoided during pregnancy. These medications are also present in breast milk in small amounts. Quinolones Counseling:  I discussed with the patient the risks of fluoroquinolones including but not limited to GI upset, allergic reaction, drug rash, diarrhea, dizziness, photosensitivity, yeast infections, liver function test abnormalities, tendonitis/tendon rupture. Benzoyl Peroxide Counseling: Patient counseled that medicine may cause skin irritation and bleach clothing.  In the event of skin irritation, the patient was advised to reduce the amount of the drug applied or use it less frequently.   The patient verbalized understanding of the proper use and possible adverse effects of benzoyl peroxide.  All of the patient's questions and concerns were addressed. Isotretinoin Pregnancy And Lactation Text: This medication is Pregnancy Category X and is considered extremely dangerous during pregnancy. It is unknown if it is excreted in breast milk. Clindamycin Counseling: I counseled the patient regarding use of clindamycin as an antibiotic for prophylactic and/or therapeutic purposes. Clindamycin is active against numerous classes of bacteria, including skin bacteria. Side effects may include nausea, diarrhea, gastrointestinal upset, rash, hives, yeast infections, and in rare cases, colitis. Albendazole Counseling:  I discussed with the patient the risks of albendazole including but not limited to cytopenia, kidney damage, nausea/vomiting and severe allergy.  The patient understands that this medication is being used in an off-label manner. Xeljanz Counseling: I discussed with the patient the risks of Xeljanz therapy including increased risk of infection, liver issues, headache, diarrhea, or cold symptoms. Live vaccines should be avoided. They were instructed to call if they have any problems. Mirvaso Counseling: Mirvaso is a topical medication which can decrease superficial blood flow where applied. Side effects are uncommon and include stinging, redness and allergic reactions. Taltz Counseling: I discussed with the patient the risks of ixekizumab including but not limited to immunosuppression, serious infections, worsening of inflammatory bowel disease and drug reactions.  The patient understands that monitoring is required including a PPD at baseline and must alert us or the primary physician if symptoms of infection or other concerning signs are noted. Finasteride Male Counseling: Finasteride Counseling:  I discussed with the patient the risks of use of finasteride including but not limited to decreased libido, decreased ejaculate volume, gynecomastia, and depression. Women should not handle medication.  All of the patient's questions and concerns were addressed. Erythromycin Pregnancy And Lactation Text: This medication is Pregnancy Category B and is considered safe during pregnancy. It is also excreted in breast milk. Methotrexate Pregnancy And Lactation Text: This medication is Pregnancy Category X and is known to cause fetal harm. This medication is excreted in breast milk. Opioid Counseling: I discussed with the patient the potential side effects of opioids including but not limited to addiction, altered mental status, and depression. I stressed avoiding alcohol, benzodiazepines, muscle relaxants and sleep aids unless specifically okayed by a physician. The patient verbalized understanding of the proper use and possible adverse effects of opioids. All of the patient's questions and concerns were addressed. They were instructed to flush the remaining pills down the toilet if they did not need them for pain. Cephalexin Pregnancy And Lactation Text: This medication is Pregnancy Category B and considered safe during pregnancy.  It is also excreted in breast milk but can be used safely for shorter doses. Oxybutynin Pregnancy And Lactation Text: This medication is Pregnancy Category B and is considered safe during pregnancy. It is unknown if it is excreted in breast milk. Hydroxychloroquine Counseling:  I discussed with the patient that a baseline ophthalmologic exam is needed at the start of therapy and every year thereafter while on therapy. A CBC may also be warranted for monitoring.  The side effects of this medication were discussed with the patient, including but not limited to agranulocytosis, aplastic anemia, seizures, rashes, retinopathy, and liver toxicity. Patient instructed to call the office should any adverse effect occur.  The patient verbalized understanding of the proper use and possible adverse effects of Plaquenil.  All the patient's questions and concerns were addressed. Clindamycin Pregnancy And Lactation Text: This medication can be used in pregnancy if certain situations. Clindamycin is also present in breast milk. Methotrexate Counseling:  Patient counseled regarding adverse effects of methotrexate including but not limited to nausea, vomiting, abnormalities in liver function tests. Patients may develop mouth sores, rash, diarrhea, and abnormalities in blood counts. The patient understands that monitoring is required including LFT's and blood counts.  There is a rare possibility of scarring of the liver and lung problems that can occur when taking methotrexate. Persistent nausea, loss of appetite, pale stools, dark urine, cough, and shortness of breath should be reported immediately. Patient advised to discontinue methotrexate treatment at least three months before attempting to become pregnant.  I discussed the need for folate supplements while taking methotrexate.  These supplements can decrease side effects during methotrexate treatment. The patient verbalized understanding of the proper use and possible adverse effects of methotrexate.  All of the patient's questions and concerns were addressed. Sski Pregnancy And Lactation Text: This medication is Pregnancy Category D and isn't considered safe during pregnancy. It is excreted in breast milk. Birth Control Pills Counseling: Birth Control Pill Counseling: I discussed with the patient the potential side effects of OCPs including but not limited to increased risk of stroke, heart attack, thrombophlebitis, deep venous thrombosis, hepatic adenomas, breast changes, GI upset, headaches, and depression.  The patient verbalized understanding of the proper use and possible adverse effects of OCPs. All of the patient's questions and concerns were addressed. Cyclophosphamide Counseling:  I discussed with the patient the risks of cyclophosphamide including but not limited to hair loss, hormonal abnormalities, decreased fertility, abdominal pain, diarrhea, nausea and vomiting, bone marrow suppression and infection. The patient understands that monitoring is required while taking this medication. Elidel Counseling: Patient may experience a mild burning sensation during topical application. Elidel is not approved in children less than 2 years of age. There have been case reports of hematologic and skin malignancies in patients using topical calcineurin inhibitors although causality is questionable. Humira Counseling:  I discussed with the patient the risks of adalimumab including but not limited to myelosuppression, immunosuppression, autoimmune hepatitis, demyelinating diseases, lymphoma, and serious infections.  The patient understands that monitoring is required including a PPD at baseline and must alert us or the primary physician if symptoms of infection or other concerning signs are noted. Dupixent Pregnancy And Lactation Text: This medication likely crosses the placenta but the risk for the fetus is uncertain. This medication is excreted in breast milk. Cephalexin Counseling: I counseled the patient regarding use of cephalexin as an antibiotic for prophylactic and/or therapeutic purposes. Cephalexin (commonly prescribed under brand name Keflex) is a cephalosporin antibiotic which is active against numerous classes of bacteria, including most skin bacteria. Side effects may include nausea, diarrhea, gastrointestinal upset, rash, hives, yeast infections, and in rare cases, hepatitis, kidney disease, seizures, fever, confusion, neurologic symptoms, and others. Patients with severe allergies to penicillin medications are cautioned that there is about a 10% incidence of cross-reactivity with cephalosporins. When possible, patients with penicillin allergies should use alternatives to cephalosporins for antibiotic therapy. Cimzia Counseling:  I discussed with the patient the risks of Cimzia including but not limited to immunosuppression, allergic reactions and infections.  The patient understands that monitoring is required including a PPD at baseline and must alert us or the primary physician if symptoms of infection or other concerning signs are noted. Tranexamic Acid Pregnancy And Lactation Text: It is unknown if this medication is safe during pregnancy or breast feeding. Rituxan Pregnancy And Lactation Text: This medication is Pregnancy Category C and it isn't know if it is safe during pregnancy. It is unknown if this medication is excreted in breast milk but similar antibodies are known to be excreted. Sarecycline Counseling: Patient advised regarding possible photosensitivity and discoloration of the teeth, skin, lips, tongue and gums.  Patient instructed to avoid sunlight, if possible.  When exposed to sunlight, patients should wear protective clothing, sunglasses, and sunscreen.  The patient was instructed to call the office immediately if the following severe adverse effects occur:  hearing changes, easy bruising/bleeding, severe headache, or vision changes.  The patient verbalized understanding of the proper use and possible adverse effects of sarecycline.  All of the patient's questions and concerns were addressed. Minoxidil Counseling: Minoxidil is a topical medication which can increase blood flow where it is applied. It is uncertain how this medication increases hair growth. Side effects are uncommon and include stinging and allergic reactions. Topical Sulfur Applications Pregnancy And Lactation Text: This medication is Pregnancy Category C and has an unknown safety profile during pregnancy. It is unknown if this topical medication is excreted in breast milk. Dupixent Counseling: I discussed with the patient the risks of dupilumab including but not limited to eye infection and irritation, cold sores, injection site reactions, worsening of asthma, allergic reactions and increased risk of parasitic infection.  Live vaccines should be avoided while taking dupilumab. Dupilumab will also interact with certain medications such as warfarin and cyclosporine. The patient understands that monitoring is required and they must alert us or the primary physician if symptoms of infection or other concerning signs are noted. Hydroxyzine Pregnancy And Lactation Text: This medication is not safe during pregnancy and should not be taken. It is also excreted in breast milk and breast feeding isn't recommended. Hydroxychloroquine Pregnancy And Lactation Text: This medication has been shown to cause fetal harm but it isn't assigned a Pregnancy Risk Category. There are small amounts excreted in breast milk. Carac Counseling:  I discussed with the patient the risks of Carac including but not limited to erythema, scaling, itching, weeping, crusting, and pain. Topical Clindamycin Counseling: Patient counseled that this medication may cause skin irritation or allergic reactions.  In the event of skin irritation, the patient was advised to reduce the amount of the drug applied or use it less frequently.   The patient verbalized understanding of the proper use and possible adverse effects of clindamycin.  All of the patient's questions and concerns were addressed. Stelara Counseling:  I discussed with the patient the risks of ustekinumab including but not limited to immunosuppression, malignancy, posterior leukoencephalopathy syndrome, and serious infections.  The patient understands that monitoring is required including a PPD at baseline and must alert us or the primary physician if symptoms of infection or other concerning signs are noted. Xolair Counseling:  Patient informed of potential adverse effects including but not limited to fever, muscle aches, rash and allergic reactions.  The patient verbalized understanding of the proper use and possible adverse effects of Xolair.  All of the patient's questions and concerns were addressed. Tazorac Pregnancy And Lactation Text: This medication is not safe during pregnancy. It is unknown if this medication is excreted in breast milk. Tranexamic Acid Counseling:  Patient advised of the small risk of bleeding problems with tranexamic acid. They were also instructed to call if they developed any nausea, vomiting or diarrhea. All of the patient's questions and concerns were addressed. Simponi Counseling:  I discussed with the patient the risks of golimumab including but not limited to myelosuppression, immunosuppression, autoimmune hepatitis, demyelinating diseases, lymphoma, and serious infections.  The patient understands that monitoring is required including a PPD at baseline and must alert us or the primary physician if symptoms of infection or other concerning signs are noted. Drysol Counseling:  I discussed with the patient the risks of drysol/aluminum chloride including but not limited to skin rash, itching, irritation, burning. Imiquimod Counseling:  I discussed with the patient the risks of imiquimod including but not limited to erythema, scaling, itching, weeping, crusting, and pain.  Patient understands that the inflammatory response to imiquimod is variable from person to person and was educated regarded proper titration schedule.  If flu-like symptoms develop, patient knows to discontinue the medication and contact us. Eucrisa Counseling: Patient may experience a mild burning sensation during topical application. Eucrisa is not approved in children less than 2 years of age. Cyclosporine Counseling:  I discussed with the patient the risks of cyclosporine including but not limited to hypertension, gingival hyperplasia,myelosuppression, immunosuppression, liver damage, kidney damage, neurotoxicity, lymphoma, and serious infections. The patient understands that monitoring is required including baseline blood pressure, CBC, CMP, lipid panel and uric acid, and then 1-2 times monthly CMP and blood pressure. Tremfya Counseling: I discussed with the patient the risks of guselkumab including but not limited to immunosuppression, serious infections, worsening of inflammatory bowel disease and drug reactions.  The patient understands that monitoring is required including a PPD at baseline and must alert us or the primary physician if symptoms of infection or other concerning signs are noted. Bexarotene Pregnancy And Lactation Text: This medication is Pregnancy Category X and should not be given to women who are pregnant or may become pregnant. This medication should not be used if you are breast feeding. Hydroquinone Counseling:  Patient advised that medication may result in skin irritation, lightening (hypopigmentation), dryness, and burning.  In the event of skin irritation, the patient was advised to reduce the amount of the drug applied or use it less frequently.  Rarely, spots that are treated with hydroquinone can become darker (pseudoochronosis).  Should this occur, patient instructed to stop medication and call the office. The patient verbalized understanding of the proper use and possible adverse effects of hydroquinone.  All of the patient's questions and concerns were addressed. Topical Sulfur Applications Counseling: Topical Sulfur Counseling: Patient counseled that this medication may cause skin irritation or allergic reactions.  In the event of skin irritation, the patient was advised to reduce the amount of the drug applied or use it less frequently.   The patient verbalized understanding of the proper use and possible adverse effects of topical sulfur application.  All of the patient's questions and concerns were addressed. Oxybutynin Counseling:  I discussed with the patient the risks of oxybutynin including but not limited to skin rash, drowsiness, dry mouth, difficulty urinating, and blurred vision. Acitretin Counseling:  I discussed with the patient the risks of acitretin including but not limited to hair loss, dry lips/skin/eyes, liver damage, hyperlipidemia, depression/suicidal ideation, photosensitivity.  Serious rare side effects can include but are not limited to pancreatitis, pseudotumor cerebri, bony changes, clot formation/stroke/heart attack.  Patient understands that alcohol is contraindicated since it can result in liver toxicity and significantly prolong the elimination of the drug by many years. Wartpeel Counseling:  I discussed with the patient the risks of Wartpeel including but not limited to erythema, scaling, itching, weeping, crusting, and pain. Thalidomide Counseling: I discussed with the patient the risks of thalidomide including but not limited to birth defects, anxiety, weakness, chest pain, dizziness, cough and severe allergy. Birth Control Pills Pregnancy And Lactation Text: This medication should be avoided if pregnant and for the first 30 days post-partum. Azithromycin Pregnancy And Lactation Text: This medication is considered safe during pregnancy and is also secreted in breast milk. Siliq Counseling:  I discussed with the patient the risks of Siliq including but not limited to new or worsening depression, suicidal thoughts and behavior, immunosuppression, malignancy, posterior leukoencephalopathy syndrome, and serious infections.  The patient understands that monitoring is required including a PPD at baseline and must alert us or the primary physician if symptoms of infection or other concerning signs are noted. There is also a special program designed to monitor depression which is required with Siliq. Xolair Pregnancy And Lactation Text: This medication is Pregnancy Category B and is considered safe during pregnancy. This medication is excreted in breast milk. Odomzo Counseling- I discussed with the patient the risks of Odomzo including but not limited to nausea, vomiting, diarrhea, constipation, weight loss, changes in the sense of taste, decreased appetite, muscle spasms, and hair loss.  The patient verbalized understanding of the proper use and possible adverse effects of Odomzo.  All of the patient's questions and concerns were addressed. Bactrim Pregnancy And Lactation Text: This medication is Pregnancy Category D and is known to cause fetal risk.  It is also excreted in breast milk. Propranolol Pregnancy And Lactation Text: This medication is Pregnancy Category C and it isn't known if it is safe during pregnancy. It is excreted in breast milk.

## 2020-05-26 ENCOUNTER — NON-APPOINTMENT (OUTPATIENT)
Age: 45
End: 2020-05-26

## 2020-05-26 ENCOUNTER — APPOINTMENT (OUTPATIENT)
Dept: CARDIOLOGY | Facility: CLINIC | Age: 45
End: 2020-05-26
Payer: SELF-PAY

## 2020-05-26 VITALS
DIASTOLIC BLOOD PRESSURE: 90 MMHG | SYSTOLIC BLOOD PRESSURE: 160 MMHG | HEART RATE: 91 BPM | TEMPERATURE: 98.1 F | HEIGHT: 69 IN | WEIGHT: 250 LBS | BODY MASS INDEX: 37.03 KG/M2 | OXYGEN SATURATION: 98 %

## 2020-05-26 DIAGNOSIS — M79.604 PAIN IN RIGHT LEG: ICD-10-CM

## 2020-05-26 PROCEDURE — 99214 OFFICE O/P EST MOD 30 MIN: CPT

## 2020-05-26 RX ORDER — DOCUSATE SODIUM 100 MG/1
100 CAPSULE ORAL
Refills: 0 | Status: DISCONTINUED | COMMUNITY
End: 2020-05-26

## 2020-05-26 RX ORDER — ENOXAPARIN SODIUM 100 MG/ML
40 INJECTION SUBCUTANEOUS
Refills: 0 | Status: DISCONTINUED | COMMUNITY
End: 2020-05-26

## 2020-05-26 RX ORDER — OMEPRAZOLE 40 MG/1
40 CAPSULE, DELAYED RELEASE ORAL
Qty: 90 | Refills: 1 | Status: DISCONTINUED | COMMUNITY
Start: 2020-01-13 | End: 2020-05-26

## 2020-05-26 RX ORDER — METOPROLOL SUCCINATE 25 MG/1
25 TABLET, EXTENDED RELEASE ORAL DAILY
Refills: 0 | Status: DISCONTINUED | COMMUNITY
End: 2020-05-26

## 2020-05-26 RX ORDER — FUROSEMIDE 40 MG/1
40 TABLET ORAL DAILY
Qty: 90 | Refills: 3 | Status: DISCONTINUED | COMMUNITY
Start: 2020-05-26 | End: 2020-05-26

## 2020-05-26 RX ORDER — SPIRONOLACTONE 25 MG/1
25 TABLET ORAL DAILY
Refills: 0 | Status: DISCONTINUED | COMMUNITY
End: 2020-05-26

## 2020-05-26 RX ORDER — IBUPROFEN 800 MG/1
800 TABLET, FILM COATED ORAL EVERY 8 HOURS
Qty: 90 | Refills: 1 | Status: DISCONTINUED | COMMUNITY
Start: 2020-01-13 | End: 2020-05-26

## 2020-05-26 RX ORDER — INSULIN LISPRO 100 [IU]/ML
INJECTION, SOLUTION INTRAVENOUS; SUBCUTANEOUS
Refills: 0 | Status: DISCONTINUED | COMMUNITY
End: 2020-05-26

## 2020-05-26 RX ORDER — COLCHICINE 0.6 MG/1
0.6 TABLET ORAL
Qty: 180 | Refills: 0 | Status: DISCONTINUED | COMMUNITY
End: 2020-05-26

## 2020-05-26 RX ORDER — FUROSEMIDE 40 MG/1
40 TABLET ORAL DAILY
Qty: 180 | Refills: 3 | Status: DISCONTINUED | COMMUNITY
Start: 2019-09-09 | End: 2020-05-26

## 2020-05-26 RX ORDER — POTASSIUM CHLORIDE 20 MEQ
TABLET, EXT RELEASE, PARTICLES/CRYSTALS ORAL
Refills: 0 | Status: DISCONTINUED | COMMUNITY
End: 2020-05-26

## 2020-05-26 NOTE — REASON FOR VISIT
[Follow-Up - Clinic] : a clinic follow-up of [Hypertension] : hypertension [Medication Management] : Medication management [Spouse] : spouse [Source: ______] : History obtained from [unfilled] [FreeTextEntry1] : Pericardial effusion, effusive-contstrictive pericarditis

## 2020-05-26 NOTE — REVIEW OF SYSTEMS
[see HPI] : see HPI [Feeling Fatigued] : feeling fatigued [Lower Ext Edema] : lower extremity edema [Negative] : Heme/Lymph [Shortness Of Breath] : no shortness of breath [Dyspnea on exertion] : not dyspnea during exertion [Chest Pain] : no chest pain [Palpitations] : no palpitations

## 2020-05-26 NOTE — DISCUSSION/SUMMARY
[FreeTextEntry1] : 45M w/ PMH as above presenting for routine follow up.  He has effusive-constrictive pericardial disease. He should remain on diuresis, Lasix 40mg dailiy.  I asked him to cut down on his ibuprofen to 800mg daily and this should remain for 1 week. Then he can discontinue completely. Discontinue colchicine. Follow up with rheumatology. Urged pulmonary evaluation for WALKER workup. I will order a follow up echocardiogram and arterial duplex of his right lower extremity given discomfort. \par \par Follow up in 3 months. \par

## 2020-05-26 NOTE — PHYSICAL EXAM
[General Appearance - Well Developed] : well developed [Normal Appearance] : normal appearance [Well Groomed] : well groomed [No Deformities] : no deformities [General Appearance - Well Nourished] : well nourished [General Appearance - In No Acute Distress] : no acute distress [Normal Conjunctiva] : the conjunctiva exhibited no abnormalities [Normal Oral Mucosa] : normal oral mucosa [Eyelids - No Xanthelasma] : the eyelids demonstrated no xanthelasmas [No Oral Pallor] : no oral pallor [No Oral Cyanosis] : no oral cyanosis [Respiration, Rhythm And Depth] : normal respiratory rhythm and effort [Exaggerated Use Of Accessory Muscles For Inspiration] : no accessory muscle use [Auscultation Breath Sounds / Voice Sounds] : lungs were clear to auscultation bilaterally [Heart Rate And Rhythm] : heart rate and rhythm were normal [Heart Sounds] : normal S1 and S2 [Murmurs] : no murmurs present [Gait - Sufficient For Exercise Testing] : the gait was sufficient for exercise testing [Abnormal Walk] : normal gait [Nail Clubbing] : no clubbing of the fingernails [Cyanosis, Localized] : no localized cyanosis [Petechial Hemorrhages (___cm)] : no petechial hemorrhages [Skin Color & Pigmentation] : normal skin color and pigmentation [] : no rash [Skin Lesions] : no skin lesions [No Venous Stasis] : no venous stasis [No Xanthoma] : no  xanthoma was observed [No Skin Ulcers] : no skin ulcer [Oriented To Time, Place, And Person] : oriented to person, place, and time [Affect] : the affect was normal [Mood] : the mood was normal [No Anxiety] : not feeling anxious [FreeTextEntry1] : no bruising at right groin area. No palpable mass.

## 2020-05-26 NOTE — HISTORY OF PRESENT ILLNESS
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  His initial presentation was in 3/19 in [place].  He had a moderate effusion at that time and was discharged home without anti-inflammatory treatment.  He then presented to our practice where he was found to have a large pericardial effusion.  He went to [facility] and had a pericardial tap yielding 700 cc of fluid.  1 week later he reaccumulated and was tapped again yielding an additional 700 cc of straw-colored fluid.  Echo while in the hospital showed reaccumulation of the fluid after drain removal and he was transferred to [facility] for CT Sx eval.  A pericardial window was performed by Dr. Peters on 9/30/19 and he was eventually discharged from the hospital.  He reports improvement in his symptoms with less JOHNSON but he is still getting tired easily and has LE edema. \par \par Current Health Status:\par Patient has fatigue and SOB with at least moderate exertional activity. No orthopnea. Wife notices apneic episodes at night. He is on the colchine and Ibuprofen 800mg BID. Swelling in his lower extremities has improved. Since his catheterization on 11/18/2019 he states when he moves his leg his groin hurts. There is no lump or bruising. \par \par Since seeing him last he did start his workup with rheumatology but things got delayed due to COVID-19. He has follow up with rheumatology next week.

## 2020-06-12 ENCOUNTER — APPOINTMENT (OUTPATIENT)
Dept: CARDIOLOGY | Facility: CLINIC | Age: 45
End: 2020-06-12
Payer: SELF-PAY

## 2020-06-12 PROCEDURE — 93306 TTE W/DOPPLER COMPLETE: CPT

## 2020-06-29 ENCOUNTER — NON-APPOINTMENT (OUTPATIENT)
Age: 45
End: 2020-06-29

## 2020-06-29 ENCOUNTER — APPOINTMENT (OUTPATIENT)
Dept: CARDIOLOGY | Facility: CLINIC | Age: 45
End: 2020-06-29
Payer: SELF-PAY

## 2020-06-29 ENCOUNTER — OUTPATIENT (OUTPATIENT)
Dept: OUTPATIENT SERVICES | Facility: HOSPITAL | Age: 45
LOS: 1 days | End: 2020-06-29

## 2020-06-29 VITALS
SYSTOLIC BLOOD PRESSURE: 166 MMHG | OXYGEN SATURATION: 97 % | DIASTOLIC BLOOD PRESSURE: 80 MMHG | HEIGHT: 68 IN | WEIGHT: 242 LBS | BODY MASS INDEX: 36.68 KG/M2 | HEART RATE: 87 BPM

## 2020-06-29 PROCEDURE — 99285 EMERGENCY DEPT VISIT HI MDM: CPT

## 2020-06-29 PROCEDURE — 71250 CT THORAX DX C-: CPT | Mod: 26

## 2020-06-29 PROCEDURE — 99213 OFFICE O/P EST LOW 20 MIN: CPT

## 2020-06-29 PROCEDURE — 71046 X-RAY EXAM CHEST 2 VIEWS: CPT | Mod: 26

## 2020-06-29 RX ORDER — FUROSEMIDE 40 MG/1
40 TABLET ORAL DAILY
Qty: 90 | Refills: 3 | Status: ACTIVE | COMMUNITY
Start: 2020-06-29 | End: 1900-01-01

## 2020-06-29 RX ORDER — GLIPIZIDE 10 MG/1
10 TABLET ORAL DAILY
Refills: 0 | Status: DISCONTINUED | COMMUNITY
End: 2020-06-29

## 2020-06-29 NOTE — REASON FOR VISIT
[Follow-Up - Clinic] : a clinic follow-up of [Hypertension] : hypertension [Medication Management] : Medication management [Source: ______] : History obtained from [unfilled] [FreeTextEntry1] : Pericardial effusion, effusive-contstrictive pericarditis

## 2020-06-29 NOTE — DISCUSSION/SUMMARY
[FreeTextEntry1] : 45M w/ PMH as above presenting for routine follow up.  He has effusive-constrictive pericardial disease. He should remain on diuresis, Lasix 40mg daily. I am sending his to the hospital for a right heart cath for tomorrow. Dr. Castro is aware and will perform the procedure to demonstrate hemodynamic evidence of constriction, which if present, he should have pericardial stripping. \par \par Follow up with rheumatology as well.\par \par

## 2020-06-29 NOTE — PHYSICAL EXAM
[Normal Appearance] : normal appearance [General Appearance - Well Developed] : well developed [Well Groomed] : well groomed [General Appearance - Well Nourished] : well nourished [No Deformities] : no deformities [General Appearance - In No Acute Distress] : no acute distress [Normal Conjunctiva] : the conjunctiva exhibited no abnormalities [No Oral Pallor] : no oral pallor [Eyelids - No Xanthelasma] : the eyelids demonstrated no xanthelasmas [Normal Oral Mucosa] : normal oral mucosa [No Oral Cyanosis] : no oral cyanosis [Respiration, Rhythm And Depth] : normal respiratory rhythm and effort [Auscultation Breath Sounds / Voice Sounds] : lungs were clear to auscultation bilaterally [Exaggerated Use Of Accessory Muscles For Inspiration] : no accessory muscle use [Heart Rate And Rhythm] : heart rate and rhythm were normal [Heart Sounds] : normal S1 and S2 [Murmurs] : no murmurs present [Gait - Sufficient For Exercise Testing] : the gait was sufficient for exercise testing [Abnormal Walk] : normal gait [Nail Clubbing] : no clubbing of the fingernails [Petechial Hemorrhages (___cm)] : no petechial hemorrhages [Cyanosis, Localized] : no localized cyanosis [Skin Color & Pigmentation] : normal skin color and pigmentation [] : no rash [No Venous Stasis] : no venous stasis [Skin Lesions] : no skin lesions [No Skin Ulcers] : no skin ulcer [No Xanthoma] : no  xanthoma was observed [Oriented To Time, Place, And Person] : oriented to person, place, and time [Affect] : the affect was normal [Mood] : the mood was normal [No Anxiety] : not feeling anxious [FreeTextEntry1] : no bruising at right groin area. No palpable mass.

## 2020-06-30 ENCOUNTER — INPATIENT (INPATIENT)
Facility: HOSPITAL | Age: 45
LOS: 12 days | Discharge: HOME | DRG: 546 | End: 2020-07-13
Attending: INTERNAL MEDICINE | Admitting: THORACIC SURGERY (CARDIOTHORACIC VASCULAR SURGERY)
Payer: MEDICAID

## 2020-06-30 ENCOUNTER — OUTPATIENT (OUTPATIENT)
Dept: OUTPATIENT SERVICES | Facility: HOSPITAL | Age: 45
LOS: 1 days | End: 2020-06-30

## 2020-06-30 ENCOUNTER — INPATIENT (INPATIENT)
Facility: HOSPITAL | Age: 45
LOS: 0 days | Discharge: ANOTHER HOSPITAL | End: 2020-06-30
Payer: MEDICAID

## 2020-06-30 VITALS
SYSTOLIC BLOOD PRESSURE: 137 MMHG | TEMPERATURE: 99 F | HEART RATE: 102 BPM | OXYGEN SATURATION: 96 % | DIASTOLIC BLOOD PRESSURE: 92 MMHG | RESPIRATION RATE: 18 BRPM

## 2020-06-30 DIAGNOSIS — I25.10 ATHEROSCLEROTIC HEART DISEASE OF NATIVE CORONARY ARTERY WITHOUT ANGINA PECTORIS: ICD-10-CM

## 2020-06-30 DIAGNOSIS — Z98.890 OTHER SPECIFIED POSTPROCEDURAL STATES: Chronic | ICD-10-CM

## 2020-06-30 DIAGNOSIS — N13.30 UNSPECIFIED HYDRONEPHROSIS: ICD-10-CM

## 2020-06-30 DIAGNOSIS — N13.5 CROSSING VESSEL AND STRICTURE OF URETER WITHOUT HYDRONEPHROSIS: ICD-10-CM

## 2020-06-30 DIAGNOSIS — I10 ESSENTIAL (PRIMARY) HYPERTENSION: ICD-10-CM

## 2020-06-30 DIAGNOSIS — I31.1 CHRONIC CONSTRICTIVE PERICARDITIS: ICD-10-CM

## 2020-06-30 DIAGNOSIS — A69.20 LYME DISEASE, UNSPECIFIED: ICD-10-CM

## 2020-06-30 DIAGNOSIS — E11.9 TYPE 2 DIABETES MELLITUS WITHOUT COMPLICATIONS: ICD-10-CM

## 2020-06-30 LAB
A1C WITH ESTIMATED AVERAGE GLUCOSE RESULT: 8.7 % — HIGH (ref 4–5.6)
ALBUMIN SERPL ELPH-MCNC: 3.3 G/DL — SIGNIFICANT CHANGE UP (ref 3.3–5.2)
ALP SERPL-CCNC: 79 U/L — SIGNIFICANT CHANGE UP (ref 40–120)
ALT FLD-CCNC: 10 U/L — SIGNIFICANT CHANGE UP
ANION GAP SERPL CALC-SCNC: 11 MMOL/L — SIGNIFICANT CHANGE UP (ref 5–17)
APPEARANCE UR: CLEAR — SIGNIFICANT CHANGE UP
APTT BLD: 36.2 SEC — HIGH (ref 27.5–35.5)
AST SERPL-CCNC: 13 U/L — SIGNIFICANT CHANGE UP
BASOPHILS # BLD AUTO: 0.04 K/UL — SIGNIFICANT CHANGE UP (ref 0–0.2)
BASOPHILS NFR BLD AUTO: 0.3 % — SIGNIFICANT CHANGE UP (ref 0–2)
BILIRUB SERPL-MCNC: 0.3 MG/DL — LOW (ref 0.4–2)
BILIRUB UR-MCNC: NEGATIVE — SIGNIFICANT CHANGE UP
BLD GP AB SCN SERPL QL: SIGNIFICANT CHANGE UP
BUN SERPL-MCNC: 10 MG/DL — SIGNIFICANT CHANGE UP (ref 8–20)
CALCIUM SERPL-MCNC: 9.7 MG/DL — SIGNIFICANT CHANGE UP (ref 8.6–10.2)
CHLORIDE SERPL-SCNC: 101 MMOL/L — SIGNIFICANT CHANGE UP (ref 98–107)
CO2 SERPL-SCNC: 32 MMOL/L — HIGH (ref 22–29)
COLOR SPEC: YELLOW — SIGNIFICANT CHANGE UP
CREAT SERPL-MCNC: 0.78 MG/DL — SIGNIFICANT CHANGE UP (ref 0.5–1.3)
DIFF PNL FLD: NEGATIVE — SIGNIFICANT CHANGE UP
EOSINOPHIL # BLD AUTO: 0.09 K/UL — SIGNIFICANT CHANGE UP (ref 0–0.5)
EOSINOPHIL NFR BLD AUTO: 0.6 % — SIGNIFICANT CHANGE UP (ref 0–6)
ESTIMATED AVERAGE GLUCOSE: 203 MG/DL — HIGH (ref 68–114)
GLUCOSE BLDC GLUCOMTR-MCNC: 157 MG/DL — HIGH (ref 70–99)
GLUCOSE BLDC GLUCOMTR-MCNC: 191 MG/DL — HIGH (ref 70–99)
GLUCOSE SERPL-MCNC: 171 MG/DL — HIGH (ref 70–99)
GLUCOSE UR QL: NEGATIVE MG/DL — SIGNIFICANT CHANGE UP
HCT VFR BLD CALC: 41 % — SIGNIFICANT CHANGE UP (ref 39–50)
HGB BLD-MCNC: 12.6 G/DL — LOW (ref 13–17)
IMM GRANULOCYTES NFR BLD AUTO: 0.4 % — SIGNIFICANT CHANGE UP (ref 0–1.5)
INR BLD: 1.37 RATIO — HIGH (ref 0.88–1.16)
KETONES UR-MCNC: NEGATIVE — SIGNIFICANT CHANGE UP
LEUKOCYTE ESTERASE UR-ACNC: NEGATIVE — SIGNIFICANT CHANGE UP
LYMPHOCYTES # BLD AUTO: 1.77 K/UL — SIGNIFICANT CHANGE UP (ref 1–3.3)
LYMPHOCYTES # BLD AUTO: 12.5 % — LOW (ref 13–44)
MCHC RBC-ENTMCNC: 26.1 PG — LOW (ref 27–34)
MCHC RBC-ENTMCNC: 30.7 GM/DL — LOW (ref 32–36)
MCV RBC AUTO: 84.9 FL — SIGNIFICANT CHANGE UP (ref 80–100)
MONOCYTES # BLD AUTO: 0.72 K/UL — SIGNIFICANT CHANGE UP (ref 0–0.9)
MONOCYTES NFR BLD AUTO: 5.1 % — SIGNIFICANT CHANGE UP (ref 2–14)
MRSA PCR RESULT.: SIGNIFICANT CHANGE UP
NEUTROPHILS # BLD AUTO: 11.5 K/UL — HIGH (ref 1.8–7.4)
NEUTROPHILS NFR BLD AUTO: 81.1 % — HIGH (ref 43–77)
NITRITE UR-MCNC: NEGATIVE — SIGNIFICANT CHANGE UP
NT-PROBNP SERPL-SCNC: 875 PG/ML — HIGH (ref 0–300)
PA ADP PRP-ACNC: 245 PRU — SIGNIFICANT CHANGE UP (ref 180–376)
PH UR: 7 — SIGNIFICANT CHANGE UP (ref 5–8)
PLATELET # BLD AUTO: 505 K/UL — HIGH (ref 150–400)
POTASSIUM SERPL-MCNC: 3.7 MMOL/L — SIGNIFICANT CHANGE UP (ref 3.5–5.3)
POTASSIUM SERPL-SCNC: 3.7 MMOL/L — SIGNIFICANT CHANGE UP (ref 3.5–5.3)
PREALB SERPL-MCNC: 14 MG/DL — LOW (ref 18–38)
PROT SERPL-MCNC: 6.8 G/DL — SIGNIFICANT CHANGE UP (ref 6.6–8.7)
PROT UR-MCNC: NEGATIVE MG/DL — SIGNIFICANT CHANGE UP
PROTHROM AB SERPL-ACNC: 15.7 SEC — HIGH (ref 10.6–13.6)
RBC # BLD: 4.83 M/UL — SIGNIFICANT CHANGE UP (ref 4.2–5.8)
RBC # FLD: 13.9 % — SIGNIFICANT CHANGE UP (ref 10.3–14.5)
S AUREUS DNA NOSE QL NAA+PROBE: DETECTED
SODIUM SERPL-SCNC: 144 MMOL/L — SIGNIFICANT CHANGE UP (ref 135–145)
SP GR SPEC: 1 — LOW (ref 1.01–1.02)
TSH SERPL-MCNC: 3.04 UIU/ML — SIGNIFICANT CHANGE UP (ref 0.27–4.2)
UROBILINOGEN FLD QL: NEGATIVE MG/DL — SIGNIFICANT CHANGE UP
WBC # BLD: 14.18 K/UL — HIGH (ref 3.8–10.5)
WBC # FLD AUTO: 14.18 K/UL — HIGH (ref 3.8–10.5)

## 2020-06-30 PROCEDURE — 71045 X-RAY EXAM CHEST 1 VIEW: CPT | Mod: 26

## 2020-06-30 PROCEDURE — 77012 CT SCAN FOR NEEDLE BIOPSY: CPT | Mod: 26

## 2020-06-30 PROCEDURE — 93010 ELECTROCARDIOGRAM REPORT: CPT

## 2020-06-30 PROCEDURE — 99222 1ST HOSP IP/OBS MODERATE 55: CPT

## 2020-06-30 PROCEDURE — 93453 R&L HRT CATH W/VENTRICLGRPHY: CPT | Mod: 26

## 2020-06-30 PROCEDURE — 49180 BIOPSY ABDOMINAL MASS: CPT

## 2020-06-30 PROCEDURE — 93308 TTE F-UP OR LMTD: CPT | Mod: 26

## 2020-06-30 RX ORDER — INSULIN LISPRO 100/ML
VIAL (ML) SUBCUTANEOUS
Refills: 0 | Status: DISCONTINUED | OUTPATIENT
Start: 2020-06-30 | End: 2020-07-13

## 2020-06-30 RX ORDER — DEXTROSE 50 % IN WATER 50 %
25 SYRINGE (ML) INTRAVENOUS ONCE
Refills: 0 | Status: DISCONTINUED | OUTPATIENT
Start: 2020-06-30 | End: 2020-07-13

## 2020-06-30 RX ORDER — ATORVASTATIN CALCIUM 80 MG/1
10 TABLET, FILM COATED ORAL AT BEDTIME
Refills: 0 | Status: DISCONTINUED | OUTPATIENT
Start: 2020-06-30 | End: 2020-07-13

## 2020-06-30 RX ORDER — BUMETANIDE 0.25 MG/ML
2 INJECTION INTRAMUSCULAR; INTRAVENOUS
Refills: 0 | Status: DISCONTINUED | OUTPATIENT
Start: 2020-06-30 | End: 2020-07-01

## 2020-06-30 RX ORDER — SODIUM CHLORIDE 9 MG/ML
1000 INJECTION, SOLUTION INTRAVENOUS
Refills: 0 | Status: DISCONTINUED | OUTPATIENT
Start: 2020-06-30 | End: 2020-07-13

## 2020-06-30 RX ORDER — SODIUM CHLORIDE 9 MG/ML
3 INJECTION INTRAMUSCULAR; INTRAVENOUS; SUBCUTANEOUS EVERY 8 HOURS
Refills: 0 | Status: DISCONTINUED | OUTPATIENT
Start: 2020-06-30 | End: 2020-07-13

## 2020-06-30 RX ORDER — CHLORHEXIDINE GLUCONATE 213 G/1000ML
15 SOLUTION TOPICAL
Refills: 0 | Status: DISCONTINUED | OUTPATIENT
Start: 2020-06-30 | End: 2020-07-07

## 2020-06-30 RX ORDER — PANTOPRAZOLE SODIUM 20 MG/1
40 TABLET, DELAYED RELEASE ORAL
Refills: 0 | Status: DISCONTINUED | OUTPATIENT
Start: 2020-06-30 | End: 2020-07-13

## 2020-06-30 RX ORDER — GLUCAGON INJECTION, SOLUTION 0.5 MG/.1ML
1 INJECTION, SOLUTION SUBCUTANEOUS ONCE
Refills: 0 | Status: DISCONTINUED | OUTPATIENT
Start: 2020-06-30 | End: 2020-07-13

## 2020-06-30 RX ORDER — DEXTROSE 50 % IN WATER 50 %
12.5 SYRINGE (ML) INTRAVENOUS ONCE
Refills: 0 | Status: DISCONTINUED | OUTPATIENT
Start: 2020-06-30 | End: 2020-07-13

## 2020-06-30 RX ORDER — DEXTROSE 50 % IN WATER 50 %
15 SYRINGE (ML) INTRAVENOUS ONCE
Refills: 0 | Status: DISCONTINUED | OUTPATIENT
Start: 2020-06-30 | End: 2020-07-13

## 2020-06-30 RX ORDER — ACETAMINOPHEN 500 MG
650 TABLET ORAL EVERY 6 HOURS
Refills: 0 | Status: DISCONTINUED | OUTPATIENT
Start: 2020-06-30 | End: 2020-07-13

## 2020-06-30 RX ADMIN — CHLORHEXIDINE GLUCONATE 15 MILLILITER(S): 213 SOLUTION TOPICAL at 18:53

## 2020-06-30 RX ADMIN — BUMETANIDE 2 MILLIGRAM(S): 0.25 INJECTION INTRAMUSCULAR; INTRAVENOUS at 22:05

## 2020-06-30 RX ADMIN — SODIUM CHLORIDE 3 MILLILITER(S): 9 INJECTION INTRAMUSCULAR; INTRAVENOUS; SUBCUTANEOUS at 21:52

## 2020-06-30 RX ADMIN — Medication 2: at 22:04

## 2020-06-30 RX ADMIN — ATORVASTATIN CALCIUM 10 MILLIGRAM(S): 80 TABLET, FILM COATED ORAL at 22:04

## 2020-06-30 NOTE — H&P ADULT - NSHPREVIEWOFSYSTEMS_GEN_ALL_CORE
Review of Systems  GENERAL:  Fevers[] chills[] sweats[] fatigue[x] weight loss[] weight gain []                                      [ ] NEGATIVE  NEURO:  parathesias[] seizures []  syncope []  confusion []                                                                [ x] NEGATIVE                 EYES: glasses[]  blurry vision[]  discharge[] pain[] glaucoma []                                                           [ x] NEGATIVE                 ENMT:  difficulty hearing []  vertigo[]  dysphagia[] epistaxis[] recent dental work []                     [ x] NEGATIVE                 CV:  chest pain[] palpitations[] JOHNSON [] diaphoresis [] edema[]                                                           [x ] NEGATIVE                                 RESPIRATORY:  wheezing[] SOB[x] cough [] sputum[] hemoptysis[]                                                   [ ] NEGATIVE               GI:  nausea[]  vomiting []  diarrhea[] constipation [] melena []                                                          [x ] NEGATIVE            : hematuria[ ]  dysuria[ ] urgency[] incontinence[]                                                                          [ x] NEGATIVE                    MUSKULOSKELETAL:  arthritis[ ]  joint swelling [ ] muscle weakness [ ]                                            [x ] NEGATIVE                     SKIN/BREAST:  rash[ ] itching [ ]  hair loss[ ] masses[ ]                                                                          [x ] NEGATIVE                     PSYCH:  dementia [ ] depresion [ ] anxiety[ ]                                                                                          [x] NEGATIVE                        HEME/LYMPH:  bruises easily[ ] enlarged lymph nodes[ ] tender lymph nodes[ ]                           [ x] NEGATIVE                      ENDOCRINE:  cold intolerance[ ] heat intolerance[ ] polydipsia[ ]                                                      [x ] NEGATIVE

## 2020-06-30 NOTE — H&P ADULT - HISTORY OF PRESENT ILLNESS
44 year old male with PMH cardiomegaly, DM II, HTN, and recurrent pericardial effusions with pericardiocentesis x2 ( 700ml serous drainage both times). He subsequently underwent a subxiphoid pericardial window and pericardial biopsy on 9/30/19 at [facility] with Dr. Peters .  Postoperative course complicated by AF/SVT which converted to SR.  Was found to be + lymes on that admission and has completed his course of IV and PO antibiotics.  Patient then presented to the ER on 11/12 from Dr. Castro office s/p in office TTE that showed an increased pericardial effusion. Patient found to have infiltrate on Chest CT performed 11/13 that is concerning for infiltrative process / neoplastic disease, hematology/ oncology recommend ID follow up as outpatient.  Ct scan of abdomen pelvis with contrast on 11/14 show infiltrative process progressing to the retroperitoneal soft tissue surrounding both kidneys causing moderate bilateral hydronephrosis. ON 11/15 he underwent IR pericardial drain placement for 55ml of bloody drainage.  At that time, ID was consulted regarding infiltrative process on CT, and it was deemed likely not infectious.  Hem/onc recalled and Rheumatology consulted.  An Abd/Renal MRI was done that showed retroperitoneal fibrosis, mod. margoth. hydronephrosis, fibrosis prox. SMA, RIAN, margoth. renal artery, resulting luminal narrowing.  He then underwent a Right and left cardiac cath on 11/18 that showed normal coronaries, increased wedge pressure 24, no evidence of constriction or restriction.  His drain was removed 11/19 pericardial AI removed and he was discharged home.  He was following with his cardiologist (Dr. Mcginnis) and a rheumatologist as an outpatient.    He presented to [facility] 6/29 sent from his cardiologists office with c/o SOB on minimal exertion.  Pt reports that he gets SOB and fatigued with minimal exertion such as doing ADL's and even while talking. He was ruled out for an dMI. CT chest showed stable moderate bilateral pleural effusions with atelectasis, as well as a small stable pericardial effusion with adjacent inflammatory change and small loculated fluid along the right atrial border that could correlate with pericarditis.   He underwent a TTE and right and left heart cath at [facility] but the reports are not available.  He was transferred to [facility] today for evaluation of constrictive pericarditis /pericardial stripping.    Pt is currently sitting up in bed eating dinner.  Denies CP.  Mild SOB noted while pt is speaking to me.  NAD noted.

## 2020-06-30 NOTE — H&P ADULT - NSICDXPASTMEDICALHX_GEN_ALL_CORE_FT
PAST MEDICAL HISTORY:  Cardiomegaly     Diabetes mellitus     Heart failure     Hydronephrosis     Hypertension     Lyme disease     Nonsmoker     Pericardial effusion     Pericarditis

## 2020-06-30 NOTE — H&P ADULT - ASSESSMENT
44 year old male with PMH cardiomegaly, DM II, HTN, and recurrent pericardial effusions with pericardiocentesis x2 ( 700ml serous drainage both times). He subsequently underwent a subxiphoid pericardial window and pericardial biopsy on 9/30/19 at [facility] with Dr. Peters .  Postoperative course complicated by AF/SVT which converted to SR.  Was found to be + lymes on that admission and has completed his course of IV and PO antibiotics.  Patient then presented to the ER on 11/12 from Dr. Castro office s/p in office TTE that showed an increased pericardial effusion. Patient found to have infiltrate on Chest CT performed 11/13 that is concerning for infiltrative process / neoplastic disease, hematology/ oncology recommend ID follow up as outpatient.  Ct scan of abdomen pelvis with contrast on 11/14 show infiltrative process progressing to the retroperitoneal soft tissue surrounding both kidneys causing moderate bilateral hydronephrosis. ON 11/15 he underwent IR pericardial drain placement for 55ml of bloody drainage.  At that time, ID was consulted regarding infiltrative process on CT, and it was deemed likely not infectious.  Hem/onc recalled and Rheumatology consulted.  An Abd/Renal MRI was done that showed retroperitoneal fibrosis, mod. margoth. hydronephrosis, fibrosis prox. SMA, RIAN, margoth. renal artery, resulting luminal narrowing.  He then underwent a Right and left cardiac cath on 11/18 that showed normal coronaries, increased wedge pressure 24, no evidence of constriction or restriction.  His drain was removed 11/19 pericardial AI removed and he was discharged home.  He was following with his cardiologist (Dr. Mcginnis) and a rheumatologist as an outpatient.    He presented to [facility] 6/29 sent from his cardiologists office with c/o SOB on minimal exertion.  Pt reports that he gets SOB and fatigued with minimal exertion such as doing ADL's and even while talking. He was ruled out for an dMI. CT chest showed stable moderate bilateral pleural effusions with atelectasis, as well as a small stable pericardial effusion with adjacent inflammatory change and small loculated fluid along the right atrial border that could correlate with pericarditis.   He underwent a TTE and right and left heart cath at [facility] but the reports are not available.  He was transferred to [facility] today for evaluation of constrictive pericarditis /pericardial stripping.

## 2020-06-30 NOTE — H&P ADULT - NSHPPHYSICALEXAM_GEN_ALL_CORE
Neurology: A&Ox3, nonfocal, MARIA x 4  Respiratory: CTA B/L  CV: RRR, S1S2.  Abdominal: Soft, NT, ND +BS.  Extremities: Moderate BLE edema, + peripheral pulses

## 2020-06-30 NOTE — H&P ADULT - PROBLEM SELECTOR PLAN 1
Admit to CT surgery service to Dr. Peters.  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  Cath report and TTE from [facility] [place] unavailable.  Small stable pericardial effusion on ct scan at [facility] [place] [place].  Transferred for eval for possible pericardial stripping.  labs ordered.  TTE ordered.  Will call rheumatology consult.

## 2020-07-01 DIAGNOSIS — Z29.9 ENCOUNTER FOR PROPHYLACTIC MEASURES, UNSPECIFIED: ICD-10-CM

## 2020-07-01 LAB
A1C WITH ESTIMATED AVERAGE GLUCOSE RESULT: 8.9 % — HIGH (ref 4–5.6)
ALBUMIN SERPL ELPH-MCNC: 3.3 G/DL — SIGNIFICANT CHANGE UP (ref 3.3–5.2)
ALP SERPL-CCNC: 80 U/L — SIGNIFICANT CHANGE UP (ref 40–120)
ALT FLD-CCNC: 10 U/L — SIGNIFICANT CHANGE UP
ANION GAP SERPL CALC-SCNC: 11 MMOL/L — SIGNIFICANT CHANGE UP (ref 5–17)
AST SERPL-CCNC: 16 U/L — SIGNIFICANT CHANGE UP
BASOPHILS # BLD AUTO: 0.03 K/UL — SIGNIFICANT CHANGE UP (ref 0–0.2)
BASOPHILS NFR BLD AUTO: 0.2 % — SIGNIFICANT CHANGE UP (ref 0–2)
BILIRUB SERPL-MCNC: 0.7 MG/DL — SIGNIFICANT CHANGE UP (ref 0.4–2)
BUN SERPL-MCNC: 8 MG/DL — SIGNIFICANT CHANGE UP (ref 8–20)
CALCIUM SERPL-MCNC: 9.2 MG/DL — SIGNIFICANT CHANGE UP (ref 8.6–10.2)
CHLORIDE SERPL-SCNC: 100 MMOL/L — SIGNIFICANT CHANGE UP (ref 98–107)
CO2 SERPL-SCNC: 31 MMOL/L — HIGH (ref 22–29)
CREAT SERPL-MCNC: 0.61 MG/DL — SIGNIFICANT CHANGE UP (ref 0.5–1.3)
EOSINOPHIL # BLD AUTO: 0.08 K/UL — SIGNIFICANT CHANGE UP (ref 0–0.5)
EOSINOPHIL NFR BLD AUTO: 0.5 % — SIGNIFICANT CHANGE UP (ref 0–6)
ESTIMATED AVERAGE GLUCOSE: 209 MG/DL — HIGH (ref 68–114)
GLUCOSE BLDC GLUCOMTR-MCNC: 128 MG/DL — HIGH (ref 70–99)
GLUCOSE BLDC GLUCOMTR-MCNC: 142 MG/DL — HIGH (ref 70–99)
GLUCOSE BLDC GLUCOMTR-MCNC: 148 MG/DL — HIGH (ref 70–99)
GLUCOSE BLDC GLUCOMTR-MCNC: 217 MG/DL — HIGH (ref 70–99)
GLUCOSE SERPL-MCNC: 165 MG/DL — HIGH (ref 70–99)
HCT VFR BLD CALC: 41.5 % — SIGNIFICANT CHANGE UP (ref 39–50)
HGB BLD-MCNC: 12.7 G/DL — LOW (ref 13–17)
IMM GRANULOCYTES NFR BLD AUTO: 0.5 % — SIGNIFICANT CHANGE UP (ref 0–1.5)
LYMPHOCYTES # BLD AUTO: 1.87 K/UL — SIGNIFICANT CHANGE UP (ref 1–3.3)
LYMPHOCYTES # BLD AUTO: 12.3 % — LOW (ref 13–44)
MCHC RBC-ENTMCNC: 25.9 PG — LOW (ref 27–34)
MCHC RBC-ENTMCNC: 30.6 GM/DL — LOW (ref 32–36)
MCV RBC AUTO: 84.5 FL — SIGNIFICANT CHANGE UP (ref 80–100)
MONOCYTES # BLD AUTO: 0.76 K/UL — SIGNIFICANT CHANGE UP (ref 0–0.9)
MONOCYTES NFR BLD AUTO: 5 % — SIGNIFICANT CHANGE UP (ref 2–14)
NEUTROPHILS # BLD AUTO: 12.42 K/UL — HIGH (ref 1.8–7.4)
NEUTROPHILS NFR BLD AUTO: 81.5 % — HIGH (ref 43–77)
PLATELET # BLD AUTO: 502 K/UL — HIGH (ref 150–400)
POTASSIUM SERPL-MCNC: 3.5 MMOL/L — SIGNIFICANT CHANGE UP (ref 3.5–5.3)
POTASSIUM SERPL-SCNC: 3.5 MMOL/L — SIGNIFICANT CHANGE UP (ref 3.5–5.3)
PROT SERPL-MCNC: 6.9 G/DL — SIGNIFICANT CHANGE UP (ref 6.6–8.7)
RBC # BLD: 4.91 M/UL — SIGNIFICANT CHANGE UP (ref 4.2–5.8)
RBC # FLD: 14.1 % — SIGNIFICANT CHANGE UP (ref 10.3–14.5)
SARS-COV-2 IGG SERPL QL IA: NEGATIVE — SIGNIFICANT CHANGE UP
SARS-COV-2 IGM SERPL IA-ACNC: 0.01 INDEX — SIGNIFICANT CHANGE UP
SARS-COV-2 RNA SPEC QL NAA+PROBE: SIGNIFICANT CHANGE UP
SODIUM SERPL-SCNC: 142 MMOL/L — SIGNIFICANT CHANGE UP (ref 135–145)
WBC # BLD: 15.24 K/UL — HIGH (ref 3.8–10.5)
WBC # FLD AUTO: 15.24 K/UL — HIGH (ref 3.8–10.5)

## 2020-07-01 PROCEDURE — 99255 IP/OBS CONSLTJ NEW/EST HI 80: CPT

## 2020-07-01 PROCEDURE — 93312 ECHO TRANSESOPHAGEAL: CPT | Mod: 26

## 2020-07-01 PROCEDURE — 99232 SBSQ HOSP IP/OBS MODERATE 35: CPT

## 2020-07-01 PROCEDURE — 93320 DOPPLER ECHO COMPLETE: CPT | Mod: 26

## 2020-07-01 PROCEDURE — 93325 DOPPLER ECHO COLOR FLOW MAPG: CPT | Mod: 26

## 2020-07-01 PROCEDURE — 74177 CT ABD & PELVIS W/CONTRAST: CPT | Mod: 26

## 2020-07-01 PROCEDURE — 71260 CT THORAX DX C+: CPT | Mod: 26

## 2020-07-01 RX ORDER — PHYTONADIONE (VIT K1) 5 MG
10 TABLET ORAL DAILY
Refills: 0 | Status: COMPLETED | OUTPATIENT
Start: 2020-07-01 | End: 2020-07-03

## 2020-07-01 RX ORDER — MUPIROCIN 20 MG/G
1 OINTMENT TOPICAL
Refills: 0 | Status: COMPLETED | OUTPATIENT
Start: 2020-07-01 | End: 2020-07-06

## 2020-07-01 RX ORDER — SODIUM CHLORIDE 9 MG/ML
90 INJECTION INTRAMUSCULAR; INTRAVENOUS; SUBCUTANEOUS
Refills: 0 | Status: DISCONTINUED | OUTPATIENT
Start: 2020-07-01 | End: 2020-07-02

## 2020-07-01 RX ORDER — LANOLIN ALCOHOL/MO/W.PET/CERES
5 CREAM (GRAM) TOPICAL ONCE
Refills: 0 | Status: COMPLETED | OUTPATIENT
Start: 2020-07-01 | End: 2020-07-01

## 2020-07-01 RX ORDER — INSULIN GLARGINE 100 [IU]/ML
10 INJECTION, SOLUTION SUBCUTANEOUS AT BEDTIME
Refills: 0 | Status: DISCONTINUED | OUTPATIENT
Start: 2020-07-01 | End: 2020-07-03

## 2020-07-01 RX ORDER — POTASSIUM CHLORIDE 20 MEQ
40 PACKET (EA) ORAL ONCE
Refills: 0 | Status: COMPLETED | OUTPATIENT
Start: 2020-07-01 | End: 2020-07-01

## 2020-07-01 RX ADMIN — CHLORHEXIDINE GLUCONATE 15 MILLILITER(S): 213 SOLUTION TOPICAL at 06:46

## 2020-07-01 RX ADMIN — Medication 5 MILLIGRAM(S): at 01:13

## 2020-07-01 RX ADMIN — SODIUM CHLORIDE 3 MILLILITER(S): 9 INJECTION INTRAMUSCULAR; INTRAVENOUS; SUBCUTANEOUS at 16:43

## 2020-07-01 RX ADMIN — BUMETANIDE 2 MILLIGRAM(S): 0.25 INJECTION INTRAMUSCULAR; INTRAVENOUS at 06:46

## 2020-07-01 RX ADMIN — Medication 5 MILLIGRAM(S): at 23:27

## 2020-07-01 RX ADMIN — MUPIROCIN 1 APPLICATION(S): 20 OINTMENT TOPICAL at 17:25

## 2020-07-01 RX ADMIN — ATORVASTATIN CALCIUM 10 MILLIGRAM(S): 80 TABLET, FILM COATED ORAL at 21:21

## 2020-07-01 RX ADMIN — PANTOPRAZOLE SODIUM 40 MILLIGRAM(S): 20 TABLET, DELAYED RELEASE ORAL at 06:45

## 2020-07-01 RX ADMIN — Medication 4: at 21:34

## 2020-07-01 RX ADMIN — Medication 40 MILLIEQUIVALENT(S): at 10:13

## 2020-07-01 RX ADMIN — SODIUM CHLORIDE 3 MILLILITER(S): 9 INJECTION INTRAMUSCULAR; INTRAVENOUS; SUBCUTANEOUS at 06:45

## 2020-07-01 RX ADMIN — Medication 10 MILLIGRAM(S): at 10:13

## 2020-07-01 RX ADMIN — CHLORHEXIDINE GLUCONATE 15 MILLILITER(S): 213 SOLUTION TOPICAL at 17:25

## 2020-07-01 RX ADMIN — INSULIN GLARGINE 10 UNIT(S): 100 INJECTION, SOLUTION SUBCUTANEOUS at 21:34

## 2020-07-01 RX ADMIN — SODIUM CHLORIDE 3 MILLILITER(S): 9 INJECTION INTRAMUSCULAR; INTRAVENOUS; SUBCUTANEOUS at 21:22

## 2020-07-01 NOTE — PROGRESS NOTE ADULT - SUBJECTIVE AND OBJECTIVE BOX
History of Present Illness: 	  44 year old male with PMH cardiomegaly, DM II, HTN, and recurrent pericardial effusions with pericardiocentesis x2 ( 700ml serous drainage both times). He subsequently underwent a subxiphoid pericardial window and pericardial biopsy on 9/30/19 at [facility] with Dr. Peters .  Postoperative course complicated by AF/SVT which converted to SR.  Was found to be + lymes on that admission and has completed his course of IV and PO antibiotics.  Patient then presented to the ER on 11/12 from Dr. Castro office s/p in office TTE that showed an increased pericardial effusion. Patient found to have infiltrate on Chest CT performed 11/13 that is concerning for infiltrative process / neoplastic disease, hematology/ oncology recommend ID follow up as outpatient.  Ct scan of abdomen pelvis with contrast on 11/14 show infiltrative process progressing to the retroperitoneal soft tissue surrounding both kidneys causing moderate bilateral hydronephrosis. ON 11/15 he underwent IR pericardial drain placement for 55ml of bloody drainage.  At that time, ID was consulted regarding infiltrative process on CT, and it was deemed likely not infectious.  Hem/onc recalled and Rheumatology consulted.  An Abd/Renal MRI was done that showed retroperitoneal fibrosis, mod. margoth. hydronephrosis, fibrosis prox. SMA, RIAN, margoth. renal artery, resulting luminal narrowing.  He then underwent a Right and left cardiac cath on 11/18 that showed normal coronaries, increased wedge pressure 24, no evidence of constriction or restriction.  His drain was removed 11/19 pericardial AI removed and he was discharged home.  He was following with his cardiologist (Dr. Mcgninis) and a rheumatologist as an outpatient.    He presented to [facility] 6/29 sent from his cardiologists office with c/o SOB on minimal exertion.  Pt reports that he gets SOB and fatigued with minimal exertion such as doing ADL's and even while talking. He was ruled out for an dMI. CT chest showed stable moderate bilateral pleural effusions with atelectasis, as well as a small stable pericardial effusion with adjacent inflammatory change and small loculated fluid along the right atrial border that could correlate with pericarditis.   He underwent a TTE and right and left heart cath at [facility] but the reports are not available.  He was transferred to [facility] today for evaluation of constrictive pericarditis /pericardial stripping.    · Assessment		  44 year old male with PMH cardiomegaly, DM II, HTN, and recurrent pericardial effusions with pericardiocentesis x2 ( 700ml serous drainage both times). He subsequently underwent a subxiphoid pericardial window and pericardial biopsy on 9/30/19 at [facility] with Dr. Peters .  Postoperative course complicated by AF/SVT which converted to SR.  Was found to be + lymes on that admission and has completed his course of IV and PO antibiotics.  Patient then presented to the ER on 11/12 from Dr. Castro office s/p in office TTE that showed an increased pericardial effusion. Patient found to have infiltrate on Chest CT performed 11/13 that is concerning for infiltrative process / neoplastic disease, hematology/ oncology recommend ID follow up as outpatient.  Ct scan of abdomen pelvis with contrast on 11/14 show infiltrative process progressing to the retroperitoneal soft tissue surrounding both kidneys causing moderate bilateral hydronephrosis. ON 11/15 he underwent IR pericardial drain placement for 55ml of bloody drainage.  At that time, ID was consulted regarding infiltrative process on CT, and it was deemed likely not infectious.  Hem/onc recalled and Rheumatology consulted.  An Abd/Renal MRI was done that showed retroperitoneal fibrosis, mod. margoth. hydronephrosis, fibrosis prox. SMA, RIAN, margoth. renal artery, resulting luminal narrowing.  He then underwent a Right and left cardiac cath on 11/18 that showed normal coronaries, increased wedge pressure 24, no evidence of constriction or restriction.  His drain was removed 11/19 pericardial AI removed and he was discharged home.  He was following with his cardiologist (Dr. Mcginnis) and a rheumatologist as an outpatient.  Ct scan of abdomen pelvis with contrast on 11/14 show infiltrative process progressing to the retroperitoneal soft tissue surrounding both kidneys causing moderate bilateral hydronephrosis. ON 11/15 he underwent IR pericardial drain placement for 55ml of bloody drainage.  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 margoth. hydronephrosis, fibrosis prox. SMA, RIAN, margoth. renal artery, resulting luminal narrowing.  He then underwent a Right and left cardiac cath on 11/18 that showed normal coronaries, increased wedge pressure 24, no evidence of constriction or restriction.  His drain was removed 11/19 pericardial AI removed and he was discharged home.  He was following with his cardiologist (Dr. Mcginnis) and a rheumatologist as an outpatient.    He presented to [facility] 6/29 sent from his cardiologists office with c/o SOB on minimal exertion.  Pt reports that he gets SOB and fatigued with minimal exertion such as doing ADL's and even while talking. He was ruled out for an dMI. CT chest showed stable moderate bilateral pleural effusions with atelectasis, as well as a small stable pericardial effusion with adjacent inflammatory change and small loculated fluid along the right atrial border that could correlate with pericarditis.  6/30 he underwent a TTE and right and left heart cath at [facility] patient was transferred to [facility] today for evaluation of constrictive pericarditis /pericardial stripping.        He presented to [facility] 6/29 sent from his cardiologists office with c/o SOB on minimal exertion.  Pt reports that he gets SOB and fatigued with minimal exertion such as doing ADL's and even while talking. He was ruled out for an dMI. CT chest showed stable moderate bilateral pleural effusions with atelectasis, as well as a small stable pericardial effusion with adjacent inflammatory change and small loculated fluid along the right atrial border that could correlate with pericarditis.   He underwent a TTE and right and left heart cath at [facility] but the reports are not available.  He was transferred to [facility] today for evaluation of constrictive pericarditis /pericardial stripping. History of Present Illness: 	  44 year old male with PMH cardiomegaly, DM II, HTN, and recurrent pericardial effusions with pericardiocentesis x2 ( 700ml serous drainage both times). He subsequently underwent a subxiphoid pericardial window and pericardial biopsy on 9/30/19 at [facility] with Dr. Peters .  Postoperative course complicated by AF/SVT which converted to SR.  Was found to be + lymes on that admission and has completed his course of IV and PO antibiotics.  Patient then presented to the ER on 11/12 from Dr. Castro office s/p in office TTE that showed an increased pericardial effusion. Patient found to have infiltrate on Chest CT performed 11/13 that is concerning for infiltrative process / neoplastic disease, hematology/ oncology recommend ID follow up as outpatient.  Ct scan of abdomen pelvis with contrast on 11/14 show infiltrative process progressing to the retroperitoneal soft tissue surrounding both kidneys causing moderate bilateral hydronephrosis. ON 11/15 he underwent IR pericardial drain placement for 55ml of bloody drainage.  At that time, ID was consulted regarding infiltrative process on CT, and it was deemed likely not infectious.  Hem/onc recalled and Rheumatology consulted.  An Abd/Renal MRI was done that showed retroperitoneal fibrosis, mod. margoth. hydronephrosis, fibrosis prox. SMA, RIAN, margoth. renal artery, resulting luminal narrowing.  He then underwent a Right and left cardiac cath on 11/18 that showed normal coronaries, increased wedge pressure 24, no evidence of constriction or restriction.  His drain was removed 11/19 pericardial AI removed and he was discharged home.  He was following with his cardiologist (Dr. Mcginnis) and a rheumatologist as an outpatient.  · Assessment		  44 year old male with PMH cardiomegaly, DM II, HTN, and recurrent pericardial effusions with pericardiocentesis x2 ( 700ml serous drainage both times). He subsequently underwent a subxiphoid pericardial window and pericardial biopsy on 9/30/19 at [facility] with Dr. Peters .  Postoperative course complicated by AF/SVT which converted to SR.  Was found to be + lymes on that admission and has completed his course of IV and PO antibiotics.  Patient then presented to the ER on 11/12 from Dr. Castro office s/p in office TTE that showed an increased pericardial effusion. Patient found to have infiltrate on Chest CT performed 11/13 that is concerning for infiltrative process / neoplastic disease, hematology/ oncology recommend ID follow up as outpatient.  Ct scan of abdomen pelvis with contrast on 11/14 show infiltrative process progressing to the retroperitoneal soft tissue surrounding both kidneys causing moderate bilateral hydronephrosis. ON 11/15 he underwent IR pericardial drain placement for 55ml of bloody drainage.  At that time, ID was consulted regarding infiltrative process on CT, and it was deemed likely not infectious.  Hem/onc recalled and Rheumatology consulted.  An Abd/Renal MRI was done that showed retroperitoneal fibrosis, mod. margoth. hydronephrosis, fibrosis prox. SMA, RIAN, margoth. renal artery, resulting luminal narrowing.  He then underwent a Right and left cardiac cath on 11/18 that showed normal coronaries, increased wedge pressure 24, no evidence of constriction or restriction.  His drain was removed 11/19 pericardial AI removed and he was discharged home.  He was following with his cardiologist (Dr. Mcginnis) and a rheumatologist as an outpatient.  Ct scan of abdomen pelvis with contrast on 11/14 show infiltrative process progressing to the retroperitoneal soft tissue surrounding both kidneys causing moderate bilateral hydronephrosis. ON 11/15 he underwent IR pericardial drain placement for 55ml of bloody drainage.  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 margoth. hydronephrosis, fibrosis prox. SMA, RIAN, margoth. renal artery, resulting luminal narrowing.  He then underwent a Right and left cardiac cath on 11/18 that showed normal coronaries, increased wedge pressure 24, no evidence of constriction or restriction.  His drain was removed 11/19 pericardial AI removed and he was discharged home.  He was following with his cardiologist (Dr. Mcginnis) and a rheumatologist as an outpatient.    He presented to [place] 6/29 sent from his cardiologists office with c/o SOB on minimal exertion.  Pt reports that he gets SOB and fatigued with minimal exertion such as doing ADL's and even while talking. He was ruled out for an dMI. CT chest showed stable moderate bilateral pleural effusions with atelectasis, as well as a small stable pericardial effusion with adjacent inflammatory change and small loculated fluid along the right atrial border that could correlate with pericarditis.  6/30 he underwent a TTE and right and left heart cath at [place] patient was transferred to [facility] today for evaluation of constrictive pericarditis /pericardial stripping.    NPO> 8 hours  Dr Richards to consent  RUE AC w/DSD from prior central line removal  Labs reviewed K+ repletion started  REVIEW OF SYSTEMS:  Denies SOB, CP, NV, HA, dizziness, palpitations, site pain    PHYSICAL EXAM: A&Ox3 NAD Skin warm and dry  NEURO: Speech intact +gag +swallow Tongue midline MARIA  NECK: No JVD, trachea midline. Eupneic  HEART: SR 83 bpm no g/m or ectopy  PULMONARY:  CTA margoth  ABDOMEN: Soft nontender X4 +BS Vdg  EXTREMITIES: MARIA/FROM Tr margoth le edema

## 2020-07-01 NOTE — PROGRESS NOTE ADULT - PROBLEM SELECTOR PLAN 4
Seen on MRI on last admission in November.  Seen by Rheumatology on that admission.  Was recommended to have a CT guided biopsy to confirm.  Was following with a rheumatologist as an outpatient.  Rheumatology consult called, (Dr. Jackson) will see the patient in AM

## 2020-07-01 NOTE — PROGRESS NOTE ADULT - SUBJECTIVE AND OBJECTIVE BOX
Pt presents for post JAMES    T(C): 36.8 (07-01-20 @ 10:05), Max: 37.2 (06-30-20 @ 17:08)  HR: 87 (07-01-20 @ 10:05) (78 - 107)  BP: 153/85 (07-01-20 @ 10:05) (127/81 - 153/85)  RR: 18 (07-01-20 @ 10:05) (18 - 18)  SpO2: 96% (07-01-20 @ 10:05) (96% - 97%)        ASA/ Mallampati accessed by anesthesia         Awake/ alert   Resp clear CV s1s2  - M/R/G     - tolerates po with out diff  - Medications reviewed   - Labs reviewed   - Pt verbalized understanding of Post procedure

## 2020-07-01 NOTE — PROGRESS NOTE ADULT - ASSESSMENT
44 year old male with PMH cardiomegaly, DM II, HTN, and recurrent pericardial effusions with pericardiocentesis x2 ( 700ml serous drainage both times). He subsequently underwent a subxiphoid pericardial window and pericardial biopsy on 9/30/19 at [facility] with Dr. Peters .  Postoperative course complicated by AF/SVT which converted to SR.  Was found to be + lymes on that admission and has completed his course of IV and PO antibiotics.  Patient then presented to the ER on 11/12 from Dr. Castro office s/p in office TTE that showed an increased pericardial effusion. Patient found to have infiltrate on Chest CT performed 11/13 that is concerning for infiltrative process / neoplastic disease, hematology/ oncology recommend ID follow up as outpatient.  Ct scan of abdomen pelvis with contrast on 11/14 show infiltrative process progressing to the retroperitoneal soft tissue surrounding both kidneys causing moderate bilateral hydronephrosis. ON 11/15 he underwent IR pericardial drain placement for 55ml of bloody drainage.  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 margoth. hydronephrosis, fibrosis prox. SMA, RIAN, margoth. renal artery, resulting luminal narrowing.  He then underwent a Right and left cardiac cath on 11/18 that showed normal coronaries, increased wedge pressure 24, no evidence of constriction or restriction.  His drain was removed 11/19 pericardial AI removed and he was discharged home.  He was following with his cardiologist (Dr. Mcginnis) and a rheumatologist as an outpatient.    He presented to [facility] 6/29 sent from his cardiologists office with c/o SOB on minimal exertion.  Pt reports that he gets SOB and fatigued with minimal exertion such as doing ADL's and even while talking. He was ruled out for an dMI. CT chest showed stable moderate bilateral pleural effusions with atelectasis, as well as a small stable pericardial effusion with adjacent inflammatory change and small loculated fluid along the right atrial border that could correlate with pericarditis.  6/30 he underwent a TTE and right and left heart cath at [facility] patient was transferred to 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

## 2020-07-01 NOTE — CONSULT NOTE ADULT - ASSESSMENT
45y M w/ recurrent pericarditis/effusion s/p subxiphoid window here for pericardial stripping due to recurrent symptoms.  History of infiltrative mass resulting in luminal narrowing of renal arteries, SMA/RIAN questionable for IgG4- related disease, scant <10 IgG4 plasma cells on biopsy of pericardium. +DREAD with low-titer Scl70 noted as well in previous w/u.  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    - repeat IgG4 subsets, check Scl70, centromere, RNA polymerase III, ESR, CRP, cytokine panel, IPEP  - recommend starting solumedrol 20mg/d for now, taper to prednisone 20mg po in 2-3 days s/p procedure  - consider bx of soft tissue infiltrative mass in abdomen for more adequate specimen  - agree w/ cardiology mgmt for effusion    Call for any questions  [phone]

## 2020-07-01 NOTE — PROGRESS NOTE ADULT - PROBLEM SELECTOR PLAN 6
Will add agent if necessary.   Had been on lopressor, but was not receiving it at [facility]  Will discuss with CTS team in AM

## 2020-07-01 NOTE — CONSULT NOTE ADULT - SUBJECTIVE AND OBJECTIVE BOX
[facility] Rheumatology [facility]                                  Dr. Edy Jackson MD, PhD, NIECY                                    [address], [place], [place]  ---------------------------------------------------------------------------------------------------------    HISTORY OF PRESENT ILLNESS:  45y M w/ recurrent pericardial effusions s/p window, Lyme s/p abx, and +DREAD with RP fibrosis/infiltration along bilateral kidneys, B/L renal aa., SMA/RIAN. Pericardial bx with scant <10 IgG4 plasma cells, no other bx done of RP tissue.  Recently came from cardiology office and admitted to [facility] w/ JOHNSON and SOB affecting ADLs.   Small stable pericardial effusion, moderate b/l pleural effusions, and loculated fluid along R atrial border. 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  ?Dx of IgG4-RD- however no biopsy done of infiltrative mass.  Low positive Scl70 1.9 on last labs with +DREAD, ?fibrotic disease    PAST MEDICAL & SURGICAL HISTORY:  Hydronephrosis  Pericardial effusion  Lyme disease  Pericarditis  Heart failure  Cardiomegaly  Nonsmoker  Diabetes mellitus  Hypertension  S/P pericardial window creation  S/P pericardiocentesis      REVIEW OF SYSTEMS      General:	weakness  Skin/Breast: no rash  Ophthalmologic:   neg  ENMT:  neg  Respiratory and Thorax: SOB, JOHNSON  Cardiovascular:	edema, SOB  Gastrointestinal:	 neg  Genitourinary:	neg  Musculoskeletal:	 neg  Neurological:	 neg  Psychiatric:	neg  Hematology/Lymphatics:	neg  Endocrine:	neg  Allergic/Immunologic:	 neg      MEDICATIONS  (STANDING):  atorvastatin 10 milliGRAM(s) Oral at bedtime  chlorhexidine 0.12% Liquid 15 milliLiter(s) Oral Mucosa two times a day  dextrose 5%. 1000 milliLiter(s) (50 mL/Hr) IV Continuous <Continuous>  dextrose 50% Injectable 12.5 Gram(s) IV Push once  dextrose 50% Injectable 25 Gram(s) IV Push once  dextrose 50% Injectable 25 Gram(s) IV Push once  insulin glargine Injectable (LANTUS) 10 Unit(s) SubCutaneous at bedtime  insulin lispro (HumaLOG) corrective regimen sliding scale   SubCutaneous four times a day before meals  mupirocin 2% Ointment 1 Application(s) Topical two times a day  pantoprazole    Tablet 40 milliGRAM(s) Oral before breakfast  phytonadione   Solution 10 milliGRAM(s) Oral daily  sodium chloride 0.9% lock flush 3 milliLiter(s) IV Push every 8 hours    MEDICATIONS  (PRN):  acetaminophen   Tablet .. 650 milliGRAM(s) Oral every 6 hours PRN Mild Pain (1 - 3), Moderate Pain (4 - 6)  dextrose 40% Gel 15 Gram(s) Oral once PRN Blood Glucose LESS THAN 70 milliGRAM(s)/deciliter  glucagon  Injectable 1 milliGRAM(s) IntraMuscular once PRN Glucose LESS THAN 70 milligrams/deciliter      Allergies    No Known Allergies    Intolerances    [facility] patient (Unknown)      PERTINENT MEDICATION HISTORY:    SOCIAL HISTORY:     Tobacco: N     Alcohol use: N     Illicit drug use: N    FAMILY HISTORY:  No pertinent family history in first degree relatives      Vital Signs Last 24 Hrs  T(C): 36.8 (2020 10:05), Max: 37.2 (2020 17:08)  T(F): 98.3 (2020 10:05), Max: 99 (2020 17:08)  HR: 87 (2020 10:05) (78 - 107)  BP: 153/85 (2020 10:05) (127/81 - 153/85)  BP(mean): --  RR: 18 (2020 10:05) (18 - 18)  SpO2: 96% (2020 10:05) (96% - 97%)    Daily     Daily Weight in k.3 (2020 06:45)    PHYSICAL EXAM:    Constitutional:  NAD  Eyes: EOMI  ENMT: MMM, no oral/nasal ulcers	  Neck: supple  Respiratory: decr bibasilar BS  Cardiovascular: S1S2 RRR  Gastrointestinal: soft, NTND +BS  Genitourinary: normal  Vascular: 2+ PT, radial  Neurological: no FND  Skin: no rash  Lymph Nodes:  no axillary, supraclav, or cervical LAD  Musculoskeletal: no synovitis  Psychiatric: appropriate    LABS:                        12.7   15.24 )-----------( 502      ( 2020 06:18 )             41.5     07-01    142  |  100  |  8.0  ----------------------------<  165<H>  3.5   |  31.0<H>  |  0.61    Ca    9.2      2020 06:18    TPro  6.9  /  Alb  3.3  /  TBili  0.7  /  DBili  x   /  AST  16  /  ALT  10  /  AlkPhos  80  07-01    PT/INR - ( 2020 20:43 )   PT: 15.7 sec;   INR: 1.37 ratio         PTT - ( 2020 20:43 )  PTT:36.2 sec  Urinalysis Basic - ( 2020 23:48 )    Color: Yellow / Appearance: Clear / S.005 / pH: x  Gluc: x / Ketone: Negative  / Bili: Negative / Urobili: Negative mg/dL   Blood: x / Protein: Negative mg/dL / Nitrite: Negative   Leuk Esterase: Negative / RBC: x / WBC x   Sq Epi: x / Non Sq Epi: x / Bacteria: x      Protein, Urine: Negative mg/dL (20 @ 23:48)      RADIOLOGY & ADDITIONAL STUDIES:  IgG Subsets (11.17.19 @ 15:43)    IgG 1: 804 mg/dL    IgG 2: 276 mg/dL    IgG 3: 90 mg/dL    IgG 4: 34: Performed At:  [facility]    IgG Subset Total: 1214 mg/dL [facility] Rheumat[facility]                                     Edy Jackson MD, PhD, NIECY                                    [address], [place], [place]  ---------------------------------------------------------------------------------------------------------    HISTORY OF PRESENT ILLNESS:  45y M w/ recurrent pericardial effusions s/p window, Lyme s/p abx, and +DREAD with RP fibrosis/infiltration along bilateral kidneys, B/L renal aa., SMA/RIAN. Pericardial bx with scant <10 IgG4 plasma cells, no other bx done of RP tissue.  Recently came from cardiology office and admitted to [facility] [place] w/ JOHNSON and SOB affecting ADLs.   Small stable pericardial effusion, moderate b/l pleural effusions, and loculated fluid along R atrial border. 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  ?Dx of IgG4-RD- however no biopsy done of infiltrative mass.  Low positive Scl70 1.9 on last labs with +DREAD, ?fibrotic disease    PAST MEDICAL & SURGICAL HISTORY:  Hydronephrosis  Pericardial effusion  Lyme disease  Pericarditis  Heart failure  Cardiomegaly  Nonsmoker  Diabetes mellitus  Hypertension  S/P pericardial window creation  S/P pericardiocentesis      REVIEW OF SYSTEMS      General:	weakness  Skin/Breast: no rash  Ophthalmologic:   neg  ENMT:  neg  Respiratory and Thorax: SOB, JOHNSON  Cardiovascular:	edema, SOB  Gastrointestinal:	 neg  Genitourinary:	neg  Musculoskeletal:	 neg  Neurological:	 neg  Psychiatric:	neg  Hematology/Lymphatics:	neg  Endocrine:	neg  Allergic/Immunologic:	 neg      MEDICATIONS  (STANDING):  atorvastatin 10 milliGRAM(s) Oral at bedtime  chlorhexidine 0.12% Liquid 15 milliLiter(s) Oral Mucosa two times a day  dextrose 5%. 1000 milliLiter(s) (50 mL/Hr) IV Continuous <Continuous>  dextrose 50% Injectable 12.5 Gram(s) IV Push once  dextrose 50% Injectable 25 Gram(s) IV Push once  dextrose 50% Injectable 25 Gram(s) IV Push once  insulin glargine Injectable (LANTUS) 10 Unit(s) SubCutaneous at bedtime  insulin lispro (HumaLOG) corrective regimen sliding scale   SubCutaneous four times a day before meals  mupirocin 2% Ointment 1 Application(s) Topical two times a day  pantoprazole    Tablet 40 milliGRAM(s) Oral before breakfast  phytonadione   Solution 10 milliGRAM(s) Oral daily  sodium chloride 0.9% lock flush 3 milliLiter(s) IV Push every 8 hours    MEDICATIONS  (PRN):  acetaminophen   Tablet .. 650 milliGRAM(s) Oral every 6 hours PRN Mild Pain (1 - 3), Moderate Pain (4 - 6)  dextrose 40% Gel 15 Gram(s) Oral once PRN Blood Glucose LESS THAN 70 milliGRAM(s)/deciliter  glucagon  Injectable 1 milliGRAM(s) IntraMuscular once PRN Glucose LESS THAN 70 milligrams/deciliter      Allergies    No Known Allergies    Intolerances    [facility] patient (Unknown)      PERTINENT MEDICATION HISTORY:    SOCIAL HISTORY:     Tobacco: N     Alcohol use: N     Illicit drug use: N    FAMILY HISTORY:  No pertinent family history in first degree relatives      Vital Signs Last 24 Hrs  T(C): 36.8 (2020 10:05), Max: 37.2 (2020 17:08)  T(F): 98.3 (2020 10:05), Max: 99 (2020 17:08)  HR: 87 (2020 10:05) (78 - 107)  BP: 153/85 (2020 10:05) (127/81 - 153/85)  BP(mean): --  RR: 18 (2020 10:05) (18 - 18)  SpO2: 96% (2020 10:05) (96% - 97%)    Daily     Daily Weight in k.3 (2020 06:45)    PHYSICAL EXAM:    Constitutional:  NAD  Eyes: EOMI  ENMT: MMM, no oral/nasal ulcers	  Neck: supple  Respiratory: decr bibasilar BS  Cardiovascular: S1S2 RRR  Gastrointestinal: soft, NTND +BS  Genitourinary: normal  Vascular: 2+ PT, radial  Neurological: no FND  Skin: no rash  Lymph Nodes:  no axillary, supraclav, or cervical LAD  Musculoskeletal: no synovitis  Psychiatric: appropriate    LABS:                        12.7   15.24 )-----------( 502      ( 2020 06:18 )             41.5     07-01    142  |  100  |  8.0  ----------------------------<  165<H>  3.5   |  31.0<H>  |  0.61    Ca    9.2      2020 06:18    TPro  6.9  /  Alb  3.3  /  TBili  0.7  /  DBili  x   /  AST  16  /  ALT  10  /  AlkPhos  80  07-01    PT/INR - ( 2020 20:43 )   PT: 15.7 sec;   INR: 1.37 ratio         PTT - ( 2020 20:43 )  PTT:36.2 sec  Urinalysis Basic - ( 2020 23:48 )    Color: Yellow / Appearance: Clear / S.005 / pH: x  Gluc: x / Ketone: Negative  / Bili: Negative / Urobili: Negative mg/dL   Blood: x / Protein: Negative mg/dL / Nitrite: Negative   Leuk Esterase: Negative / RBC: x / WBC x   Sq Epi: x / Non Sq Epi: x / Bacteria: x      Protein, Urine: Negative mg/dL (20 @ 23:48)      RADIOLOGY & ADDITIONAL STUDIES:  IgG Subsets (11.17.19 @ 15:43)    IgG 1: 804 mg/dL    IgG 2: 276 mg/dL    IgG 3: 90 mg/dL    IgG 4: 34: Performed At:  [facility]    IgG Subset Total: 1214 mg/dL

## 2020-07-01 NOTE — PROGRESS NOTE ADULT - PROBLEM SELECTOR PLAN 1
Admit to CT surgery service to Dr. Peters.  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  Cath report and TTE from [facility] unavailable.  Small stable pericardial effusion on CT scan at [facility].  Transferred for eval for possible pericardial stripping.  TTE done  Schedule for JAMES in AM by DR. Richards   Will keep NPO post MN for JAMES in AM  Rheumatology consult called, will see the patient in AM

## 2020-07-01 NOTE — PROGRESS NOTE ADULT - SUBJECTIVE AND OBJECTIVE BOX
Subjective: Patient lying in bed,  no acute distress noted, stated "feeling okay, shortness of breath is better". Patient denies chest pain, palpitation, dizziness, abdominal pain, N/V/D.     Tele:  Sinus Rhythm                           T(C): 36.9 (06-30-20 @ 21:58), Max: 37.2 (06-30-20 @ 17:08)  HR: 107 (06-30-20 @ 21:58) (102 - 107)  BP: 142/79 (06-30-20 @ 21:58) (137/92 - 142/79)  RR: 18 (06-30-20 @ 21:58) (18 - 18)  SpO2: 97% (06-30-20 @ 21:58) (96% - 97%) on room air    LVEF: 55-60%      06-30    144  |  101  |  10.0  ----------------------------<  171<H>  3.7   |  32.0<H>  |  0.78    Ca    9.7      30 Jun 2020 20:43    TPro  6.8  /  Alb  3.3  /  TBili  0.3<L>  /  DBili  x   /  AST  13  /  ALT  10  /  AlkPhos  79  06-30                               12.6   14.18 )-----------( 505      ( 30 Jun 2020 20:43 )             41.0        PT/INR - ( 30 Jun 2020 20:43 )   PT: 15.7 sec;   INR: 1.37 ratio         PTT - ( 30 Jun 2020 20:43 )  PTT:36.2 sec    CAPILLARY BLOOD GLUCOSE      POCT Blood Glucose.: 157 mg/dL (30 Jun 2020 21:09)  POCT Blood Glucose.: 191 mg/dL (30 Jun 2020 17:25)           < from: Xray Chest 1 View- PORTABLE-Routine (11.20.19 @ 06:07) >    Findings:  Cardiomegaly. Left pleural effusion. No evidence of a right pleural   effusion or pneumothorax..    Impression:  Cardiomegaly and left pleural effusion. Stable exam without significant   change since the previous study..    < end of copied text >      < from: TTE Echo Limited or F/U (11.16.19 @ 09:08) >       Summary:   1. Technically adequate study. Limited studyfor evaluation of   pericardial effusion.   2. Normal global left ventricular systolic function.   3. Left ventricular ejection fraction, by visual estimation, is 55 to   60%.   4. Abnormal septal motion consistent with post-operative status.   5. Small pericardial effusion. IVC is mildly dilated with normal   respiratory changes. Except for respiratory variation of the tricuspid   valve inflow velocities, there is no other echocardiographic sign of   tamponade physiology.    MD Ministerio Electronically signed on 11/16/2019 at 5:36:44 PM         < end of copied text >    Physical Assessment:  Neuro: A+O x 3, non-focal, speech clear and intact  HEENT: PERRL, EOMI, oral mucosa pink and moist  Neck: supple, no JVD  CV: regular rate, regular rhythm, +S1S2, no murmurs or rub  Pulm/chest: Breath sounds clear, diminished at the bases bilaterally, no accessory muscle use noted  Abd: soft, NT, ND, +BS  Ext: MARIA x 4, no cyanosis, clubbing. +2BLE edema. +PP pulses bilaterally. Right radial incision dressing intact. No bleeding or hematoma.    Skin: warm, well perfused,    MEDICATIONS  (STANDING):  atorvastatin 10 milliGRAM(s) Oral at bedtime  buMETAnide Injectable 2 milliGRAM(s) IV Push <User Schedule>  chlorhexidine 0.12% Liquid 15 milliLiter(s) Oral Mucosa two times a day  dextrose 5%. 1000 milliLiter(s) (50 mL/Hr) IV Continuous <Continuous>  dextrose 50% Injectable 12.5 Gram(s) IV Push once  dextrose 50% Injectable 25 Gram(s) IV Push once  dextrose 50% Injectable 25 Gram(s) IV Push once  insulin lispro (HumaLOG) corrective regimen sliding scale   SubCutaneous four times a day before meals  melatonin 5 milliGRAM(s) Oral once  pantoprazole    Tablet 40 milliGRAM(s) Oral before breakfast  sodium chloride 0.9% lock flush 3 milliLiter(s) IV Push every 8 hours       PAST MEDICAL & SURGICAL HISTORY:  Hydronephrosis  Pericardial effusion  Lyme disease  Pericarditis  Heart failure  Cardiomegaly  Nonsmoker  Diabetes mellitus  Hypertension  S/P pericardial window creation  S/P pericardiocentesis

## 2020-07-02 ENCOUNTER — APPOINTMENT (OUTPATIENT)
Dept: CARDIOLOGY | Facility: CLINIC | Age: 45
End: 2020-07-02

## 2020-07-02 DIAGNOSIS — J90 PLEURAL EFFUSION, NOT ELSEWHERE CLASSIFIED: ICD-10-CM

## 2020-07-02 DIAGNOSIS — I44.1 ATRIOVENTRICULAR BLOCK, SECOND DEGREE: ICD-10-CM

## 2020-07-02 LAB
% ALBUMIN: 46.2 % — SIGNIFICANT CHANGE UP
% ALPHA 1: 7.3 % — SIGNIFICANT CHANGE UP
% ALPHA 2: 12.3 % — SIGNIFICANT CHANGE UP
% BETA: 14.3 % — SIGNIFICANT CHANGE UP
% GAMMA: 19.9 % — SIGNIFICANT CHANGE UP
ALBUMIN SERPL ELPH-MCNC: 3 G/DL — LOW (ref 3.6–5.5)
ALBUMIN/GLOB SERPL ELPH: 0.9 RATIO — SIGNIFICANT CHANGE UP
ALPHA1 GLOB SERPL ELPH-MCNC: 0.5 G/DL — HIGH (ref 0.1–0.4)
ALPHA2 GLOB SERPL ELPH-MCNC: 0.8 G/DL — SIGNIFICANT CHANGE UP (ref 0.5–1)
ANION GAP SERPL CALC-SCNC: 11 MMOL/L — SIGNIFICANT CHANGE UP (ref 5–17)
B PERT IGG+IGM PNL SER: ABNORMAL
B-GLOBULIN SERPL ELPH-MCNC: 0.9 G/DL — SIGNIFICANT CHANGE UP (ref 0.5–1)
BUN SERPL-MCNC: 8 MG/DL — SIGNIFICANT CHANGE UP (ref 8–20)
CALCIUM SERPL-MCNC: 9.2 MG/DL — SIGNIFICANT CHANGE UP (ref 8.6–10.2)
CHLORIDE SERPL-SCNC: 99 MMOL/L — SIGNIFICANT CHANGE UP (ref 98–107)
CO2 SERPL-SCNC: 29 MMOL/L — SIGNIFICANT CHANGE UP (ref 22–29)
COLOR FLD: YELLOW
CREAT SERPL-MCNC: 0.54 MG/DL — SIGNIFICANT CHANGE UP (ref 0.5–1.3)
FLUID INTAKE SUBSTANCE CLASS: SIGNIFICANT CHANGE UP
GAMMA GLOBULIN: 1.3 G/DL — SIGNIFICANT CHANGE UP (ref 0.6–1.6)
GLUCOSE BLDC GLUCOMTR-MCNC: 123 MG/DL — HIGH (ref 70–99)
GLUCOSE BLDC GLUCOMTR-MCNC: 169 MG/DL — HIGH (ref 70–99)
GLUCOSE BLDC GLUCOMTR-MCNC: 209 MG/DL — HIGH (ref 70–99)
GLUCOSE SERPL-MCNC: 101 MG/DL — HIGH (ref 70–99)
HCT VFR BLD CALC: 38.2 % — LOW (ref 39–50)
HGB BLD-MCNC: 11.9 G/DL — LOW (ref 13–17)
IGA FLD-MCNC: 332 MG/DL — SIGNIFICANT CHANGE UP (ref 84–499)
IGG FLD-MCNC: 1181 MG/DL — SIGNIFICANT CHANGE UP (ref 610–1660)
IGM SERPL-MCNC: 135 MG/DL — SIGNIFICANT CHANGE UP (ref 35–242)
INTERPRETATION SERPL IFE-IMP: SIGNIFICANT CHANGE UP
KAPPA LC SER QL IFE: 2.81 MG/DL — HIGH (ref 0.33–1.94)
KAPPA/LAMBDA FREE LIGHT CHAIN RATIO, SERUM: 1.64 RATIO — SIGNIFICANT CHANGE UP (ref 0.26–1.65)
LAMBDA LC SER QL IFE: 1.71 MG/DL — SIGNIFICANT CHANGE UP (ref 0.57–2.63)
MAGNESIUM SERPL-MCNC: 2 MG/DL — SIGNIFICANT CHANGE UP (ref 1.6–2.6)
MCHC RBC-ENTMCNC: 26.3 PG — LOW (ref 27–34)
MCHC RBC-ENTMCNC: 31.2 GM/DL — LOW (ref 32–36)
MCV RBC AUTO: 84.5 FL — SIGNIFICANT CHANGE UP (ref 80–100)
PH FLD: 8 — SIGNIFICANT CHANGE UP
PHOSPHATE SERPL-MCNC: 4.3 MG/DL — SIGNIFICANT CHANGE UP (ref 2.4–4.7)
PLATELET # BLD AUTO: 453 K/UL — HIGH (ref 150–400)
POTASSIUM SERPL-MCNC: 3.8 MMOL/L — SIGNIFICANT CHANGE UP (ref 3.5–5.3)
POTASSIUM SERPL-SCNC: 3.8 MMOL/L — SIGNIFICANT CHANGE UP (ref 3.5–5.3)
PROT PATTERN SERPL ELPH-IMP: SIGNIFICANT CHANGE UP
PROT SERPL-MCNC: 6.4 G/DL — SIGNIFICANT CHANGE UP (ref 6–8.3)
PROT SERPL-MCNC: 6.4 G/DL — SIGNIFICANT CHANGE UP (ref 6–8.3)
RBC # BLD: 4.52 M/UL — SIGNIFICANT CHANGE UP (ref 4.2–5.8)
RBC # FLD: 14.1 % — SIGNIFICANT CHANGE UP (ref 10.3–14.5)
RCV VOL RI: 205 /UL — HIGH (ref 0–0)
SODIUM SERPL-SCNC: 139 MMOL/L — SIGNIFICANT CHANGE UP (ref 135–145)
TOTAL NUCLEATED CELL COUNT, BODY FLUID: 160 /UL — SIGNIFICANT CHANGE UP
TUBE TYPE: SIGNIFICANT CHANGE UP
WBC # BLD: 16.65 K/UL — HIGH (ref 3.8–10.5)
WBC # FLD AUTO: 16.65 K/UL — HIGH (ref 3.8–10.5)

## 2020-07-02 PROCEDURE — 71045 X-RAY EXAM CHEST 1 VIEW: CPT | Mod: 26

## 2020-07-02 PROCEDURE — 99232 SBSQ HOSP IP/OBS MODERATE 35: CPT | Mod: 25

## 2020-07-02 PROCEDURE — 99222 1ST HOSP IP/OBS MODERATE 55: CPT

## 2020-07-02 PROCEDURE — 32557 INSERT CATH PLEURA W/ IMAGE: CPT

## 2020-07-02 PROCEDURE — 71045 X-RAY EXAM CHEST 1 VIEW: CPT | Mod: 26,77

## 2020-07-02 PROCEDURE — 99232 SBSQ HOSP IP/OBS MODERATE 35: CPT

## 2020-07-02 PROCEDURE — 93010 ELECTROCARDIOGRAM REPORT: CPT

## 2020-07-02 RX ORDER — LANOLIN ALCOHOL/MO/W.PET/CERES
5 CREAM (GRAM) TOPICAL ONCE
Refills: 0 | Status: COMPLETED | OUTPATIENT
Start: 2020-07-02 | End: 2020-07-02

## 2020-07-02 RX ORDER — POTASSIUM CHLORIDE 20 MEQ
40 PACKET (EA) ORAL ONCE
Refills: 0 | Status: COMPLETED | OUTPATIENT
Start: 2020-07-02 | End: 2020-07-02

## 2020-07-02 RX ORDER — HYDROMORPHONE HYDROCHLORIDE 2 MG/ML
0.25 INJECTION INTRAMUSCULAR; INTRAVENOUS; SUBCUTANEOUS ONCE
Refills: 0 | Status: DISCONTINUED | OUTPATIENT
Start: 2020-07-02 | End: 2020-07-02

## 2020-07-02 RX ORDER — BUMETANIDE 0.25 MG/ML
2 INJECTION INTRAMUSCULAR; INTRAVENOUS
Refills: 0 | Status: DISCONTINUED | OUTPATIENT
Start: 2020-07-02 | End: 2020-07-13

## 2020-07-02 RX ADMIN — PANTOPRAZOLE SODIUM 40 MILLIGRAM(S): 20 TABLET, DELAYED RELEASE ORAL at 05:35

## 2020-07-02 RX ADMIN — CHLORHEXIDINE GLUCONATE 15 MILLILITER(S): 213 SOLUTION TOPICAL at 17:25

## 2020-07-02 RX ADMIN — BUMETANIDE 2 MILLIGRAM(S): 0.25 INJECTION INTRAMUSCULAR; INTRAVENOUS at 17:24

## 2020-07-02 RX ADMIN — SODIUM CHLORIDE 3 MILLILITER(S): 9 INJECTION INTRAMUSCULAR; INTRAVENOUS; SUBCUTANEOUS at 05:27

## 2020-07-02 RX ADMIN — Medication 4: at 21:51

## 2020-07-02 RX ADMIN — Medication 5 MILLIGRAM(S): at 22:09

## 2020-07-02 RX ADMIN — SODIUM CHLORIDE 3 MILLILITER(S): 9 INJECTION INTRAMUSCULAR; INTRAVENOUS; SUBCUTANEOUS at 08:58

## 2020-07-02 RX ADMIN — CHLORHEXIDINE GLUCONATE 15 MILLILITER(S): 213 SOLUTION TOPICAL at 05:35

## 2020-07-02 RX ADMIN — INSULIN GLARGINE 10 UNIT(S): 100 INJECTION, SOLUTION SUBCUTANEOUS at 21:52

## 2020-07-02 RX ADMIN — ATORVASTATIN CALCIUM 10 MILLIGRAM(S): 80 TABLET, FILM COATED ORAL at 21:54

## 2020-07-02 RX ADMIN — Medication 40 MILLIEQUIVALENT(S): at 05:35

## 2020-07-02 RX ADMIN — MUPIROCIN 1 APPLICATION(S): 20 OINTMENT TOPICAL at 17:26

## 2020-07-02 RX ADMIN — Medication 10 MILLIGRAM(S): at 13:41

## 2020-07-02 RX ADMIN — Medication 2: at 12:44

## 2020-07-02 RX ADMIN — SODIUM CHLORIDE 3 MILLILITER(S): 9 INJECTION INTRAMUSCULAR; INTRAVENOUS; SUBCUTANEOUS at 21:54

## 2020-07-02 RX ADMIN — HYDROMORPHONE HYDROCHLORIDE 0.25 MILLIGRAM(S): 2 INJECTION INTRAMUSCULAR; INTRAVENOUS; SUBCUTANEOUS at 22:09

## 2020-07-02 RX ADMIN — MUPIROCIN 1 APPLICATION(S): 20 OINTMENT TOPICAL at 05:36

## 2020-07-02 NOTE — CONSULT NOTE ADULT - ATTENDING COMMENTS
I have personally seen, examined, and participated in the care of this patient. I have reviewed all pertinent clinical information, including history, physical exam, plan, and the PA/NP's note and agree except as noted below.    44 year old male with DM II, HTN, lyme disease and recurrent pericardial effusions s/p pericardiocentesis x2 and window including biopsy. He has had recurrent dyspnea with imaging showing extensive fibrosis suggestive of an underlying rheumatologic condition for which work up is pending. In this setting he has had periods of nocturnal bradycardia with short pauses which are consistent with vagal events (PP prolongation, OK prolongation, gradual slowing and resumption). He is overweight and has had periods of PND which may be secondary to WALKER or CHF. No indication for pacemaker at this time and it is unlikely that bradycardic events are associated with underlying disease process.    Discussed with Dr. Peters.    Thank you for this consultation. EP will sign off. Please contact EP team with any questions.    Vincent Gonsalez MD  Clinical Cardiac Electrophysiology

## 2020-07-02 NOTE — CONSULT NOTE ADULT - SUBJECTIVE AND OBJECTIVE BOX
REASON FOR CONSULTATION:     HPI:  44 year old male with PMH cardiomegaly, DM II, HTN, and recurrent pericardial effusions with pericardiocentesis x2 ( 700ml serous drainage both times). He subsequently underwent a subxiphoid pericardial window and pericardial biopsy on 19 at [facility] with Dr. Peters .  Postoperative course complicated by AF/SVT which converted to SR.  Was found to be + lymes on that admission and has completed his course of IV and PO antibiotics.  Patient then presented to the ER on  from Dr. Castro office s/p in office TTE that showed an increased pericardial effusion. Patient found to have infiltrate on Chest CT performed  that is concerning for infiltrative process.  Ct scan of abdomen pelvis with contrast on  show infiltrative process progressing to the retroperitoneal soft tissue surrounding both kidneys causing moderate bilateral hydronephrosis. ON 11/15 he underwent IR pericardial drain placement for 55ml of bloody drainage.  At that time, ID was consulted regarding infiltrative process on CT, and it was deemed likely not infectious. An Abd/Renal MRI was done that showed retroperitoneal fibrosis, mod. margoth. hydronephrosis, fibrosis prox. SMA, RIAN, margoth. renal artery, resulting luminal narrowing.  He then underwent a Right and left cardiac cath on  that showed normal coronaries, increased wedge pressure 24, no evidence of constriction or restriction.  His drain was removed  pericardial AI removed and he was discharged home.  He was following with his cardiologist (Dr. Mcginnis) and a rheumatologist as an outpatient.    He presented to [facility]  sent from his cardiologists office with c/o SOB on minimal exertion.  Pt reports that he gets SOB and fatigued with minimal exertion such as doing ADL's and even while talking. CT chest showed stable moderate bilateral pleural effusions with atelectasis, as well as a small stable pericardial effusion with adjacent inflammatory change and small loculated fluid along the right atrial border that could correlate with pericarditis.   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        REVIEW OF SYSTEMS:  Constitutional, Eyes, ENT, Cardiovascular, Respiratory, Gastrointestinal, Genitourinary, Musculoskeletal, Integumentary, Neurological, Psychiatric, Endocrine, Heme/Lymph, and Allergic/Immunologic review of systems are otherwise negative except as noted in the HPI.    PAST MEDICAL & SURGICAL HISTORY:  Hydronephrosis  Pericardial effusion  Lyme disease  Pericarditis  Heart failure  Cardiomegaly  Nonsmoker  Diabetes mellitus  Hypertension  S/P pericardial window creation  S/P pericardiocentesis      FAMILY HISTORY:  No pertinent family history in first degree relatives      SOCIAL HISTORY:    Allergies    No Known Allergies    Intolerances    [facility] patient (Unknown)      MEDICATIONS  (STANDING):  atorvastatin 10 milliGRAM(s) Oral at bedtime  buMETAnide Injectable 2 milliGRAM(s) IV Push two times a day  chlorhexidine 0.12% Liquid 15 milliLiter(s) Oral Mucosa two times a day  dextrose 5%. 1000 milliLiter(s) (50 mL/Hr) IV Continuous <Continuous>  dextrose 50% Injectable 12.5 Gram(s) IV Push once  dextrose 50% Injectable 25 Gram(s) IV Push once  dextrose 50% Injectable 25 Gram(s) IV Push once  insulin glargine Injectable (LANTUS) 10 Unit(s) SubCutaneous at bedtime  insulin lispro (HumaLOG) corrective regimen sliding scale   SubCutaneous four times a day before meals  mupirocin 2% Ointment 1 Application(s) Topical two times a day  pantoprazole    Tablet 40 milliGRAM(s) Oral before breakfast  phytonadione   Solution 10 milliGRAM(s) Oral daily  sodium chloride 0.9% lock flush 3 milliLiter(s) IV Push every 8 hours    MEDICATIONS  (PRN):  acetaminophen   Tablet .. 650 milliGRAM(s) Oral every 6 hours PRN Mild Pain (1 - 3), Moderate Pain (4 - 6)  dextrose 40% Gel 15 Gram(s) Oral once PRN Blood Glucose LESS THAN 70 milliGRAM(s)/deciliter  glucagon  Injectable 1 milliGRAM(s) IntraMuscular once PRN Glucose LESS THAN 70 milligrams/deciliter      Vital Signs Last 24 Hrs  T(C): 36.4 (2020 09:27), Max: 37.1 (2020 16:30)  T(F): 97.6 (2020 09:27), Max: 98.7 (2020 16:30)  HR: 102 (2020 09:27) (75 - 102)  BP: 129/80 (2020 09:27) (129/80 - 149/89)  BP(mean): --  RR: 16 (2020 09:27) (16 - 20)  SpO2: 96% (2020 09:27) (96% - 98%)    PHYSICAL EXAM:    GENERAL: NAD, well-groomed, well-developed  HEAD:  Atraumatic, Normocephalic  EYES: EOMI, PERRLA, conjunctiva and sclera clear  ENMT: No tonsillar erythema, exudates, or enlargement; Moist mucous membranes, Good dentition, No lesions  NECK: Supple, No JVD, Normal thyroid  NERVOUS SYSTEM:  Alert & Oriented X3, Good concentration; Motor Strength 5/5 B/L upper and lower extremities; DTRs 2+ intact and symmetric  CHEST/LUNG: Clear to auscultation bilaterally; No rales, rhonchi, wheezing, or rubs  HEART: Regular rate and rhythm; No murmurs, rubs, or gallops  ABDOMEN: Soft, Nontender, Nondistended; Bowel sounds present  EXTREMITIES:  2+ Peripheral Pulses, No clubbing, cyanosis, or edema  LYMPH: No lymphadenopathy noted  SKIN: No rashes or lesions      LABS:                        11.9   16.65 )-----------( 453      ( 2020 02:03 )             38.2     07-02    139  |  99  |  8.0  ----------------------------<  101<H>  3.8   |  29.0  |  0.54    Ca    9.2      2020 02:03  Phos  4.3     07-02  Mg     2.0     07-02    TPro  6.9  /  Alb  3.3  /  TBili  0.7  /  DBili  x   /  AST  16  /  ALT  10  /  AlkPhos  80  07-01    PT/INR - ( 2020 20:43 )   PT: 15.7 sec;   INR: 1.37 ratio         PTT - ( 2020 20:43 )  PTT:36.2 sec  Urinalysis Basic - ( 2020 23:48 )    Color: Yellow / Appearance: Clear / S.005 / pH: x  Gluc: x / Ketone: Negative  / Bili: Negative / Urobili: Negative mg/dL   Blood: x / Protein: Negative mg/dL / Nitrite: Negative   Leuk Esterase: Negative / RBC: x / WBC x   Sq Epi: x / Non Sq Epi: x / Bacteria: x          RADIOLOGY & ADDITIONAL STUDIES:  < from: CT Chest w/ IV Cont (20 @ 11:52) >  IMPRESSION:   Infiltrative soft tissue process surrounding the heart and encasing vessels, and soft tissue infiltrative process surrounding the kidneys with bilateral hydronephrosis and vasculature in the retroperitoneum. This may be due to a fibrotic infiltrative disease or an autoimmune process such as IgG 4 disease. Clinical correlation is suggested. While the bulk of disease does not appear significantly changed, the soft tissue infiltration surrounding vessels visually appears mildly increased. Clinical correlation is suggested.     Focal area of low in attenuation within the fibrotic changes along the right side of the heart. This is unchanged. Infected fluid cannot entirely be excluded.    Moderate left and small right pleural effusions. Atelectatic lung. Additional findings as above.    < end of copied text >    < from: MR Abdomen w/ IV Cont (19 @ 15:32) >  IMPRESSION:    Ill-defined soft tissue in theretroperitoneum demonstrating delayed   enhancement suggestive of retroperitoneal fibrosis. Encasement and   obstruction of both ureters causing moderate bilateral hydronephrosis.   Extensive perinephric stranding and edema is likely reactive to   obstructive uropathy. No evidence of perinephric soft tissue or mass.   Fibrotic tissue also encases the proximal SMA, RIAN, and bilateral renal   arteries with resultant luminal narrowing.    < end of copied text > REASON FOR CONSULTATION:     HPI:  44 year old male with PMH cardiomegaly, DM II, HTN, and recurrent pericardial effusions with pericardiocentesis x2 ( 700ml serous drainage both times). He subsequently underwent a subxiphoid pericardial window and pericardial biopsy on 19 at [facility] with Dr. Peters .  Postoperative course complicated by AF/SVT which converted to SR.  Was found to be + lymes on that admission and has completed his course of IV and PO antibiotics.  Patient then presented to the ER on  from Dr. Castro office s/p in office TTE that showed an increased pericardial effusion. Patient found to have infiltrate on Chest CT performed  that is concerning for infiltrative process.  Ct scan of abdomen pelvis with contrast on  show infiltrative process progressing to the retroperitoneal soft tissue surrounding both kidneys causing moderate bilateral hydronephrosis. ON 11/15 he underwent IR pericardial drain placement for 55ml of bloody drainage.  At that time, ID was consulted regarding infiltrative process on CT, and it was deemed likely not infectious. An Abd/Renal MRI was done that showed retroperitoneal fibrosis, mod. margoth. hydronephrosis, fibrosis prox. SMA, RIAN, margoth. renal artery, resulting luminal narrowing.  He then underwent a Right and left cardiac cath on  that showed normal coronaries, increased wedge pressure 24, no evidence of constriction or restriction.  His drain was removed  pericardial AI removed and he was discharged home.  He was following with his cardiologist (Dr. Mcginnis) and a rheumatologist as an outpatient.    He presented to [facility]  sent from his cardiologists office with c/o SOB on minimal exertion.  Pt reports that he gets SOB and fatigued with minimal exertion such as doing ADL's and even while talking. CT chest showed stable moderate bilateral pleural effusions with atelectasis, as well as a small stable pericardial effusion with adjacent inflammatory change and small loculated fluid along the right atrial border that could correlate with pericarditis.   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        REVIEW OF SYSTEMS:  Constitutional, Eyes, ENT, Cardiovascular, Respiratory, Gastrointestinal, Genitourinary, Musculoskeletal, Integumentary, Neurological, Psychiatric, Endocrine, Heme/Lymph, and Allergic/Immunologic review of systems are otherwise negative except as noted in the HPI.    PAST MEDICAL & SURGICAL HISTORY:  Hydronephrosis  Pericardial effusion  Lyme disease  Pericarditis  Heart failure  Cardiomegaly  Nonsmoker  Diabetes mellitus  Hypertension  S/P pericardial window creation  S/P pericardiocentesis      FAMILY HISTORY:  No pertinent family history in first degree relatives      SOCIAL HISTORY:  non smoker  Allergies    No Known Allergies    Intolerances    [facility] patient (Unknown)      MEDICATIONS  (STANDING):  atorvastatin 10 milliGRAM(s) Oral at bedtime  buMETAnide Injectable 2 milliGRAM(s) IV Push two times a day  chlorhexidine 0.12% Liquid 15 milliLiter(s) Oral Mucosa two times a day  dextrose 5%. 1000 milliLiter(s) (50 mL/Hr) IV Continuous <Continuous>  dextrose 50% Injectable 12.5 Gram(s) IV Push once  dextrose 50% Injectable 25 Gram(s) IV Push once  dextrose 50% Injectable 25 Gram(s) IV Push once  insulin glargine Injectable (LANTUS) 10 Unit(s) SubCutaneous at bedtime  insulin lispro (HumaLOG) corrective regimen sliding scale   SubCutaneous four times a day before meals  mupirocin 2% Ointment 1 Application(s) Topical two times a day  pantoprazole    Tablet 40 milliGRAM(s) Oral before breakfast  phytonadione   Solution 10 milliGRAM(s) Oral daily  sodium chloride 0.9% lock flush 3 milliLiter(s) IV Push every 8 hours    MEDICATIONS  (PRN):  acetaminophen   Tablet .. 650 milliGRAM(s) Oral every 6 hours PRN Mild Pain (1 - 3), Moderate Pain (4 - 6)  dextrose 40% Gel 15 Gram(s) Oral once PRN Blood Glucose LESS THAN 70 milliGRAM(s)/deciliter  glucagon  Injectable 1 milliGRAM(s) IntraMuscular once PRN Glucose LESS THAN 70 milligrams/deciliter      Vital Signs Last 24 Hrs  T(C): 36.4 (2020 09:27), Max: 37.1 (2020 16:30)  T(F): 97.6 (2020 09:27), Max: 98.7 (2020 16:30)  HR: 102 (2020 09:27) (75 - 102)  BP: 129/80 (2020 09:27) (129/80 - 149/89)  BP(mean): --  RR: 16 (2020 09:27) (16 - 20)  SpO2: 96% (2020 09:27) (96% - 98%)    PHYSICAL EXAM:    GENERAL: NAD, well-groomed, well-developed  HEAD:  Atraumatic, Normocephalic  EYES: EOMI, PERRLA,  NECK: Supple,   NERVOUS SYSTEM:  Alert & Oriented X3, Good concentration;   CHEST/LUNG: Clear to auscultation bilaterally;   HEART: Regular rate and rhythm;  ABDOMEN: Soft, Nontender, Nondistended;   EXTREMITIES: no edema  LYMPH: No lymphadenopathy noted  SKIN: No rashes or lesions      LABS:                        11.9   16.65 )-----------( 453      ( 2020 02:03 )             38.2     07-02    139  |  99  |  8.0  ----------------------------<  101<H>  3.8   |  29.0  |  0.54    Ca    9.2      2020 02:03  Phos  4.3     07-02  Mg     2.0     07-02    TPro  6.9  /  Alb  3.3  /  TBili  0.7  /  DBili  x   /  AST  16  /  ALT  10  /  AlkPhos  80  07-01    PT/INR - ( 2020 20:43 )   PT: 15.7 sec;   INR: 1.37 ratio         PTT - ( 2020 20:43 )  PTT:36.2 sec  Urinalysis Basic - ( 2020 23:48 )    Color: Yellow / Appearance: Clear / S.005 / pH: x  Gluc: x / Ketone: Negative  / Bili: Negative / Urobili: Negative mg/dL   Blood: x / Protein: Negative mg/dL / Nitrite: Negative   Leuk Esterase: Negative / RBC: x / WBC x   Sq Epi: x / Non Sq Epi: x / Bacteria: x          RADIOLOGY & ADDITIONAL STUDIES:  < from: CT Chest w/ IV Cont (20 @ 11:52) >  IMPRESSION:   Infiltrative soft tissue process surrounding the heart and encasing vessels, and soft tissue infiltrative process surrounding the kidneys with bilateral hydronephrosis and vasculature in the retroperitoneum. This may be due to a fibrotic infiltrative disease or an autoimmune process such as IgG 4 disease. Clinical correlation is suggested. While the bulk of disease does not appear significantly changed, the soft tissue infiltration surrounding vessels visually appears mildly increased. Clinical correlation is suggested.     Focal area of low in attenuation within the fibrotic changes along the right side of the heart. This is unchanged. Infected fluid cannot entirely be excluded.    Moderate left and small right pleural effusions. Atelectatic lung. Additional findings as above.    < end of copied text >    < from: MR Abdomen w/ IV Cont (11.17.19 @ 15:32) >  IMPRESSION:    Ill-defined soft tissue in theretroperitoneum demonstrating delayed   enhancement suggestive of retroperitoneal fibrosis. Encasement and   obstruction of both ureters causing moderate bilateral hydronephrosis.   Extensive perinephric stranding and edema is likely reactive to   obstructive uropathy. No evidence of perinephric soft tissue or mass.   Fibrotic tissue also encases the proximal SMA, RIAN, and bilateral renal   arteries with resultant luminal narrowing.    < end of copied text >

## 2020-07-02 NOTE — PROGRESS NOTE ADULT - PROBLEM SELECTOR PLAN 2
Treated during admission in November 2019. Tele-monitor with intermittent type 2 AV block (Mobitz Type 2)  Patient asymptomatic, VS stable  Transcutaneous pacer connected  12 Lead EKG ordered  Stat labs ordered, replete orders placed  Will continue to monitor closely  Will consider EP consult if condition persists

## 2020-07-02 NOTE — CONSULT NOTE ADULT - ASSESSMENT
44 year old male with PMH cardiomegaly, recurrent pericardial effusions with pericardiocentesis x2, subxiphoid pericardial window and pericardial biopsy on 9/30/19 transferred from [facility] for pericardial effusion and bilateral pleural effusions. Previously seen by oncology in 11/2019.  Previous pericardial biopsy in 2019 was negative for malignancy.  Per review of prior hospitalization and imaging records, the infiltrative process appears to be retroperitoneal fibrosis.  He requires a biopsy to establish diagnosis - suggest IR for core biopsy vs.  for incisional biopsy of retroperitoneum.  If retroperitoneal fibrosis is confirmed, first line treatment is glucocorticoids. 44 year old male with PMH cardiomegaly, recurrent pericardial effusions with pericardiocentesis x2, subxiphoid pericardial window and pericardial biopsy on 9/30/19 [place] [facility] [place]usion and bilateral pleural effusions. Previously seen by oncology in 11/2019.  Previous pericardial biopsy in 2019 was negative for malignancy.  Per review of prior hospitalization and imaging records, the infiltrative process appears to be retroperitoneal fibrosis on prior MRI.  +hydronephrosis.  He requires a biopsy to establish diagnosis - suggest  for incisional biopsy of retroperitoneum.

## 2020-07-02 NOTE — PROGRESS NOTE ADULT - PROBLEM SELECTOR PLAN 1
Admit to CT surgery service to Dr. Peters.  CT chest and JAMES done, report as above  Rheumatology consulted, input appreciated, plans needs to discuss with Dr. Peters in AM rounds.

## 2020-07-02 NOTE — PROGRESS NOTE ADULT - PROBLEM SELECTOR PLAN 6
Will    Will discuss with CTS team in AM Will hold Beta blockers now secondary to missed QRS beats (Mobitz Type 2)  -139/80-85 mmHg  Will discuss with CTS team in AM

## 2020-07-02 NOTE — PROGRESS NOTE ADULT - SUBJECTIVE AND OBJECTIVE BOX
Subjective: Patient lying in bed, no acute distress noted, denies chest pain, palpitation, dizziness, headache, shortness of breath, abdominal pain, N/V/D.     VITAL SIGNS  Vital Signs Last 24 Hrs  T(C): 36.8 (20 @ 21:18), Max: 37.1 (20 @ 16:30)  T(F): 98.2 (20 @ 21:18), Max: 98.7 (20 @ 16:30)  HR: 75 (20 @ 00:30) (75 - 99)  BP: 133/86 (20 @ 00:30) (127/81 - 153/85)  RR: 18 (20 @ 21:18) (18 - 20)  SpO2: 98% (20 @ 21:18) (96% - 98%)  on room air.      Telemetry/Alarms:  Sinus rhythm. Noted to have missed QRS complex, Type 2 Mobitz.   LVEF: 60-65%    MEDICATIONS  acetaminophen   Tablet .. 650 milliGRAM(s) Oral every 6 hours PRN  atorvastatin 10 milliGRAM(s) Oral at bedtime  chlorhexidine 0.12% Liquid 15 milliLiter(s) Oral Mucosa two times a day  glucagon  Injectable 1 milliGRAM(s) IntraMuscular once PRN  insulin glargine Injectable (LANTUS) 10 Unit(s) SubCutaneous at bedtime  insulin lispro (HumaLOG) corrective regimen sliding scale   SubCutaneous four times a day before meals  mupirocin 2% Ointment 1 Application(s) Topical two times a day  pantoprazole    Tablet 40 milliGRAM(s) Oral before breakfast  phytonadione   Solution 10 milliGRAM(s) Oral daily  sodium chloride 0.9% lock flush 3 milliLiter(s) IV Push every 8 hours  sodium chloride 0.9%. 90 milliLiter(s) IV Continuous <Continuous>      PHYSICAL EXAM  General: well nourished, well developed, no acute distress  Neurology: alert and oriented x 3, nonfocal, no gross deficits  Respiratory: clear to auscultation, diminished at the bases bilaterally. No accessory muscle use noted.  CV: regular rate and rhythm, normal S1, S2. No murmur or gallops. +3 BLE edema, +PP pulses bilaterally  Abdomen: soft, nontender, nondistended, positive bowel sounds  Extremities: warm, well perfused. +3 BLE edema. + DP pulses bilaterally      06-30 @ 07:01  -   @ 07:00  --------------------------------------------------------  IN: 200 mL / OUT: 2400 mL / NET: -2200 mL     @ 07:01  -   @ 03:11  --------------------------------------------------------  IN: 0 mL / OUT: 300 mL / NET: -300 mL        Weights:  Daily     Daily Weight in k.3 (2020 06:45)  Admit Wt: Drug Dosing Weight  Height (cm): 172.72 (2019 17:34)  Weight (kg): 114.3 (2020 06:45)  BMI (kg/m2): 38.3 (2020 06:45)  BSA (m2): 2.25 (2020 06:45)    All laboratory results, radiology and medications reviewed.    LABS      139  |  99  |  8.0  ----------------------------<  101<H>  3.8   |  29.0  |  0.54    Ca    9.2      2020 02:03  Phos  4.3       Mg     2.0         TPro  6.9  /  Alb  3.3  /  TBili  0.7  /  DBili  x   /  AST  16  /  ALT  10  /  AlkPhos  80                                   11.9   16.65 )-----------( 453      ( 2020 02:03 )             38.2          PT/INR - ( 2020 20:43 )   PT: 15.7 sec;   INR: 1.37 ratio         PTT - ( 2020 20:43 )  PTT:36.2 sec  Bilirubin Total, Serum: 0.7 mg/dL ( @ 06:18)    CAPILLARY BLOOD GLUCOSE      POCT Blood Glucose.: 217 mg/dL (2020 21:15)  POCT Blood Glucose.: 148 mg/dL (2020 17:14)  POCT Blood Glucose.: 128 mg/dL (2020 12:49)  POCT Blood Glucose.: 142 mg/dL (2020 08:34)           < from: CT Chest w/ IV Cont (20 @ 11:52) >    IMPRESSION:   Infiltrative soft tissue process surrounding the heart and encasing vessels, and soft tissue infiltrative process surrounding the kidneys with bilateral hydronephrosis and vasculature in the retroperitoneum. This may be due to a fibrotic infiltrative disease or an autoimmune process such as IgG 4 disease. Clinical correlation is suggested. While the bulk of disease does not appear significantly changed, the soft tissue infiltration surrounding vessels visually appears mildly increased. Clinical correlation is suggested.     Focal area of low in attenuation within the fibrotic changes along the right side of the heart. This is unchanged. Infected fluid cannot entirely be excluded.    Moderate left and small right pleural effusions. Atelectatic lung. Additional findings as above.      < end of copied text >      < from: Xray Chest 1 View AP/PA. (20 @ 17:58) >  FINDINGS:     HEART:  Enlarged  LUNGS: There is opacity at the left base compatible with effusion/infiltrate.  BONES: degenerative changes    IMPRESSION:     Left effusion/infiltrate. Similar to the prior study      < end of copied text >      < from: TTE Echo Complete w/ Contrast w/ Doppler (20 @ 21:33) >    Summary:   1. Technically difficult study.   2. Endocardial visualization was enhanced with intravenous echo contrast.   3. Left ventricular ejection fraction, by visual estimation, is 60 to 65%.   4. Normal global left ventricular systolic function.   5. The left ventricular diastolic function could not be assessed in this study.   6. Normal left ventricular internal cavity size.   7. Thickening of the anterior and posterior mitral valve leaflets.   8. Trace mitral valve regurgitation.   9. Small pericardial effusion.      < end of copied text >     EKG:  < from: 12 Lead ECG (20 @ 18:00) >    Ventricular Rate 94 BPM    Atrial Rate 94 BPM    P-R Interval 174 ms    QRS Duration 90 ms    Q-T Interval 358 ms    QTC Calculation(Bezet) 447 ms    P Axis 36 degrees    R Axis 65 degrees    T Axis 37 degrees    Diagnosis Line Normal sinus rhythm  Possible Left atrial enlargement  Nonspecific T wave abnormality  Right bundle branch block    < end of copied text >      PAST MEDICAL & SURGICAL HISTORY:  Hydronephrosis  Pericardial effusion  Lyme disease  Pericarditis  Heart failure  Cardiomegaly  Nonsmoker  Diabetes mellitus  Hypertension  S/P pericardial window creation  S/P pericardiocentesis

## 2020-07-02 NOTE — PROGRESS NOTE ADULT - PROBLEM SELECTOR PLAN 7
SCD for DVT prophylaxis  Protonix for PUD prophylaxis  Plan needs to discuss with CTS team in AM Treated during admission in November 2019.

## 2020-07-02 NOTE — CHART NOTE - NSCHARTNOTEFT_GEN_A_CORE
CTS ACP Addendum    Briefly, 44M h/o DM, HTN, cardiomegaly and recurrent pericardial effusions s/p pericardiocentesis x 2 and a subxiphoid pericardial window with biopsy on 9/30/19. Postop course was complicated by AF/SVT, + Lymes disease s/p treatment. 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 He followed up with his cardiologist and rheumatologist as outpatient but received no specific diagnosis or medication. [place] [facility] [place] and was transferred to [facility] to evaluate for constrictive pericarditis. Yesterday patient underwent a JAMES which revealed a normal LVEF with a restrictive pericarditis. Overnight last night, he had 3 episodes of ventricular pause vs high grade heart block. Evaluated by EP today > likely vagally mediated from WALKER.    Patient seen and examined. Notes, flowsheets, medications, radiologic images and labs reviewed. Patient seen and examined with the  (Language Line iPad) at length.    Plan:  - Spoke with IR Dr Jones who felt a core biopsy of retroperitoneal fat would have little yield, Dr Zimmer from heme/onc who is recommending biopsy but on further conversation with  Onc, felt that it was unlikely to be retroperitoneal fibrosis.  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 Peters

## 2020-07-02 NOTE — CONSULT NOTE ADULT - SUBJECTIVE AND OBJECTIVE BOX
44 year old male patient with a history of cardiomegaly, DM II, HTN, and recurrent pericardial effusions with pericardiocentesis x2 (700ml serous drainage both times). He subsequently underwent a subxiphoid pericardial window and pericardial biopsy on 19 at [facility] with Dr. Peters .  Postoperative course complicated by AF/SVT which converted to SR.  Was found to be + lymes on that admission and has completed his course of IV and PO antibiotics.  Patient then presented to the ER on  from Dr. Castro office s/p in office TTE that showed an increased pericardial effusion. Patient found to have infiltrate on Chest CT performed  that is concerning for infiltrative process / neoplastic disease, hematology/ oncology recommend ID follow up as outpatient.  Ct scan of abdomen pelvis with contrast on  show infiltrative process progressing to the retroperitoneal soft tissue surrounding both kidneys causing moderate bilateral hydronephrosis. ON 11/15 he underwent IR pericardial drain placement for 55ml of bloody drainage.  At that time, ID was consulted regarding infiltrative process on CT, and it was deemed likely not infectious.  Hem/onc recalled and Rheumatology consulted.  An Abd/Renal MRI was done that showed retroperitoneal fibrosis, mod. margoth. hydronephrosis, fibrosis prox. SMA, RIAN, margoth. renal artery, resulting luminal narrowing.  He then underwent a Right and left cardiac cath on  that showed normal coronaries, increased wedge pressure 24, no evidence of constriction or restriction.  His drain was removed  pericardial AI removed and he was discharged home.  He was following with his cardiologist (Dr. Mcginnis) and a rheumatologist as an outpatient.    [place] [facility]  [place] cardiologists office with c/o SOB on minimal exertion.  Pt reports that he gets SOB and fatigued with minimal exertion such as doing ADL's and even while talking. He was ruled out for an dMI. CT chest showed stable moderate bilateral pleural effusions with atelectasis, as well as a small stable pericardial effusion with adjacent inflammatory change and small loculated fluid along the right atrial border that could correlate with pericarditis.   He underwent a TTE and right and [place] heart [place] [facility] but [place] are not available.  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    PAST MEDICAL & SURGICAL HISTORY:  Hydronephrosis  Pericardial effusion  Lyme disease  Pericarditis  Heart failure  Cardiomegaly  Nonsmoker  Diabetes mellitus  Hypertension  S/P pericardial window creation  S/P pericardiocentesis    REVIEW OF SYSTEMS  General:	  Skin/Breast:	  Ophthalmologic:	  ENMT:	  Respiratory and Thorax:  Cardiovascular:	  Gastrointestinal:	  Genitourinary:	  Musculoskeletal:	  Neurological:	  Psychiatric:	  Hematology/Lymphatics:	  Endocrine:	  Allergic/Immunologic:	    MEDICATIONS  (STANDING):  atorvastatin 10 milliGRAM(s) Oral at bedtime  buMETAnide Injectable 2 milliGRAM(s) IV Push two times a day  chlorhexidine 0.12% Liquid 15 milliLiter(s) Oral Mucosa two times a day  dextrose 5%. 1000 milliLiter(s) (50 mL/Hr) IV Continuous <Continuous>  dextrose 50% Injectable 12.5 Gram(s) IV Push once  dextrose 50% Injectable 25 Gram(s) IV Push once  dextrose 50% Injectable 25 Gram(s) IV Push once  insulin glargine Injectable (LANTUS) 10 Unit(s) SubCutaneous at bedtime  insulin lispro (HumaLOG) corrective regimen sliding scale   SubCutaneous four times a day before meals  mupirocin 2% Ointment 1 Application(s) Topical two times a day  pantoprazole    Tablet 40 milliGRAM(s) Oral before breakfast  phytonadione   Solution 10 milliGRAM(s) Oral daily  sodium chloride 0.9% lock flush 3 milliLiter(s) IV Push every 8 hours    MEDICATIONS  (PRN):  acetaminophen   Tablet .. 650 milliGRAM(s) Oral every 6 hours PRN Mild Pain (1 - 3), Moderate Pain (4 - 6)  dextrose 40% Gel 15 Gram(s) Oral once PRN Blood Glucose LESS THAN 70 milliGRAM(s)/deciliter  glucagon  Injectable 1 milliGRAM(s) IntraMuscular once PRN Glucose LESS THAN 70 milligrams/deciliter  Allergies  No Known Allergies    Intolerances  [facility] patient (Unknown)    SOCIAL HISTORY:    FAMILY HISTORY:  No pertinent family history in first degree relatives    Vital Signs Last 24 Hrs  T(C): 36.4 (2020 05:33), Max: 37.1 (2020 16:30)  T(F): 97.6 (2020 05:33), Max: 98.7 (2020 16:30)  HR: 91 (2020 05:33) (75 - 99)  BP: 149/89 (2020 05:33) (132/78 - 153/85)  RR: 20 (2020 05:33) (18 - 20)  SpO2: 98% (2020 05:33) (96% - 98%)    Physical Exam:  Constitutional: AAOx3, NAD  Neck: supple, No JVD  Cardiovascular: +S1S2 RRR, no murmurs, rubs, gallops   Pulmonary: CTA b/l, unlabored, no wheezes, rales. rhonci  Abdomen: +BS, soft NTND  Extremities: no edema b/l, +distal pulses b/l  Neuro: non focal, speech clear, MARIA x 4    LABS:                        11.9   16.65 )-----------( 453      ( 2020 02:03 )             38.2     139  |  99  |  8.0  ----------------------------<  101<H>  3.8   |  29.0  |  0.54  Ca    9.2      2020 02:03  Phos  4.3     07-02  Mg     2.0     07-02  TPro  6.9  /  Alb  3.3  /  TBili  0.7  /  DBili  x   /  AST  16  /  ALT  10  /  AlkPhos  80  07-01  LIVER FUNCTIONS - ( 2020 06:18 )  Alb: 3.3 g/dL / Pro: 6.9 g/dL / ALK PHOS: 80 U/L / ALT: 10 U/L / AST: 16 U/L / GGT: x         PT/INR - ( 2020 20:43 )   PT: 15.7 sec;   INR: 1.37 ratio    PTT - ( 2020 20:43 )  PTT:36.2 sec    Urinalysis Basic - ( 2020 23:48 )  Color: Yellow / Appearance: Clear / S.005 / pH: x  Gluc: x / Ketone: Negative  / Bili: Negative / Urobili: Negative mg/dL   Blood: x / Protein: Negative mg/dL / Nitrite: Negative   Leuk Esterase: Negative / RBC: x / WBC x   Sq Epi: x / Non Sq Epi: x / Bacteria: x    A/P 44 year old male patient with a history of cardiomegaly, DM II, HTN, Lyme disease, and recurrent pericardial effusions with pericardiocentesis x2 (700ml serous drainage both times), s/p subxiphoid pericardial window and pericardial biopsy on 19 at [facility] with Dr. Peters. [place] [facility] [place]. 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    PAST MEDICAL & SURGICAL HISTORY:  Hydronephrosis  Pericardial effusion  Lyme disease  Pericarditis  Heart failure  Cardiomegaly  Nonsmoker  Diabetes mellitus  Hypertension  S/P pericardial window creation  S/P pericardiocentesis    REVIEW OF SYSTEMS  General:	  Skin/Breast:	  Ophthalmologic:	  ENMT:	  Respiratory and Thorax:  Cardiovascular:	  Gastrointestinal:	  Genitourinary:	  Musculoskeletal:	  Neurological:	  Psychiatric:	  Hematology/Lymphatics:	  Endocrine:	  Allergic/Immunologic:	    MEDICATIONS  (STANDING):  atorvastatin 10 milliGRAM(s) Oral at bedtime  buMETAnide Injectable 2 milliGRAM(s) IV Push two times a day  chlorhexidine 0.12% Liquid 15 milliLiter(s) Oral Mucosa two times a day  dextrose 5%. 1000 milliLiter(s) (50 mL/Hr) IV Continuous <Continuous>  dextrose 50% Injectable 12.5 Gram(s) IV Push once  dextrose 50% Injectable 25 Gram(s) IV Push once  dextrose 50% Injectable 25 Gram(s) IV Push once  insulin glargine Injectable (LANTUS) 10 Unit(s) SubCutaneous at bedtime  insulin lispro (HumaLOG) corrective regimen sliding scale   SubCutaneous four times a day before meals  mupirocin 2% Ointment 1 Application(s) Topical two times a day  pantoprazole    Tablet 40 milliGRAM(s) Oral before breakfast  phytonadione   Solution 10 milliGRAM(s) Oral daily  sodium chloride 0.9% lock flush 3 milliLiter(s) IV Push every 8 hours    MEDICATIONS  (PRN):  acetaminophen   Tablet .. 650 milliGRAM(s) Oral every 6 hours PRN Mild Pain (1 - 3), Moderate Pain (4 - 6)  dextrose 40% Gel 15 Gram(s) Oral once PRN Blood Glucose LESS THAN 70 milliGRAM(s)/deciliter  glucagon  Injectable 1 milliGRAM(s) IntraMuscular once PRN Glucose LESS THAN 70 milligrams/deciliter  Allergies  No Known Allergies    Intolerances  [facility] patient (Unknown)    SOCIAL HISTORY:    FAMILY HISTORY:  No pertinent family history in first degree relatives    Vital Signs Last 24 Hrs  T(C): 36.4 (2020 05:33), Max: 37.1 (2020 16:30)  T(F): 97.6 (2020 05:33), Max: 98.7 (2020 16:30)  HR: 91 (2020 05:33) (75 - 99)  BP: 149/89 (2020 05:33) (132/78 - 153/85)  RR: 20 (2020 05:33) (18 - 20)  SpO2: 98% (2020 05:33) (96% - 98%)    Physical Exam:  Constitutional: AAOx3, NAD  Neck: supple, No JVD  Cardiovascular: +S1S2 RRR, no murmurs, rubs, gallops   Pulmonary: CTA b/l, unlabored, no wheezes, rales. rhonci  Abdomen: +BS, soft NTND  Extremities: no edema b/l, +distal pulses b/l  Neuro: non focal, speech clear, MARIA x 4    LABS:                        11.9   16.65 )-----------( 453      ( 2020 02:03 )             38.2     139  |  99  |  8.0  ----------------------------<  101<H>  3.8   |  29.0  |  0.54  Ca    9.2      2020 02:03  Phos  4.3     07-02  Mg     2.0     07-02  TPro  6.9  /  Alb  3.3  /  TBili  0.7  /  DBili  x   /  AST  16  /  ALT  10  /  AlkPhos  80  07-01  LIVER FUNCTIONS - ( 2020 06:18 )  Alb: 3.3 g/dL / Pro: 6.9 g/dL / ALK PHOS: 80 U/L / ALT: 10 U/L / AST: 16 U/L / GGT: x         PT/INR - ( 2020 20:43 )   PT: 15.7 sec;   INR: 1.37 ratio    PTT - ( 2020 20:43 )  PTT:36.2 sec    Urinalysis Basic - ( 2020 23:48 )  Color: Yellow / Appearance: Clear / S.005 / pH: x  Gluc: x / Ketone: Negative  / Bili: Negative / Urobili: Negative mg/dL   Blood: x / Protein: Negative mg/dL / Nitrite: Negative   Leuk Esterase: Negative / RBC: x / WBC x   Sq Epi: x / Non Sq Epi: x / Bacteria: x    RESULTS:  TTE 2020  Summary:   1. Technically difficult study.   2. Endocardial visualization was enhanced with intravenous echo contrast.   3. Left ventricular ejection fraction, by visual estimation, is 60 to 65%.   4. Normal global left ventricular systolic function.   5. The left ventricular diastolic function could not be assessed in this study.   6. Normal left ventricular internal cavity size.   7. Thickening of the anterior and posterior mitral valve leaflets.   8. Trace mitral valve regurgitation.   9. Small pericardial effusion.    CT chest 2020  IMPRESSION:   Infiltrative soft tissue process surrounding the heart and encasing vessels, and soft tissue infiltrative process surrounding the kidneys with bilateral hydronephrosis and vasculature in the retroperitoneum. This may be due to a fibrotic infiltrative disease or an autoimmune process such as IgG 4 disease. Clinical correlation is suggested. While the bulk of disease does not appear significantly changed, the soft tissue infiltration surrounding vessels visually appears mildly increased. Clinical correlation is suggested.   Focal area of low in attenuation within the fibrotic changes along the right side of the heart. This is unchanged. Infected fluid cannot entirely be excluded.  Moderate left and small right pleural effusions. Atelectatic lung. Additional findings as above.    Cath 2019  HISTORY: Patient with history recurrent pericardial effusion. CHF.  DIAGNOSTIC IMPRESSIONS: Mild CAD.  Normal LV function.  Elevated wedge pressure.  Mild to moderate pulmonary hypertension.  DIAGNOSTIC RECOMMENDATIONS: Medical management.    CXR 2020  IMPRESSION:   Constellation of findings suggestive of CHF.    MRI abdomen 2019  IMPRESSION:  Ill-defined soft tissue in the retroperitoneum demonstrating delayed   enhancement suggestive of retroperitoneal fibrosis. Encasement and   obstruction of both ureters causing moderate bilateral hydronephrosis.   Extensive perinephric stranding and edema is likely reactive to   obstructive uropathy. No evidence of perinephric soft tissue or mass.   Fibrotic tissue also encases the proximal SMA, RIAN, and bilateral renal   arteries with resultant luminal narrowing.    EKG 2020  NSR at 94bpm; QRSD 90ms; RBBB + LAE    Telemetry:    A/P  44 year old male patient with a history of cardiomegaly, DM II, HTN, Lyme disease, and recurrent pericardial effusions with pericardiocentesis x2, s/p subxiphoid pericardial window and pericardial biopsy who is once again admitted with constrictive pericarditis. 44 year old male patient with a history of obesity, cardiomegaly, DM II, HTN, Lyme disease, and recurrent pericardial effusions with pericardiocentesis x2 (700ml serous drainage both times), s/p subxiphoid pericardial window and pericardial biopsy on 19 at [facility] with Dr. Peters. Presented to [facility] [place] pericardial effusion. 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    Patient has recently started to feel more short of breath. He reports he experiences dyspnea with ambulation. Has trouble going up the stairs. He admit to episodes of dizziness especially when he bends over to pick something up off the floor. He admits to two episodes of dizziness and falls in the past with this maneuver. No history of jarett syncope. Admits he sometimes wakes up at night gasping for air as well as snoring. Telemetry reviewed. Since hospitalizations he reports he has not slept well at all. Reports he keeps getting woken up because of vital checks and noise.     PAST MEDICAL & SURGICAL HISTORY:  Hydronephrosis  Pericardial effusion  Lyme disease  Pericarditis  Heart failure  Cardiomegaly  Nonsmoker  Diabetes mellitus  Hypertension  S/P pericardial window creation  S/P pericardiocentesis    REVIEW OF SYSTEMS  General: - fever or chills, + fatigue  Skin/Breast: - rashes  Ophthalmologic: - blurred vision	  ENMT: - sore throat  Respiratory and Thorax: + cough,   Cardiovascular:	+JOHNSON, - palpitations, - chest pain  Gastrointestinal:	- N/V/D/C  Genitourinary: - dysuria  Musculoskeletal:	 - arthritis  Neurological: - weaknesses  Psychiatric: - anxiety    MEDICATIONS  (STANDING):  atorvastatin 10 milliGRAM(s) Oral at bedtime  buMETAnide Injectable 2 milliGRAM(s) IV Push two times a day  chlorhexidine 0.12% Liquid 15 milliLiter(s) Oral Mucosa two times a day  dextrose 5%. 1000 milliLiter(s) (50 mL/Hr) IV Continuous <Continuous>  dextrose 50% Injectable 12.5 Gram(s) IV Push once  dextrose 50% Injectable 25 Gram(s) IV Push once  dextrose 50% Injectable 25 Gram(s) IV Push once  insulin glargine Injectable (LANTUS) 10 Unit(s) SubCutaneous at bedtime  insulin lispro (HumaLOG) corrective regimen sliding scale   SubCutaneous four times a day before meals  mupirocin 2% Ointment 1 Application(s) Topical two times a day  pantoprazole    Tablet 40 milliGRAM(s) Oral before breakfast  phytonadione   Solution 10 milliGRAM(s) Oral daily  sodium chloride 0.9% lock flush 3 milliLiter(s) IV Push every 8 hours    MEDICATIONS  (PRN):  acetaminophen   Tablet .. 650 milliGRAM(s) Oral every 6 hours PRN Mild Pain (1 - 3), Moderate Pain (4 - 6)  dextrose 40% Gel 15 Gram(s) Oral once PRN Blood Glucose LESS THAN 70 milliGRAM(s)/deciliter  glucagon  Injectable 1 milliGRAM(s) IntraMuscular once PRN Glucose LESS THAN 70 milligrams/deciliter  Allergies  No Known Allergies    Intolerances  [facility] patient (Unknown)    SOCIAL HISTORY: non smoker, non drinker    FAMILY HISTORY:  No pertinent family history in first degree relatives    Vital Signs Last 24 Hrs  T(C): 36.4 (2020 05:33), Max: 37.1 (2020 16:30)  T(F): 97.6 (2020 05:33), Max: 98.7 (2020 16:30)  HR: 91 (2020 05:33) (75 - 99)  BP: 149/89 (2020 05:33) (132/78 - 153/85)  RR: 20 (2020 05:33) (18 - 20)  SpO2: 98% (2020 05:33) (96% - 98%)    Physical Exam:  Constitutional: AAOx3, NAD, obese male patient  Neck: supple, No JVD  Cardiovascular: +S1S2 RRR, sinus tachycardia on monitor  Pulmonary: Coarse breath sounds with crackles at bases, unlabored  Abdomen: +BS, soft NTND, obese  Extremities: 1+ LE edema  Neuro: non focal, speech clear, MARIA x 4    LABS:                        11.9   16.65 )-----------( 453      ( 2020 02:03 )             38.2     139  |  99  |  8.0  ----------------------------<  101<H>  3.8   |  29.0  |  0.54  Ca    9.2      2020 02:03  Phos  4.3     07-02  Mg     2.0     07-02  TPro  6.9  /  Alb  3.3  /  TBili  0.7  /  DBili  x   /  AST  16  /  ALT  10  /  AlkPhos  80  07-  LIVER FUNCTIONS - ( 2020 06:18 )  Alb: 3.3 g/dL / Pro: 6.9 g/dL / ALK PHOS: 80 U/L / ALT: 10 U/L / AST: 16 U/L / GGT: x         PT/INR - ( 2020 20:43 )   PT: 15.7 sec;   INR: 1.37 ratio    PTT - ( 2020 20:43 )  PTT:36.2 sec    Urinalysis Basic - ( 2020 23:48 )  Color: Yellow / Appearance: Clear / S.005 / pH: x  Gluc: x / Ketone: Negative  / Bili: Negative / Urobili: Negative mg/dL   Blood: x / Protein: Negative mg/dL / Nitrite: Negative   Leuk Esterase: Negative / RBC: x / WBC x   Sq Epi: x / Non Sq Epi: x / Bacteria: x    RESULTS:  TTE 2020  Summary:   1. Technically difficult study.   2. Endocardial visualization was enhanced with intravenous echo contrast.   3. Left ventricular ejection fraction, by visual estimation, is 60 to 65%.   4. Normal global left ventricular systolic function.   5. The left ventricular diastolic function could not be assessed in this study.   6. Normal left ventricular internal cavity size.   7. Thickening of the anterior and posterior mitral valve leaflets.   8. Trace mitral valve regurgitation.   9. Small pericardial effusion.    CT chest 2020  IMPRESSION:   Infiltrative soft tissue process surrounding the heart and encasing vessels, and soft tissue infiltrative process surrounding the kidneys with bilateral hydronephrosis and vasculature in the retroperitoneum. This may be due to a fibrotic infiltrative disease or an autoimmune process such as IgG 4 disease. Clinical correlation is suggested. While the bulk of disease does not appear significantly changed, the soft tissue infiltration surrounding vessels visually appears mildly increased. Clinical correlation is suggested.   Focal area of low in attenuation within the fibrotic changes along the right side of the heart. This is unchanged. Infected fluid cannot entirely be excluded.  Moderate left and small right pleural effusions. Atelectatic lung. Additional findings as above.    Cath 2019  HISTORY: Patient with history recurrent pericardial effusion. CHF.  DIAGNOSTIC IMPRESSIONS: Mild CAD.  Normal LV function.  Elevated wedge pressure.  Mild to moderate pulmonary hypertension.  DIAGNOSTIC RECOMMENDATIONS: Medical management.    CXR 2020  IMPRESSION:   Constellation of findings suggestive of CHF.    MRI abdomen 2019  IMPRESSION:  Ill-defined soft tissue in the retroperitoneum demonstrating delayed   enhancement suggestive of retroperitoneal fibrosis. Encasement and   obstruction of both ureters causing moderate bilateral hydronephrosis.   Extensive perinephric stranding and edema is likely reactive to   obstructive uropathy. No evidence of perinephric soft tissue or mass.   Fibrotic tissue also encases the proximal SMA, RIAN, and bilateral renal   arteries with resultant luminal narrowing.    EKG 2020  NSR at 94bpm; QRSD 90ms; RBBB + LAE    Telemetry:    A/P  44 year old male patient with a history of cardiomegaly, DM II, HTN, Lyme disease, and recurrent pericardial effusions with pericardiocentesis x2, s/p subxiphoid pericardial window and pericardial biopsy who is once again admitted with constrictive pericarditis.     Noted on telemetry to have episodes of complete heart block with sinus slowing and R-R prolongation. Events occurring during sleeping hours. Baseline EKG with RBBB since at lease . Episodes appear to be vagally mediated in setting of desaturations    - No clear indication for permanent pacemaker at this time.  - Recommend sleep study - suspicious for sleep apnea  - No clear role for loop recorder insertion  - ?Cardiac MRI  - Full recommendations to follow. [facility]                                                       [facility]                                                      Office: [address] [address]                                                       Telephone: [phone]. Fax:[phone]    HPI:  44 year old male patient with a history of obesity, cardiomegaly, DM II, HTN, Lyme disease, and recurrent pericardial effusions with pericardiocentesis x2 (700ml serous drainage both times), s/p subxiphoid pericardial window and pericardial biopsy on 19 at [facility] with Dr. Peters. Presented to [facility] [place] with increased pericardial effusion. 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    Patient has recently started to feel more short of breath. He reports he experiences dyspnea with ambulation. Has trouble going up the stairs. He admit to episodes of dizziness especially when standing up after he bends over to pick something up off the floor. He admits to two episodes of dizziness and falls in the past with this maneuver. No history of jarett syncope. Admits he sometimes wakes up at night gasping for air as well as snoring. Telemetry reviewed. Since hospitalizations he reports he has not slept well at all. Reports he keeps getting woken up because of vital checks and noise.     PAST MEDICAL & SURGICAL HISTORY:  Hydronephrosis  Pericardial effusion  Lyme disease  Pericarditis  Heart failure  Cardiomegaly  Nonsmoker  Diabetes mellitus  Hypertension  S/P pericardial window creation  S/P pericardiocentesis    REVIEW OF SYSTEMS  General: - fever or chills, + fatigue  Skin/Breast: - rashes  Ophthalmologic: - blurred vision	  ENMT: - sore throat  Respiratory and Thorax: + cough,   Cardiovascular:	+JOHNSON, - palpitations, - chest pain  Gastrointestinal:	- N/V/D/C  Genitourinary: - dysuria  Musculoskeletal:	 - arthritis  Neurological: - weaknesses  Psychiatric: - anxiety    MEDICATIONS  (STANDING):  atorvastatin 10 milliGRAM(s) Oral at bedtime  buMETAnide Injectable 2 milliGRAM(s) IV Push two times a day  chlorhexidine 0.12% Liquid 15 milliLiter(s) Oral Mucosa two times a day  dextrose 5%. 1000 milliLiter(s) (50 mL/Hr) IV Continuous <Continuous>  dextrose 50% Injectable 12.5 Gram(s) IV Push once  dextrose 50% Injectable 25 Gram(s) IV Push once  dextrose 50% Injectable 25 Gram(s) IV Push once  insulin glargine Injectable (LANTUS) 10 Unit(s) SubCutaneous at bedtime  insulin lispro (HumaLOG) corrective regimen sliding scale   SubCutaneous four times a day before meals  mupirocin 2% Ointment 1 Application(s) Topical two times a day  pantoprazole    Tablet 40 milliGRAM(s) Oral before breakfast  phytonadione   Solution 10 milliGRAM(s) Oral daily  sodium chloride 0.9% lock flush 3 milliLiter(s) IV Push every 8 hours    MEDICATIONS  (PRN):  acetaminophen   Tablet .. 650 milliGRAM(s) Oral every 6 hours PRN Mild Pain (1 - 3), Moderate Pain (4 - 6)  dextrose 40% Gel 15 Gram(s) Oral once PRN Blood Glucose LESS THAN 70 milliGRAM(s)/deciliter  glucagon  Injectable 1 milliGRAM(s) IntraMuscular once PRN Glucose LESS THAN 70 milligrams/deciliter  Allergies  No Known Allergies    Intolerances  [facility] patient (Unknown)    SOCIAL HISTORY: non smoker, non drinker    FAMILY HISTORY:  No pertinent family history in first degree relatives    Vital Signs Last 24 Hrs  T(C): 36.4 (2020 05:33), Max: 37.1 (2020 16:30)  T(F): 97.6 (2020 05:33), Max: 98.7 (2020 16:30)  HR: 91 (2020 05:33) (75 - 99)  BP: 149/89 (2020 05:33) (132/78 - 153/85)  RR: 20 (2020 05:33) (18 - 20)  SpO2: 98% (2020 05:33) (96% - 98%)    Physical Exam:  Constitutional: AAOx3, NAD, obese male patient  Neck: supple, No JVD  Cardiovascular: +S1S2 RRR, sinus tachycardia on monitor  Pulmonary: Coarse breath sounds with crackles at bases, unlabored  Abdomen: +BS, soft NTND, obese  Extremities: 1+ LE edema  Neuro: non focal, speech clear, MARIA x 4    LABS:                        11.9   16.65 )-----------( 453      ( 2020 02:03 )             38.2     139  |  99  |  8.0  ----------------------------<  101<H>  3.8   |  29.0  |  0.54  Ca    9.2      2020 02:03  Phos  4.3     07-02  Mg     2.0     07-02  TPro  6.9  /  Alb  3.3  /  TBili  0.7  /  DBili  x   /  AST  16  /  ALT  10  /  AlkPhos  80  07-01  LIVER FUNCTIONS - ( 2020 06:18 )  Alb: 3.3 g/dL / Pro: 6.9 g/dL / ALK PHOS: 80 U/L / ALT: 10 U/L / AST: 16 U/L / GGT: x         PT/INR - ( 2020 20:43 )   PT: 15.7 sec;   INR: 1.37 ratio    PTT - ( 2020 20:43 )  PTT:36.2 sec    Urinalysis Basic - ( 2020 23:48 )  Color: Yellow / Appearance: Clear / S.005 / pH: x  Gluc: x / Ketone: Negative  / Bili: Negative / Urobili: Negative mg/dL   Blood: x / Protein: Negative mg/dL / Nitrite: Negative   Leuk Esterase: Negative / RBC: x / WBC x   Sq Epi: x / Non Sq Epi: x / Bacteria: x    RESULTS:  TTE 2020  Summary:   1. Technically difficult study.   2. Endocardial visualization was enhanced with intravenous echo contrast.   3. Left ventricular ejection fraction, by visual estimation, is 60 to 65%.   4. Normal global left ventricular systolic function.   5. The left ventricular diastolic function could not be assessed in this study.   6. Normal left ventricular internal cavity size.   7. Thickening of the anterior and posterior mitral valve leaflets.   8. Trace mitral valve regurgitation.   9. Small pericardial effusion.    CT chest 2020  IMPRESSION:   Infiltrative soft tissue process surrounding the heart and encasing vessels, and soft tissue infiltrative process surrounding the kidneys with bilateral hydronephrosis and vasculature in the retroperitoneum. This may be due to a fibrotic infiltrative disease or an autoimmune process such as IgG 4 disease. Clinical correlation is suggested. While the bulk of disease does not appear significantly changed, the soft tissue infiltration surrounding vessels visually appears mildly increased. Clinical correlation is suggested.   Focal area of low in attenuation within the fibrotic changes along the right side of the heart. This is unchanged. Infected fluid cannot entirely be excluded.  Moderate left and small right pleural effusions. Atelectatic lung. Additional findings as above.    Cath 2019  HISTORY: Patient with history recurrent pericardial effusion. CHF.  DIAGNOSTIC IMPRESSIONS: Mild CAD.  Normal LV function.  Elevated wedge pressure.  Mild to moderate pulmonary hypertension.  DIAGNOSTIC RECOMMENDATIONS: Medical management.    CXR 2020  IMPRESSION:   Constellation of findings suggestive of CHF.    MRI abdomen 2019  IMPRESSION:  Ill-defined soft tissue in the retroperitoneum demonstrating delayed   enhancement suggestive of retroperitoneal fibrosis. Encasement and   obstruction of both ureters causing moderate bilateral hydronephrosis.   Extensive perinephric stranding and edema is likely reactive to   obstructive uropathy. No evidence of perinephric soft tissue or mass.   Fibrotic tissue also encases the proximal SMA, RIAN, and bilateral renal   arteries with resultant luminal narrowing.    EKG 2020  NSR at 94bpm; QRSD 90ms; RBBB + LAE    Telemetry:    A/P  44 year old male patient with a history of cardiomegaly, DM II, HTN, Lyme disease, and recurrent pericardial effusions with pericardiocentesis x2, s/p subxiphoid pericardial window and pericardial biopsy who is once again admitted with constrictive pericarditis.     Noted on telemetry to have episodes of complete heart block with sinus slowing and R-R prolongation. Events occurring during sleeping hours. Baseline EKG with RBBB since at lease . Episodes appear to be vagally mediated in setting of desaturations    - No clear indication for permanent pacemaker at this time.  - Recommend sleep study - suspicious for sleep apnea  - No clear role for loop recorder insertion  - Full recommendations to follow.

## 2020-07-02 NOTE — PROCEDURE NOTE - NSPROCDETAILS_GEN_ALL_CORE
thoracostomy tube placed percutaneously/Seldinger technique/dressing applied/secured in place/sterile dressing applied

## 2020-07-02 NOTE — CONSULT NOTE ADULT - ASSESSMENT
46 yo male with constrictive pericarditis, same moderate hydronephrosis, normal renal function  - CT scan reviewed with Dr. Thayer.  If pt had retroperitoneal fibrosis, would expect more of an obstruction including the vasculature.   - Would not recommend retroperitoneal bx, do not see good area to bx  - would recommend lasix renal scan to assess function and r/o obstruction  - rec pericardial bx  - case reviewed and discussed with Dr. Thayer

## 2020-07-02 NOTE — PROGRESS NOTE ADULT - PROBLEM SELECTOR PLAN 4
Seen on MRI on last admission in November.  Seen by Rheumatology, recommendations appreciated, needs to discuss with CT team in AM

## 2020-07-02 NOTE — CONSULT NOTE ADULT - SUBJECTIVE AND OBJECTIVE BOX
HPI: 45y Male transferred from [facility] to r/o constrictive pericarditis. Pt was hospitalized at [facility] in september and 2019.  Pt was treated for lyme's disease, also underwent a pericardial window and bx.  He presented to [facility]  sent from his cardiologists office with c/o SOB on minimal exertion.  Pt reports that he gets SOB and fatigued with minimal exertion such as doing ADL's and even while talking. He was ruled out for an dMI. CT chest showed stable moderate bilateral pleural effusions with atelectasis, as well as a small stable pericardial effusion with adjacent inflammatory change and small loculated fluid along the right atrial border that could correlate with pericarditis.   He underwent a TTE and right and left heart cath at [facility] but the reports are not available.  He was transferred to [facility] today for evaluation of constrictive pericarditis /pericardial stripping.    Urology: Called to evaluate pt to r/o retroperitoneal fibrosis and possible bx. Pt resting in bed relatively comfortable. Pt voiding without difficulty, no c/o pain.  Pt seen last november for b/l moderate hydronephrosis, has normal kidney function.      PAST MEDICAL & SURGICAL HISTORY:  Hydronephrosis  Pericardial effusion  Lyme disease  Pericarditis  Heart failure  Cardiomegaly  Nonsmoker  Diabetes mellitus  Hypertension  S/P pericardial window creation  S/P pericardiocentesis      acetaminophen   Tablet .. 650 milliGRAM(s) Oral every 6 hours PRN  atorvastatin 10 milliGRAM(s) Oral at bedtime  buMETAnide Injectable 2 milliGRAM(s) IV Push two times a day  chlorhexidine 0.12% Liquid 15 milliLiter(s) Oral Mucosa two times a day  dextrose 40% Gel 15 Gram(s) Oral once PRN  dextrose 5%. 1000 milliLiter(s) IV Continuous <Continuous>  dextrose 50% Injectable 12.5 Gram(s) IV Push once  dextrose 50% Injectable 25 Gram(s) IV Push once  dextrose 50% Injectable 25 Gram(s) IV Push once  glucagon  Injectable 1 milliGRAM(s) IntraMuscular once PRN  insulin glargine Injectable (LANTUS) 10 Unit(s) SubCutaneous at bedtime  insulin lispro (HumaLOG) corrective regimen sliding scale   SubCutaneous four times a day before meals  mupirocin 2% Ointment 1 Application(s) Topical two times a day  pantoprazole    Tablet 40 milliGRAM(s) Oral before breakfast  phytonadione   Solution 10 milliGRAM(s) Oral daily  sodium chloride 0.9% lock flush 3 milliLiter(s) IV Push every 8 hours      No Known Allergies  [facility] patient (Unknown)      T(C): 36.4 (20 @ 09:27), Max: 37.1 (20 @ 16:30)  HR: 102 (20 @ 09:27) (75 - 102)  BP: 129/80 (20 @ 09:27) (129/80 - 149/89)  RR: 16 (20 @ 09:27) (16 - 20)  SpO2: 96% (20 @ 09:27) (96% - 98%)      20 @ 07:01  -  20 @ 07:00  --------------------------------------------------------  IN: 0 mL / OUT: 300 mL / NET: -300 mL    20 @ 07:01  -  20 @ 12:14  --------------------------------------------------------  IN: 420 mL / OUT: 2400 mL / NET: -1980 mL                              11.9   16.65 )-----------( 453      ( 2020 02:03 )             38.2           139  |  99  |  8.0  ----------------------------<  101<H>  3.8   |  29.0  |  0.54    Ca    9.2      2020 02:03  Phos  4.3     07-  Mg     2.0         TPro  6.9  /  Alb  3.3  /  TBili  0.7  /  DBili  x   /  AST  16  /  ALT  10  /  AlkPhos  80  07-01      Urinalysis Basic - ( 2020 23:48 )    Color: Yellow / Appearance: Clear / S.005 / pH: x  Gluc: x / Ketone: Negative  / Bili: Negative / Urobili: Negative mg/dL   Blood: x / Protein: Negative mg/dL / Nitrite: Negative   Leuk Esterase: Negative / RBC: x / WBC x   Sq Epi: x / Non Sq Epi: x / Bacteria: x      Radiology: < from: CT Chest w/ IV Cont (20 @ 11:52) >     EXAM:  CT ABDOMEN AND PELVIS IC                         EXAM:  CT CHEST IC                          PROCEDURE DATE:  2020          INTERPRETATION:  CLINICAL INFORMATION: Recurrent pericarditis. Pericardial effusion.    COMPARISON: CT scan ofthe chest from 2019 and the scan of the abdomen and pelvis from 2019    PROCEDURE:   CT of the Chest, Abdomen and Pelvis was performed with intravenous contrast.   Intravenous contrast: 90 ml Omnipaque 350. 10 ml discarded.  Oral contrast: None.  Sagittal and coronal reformats were performed.    FINDINGS:  CHEST:   LUNGS AND LARGE AIRWAYS: Patent central airways. No pulmonary nodules. Atelectasis of the basilar segments in the left lower lobe and partial atelectasis in the lingula. Mild atelectatic changes at the right lung base posteriorly. Few scattered 2 mm pulmonary nodules in the right lung which are stable in appearance. Motion artifact slightly limits evaluation of the left lung parenchyma. VESSELS: Within normal limits.  HEART: Heart size is normal. Soft tissue thickening of the pericardium with adjacent soft tissue stranding. There is a focal lower attenuation component along the right side. This is inseparable from the anterior chest wall on the left side with mild irregularity in the chest wall musculature and prominent soft tissue in the subcutaneous tissue which is unchanged. This may be due to infected fluid or an infiltrative or inflammatory process, including an autoimmune response. There is limited direct comparison with prior noncontrast CT scan of the chest although this is not thought to have significantly changed.    MEDIASTINUM AND CECILLE: No lymphadenopathy. Soft tissue tissue process throughout the pericardium extends superiorly encasing the ascending aorta, aortic arch, main and right pulmonary artery, and SVC. This appears to have been present on 2019 although there is limited direct comparison due to lack of intravenous contrast on the prior study.   CHEST WALL AND LOWER NECK: Harrell is soft tissue anterior to the left chest wall which is unchanged. This may be related to extension of the process mentioned above versus postsurgical or postprocedural. Please correlate with patient's history.    ABDOMEN AND PELVIS:  LIVER: Within normal limits.  BILE DUCTS: Normal caliber.  GALLBLADDER: Within normal limits.  SPLEEN: Within normal limits.  PANCREAS: Within normal limits.  ADRENALS: Within normal limits.  KIDNEYS/URETERS: Again noted is an infiltrative process surrounding both kidneys with moderate hydronephrosis. This soft tissue process extends centrally to encase the renal veins, IVC, aorta, and SMA. This was present on the prior exam from 2019 wall much of the disease around the kidneys remain stable, the soft tissue surrounding the described vessels visually appears minimally increased.    BLADDER: Bladder appears thick-walled.  REPRODUCTIVE ORGANS: Mild heterogeneity in the prostate gland.    BOWEL: No bowel obstruction. Appendix within normal limits.  PERITONEUM: No ascites.  VESSELS: Encasement of retroperitoneal vessels as above under the renal section.  RETROPERITONEUM/LYMPH NODES: No discrete lymphadenopathy. Poorly defined soft tissue in the retroperitoneum as above.    ABDOMINAL WALL: Subcutaneous edema laterally.  BONES: Within normal limits.    IMPRESSION:   Infiltrative soft tissue process surrounding the heart and encasing vessels, and soft tissue infiltrative process surrounding the kidneys with bilateral hydronephrosis and vasculature in the retroperitoneum. This may be due to a fibrotic infiltrative disease or an autoimmune process such as IgG 4 disease. Clinical correlation is suggested. While the bulk of disease does not appear significantly changed, the soft tissue infiltration surrounding vessels visually appears mildly increased. Clinical correlation is suggested.     Focal area of low in attenuation within the fibrotic changes along the right side of the heart. This is unchanged. Infected fluid cannot entirely be excluded.    Moderate left and small right pleural effusions. Atelectatic lung. Additional findings as above.    < end of copied text >

## 2020-07-02 NOTE — PROGRESS NOTE ADULT - ASSESSMENT
44 year old male with PMH cardiomegaly, DM II, HTN, and recurrent pericardial effusions with pericardiocentesis x2 ( 700ml serous drainage both times). He subsequently underwent a subxiphoid pericardial window and pericardial biopsy on 9/30/19 at [facility] with Dr. Peters .  Postoperative course complicated by AF/SVT which converted to SR.  Was found to be + lymes on that admission and has completed his course of IV and PO antibiotics.  Patient then presented to the ER on 11/12 from Dr. Castro office s/p in office TTE that showed an increased pericardial effusion. Patient found to have infiltrate on Chest CT performed 11/13 that is concerning for infiltrative process / neoplastic disease, hematology/ oncology recommend ID follow up as outpatient.  Ct scan of abdomen pelvis with contrast on 11/14 show infiltrative process progressing to the retroperitoneal soft tissue surrounding both kidneys causing moderate bilateral hydronephrosis. ON 11/15 he underwent IR pericardial drain placement for 55ml of bloody drainage.  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 margoth. hydronephrosis, fibrosis prox. SMA, RIAN, margoth. renal artery, resulting luminal narrowing.  He then underwent a Right and left cardiac cath on 11/18 that showed normal coronaries, increased wedge pressure 24, no evidence of constriction or restriction.  His drain was removed 11/19 pericardial AI removed and he was discharged home.  He was following with his cardiologist (Dr. Mcginnis) and a rheumatologist as an outpatient.    He presented to [facility] 6/29 sent from his cardiologists office with c/o SOB on minimal exertion.  Pt reports that he gets SOB and fatigued with minimal exertion such as doing ADL's and even while talking. He was ruled out for an dMI. CT chest showed stable moderate bilateral pleural effusions with atelectasis, as well as a small stable pericardial effusion with adjacent inflammatory change and small loculated fluid along the right atrial border that could correlate with pericarditis.  6/30 he underwent a TTE and right and left heart cath at [facility] patient was transferred to [facility] today for evaluation of constrictive pericarditis /pericardial stripping.

## 2020-07-03 DIAGNOSIS — D72.829 ELEVATED WHITE BLOOD CELL COUNT, UNSPECIFIED: ICD-10-CM

## 2020-07-03 LAB
ALBUMIN FLD-MCNC: 2.1 G/DL — SIGNIFICANT CHANGE UP
ANION GAP SERPL CALC-SCNC: 14 MMOL/L — SIGNIFICANT CHANGE UP (ref 5–17)
BUN SERPL-MCNC: 9 MG/DL — SIGNIFICANT CHANGE UP (ref 8–20)
CALCIUM SERPL-MCNC: 9.6 MG/DL — SIGNIFICANT CHANGE UP (ref 8.6–10.2)
CENTROMERE AB SER-ACNC: <0.2 AI — SIGNIFICANT CHANGE UP
CHLORIDE SERPL-SCNC: 95 MMOL/L — LOW (ref 98–107)
CO2 SERPL-SCNC: 30 MMOL/L — HIGH (ref 22–29)
CREAT SERPL-MCNC: 0.71 MG/DL — SIGNIFICANT CHANGE UP (ref 0.5–1.3)
ENA SCL70 AB SER-ACNC: 1.3 AI — HIGH
EOSINOPHIL # FLD: 1 % — SIGNIFICANT CHANGE UP
FLUID SEGMENTED GRANULOCYTES: 9 % — SIGNIFICANT CHANGE UP
GLUCOSE BLDC GLUCOMTR-MCNC: 131 MG/DL — HIGH (ref 70–99)
GLUCOSE BLDC GLUCOMTR-MCNC: 194 MG/DL — HIGH (ref 70–99)
GLUCOSE BLDC GLUCOMTR-MCNC: 202 MG/DL — HIGH (ref 70–99)
GLUCOSE BLDC GLUCOMTR-MCNC: 233 MG/DL — HIGH (ref 70–99)
GLUCOSE FLD-MCNC: 173 MG/DL — SIGNIFICANT CHANGE UP
GLUCOSE SERPL-MCNC: 133 MG/DL — HIGH (ref 70–99)
GRAM STN FLD: SIGNIFICANT CHANGE UP
HCT VFR BLD CALC: 46.3 % — SIGNIFICANT CHANGE UP (ref 39–50)
HGB BLD-MCNC: 14.1 G/DL — SIGNIFICANT CHANGE UP (ref 13–17)
LDH SERPL L TO P-CCNC: 77 U/L — SIGNIFICANT CHANGE UP
LYMPHOCYTES # FLD: 80 % — SIGNIFICANT CHANGE UP
MAGNESIUM SERPL-MCNC: 2 MG/DL — SIGNIFICANT CHANGE UP (ref 1.6–2.6)
MCHC RBC-ENTMCNC: 26 PG — LOW (ref 27–34)
MCHC RBC-ENTMCNC: 30.5 GM/DL — LOW (ref 32–36)
MCV RBC AUTO: 85.3 FL — SIGNIFICANT CHANGE UP (ref 80–100)
MESOTHL CELL # FLD: 2 % — SIGNIFICANT CHANGE UP
MONOS+MACROS # FLD: 8 % — SIGNIFICANT CHANGE UP
PHOSPHATE SERPL-MCNC: 4.6 MG/DL — SIGNIFICANT CHANGE UP (ref 2.4–4.7)
PLATELET # BLD AUTO: 517 K/UL — HIGH (ref 150–400)
POTASSIUM SERPL-MCNC: 4 MMOL/L — SIGNIFICANT CHANGE UP (ref 3.5–5.3)
POTASSIUM SERPL-SCNC: 4 MMOL/L — SIGNIFICANT CHANGE UP (ref 3.5–5.3)
PROT FLD-MCNC: 3.4 G/DL — SIGNIFICANT CHANGE UP
RBC # BLD: 5.43 M/UL — SIGNIFICANT CHANGE UP (ref 4.2–5.8)
RBC # FLD: 14.1 % — SIGNIFICANT CHANGE UP (ref 10.3–14.5)
SODIUM SERPL-SCNC: 139 MMOL/L — SIGNIFICANT CHANGE UP (ref 135–145)
SPECIMEN SOURCE: SIGNIFICANT CHANGE UP
WBC # BLD: 20.02 K/UL — HIGH (ref 3.8–10.5)
WBC # FLD AUTO: 20.02 K/UL — HIGH (ref 3.8–10.5)

## 2020-07-03 PROCEDURE — 71045 X-RAY EXAM CHEST 1 VIEW: CPT | Mod: 26

## 2020-07-03 PROCEDURE — 99232 SBSQ HOSP IP/OBS MODERATE 35: CPT

## 2020-07-03 RX ORDER — INSULIN GLARGINE 100 [IU]/ML
14 INJECTION, SOLUTION SUBCUTANEOUS AT BEDTIME
Refills: 0 | Status: DISCONTINUED | OUTPATIENT
Start: 2020-07-03 | End: 2020-07-04

## 2020-07-03 RX ORDER — LANOLIN ALCOHOL/MO/W.PET/CERES
5 CREAM (GRAM) TOPICAL AT BEDTIME
Refills: 0 | Status: DISCONTINUED | OUTPATIENT
Start: 2020-07-03 | End: 2020-07-04

## 2020-07-03 RX ADMIN — Medication 10 MILLIGRAM(S): at 08:31

## 2020-07-03 RX ADMIN — MUPIROCIN 1 APPLICATION(S): 20 OINTMENT TOPICAL at 06:02

## 2020-07-03 RX ADMIN — BUMETANIDE 2 MILLIGRAM(S): 0.25 INJECTION INTRAMUSCULAR; INTRAVENOUS at 17:41

## 2020-07-03 RX ADMIN — INSULIN GLARGINE 14 UNIT(S): 100 INJECTION, SOLUTION SUBCUTANEOUS at 21:42

## 2020-07-03 RX ADMIN — BUMETANIDE 2 MILLIGRAM(S): 0.25 INJECTION INTRAMUSCULAR; INTRAVENOUS at 06:03

## 2020-07-03 RX ADMIN — Medication 4: at 08:30

## 2020-07-03 RX ADMIN — SODIUM CHLORIDE 3 MILLILITER(S): 9 INJECTION INTRAMUSCULAR; INTRAVENOUS; SUBCUTANEOUS at 21:35

## 2020-07-03 RX ADMIN — MUPIROCIN 1 APPLICATION(S): 20 OINTMENT TOPICAL at 17:42

## 2020-07-03 RX ADMIN — PANTOPRAZOLE SODIUM 40 MILLIGRAM(S): 20 TABLET, DELAYED RELEASE ORAL at 06:03

## 2020-07-03 RX ADMIN — Medication 5 MILLIGRAM(S): at 22:16

## 2020-07-03 RX ADMIN — Medication 4: at 12:38

## 2020-07-03 RX ADMIN — CHLORHEXIDINE GLUCONATE 15 MILLILITER(S): 213 SOLUTION TOPICAL at 17:42

## 2020-07-03 RX ADMIN — ATORVASTATIN CALCIUM 10 MILLIGRAM(S): 80 TABLET, FILM COATED ORAL at 21:42

## 2020-07-03 RX ADMIN — SODIUM CHLORIDE 3 MILLILITER(S): 9 INJECTION INTRAMUSCULAR; INTRAVENOUS; SUBCUTANEOUS at 06:04

## 2020-07-03 RX ADMIN — Medication 2: at 21:42

## 2020-07-03 RX ADMIN — SODIUM CHLORIDE 3 MILLILITER(S): 9 INJECTION INTRAMUSCULAR; INTRAVENOUS; SUBCUTANEOUS at 12:39

## 2020-07-03 RX ADMIN — CHLORHEXIDINE GLUCONATE 15 MILLILITER(S): 213 SOLUTION TOPICAL at 06:03

## 2020-07-03 NOTE — PROGRESS NOTE ADULT - PROBLEM SELECTOR PLAN 2
Tele-monitor with intermittent type 2 AV block (Mobitz Type 2)  Patient asymptomatic, VS stable  Transcutaneous pacer connected  12 Lead EKG ordered  Stat labs ordered, replete orders placed  Will continue to monitor closely  Will consider EP consult if condition persists

## 2020-07-03 NOTE — PROGRESS NOTE ADULT - ASSESSMENT
44 year old male with PMH cardiomegaly, DM II, HTN, and recurrent pericardial effusions with pericardiocentesis x2 ( 700ml serous drainage both times). He subsequently underwent a subxiphoid pericardial window and pericardial biopsy on 9/30/19 at [facility] with Dr. Peters .  Postoperative course complicated by AF/SVT which converted to SR.  Was found to be + lymes on that admission and has completed his course of IV and PO antibiotics.  Patient then presented to the ER on 11/12 from Dr. Castro office s/p in office TTE that showed an increased pericardial effusion. Patient found to have infiltrate on Chest CT performed 11/13 that is concerning for infiltrative process / neoplastic disease, hematology/ oncology recommend ID follow up as outpatient.  Ct scan of abdomen pelvis with contrast on 11/14 show infiltrative process progressing to the retroperitoneal soft tissue surrounding both kidneys causing moderate bilateral hydronephrosis. ON 11/15 he underwent IR pericardial drain placement for 55ml of bloody drainage.  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 margoth. hydronephrosis, fibrosis prox. SMA, RIAN, margoth. renal artery, resulting luminal narrowing.  He then underwent a Right and left cardiac cath on 11/18 that showed normal coronaries, increased wedge pressure 24, no evidence of constriction or restriction.  His drain was removed 11/19 pericardial AI removed and he was discharged home.  He was following with his cardiologist (Dr. Mcginnis) and a rheumatologist as an outpatient.    He presented to [facility] 6/29 sent from his cardiologists office with c/o SOB on minimal exertion.  Pt reports that he gets SOB and fatigued with minimal exertion such as doing ADL's and even while talking. He was ruled out for an dMI. CT chest showed stable moderate bilateral pleural effusions with atelectasis, as well as a small stable pericardial effusion with adjacent inflammatory change and small loculated fluid along the right atrial border that could correlate with pericarditis.  6/30 he underwent a TTE and right and left heart cath at [facility] patient was transferred to [facility] today for evaluation of constrictive pericarditis /pericardial stripping.

## 2020-07-03 NOTE — PROGRESS NOTE ADULT - SUBJECTIVE AND OBJECTIVE BOX
Subjective:  Pt in bed NAD no issues overnight     VITAL SIGNS  T(C): 36.8 (20 @ 05:58), Max: 37.2 (20 @ 15:22)  HR: 103 (20 @ 05:58) (95 - 110)  BP: 124/85 (20 @ 05:58) (124/79 - 145/87)  RR: 17 (20 @ 05:58) (16 - 20)  SpO2: 97% (20 @ 05:58) (92% - 97%) RA          Daily     Daily Weight in k.2 (2020 06:56)  Admit Wt: Drug Dosing Weight  Height (cm): 172.72 (2019 17:34)  Weight (kg): 114.3 (2020 06:45)    Telemetry:   SR-ST   LVEF: 45%    MEDICATIONS  acetaminophen   Tablet .. 650 milliGRAM(s) Oral every 6 hours PRN  atorvastatin 10 milliGRAM(s) Oral at bedtime  buMETAnide Injectable 2 milliGRAM(s) IV Push two times a day  chlorhexidine 0.12% Liquid 15 milliLiter(s) Oral Mucosa two times a day  dextrose 40% Gel 15 Gram(s) Oral once PRN  dextrose 5%. 1000 milliLiter(s) IV Continuous <Continuous>  dextrose 50% Injectable 12.5 Gram(s) IV Push once  dextrose 50% Injectable 25 Gram(s) IV Push once  dextrose 50% Injectable 25 Gram(s) IV Push once  glucagon  Injectable 1 milliGRAM(s) IntraMuscular once PRN  insulin glargine Injectable (LANTUS) 14 Unit(s) SubCutaneous at bedtime  insulin lispro (HumaLOG) corrective regimen sliding scale   SubCutaneous four times a day before meals  mupirocin 2% Ointment 1 Application(s) Topical two times a day  pantoprazole    Tablet 40 milliGRAM(s) Oral before breakfast  phytonadione   Solution 10 milliGRAM(s) Oral daily  sodium chloride 0.9% lock flush 3 milliLiter(s) IV Push every 8 hours    MEDICATIONS  (PRN):  acetaminophen   Tablet .. 650 milliGRAM(s) Oral every 6 hours PRN Mild Pain (1 - 3), Moderate Pain (4 - 6)  dextrose 40% Gel 15 Gram(s) Oral once PRN Blood Glucose LESS THAN 70 milliGRAM(s)/deciliter  glucagon  Injectable 1 milliGRAM(s) IntraMuscular once PRN Glucose LESS THAN 70 milligrams/deciliter        PHYSICAL EXAM  General: well nourished, well developed, no acute distress  Neurology: alert and oriented x 3, nonfocal, no gross deficits  Respiratory: clear to auscultation, diminished at the bases bilaterally. No accessory muscle use noted. + left CT   CV: regular rate and rhythm, normal S1, S2. No murmur or gallops. +3 BLE edema, +PP pulses bilaterally  Abdomen: soft, nontender, nondistended, positive bowel sounds  Extremities: warm, well perfused. +2- 3 BLE edema. + DP pulses bilaterally      Chest tubes: Left CT 2100cc x 24 hours no airleak       I&O's Detail    2020 07:01  -  2020 07:00  --------------------------------------------------------  IN:    Oral Fluid: 680 mL  Total IN: 680 mL    OUT:    Chest Tube: 2190 mL    Voided: 7360 mL  Total OUT: 9550 mL    Total NET: -8870 mL      2020 07:01  -  2020 08:28  --------------------------------------------------------  IN:  Total IN: 0 mL    OUT:    Chest Tube: 30 mL    Voided: 880 mL  Total OUT: 910 mL    Total NET: -910 mL          LABS      139  |  95<L>  |  9.0  ----------------------------<  133<H>  4.0   |  30.0<H>  |  0.71    Ca    9.6      2020 07:13  Phos  4.6     07-03  Mg     2.0     -03    TPro  6.4  /  Alb  3.0<L>  /  TBili  x   /  DBili  x   /  AST  x   /  ALT  x   /  AlkPhos  x                                    14.1   20.02 )-----------( 517      ( 2020 07:13 )             46.3                LIVER FUNCTIONS - ( 2020 15:09 )  Alb: 3.0 g/dL / Pro: 6.4 g/dL / ALK PHOS: x     / ALT: x     / AST: x     / GGT: x              CAPILLARY BLOOD GLUCOSE      POCT Blood Glucose.: 202 mg/dL (2020 08:10)  POCT Blood Glucose.: 209 mg/dL (2020 20:58)  POCT Blood Glucose.: 123 mg/dL (2020 17:00)  POCT Blood Glucose.: 169 mg/dL (2020 12:09)           Today's CXR:    PAST MEDICAL & SURGICAL HISTORY:  Hydronephrosis  Pericardial effusion  Lyme disease  Pericarditis  Heart failure  Cardiomegaly  Nonsmoker  Diabetes mellitus  Hypertension  S/P pericardial window creation  S/P pericardiocentesis

## 2020-07-03 NOTE — PROGRESS NOTE ADULT - PROBLEM SELECTOR PLAN 1
CT chest and JAMES done, report as above  Rheumatology consulted, input appreciated,  plan for PET scan at the [facility] next week.   will set up.  cardiac MRI pending  DW CT team in rounds

## 2020-07-03 NOTE — CHART NOTE - NSCHARTNOTEFT_GEN_A_CORE
CTS ACP addendum note from 7/2 reviewed     s/p Left percutaneous thoracostomy tube placement for left pleural effusion on 7/2    Urology consult note placed 7/2 - no acute urological intervention required    will sign off

## 2020-07-04 LAB
ALBUMIN SERPL ELPH-MCNC: 3 G/DL — LOW (ref 3.3–5.2)
ALP SERPL-CCNC: 85 U/L — SIGNIFICANT CHANGE UP (ref 40–120)
ALT FLD-CCNC: 9 U/L — SIGNIFICANT CHANGE UP
ANION GAP SERPL CALC-SCNC: 16 MMOL/L — SIGNIFICANT CHANGE UP (ref 5–17)
AST SERPL-CCNC: 14 U/L — SIGNIFICANT CHANGE UP
BILIRUB SERPL-MCNC: 1.2 MG/DL — SIGNIFICANT CHANGE UP (ref 0.4–2)
BUN SERPL-MCNC: 11 MG/DL — SIGNIFICANT CHANGE UP (ref 8–20)
CALCIUM SERPL-MCNC: 9 MG/DL — SIGNIFICANT CHANGE UP (ref 8.6–10.2)
CHLORIDE SERPL-SCNC: 90 MMOL/L — LOW (ref 98–107)
CO2 SERPL-SCNC: 27 MMOL/L — SIGNIFICANT CHANGE UP (ref 22–29)
CREAT SERPL-MCNC: 0.64 MG/DL — SIGNIFICANT CHANGE UP (ref 0.5–1.3)
GLUCOSE BLDC GLUCOMTR-MCNC: 158 MG/DL — HIGH (ref 70–99)
GLUCOSE BLDC GLUCOMTR-MCNC: 210 MG/DL — HIGH (ref 70–99)
GLUCOSE BLDC GLUCOMTR-MCNC: 212 MG/DL — HIGH (ref 70–99)
GLUCOSE BLDC GLUCOMTR-MCNC: 236 MG/DL — HIGH (ref 70–99)
GLUCOSE SERPL-MCNC: 171 MG/DL — HIGH (ref 70–99)
HCT VFR BLD CALC: 40.6 % — SIGNIFICANT CHANGE UP (ref 39–50)
HGB BLD-MCNC: 13.1 G/DL — SIGNIFICANT CHANGE UP (ref 13–17)
IGG SERPL-MCNC: 1299 MG/DL — SIGNIFICANT CHANGE UP (ref 603–1613)
IGG1 SER-MCNC: 837 MG/DL — HIGH (ref 248–810)
IGG2 SER-MCNC: 237 MG/DL — SIGNIFICANT CHANGE UP (ref 130–555)
IGG3 SER-MCNC: 80 MG/DL — SIGNIFICANT CHANGE UP (ref 15–102)
IGG4 SER-MCNC: 46 MG/DL — SIGNIFICANT CHANGE UP (ref 2–96)
MAGNESIUM SERPL-MCNC: 1.7 MG/DL — SIGNIFICANT CHANGE UP (ref 1.6–2.6)
MCHC RBC-ENTMCNC: 26.1 PG — LOW (ref 27–34)
MCHC RBC-ENTMCNC: 32.3 GM/DL — SIGNIFICANT CHANGE UP (ref 32–36)
MCV RBC AUTO: 80.9 FL — SIGNIFICANT CHANGE UP (ref 80–100)
PLATELET # BLD AUTO: 495 K/UL — HIGH (ref 150–400)
POTASSIUM SERPL-MCNC: 3.2 MMOL/L — LOW (ref 3.5–5.3)
POTASSIUM SERPL-SCNC: 3.2 MMOL/L — LOW (ref 3.5–5.3)
PROT SERPL-MCNC: 7.1 G/DL — SIGNIFICANT CHANGE UP (ref 6.6–8.7)
RBC # BLD: 5.02 M/UL — SIGNIFICANT CHANGE UP (ref 4.2–5.8)
RBC # FLD: 13.8 % — SIGNIFICANT CHANGE UP (ref 10.3–14.5)
SODIUM SERPL-SCNC: 133 MMOL/L — LOW (ref 135–145)
WBC # BLD: 21.28 K/UL — HIGH (ref 3.8–10.5)
WBC # FLD AUTO: 21.28 K/UL — HIGH (ref 3.8–10.5)

## 2020-07-04 PROCEDURE — 99232 SBSQ HOSP IP/OBS MODERATE 35: CPT

## 2020-07-04 PROCEDURE — 71045 X-RAY EXAM CHEST 1 VIEW: CPT | Mod: 26

## 2020-07-04 PROCEDURE — 99223 1ST HOSP IP/OBS HIGH 75: CPT

## 2020-07-04 RX ORDER — MAGNESIUM SULFATE 500 MG/ML
2 VIAL (ML) INJECTION ONCE
Refills: 0 | Status: COMPLETED | OUTPATIENT
Start: 2020-07-04 | End: 2020-07-04

## 2020-07-04 RX ORDER — INSULIN GLARGINE 100 [IU]/ML
16 INJECTION, SOLUTION SUBCUTANEOUS AT BEDTIME
Refills: 0 | Status: DISCONTINUED | OUTPATIENT
Start: 2020-07-04 | End: 2020-07-13

## 2020-07-04 RX ORDER — INSULIN LISPRO 100/ML
2 VIAL (ML) SUBCUTANEOUS
Refills: 0 | Status: DISCONTINUED | OUTPATIENT
Start: 2020-07-04 | End: 2020-07-05

## 2020-07-04 RX ORDER — POTASSIUM CHLORIDE 20 MEQ
40 PACKET (EA) ORAL EVERY 4 HOURS
Refills: 0 | Status: COMPLETED | OUTPATIENT
Start: 2020-07-04 | End: 2020-07-04

## 2020-07-04 RX ORDER — LANOLIN ALCOHOL/MO/W.PET/CERES
10 CREAM (GRAM) TOPICAL AT BEDTIME
Refills: 0 | Status: DISCONTINUED | OUTPATIENT
Start: 2020-07-04 | End: 2020-07-13

## 2020-07-04 RX ADMIN — Medication 50 GRAM(S): at 08:39

## 2020-07-04 RX ADMIN — SODIUM CHLORIDE 3 MILLILITER(S): 9 INJECTION INTRAMUSCULAR; INTRAVENOUS; SUBCUTANEOUS at 05:23

## 2020-07-04 RX ADMIN — MUPIROCIN 1 APPLICATION(S): 20 OINTMENT TOPICAL at 05:25

## 2020-07-04 RX ADMIN — CHLORHEXIDINE GLUCONATE 15 MILLILITER(S): 213 SOLUTION TOPICAL at 17:44

## 2020-07-04 RX ADMIN — CHLORHEXIDINE GLUCONATE 15 MILLILITER(S): 213 SOLUTION TOPICAL at 05:31

## 2020-07-04 RX ADMIN — BUMETANIDE 2 MILLIGRAM(S): 0.25 INJECTION INTRAMUSCULAR; INTRAVENOUS at 21:21

## 2020-07-04 RX ADMIN — Medication 4: at 21:20

## 2020-07-04 RX ADMIN — PANTOPRAZOLE SODIUM 40 MILLIGRAM(S): 20 TABLET, DELAYED RELEASE ORAL at 05:25

## 2020-07-04 RX ADMIN — Medication 2: at 07:55

## 2020-07-04 RX ADMIN — BUMETANIDE 2 MILLIGRAM(S): 0.25 INJECTION INTRAMUSCULAR; INTRAVENOUS at 05:25

## 2020-07-04 RX ADMIN — Medication 4: at 17:43

## 2020-07-04 RX ADMIN — INSULIN GLARGINE 16 UNIT(S): 100 INJECTION, SOLUTION SUBCUTANEOUS at 21:20

## 2020-07-04 RX ADMIN — Medication 40 MILLIEQUIVALENT(S): at 15:14

## 2020-07-04 RX ADMIN — Medication 4: at 12:16

## 2020-07-04 RX ADMIN — Medication 2 UNIT(S): at 17:43

## 2020-07-04 RX ADMIN — Medication 40 MILLIEQUIVALENT(S): at 08:39

## 2020-07-04 RX ADMIN — Medication 10 MILLIGRAM(S): at 21:20

## 2020-07-04 RX ADMIN — Medication 20 MILLIGRAM(S): at 15:18

## 2020-07-04 RX ADMIN — SODIUM CHLORIDE 3 MILLILITER(S): 9 INJECTION INTRAMUSCULAR; INTRAVENOUS; SUBCUTANEOUS at 16:48

## 2020-07-04 RX ADMIN — ATORVASTATIN CALCIUM 10 MILLIGRAM(S): 80 TABLET, FILM COATED ORAL at 21:20

## 2020-07-04 RX ADMIN — MUPIROCIN 1 APPLICATION(S): 20 OINTMENT TOPICAL at 17:45

## 2020-07-04 RX ADMIN — SODIUM CHLORIDE 3 MILLILITER(S): 9 INJECTION INTRAMUSCULAR; INTRAVENOUS; SUBCUTANEOUS at 21:19

## 2020-07-04 NOTE — PROGRESS NOTE ADULT - PROBLEM SELECTOR PLAN 2
Tele-monitor with intermittent type 2 AV block (Mobitz Type 2) and intermittent pauses during sleep.   EP consult appreciated. Periods of nocturnal bradycardia with short pauses consistent with vagal events may be secondary to WALKER or CHF.  Patient asymptomatic, vital signs remain stable.  Will continue to monitor.

## 2020-07-04 NOTE — PROGRESS NOTE ADULT - ASSESSMENT
44 year old male with PMH cardiomegaly, DM II, HTN, and recurrent pericardial effusions with pericardiocentesis x2 ( 700ml serous drainage both times). He subsequently underwent a subxiphoid pericardial window and pericardial biopsy on 9/30/19 at [facility] with Dr. Peters .  Postoperative course complicated by AF/SVT which converted to SR.  Was found to be + lymes on that admission and has completed his course of IV and PO antibiotics.  Patient then presented to the ER on 11/12/19 from Dr. Castro office s/p in office TTE that showed an increased pericardial effusion. Patient found to have infiltrate on Chest CT performed 11/13/19 that is concerning for infiltrative process / neoplastic disease, hematology/ oncology recommend ID follow up as outpatient.  CT scan of abdomen pelvis with contrast on 11/14/19 showed infiltrative process progressing to the retroperitoneal soft tissue surrounding both kidneys causing moderate bilateral hydronephrosis. On 11/15/19 he underwent IR pericardial drain placement for 55ml of bloody drainage.  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 margoth. hydronephrosis, fibrosis prox. SMA, RIAN, margoth. renal artery, resulting luminal narrowing.  He then underwent a Right and left cardiac cath on 11/18/19 that showed normal coronaries, increased wedge pressure 24, no evidence of constriction or restriction.  His pericardial AI was removed 11/19/19 and he was discharged home.  He was following with his cardiologist (Dr. Mcginnis) and a rheumatologist as an outpatient.    He presented to [facility] [place]/29 sent from his cardiologists office with c/o SOB on minimal exertion.  Pt reports that he gets SOB and fatigued with minimal exertion such as doing ADL's and even while talking. He was ruled out for an MI. CT chest showed stable moderate bilateral pleural effusions with atelectasis, as well as a small stable pericardial effusion with adjacent inflammatory change and small loculated fluid along the right atrial border that could correlate with pericarditis.  6/30/20 he underwent a TTE and right and left heart cath at [facility]. Patient was transferred to [facility] for evaluation of constrictive pericarditis /pericardial stripping.

## 2020-07-04 NOTE — PROGRESS NOTE ADULT - PROBLEM SELECTOR PLAN 4
Seen on MRI on last admission in November.  Urology not recommend retroperitoneal bx at this time.   Follow up PET scan as above.

## 2020-07-04 NOTE — PROGRESS NOTE ADULT - PROBLEM SELECTOR PLAN 7
Hold oral agents for now.  Consistent carb diet.  Continue lantus with ISS coverage PRN.   BS goal .

## 2020-07-04 NOTE — CHART NOTE - NSCHARTNOTEFT_GEN_A_CORE
44 year old male with PMH cardiomegaly, DM II, HTN, and recurrent pericardial effusions with pericardiocentesis x2 ( 700ml serous drainage both times). He subsequently underwent a subxiphoid pericardial window and pericardial biopsy on 9/30/19 at [facility] with Dr. Peters .  Postoperative course complicated by AF/SVT which converted to SR.  Was found to be + lymes on that admission and has completed his course of IV and PO antibiotics.  Patient then presented to the ER on 11/12/19 from Dr. Castro office s/p in office TTE that showed an increased pericardial effusion. Patient found to have infiltrate on Chest CT performed 11/13/19 that is concerning for infiltrative process / neoplastic disease, hematology/ oncology recommend ID follow up as outpatient.  CT scan of abdomen pelvis with contrast on 11/14/19 showed infiltrative process progressing to the retroperitoneal soft tissue surrounding both kidneys causing moderate bilateral hydronephrosis. On 11/15/19 he underwent IR pericardial drain placement for 55ml of bloody drainage.  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 margoth. hydronephrosis, fibrosis prox. SMA, RIAN, margoth. renal artery, resulting luminal narrowing.  He then underwent a Right and left cardiac cath on 11/18/19 that showed normal coronaries, increased wedge pressure 24, no evidence of constriction or restriction.  His pericardial AI was removed 11/19/19 and he was discharged home.  He was following with his cardiologist (Dr. Mcginnis) and a rheumatologist as an outpatient.    He presented to [facility] [place] sent from his cardiologists office with c/o SOB on minimal exertion.  Pt reports that he gets SOB and fatigued with minimal exertion such as doing ADL's and even while talking. He was ruled out for an MI. CT chest showed stable moderate bilateral pleural effusions with atelectasis, as well as a small stable pericardial effusion with adjacent inflammatory change and small loculated fluid along the right atrial border that could correlate with pericarditis.  6/30/20 he underwent a TTE and right and left heart cath at [facility]. 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  7/2 He was noted to have pauses on telemetry.  EP was consulted and it was attributed to possible sleep apnea.  He also had a left pigtail placed for a pleural effusion.    Plan:  - Pt seen and examined.  - Maintain pigtail. cxr in AM.  - AM labs.  - Solumedrol started as recommended by rheumatology (with plan to taper to prednisone eventually).  - A1C 8.9 and now on steroids.  Was only on Metformin at home.  Now on LUCERO and Lantus.  Lantus increased.  Endocrine consult called today.  - Pt ultimately needs a biopsy of the fibrosis.  - Rheumatology (Dr. Jackson) and hem onc had recommended  biopsy of peritoneal fibrosis.    - Seen by urology and per notes, there is not a good window seen for a biopsy.  They recommended a pericardial biopsy, however, pt already had a pericardial biopsy in 2019 that was negative for    malignancy. (urology has signed off)  - The case was then discussed with IR ( Dr. Jones) earlier this week and he felt that a core biopsy of retroperitoneal fat would have little yield.  - Case was then further discussed with Rheumatology and they are now recommending PET scan while inpatient if possible to determine the most metabolically active site which would have the highest   yield for a biopsy.  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  - Will need to follow up on Monday with Dr. Zimmer (hem/onc) regarding scheduling PET scan at [facility] while in house   Discussed with Dr. Singer in AM rounds. 44 year old male with PMH cardiomegaly, DM II, HTN, and recurrent pericardial effusions with pericardiocentesis x2 ( 700ml serous drainage both times). He subsequently underwent a subxiphoid pericardial window and pericardial biopsy on 9/30/19 at [facility] with Dr. Peters .  Postoperative course complicated by AF/SVT which converted to SR.  Was found to be + lymes on that admission and has completed his course of IV and PO antibiotics.  Patient then presented to the ER on 11/12/19 from Dr. Castro office s/p in office TTE that showed an increased pericardial effusion. Patient found to have infiltrate on Chest CT performed 11/13/19 that is concerning for infiltrative process / neoplastic disease, hematology/ oncology recommend ID follow up as outpatient.  CT scan of abdomen pelvis with contrast on 11/14/19 showed infiltrative process progressing to the retroperitoneal soft tissue surrounding both kidneys causing moderate bilateral hydronephrosis. On 11/15/19 he underwent IR pericardial drain placement for 55ml of bloody drainage.  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 margoth. hydronephrosis, fibrosis prox. SMA, RIAN, margoth. renal artery, resulting luminal narrowing.  He then underwent a Right and left cardiac cath on 11/18/19 that showed normal coronaries, increased wedge pressure 24, no evidence of constriction or restriction.  His pericardial AI was removed 11/19/19 and he was discharged home.  He was following with his cardiologist (Dr. Mcginnis) and a rheumatologist as an outpatient.    [place] [facility] [place] sent from his cardiologists office with c/o SOB on minimal exertion.  Pt reports that he gets SOB and fatigued with minimal exertion such as doing ADL's and even while talking. He was ruled out for an MI. CT chest showed stable moderate bilateral pleural effusions with atelectasis, as well as a small stable pericardial effusion with adjacent inflammatory change and small loculated fluid along the right atrial border that could correlate with pericarditis.  6/30/20 he underwent a TTE and right and left heart cath at [facility]. 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  7/2 He was noted to have pauses on telemetry.  EP was consulted and it was attributed to possible sleep apnea.  He also had a left pigtail placed for a pleural effusion.    Plan:  - Pt seen and examined.  - Maintain pigtail. cxr in AM.  - AM labs.  - Solumedrol started as recommended by rheumatology (with plan to taper to prednisone eventually).  - A1C 8.9 and now on steroids.  Was only on Metformin at home.  Now on LUCERO and Lantus.  Lantus increased.  Endocrine consult called today.  - Pt ultimately needs a biopsy of the fibrosis.  - Cardiac MRI planned for Monday.  - Rheumatology (Dr. Jackson) and hem onc had recommended  biopsy of peritoneal fibrosis.    - Seen by urology and per notes, there is not a good window seen for a biopsy.  They recommended a pericardial biopsy, however, pt already had a pericardial biopsy in 2019 that was negative for    malignancy. (urology has signed off)  - The case was then discussed with IR ( Dr. Jones) earlier this week and he felt that a core biopsy of retroperitoneal fat would have little yield.  - Case was then further discussed with Rheumatology and they are now recommending PET scan while inpatient if possible to determine the most metabolically active site which would have the highest   yield for a biopsy.  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  - Will need to follow up on Monday with Dr. Zimmer (hem/onc) regarding scheduling PET scan at [facility] while in house   Discussed with Dr. Singer in AM rounds.

## 2020-07-04 NOTE — CONSULT NOTE ADULT - SUBJECTIVE AND OBJECTIVE BOX
HPI:  44 year old male with PMH cardiomegaly, DM II, HTN, and recurrent pericardial effusions with pericardiocentesis x2 ( 700ml serous drainage both times). He subsequently underwent a subxiphoid pericardial window and pericardial biopsy on 9/30/19 at [facility] with Dr. Peters .  Postoperative course complicated by AF/SVT which converted to SR.  Was found to be + lymes on that admission and has completed his course of IV and PO antibiotics.  Patient then presented to the ER on 11/12 from Dr. Castro office s/p in office TTE that showed an increased pericardial effusion. Patient found to have infiltrate on Chest CT performed 11/13 that is concerning for infiltrative process / neoplastic disease, hematology/ oncology recommend ID follow up as outpatient.  Ct scan of abdomen pelvis with contrast on 11/14 show infiltrative process progressing to the retroperitoneal soft tissue surrounding both kidneys causing moderate bilateral hydronephrosis. ON 11/15 he underwent IR pericardial drain placement for 55ml of bloody drainage.  At that time, ID was consulted regarding infiltrative process on CT, and it was deemed likely not infectious.  Hem/onc recalled and Rheumatology consulted.  An Abd/Renal MRI was done that showed retroperitoneal fibrosis, mod. margoth. hydronephrosis, fibrosis prox. SMA, RIAN, margoth. renal artery, resulting luminal narrowing.  He then underwent a Right and left cardiac cath on 11/18 that showed normal coronaries, increased wedge pressure 24, no evidence of constriction or restriction.  His drain was removed 11/19 pericardial AI removed and he was discharged home.  He was following with his cardiologist (Dr. Mcginnis) and a rheumatologist as an outpatient.    He presented to [facility] 6/29 sent from his cardiologists office with c/o SOB on minimal exertion.  Pt reports that he gets SOB and fatigued with minimal exertion such as doing ADL's and even while talking. He was ruled out for an dMI. CT chest showed stable moderate bilateral pleural effusions with atelectasis, as well as a small stable pericardial effusion with adjacent inflammatory change and small loculated fluid along the right atrial border that could correlate with pericarditis.   He underwent a TTE and right and left heart cath at [facility] but the reports are not available.  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    Pt is currently sitting up in bed eating dinner.  Denies CP.  Mild SOB noted while pt is speaking to me.  NAD noted. (30 Jun 2020 17:47)    consult called for management of diabetes, as pt. 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  Past h/o type 2 diabetes on oral agents. Current control suboptimal with A1C of 8.9    Pt. denies recent weight change. Some dysphagia and neck tightness at times. Dyspnea with exertion.      PAST MEDICAL & SURGICAL HISTORY:  Hydronephrosis  Pericardial effusion  Lyme disease  Pericarditis  Heart failure  Cardiomegaly  Nonsmoker  Diabetes mellitus  Hypertension  S/P pericardial window creation  S/P pericardiocentesis      FAMILY HISTORY:  No pertinent family history in first degree relatives      SOCIAL HISTORY:    REVIEW OF SYSTEMS:  as noted above, otherwise negative    MEDICATIONS  (STANDING):  atorvastatin 10 milliGRAM(s) Oral at bedtime  buMETAnide Injectable 2 milliGRAM(s) IV Push two times a day  chlorhexidine 0.12% Liquid 15 milliLiter(s) Oral Mucosa two times a day  dextrose 5%. 1000 milliLiter(s) (50 mL/Hr) IV Continuous <Continuous>  dextrose 50% Injectable 12.5 Gram(s) IV Push once  dextrose 50% Injectable 25 Gram(s) IV Push once  dextrose 50% Injectable 25 Gram(s) IV Push once  insulin glargine Injectable (LANTUS) 16 Unit(s) SubCutaneous at bedtime  insulin lispro (HumaLOG) corrective regimen sliding scale   SubCutaneous four times a day before meals  melatonin 5 milliGRAM(s) Oral at bedtime  methylPREDNISolone sodium succinate Injectable 20 milliGRAM(s) IV Push daily  mupirocin 2% Ointment 1 Application(s) Topical two times a day  pantoprazole    Tablet 40 milliGRAM(s) Oral before breakfast  potassium chloride   Powder 40 milliEquivalent(s) Oral every 4 hours  sodium chloride 0.9% lock flush 3 milliLiter(s) IV Push every 8 hours    MEDICATIONS  (PRN):  acetaminophen   Tablet .. 650 milliGRAM(s) Oral every 6 hours PRN Mild Pain (1 - 3), Moderate Pain (4 - 6)  dextrose 40% Gel 15 Gram(s) Oral once PRN Blood Glucose LESS THAN 70 milliGRAM(s)/deciliter  glucagon  Injectable 1 milliGRAM(s) IntraMuscular once PRN Glucose LESS THAN 70 milligrams/deciliter      Allergies    No Known Allergies    Intolerances    [facility] patient (Unknown)        PHYSICAL EXAM:    Vital Signs Last 24 Hrs  T(C): 36.8 (04 Jul 2020 08:00), Max: 37.7 (03 Jul 2020 21:33)  T(F): 98.3 (04 Jul 2020 08:00), Max: 99.9 (03 Jul 2020 21:33)  HR: 110 (04 Jul 2020 08:00) (106 - 119)  BP: 107/62 (04 Jul 2020 08:00) (107/62 - 136/83)  BP(mean): 77 (04 Jul 2020 08:00) (77 - 77)  RR: 18 (04 Jul 2020 08:00) (18 - 20)  SpO2: 93% (04 Jul 2020 08:00) (93% - 97%)    General appearance: Well developed, well nourished. Generalized obesity    Eyes: Pupils equal. EOM full. No exophthalmos.    Neck: Trachea midline. suggestion of thyroid enlargement    Lungs: Normal respiratory excursion. Decreased breath sounds and basilar rales    CV: Irregular cardiac rhythm    Abdomen: Soft, non tender, no organomegaly or mass.    Musculoskeletal: No cyanosis, clubbing, or edema.     Skin: Warm and dry    Neuro: Cranial nerves intact. Normal motor function.     Psych: Normal affect, good judgement.            LABS:                        13.1   21.28 )-----------( 495      ( 04 Jul 2020 07:02 )             40.6     07-04    133<L>  |  90<L>  |  11.0  ----------------------------<  171<H>  3.2<L>   |  27.0  |  0.64    Ca    9.0      04 Jul 2020 07:00  Phos  4.6     07-03  Mg     1.7     07-04    TPro  7.1  /  Alb  3.0<L>  /  TBili  1.2  /  DBili  x   /  AST  14  /  ALT  9   /  AlkPhos  85  07-04      LIVER FUNCTIONS - ( 04 Jul 2020 07:00 )  Alb: 3.0 g/dL / Pro: 7.1 g/dL / ALK PHOS: 85 U/L / ALT: 9 U/L / AST: 14 U/L / GGT: x                 POCT Blood Glucose.: 212 mg/dL (07-04-20 @ 12:04)  POCT Blood Glucose.: 158 mg/dL (07-04-20 @ 07:54)  POCT Blood Glucose.: 194 mg/dL (07-03-20 @ 21:02)  POCT Blood Glucose.: 131 mg/dL (07-03-20 @ 16:58)  POCT Blood Glucose.: 233 mg/dL (07-03-20 @ 12:04)  POCT Blood Glucose.: 202 mg/dL (07-03-20 @ 08:10)  POCT Blood Glucose.: 209 mg/dL (07-02-20 @ 20:58)  POCT Blood Glucose.: 123 mg/dL (07-02-20 @ 17:00)  POCT Blood Glucose.: 169 mg/dL (07-02-20 @ 12:09)  POCT Blood Glucose.: 217 mg/dL (07-01-20 @ 21:15)  POCT Blood Glucose.: 148 mg/dL (07-01-20 @ 17:14)

## 2020-07-04 NOTE — CONSULT NOTE ADULT - ASSESSMENT
DM type 2, baseline suboptimal control and likely to worsen with corticosteroids, which will probably require long term therapy. Reasonable to start insulin in basal bolus regimen, with future increases in prandial insulin.  Possible thyroid enlargement, along with mild symptoms of dysphagia and neck discomfort. 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

## 2020-07-04 NOTE — PROGRESS NOTE ADULT - SUBJECTIVE AND OBJECTIVE BOX
HPI:  44 year old male with PMH cardiomegaly, DM II, HTN, and recurrent pericardial effusions with pericardiocentesis x2 ( 700ml serous drainage both times). He subsequently underwent a subxiphoid pericardial window and pericardial biopsy on 19 at [facility] with Dr. Peters .  Postoperative course complicated by AF/SVT which converted to SR.  Was found to be + lymes on that admission and has completed his course of IV and PO antibiotics.  Patient then presented to the ER on  from Dr. Castro office s/p in office TTE that showed an increased pericardial effusion. Patient found to have infiltrate on Chest CT performed  that is concerning for infiltrative process / neoplastic disease, hematology/ oncology recommend ID follow up as outpatient.  Ct scan of abdomen pelvis with contrast on  show infiltrative process progressing to the retroperitoneal soft tissue surrounding both kidneys causing moderate bilateral hydronephrosis. ON 11/15 he underwent IR pericardial drain placement for 55ml of bloody drainage.  At that time, ID was consulted regarding infiltrative process on CT, and it was deemed likely not infectious.  Hem/onc recalled and Rheumatology consulted.  An Abd/Renal MRI was done that showed retroperitoneal fibrosis, mod. margoth. hydronephrosis, fibrosis prox. SMA, RIAN, margoth. renal artery, resulting luminal narrowing.  He then underwent a Right and left cardiac cath on  that showed normal coronaries, increased wedge pressure 24, no evidence of constriction or restriction.  His drain was removed  pericardial AI removed and he was discharged home.  He was following with his cardiologist (Dr. Mcginnis) and a rheumatologist as an outpatient.    He presented to [facility]  sent from his cardiologists office with c/o SOB on minimal exertion.  Pt reports that he gets SOB and fatigued with minimal exertion such as doing ADL's and even while talking. He was ruled out for an dMI. CT chest showed stable moderate bilateral pleural effusions with atelectasis, as well as a small stable pericardial effusion with adjacent inflammatory change and small loculated fluid along the right atrial border that could correlate with pericarditis.   He underwent a TTE and right and left heart cath at [facility] but the reports are not available.  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 (2020 17:47)    PAST MEDICAL & SURGICAL HISTORY:  Hydronephrosis  Pericardial effusion  Lyme disease  Pericarditis  Heart failure  Cardiomegaly  Nonsmoker  Diabetes mellitus  Hypertension  S/P pericardial window creation  S/P pericardiocentesis    Subjective: Patient lying in bed in no acute distress. +SOB with minimal exertion. Denies chills, lightheadedness, dizziness, HA, CP, palpitations, cough, abdominal pain, N/V, diarrhea, numbness/tingling in extremities, or any other acute complaints.    VITAL SIGNS  T(C): 37.7 (20 @ 21:33), Max: 37.7 (20 @ 09:33)  T(F): 99.9 (20 @ :33), Max: 99.9 (20 @ 09:33)  HR: 118 (20 @ :) (103 - 123)  BP: 122/84 (20 @ 21:33) (122/84 - 140/94)  RR: 19 (20 @ :) (17 - 20)  SpO2: 95% (20 @ :) (95% - 98%)  on (O2)              MEDICATIONS  acetaminophen   Tablet .. 650 milliGRAM(s) Oral every 6 hours PRN  atorvastatin 10 milliGRAM(s) Oral at bedtime  buMETAnide Injectable 2 milliGRAM(s) IV Push two times a day  chlorhexidine 0.12% Liquid 15 milliLiter(s) Oral Mucosa two times a day  glucagon  Injectable 1 milliGRAM(s) IntraMuscular once PRN  insulin glargine Injectable (LANTUS) 14 Unit(s) SubCutaneous at bedtime  insulin lispro (HumaLOG) corrective regimen sliding scale   SubCutaneous four times a day before meals  melatonin 5 milliGRAM(s) Oral at bedtime  mupirocin 2% Ointment 1 Application(s) Topical two times a day  pantoprazole    Tablet 40 milliGRAM(s) Oral before breakfast  sodium chloride 0.9% lock flush 3 milliLiter(s) IV Push every 8 hours    PHYSICAL EXAM  General: well nourished, well developed, no acute distress  Neurology: alert and oriented x 3, nonfocal, no gross deficits  Respiratory: Decreased BSs at b/l bases. No accessory muscle use noted.   CV: regular rate and rhythm, normal S1, S2.  Abdomen: soft, nontender, nondistended, positive bowel sounds  Extremities: warm, well perfused. +2- 3 BLE edema. + DP pulses bilaterally  Tubes: + Left Chest Tube to waterseal, +Serous output, no airleak noted. No crepitus around CT site noted.     I&O's Detail    2020 07:01  -  2020 07:00  --------------------------------------------------------  IN:    Oral Fluid: 680 mL  Total IN: 680 mL    OUT:    Chest Tube: 2190 mL    Voided: 7360 mL  Total OUT: 9550 mL    Total NET: -8870 mL      2020 07:01  -  2020 02:52  --------------------------------------------------------  IN:    Oral Fluid: 1240 mL  Total IN: 1240 mL    OUT:    Chest Tube: 60 mL    Voided: 4065 mL  Total OUT: 4125 mL    Total NET: -2885 mL    Weights:  Daily Weight in k.2 (2020 06:56)  Admit Wt: Drug Dosing Weight  Height (cm): 172.72 (2019 17:34)  Weight (kg): 114.3 (2020 06:45)  BMI (kg/m2): 38.3 (2020 06:45)  BSA (m2): 2.25 (2020 06:45)    All laboratory results, radiology and medications reviewed.    LABS        139  |  95<L>  |  9.0  ----------------------------<  133<H>  4.0   |  30.0<H>  |  0.71    Ca    9.6      2020 07:13  Phos  4.6     07-03  Mg     2.0     07-03    TPro  6.4  /  Alb  3.0<L>  /  TBili  x   /  DBili  x   /  AST  x   /  ALT  x   /  AlkPhos  x                                    14.1   20.02 )-----------( 517      ( 2020 07:13 )             46.3              CAPILLARY BLOOD GLUCOSE  POCT Blood Glucose.: 194 mg/dL (2020 21:02)  POCT Blood Glucose.: 131 mg/dL (2020 16:58)  POCT Blood Glucose.: 233 mg/dL (2020 12:04)  POCT Blood Glucose.: 202 mg/dL (2020 08:10)           Last CXR:  < from: Xray Chest 1 View- PORTABLE-Routine (20 @ 05:50) >  Central shadow is enlarged.  There is mild central infiltration which is roughly symmetric.  Chest is similar to .  IMPRESSION: Unchanged chest as above.  < end of copied text >    < from: CT Chest w/ IV Cont (20 @ 11:52) >  IMPRESSION:   Infiltrative soft tissue process surrounding the heart and encasing vessels, and soft tissue infiltrative process surrounding the kidneys with bilateral hydronephrosis and vasculature in the retroperitoneum. This may be due to a fibrotic infiltrative disease or an autoimmune process such as IgG 4 disease. Clinical correlation is suggested. While the bulk of disease does not appear significantly changed, the soft tissue infiltration surrounding vessels visually appears mildly increased. Clinical correlation is suggested.   Focal area of low in attenuation within the fibrotic changes along the right side of the heart. This is unchanged. Infected fluid cannot entirely be excluded.  Moderate left and small right pleural effusions. Atelectatic lung. Additional findings as above.  < end of copied text >      < from: JAMES Echo Doppler (20 @ 14:17) >  Summary:   1. Technically good study.   2. Normal global left ventricular systolic function.   3. Left ventricular ejection fraction, by visual estimation, is 60 to 65%.   4. Normal right ventricular size and function.   5. Trace mitral valve regurgitation.   6. The pericardium appears thickened. There is a small pericardial effusion. There is a LV septal bounce present. Tissue Doppler is higher in the medial annuls than lateral annulus, this maybe due to constriction. IVC is plethoric. There is no significant expiratory reversal of the hepatic vein.   7. Findings were DW Dr. Peters.  < end of copied text >

## 2020-07-04 NOTE — PROGRESS NOTE ADULT - PROBLEM SELECTOR PLAN 1
CT chest and JAMES done, report as above.  Cardiac MRI pending.   Rheumatology consulted, input appreciated.  Plan for PET scan at the [facility] next week to determine appropriate biopsy location.

## 2020-07-05 LAB
ALBUMIN SERPL ELPH-MCNC: 3 G/DL — LOW (ref 3.3–5.2)
ALP SERPL-CCNC: 90 U/L — SIGNIFICANT CHANGE UP (ref 40–120)
ALT FLD-CCNC: 11 U/L — SIGNIFICANT CHANGE UP
ANION GAP SERPL CALC-SCNC: 12 MMOL/L — SIGNIFICANT CHANGE UP (ref 5–17)
APTT BLD: 34.7 SEC — SIGNIFICANT CHANGE UP (ref 27.5–35.5)
AST SERPL-CCNC: 17 U/L — SIGNIFICANT CHANGE UP
BILIRUB SERPL-MCNC: 0.8 MG/DL — SIGNIFICANT CHANGE UP (ref 0.4–2)
BUN SERPL-MCNC: 16 MG/DL — SIGNIFICANT CHANGE UP (ref 8–20)
CALCIUM SERPL-MCNC: 9.2 MG/DL — SIGNIFICANT CHANGE UP (ref 8.6–10.2)
CHLORIDE SERPL-SCNC: 92 MMOL/L — LOW (ref 98–107)
CO2 SERPL-SCNC: 32 MMOL/L — HIGH (ref 22–29)
CREAT SERPL-MCNC: 0.73 MG/DL — SIGNIFICANT CHANGE UP (ref 0.5–1.3)
GAS PNL BLDA: SIGNIFICANT CHANGE UP
GLUCOSE BLDC GLUCOMTR-MCNC: 154 MG/DL — HIGH (ref 70–99)
GLUCOSE BLDC GLUCOMTR-MCNC: 185 MG/DL — HIGH (ref 70–99)
GLUCOSE BLDC GLUCOMTR-MCNC: 219 MG/DL — HIGH (ref 70–99)
GLUCOSE BLDC GLUCOMTR-MCNC: 262 MG/DL — HIGH (ref 70–99)
GLUCOSE SERPL-MCNC: 170 MG/DL — HIGH (ref 70–99)
HCT VFR BLD CALC: 39.4 % — SIGNIFICANT CHANGE UP (ref 39–50)
HGB BLD-MCNC: 12.6 G/DL — LOW (ref 13–17)
INR BLD: 1.55 RATIO — HIGH (ref 0.88–1.16)
MAGNESIUM SERPL-MCNC: 2.2 MG/DL — SIGNIFICANT CHANGE UP (ref 1.6–2.6)
MCHC RBC-ENTMCNC: 26.1 PG — LOW (ref 27–34)
MCHC RBC-ENTMCNC: 32 GM/DL — SIGNIFICANT CHANGE UP (ref 32–36)
MCV RBC AUTO: 81.6 FL — SIGNIFICANT CHANGE UP (ref 80–100)
PHOSPHATE SERPL-MCNC: 3.9 MG/DL — SIGNIFICANT CHANGE UP (ref 2.4–4.7)
PLATELET # BLD AUTO: 459 K/UL — HIGH (ref 150–400)
POTASSIUM SERPL-MCNC: 4.3 MMOL/L — SIGNIFICANT CHANGE UP (ref 3.5–5.3)
POTASSIUM SERPL-SCNC: 4.3 MMOL/L — SIGNIFICANT CHANGE UP (ref 3.5–5.3)
PROT SERPL-MCNC: 7 G/DL — SIGNIFICANT CHANGE UP (ref 6.6–8.7)
PROTHROM AB SERPL-ACNC: 17.6 SEC — HIGH (ref 10.6–13.6)
RBC # BLD: 4.83 M/UL — SIGNIFICANT CHANGE UP (ref 4.2–5.8)
RBC # FLD: 13.8 % — SIGNIFICANT CHANGE UP (ref 10.3–14.5)
RNAP III AB SER-ACNC: 6 UNITS — SIGNIFICANT CHANGE UP (ref 0–19)
SODIUM SERPL-SCNC: 136 MMOL/L — SIGNIFICANT CHANGE UP (ref 135–145)
WBC # BLD: 14.44 K/UL — HIGH (ref 3.8–10.5)
WBC # FLD AUTO: 14.44 K/UL — HIGH (ref 3.8–10.5)

## 2020-07-05 PROCEDURE — 76536 US EXAM OF HEAD AND NECK: CPT | Mod: 26

## 2020-07-05 PROCEDURE — 99232 SBSQ HOSP IP/OBS MODERATE 35: CPT

## 2020-07-05 PROCEDURE — 99233 SBSQ HOSP IP/OBS HIGH 50: CPT

## 2020-07-05 PROCEDURE — 93010 ELECTROCARDIOGRAM REPORT: CPT

## 2020-07-05 PROCEDURE — 71045 X-RAY EXAM CHEST 1 VIEW: CPT | Mod: 26

## 2020-07-05 RX ORDER — INSULIN LISPRO 100/ML
4 VIAL (ML) SUBCUTANEOUS
Refills: 0 | Status: DISCONTINUED | OUTPATIENT
Start: 2020-07-05 | End: 2020-07-09

## 2020-07-05 RX ADMIN — PANTOPRAZOLE SODIUM 40 MILLIGRAM(S): 20 TABLET, DELAYED RELEASE ORAL at 06:12

## 2020-07-05 RX ADMIN — BUMETANIDE 2 MILLIGRAM(S): 0.25 INJECTION INTRAMUSCULAR; INTRAVENOUS at 06:15

## 2020-07-05 RX ADMIN — Medication 2 UNIT(S): at 08:14

## 2020-07-05 RX ADMIN — MUPIROCIN 1 APPLICATION(S): 20 OINTMENT TOPICAL at 17:16

## 2020-07-05 RX ADMIN — SODIUM CHLORIDE 3 MILLILITER(S): 9 INJECTION INTRAMUSCULAR; INTRAVENOUS; SUBCUTANEOUS at 21:45

## 2020-07-05 RX ADMIN — CHLORHEXIDINE GLUCONATE 15 MILLILITER(S): 213 SOLUTION TOPICAL at 06:42

## 2020-07-05 RX ADMIN — SODIUM CHLORIDE 3 MILLILITER(S): 9 INJECTION INTRAMUSCULAR; INTRAVENOUS; SUBCUTANEOUS at 06:12

## 2020-07-05 RX ADMIN — Medication 4: at 12:23

## 2020-07-05 RX ADMIN — ATORVASTATIN CALCIUM 10 MILLIGRAM(S): 80 TABLET, FILM COATED ORAL at 21:48

## 2020-07-05 RX ADMIN — MUPIROCIN 1 APPLICATION(S): 20 OINTMENT TOPICAL at 06:13

## 2020-07-05 RX ADMIN — Medication 2: at 08:15

## 2020-07-05 RX ADMIN — Medication 6: at 17:16

## 2020-07-05 RX ADMIN — Medication 4 UNIT(S): at 17:16

## 2020-07-05 RX ADMIN — BUMETANIDE 2 MILLIGRAM(S): 0.25 INJECTION INTRAMUSCULAR; INTRAVENOUS at 17:15

## 2020-07-05 RX ADMIN — Medication 20 MILLIGRAM(S): at 06:12

## 2020-07-05 RX ADMIN — Medication 2: at 21:50

## 2020-07-05 RX ADMIN — CHLORHEXIDINE GLUCONATE 15 MILLILITER(S): 213 SOLUTION TOPICAL at 17:19

## 2020-07-05 RX ADMIN — Medication 2 UNIT(S): at 12:23

## 2020-07-05 RX ADMIN — Medication 10 MILLIGRAM(S): at 21:46

## 2020-07-05 RX ADMIN — INSULIN GLARGINE 16 UNIT(S): 100 INJECTION, SOLUTION SUBCUTANEOUS at 21:50

## 2020-07-05 RX ADMIN — SODIUM CHLORIDE 3 MILLILITER(S): 9 INJECTION INTRAMUSCULAR; INTRAVENOUS; SUBCUTANEOUS at 12:24

## 2020-07-05 NOTE — PROGRESS NOTE ADULT - ASSESSMENT
44 year old male with PMH cardiomegaly, DM II, HTN, and recurrent pericardial effusions with pericardiocentesis x2 ( 700ml serous drainage both times). He subsequently underwent a subxiphoid pericardial window and pericardial biopsy on 9/30/19 at [facility] with Dr. Peters .  Postoperative course complicated by AF/SVT which converted to SR.  Was found to be + lymes on that admission and has completed his course of IV and PO antibiotics.  Patient then presented to the ER on 11/12/19 from Dr. Castro office s/p in office TTE that showed an increased pericardial effusion. Patient found to have infiltrate on Chest CT performed 11/13/19 that is concerning for infiltrative process / neoplastic disease, hematology/ oncology recommend ID follow up as outpatient.  CT scan of abdomen pelvis with contrast on 11/14/19 showed infiltrative process progressing to the retroperitoneal soft tissue surrounding both kidneys causing moderate bilateral hydronephrosis. On 11/15/19 he underwent IR pericardial drain placement for 55ml of bloody drainage.  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 margoth. hydronephrosis, fibrosis prox. SMA, RIAN, margoth. renal artery, resulting luminal narrowing.  He then underwent a Right and left cardiac cath on 11/18/19 that showed normal coronaries, increased wedge pressure 24, no evidence of constriction or restriction.  His pericardial AI was removed 11/19/19 and he was discharged home.  He was following with his cardiologist (Dr. Mcginnis) and a rheumatologist as an outpatient.    He presented to [facility] 6/29 sent from his cardiologists office with c/o SOB on minimal exertion.  Pt reports that he gets SOB and fatigued with minimal exertion such as doing ADL's and even while talking. He was ruled out for an MI. CT chest showed stable moderate bilateral pleural effusions with atelectasis, as well as a small stable pericardial effusion with adjacent inflammatory change and small loculated fluid along the right atrial border that could correlate with pericarditis.  6/30/20 he underwent a TTE and right and left heart cath at [facility]. Patient was transferred to [facility] for evaluation of recurrent pericardial effusion /constrictive pericarditis /pericardial stripping.  7/5   Placed on medicine service to coordinate comprehensive care due to multiple multiple medical conditions, including  poss fibrotic chronic disease  Maintain pigtail for now Plan for  Cardiac MRI eval pericardium  Will closely follow

## 2020-07-05 NOTE — PROGRESS NOTE ADULT - ASSESSMENT
44 year old male with PMH cardiomegaly, DM II, HTN, and recurrent pericardial effusions with pericardiocentesis x2 ( 700ml serous drainage both times). He subsequently underwent a subxiphoid pericardial window and pericardial biopsy on 9/30/19 at [facility] with Dr. Peters .  Postoperative course complicated by AF/SVT which converted to SR.  Was found to be + lymes on that admission and has completed his course of IV and PO antibiotics.  Patient then presented to the ER on 11/12/19 from Dr. Castro office s/p in office TTE that showed an increased pericardial effusion. Patient found to have infiltrate on Chest CT performed 11/13/19 that is concerning for infiltrative process / neoplastic disease, hematology/ oncology recommend ID follow up as outpatient.  CT scan of abdomen pelvis with contrast on 11/14/19 showed infiltrative process progressing to the retroperitoneal soft tissue surrounding both kidneys causing moderate bilateral hydronephrosis. On 11/15/19 he underwent IR pericardial drain placement for 55ml of bloody drainage.  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 margoth. hydronephrosis, fibrosis prox. SMA, RIAN, margoth. renal artery, resulting luminal narrowing.  He then underwent a Right and left cardiac cath on 11/18/19 that showed normal coronaries, increased wedge pressure 24, no evidence of constriction or restriction.  His pericardial AI was removed 11/19/19 and he was discharged home.  He was following with his cardiologist (Dr. Mcginnis) and a rheumatologist as an outpatient.    He presented to [facility] 6/29 sent from his cardiologists office with c/o SOB on minimal exertion.  Pt reports that he gets SOB and fatigued with minimal exertion such as doing ADL's and even while talking. He was ruled out for an MI. CT chest showed stable moderate bilateral pleural effusions with atelectasis, as well as a small stable pericardial effusion with adjacent inflammatory change and small loculated fluid along the right atrial border that could correlate with pericarditis.  6/30/20 he underwent a TTE and right and left heart cath at [facility]. Patient was transferred to [facility] for evaluation of recurrent pericardial effusion /constrictive pericarditis /pericardial stripping.    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  - Urology not recommend retroperitoneal bx at this time.     #Hydronephrosis  - Moderate hydronephrosis noted on prior admission.  -  input appreciated, no acute intervention required,  signed off.     #Pleural effusion  - s/p pigtail placed 7/2- mgmt per ct surgery     #Heart block AV second degree (mobitz 2)  - ep consult appreciated - periods of nocturnal bradycardia with short pauses which are consistent with vagal events (PP prolongation, IN prolongation, gradual slowing and resumption). He is overweight and has had periods of PND which may be secondary to WALKER or CHF. No indication for pacemaker at this time  - tele monitor     #HTN- essential-> however currently normotensive   - bumex (holding betablocker 2/2 missed qrs beats-> mobitz 2 on tele monitor)  - monitor blood pressure     #Type 2 diabetes  - a1c 8.9 on admit   - iss   - lantus w/ premeal    #Leukocytosis  - trend and monitor for fever- hold off abx for now given stable  - currently on steroid taper which may affect wbc count as well     #DVT prophylaxis  - venodynes

## 2020-07-05 NOTE — PROGRESS NOTE ADULT - PROBLEM SELECTOR PLAN 1
CT chest and JAMES done, report as above.  Cardiac MRI pending.   Rheumatology consulted, input appreciated.  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   Continue solumedrol 20mg/day for now with plan to taper to prednisone 20mg/day in 2-3 days.   Plan for PET scan at the [facility] this week to determine appropriate biopsy location.  Endocrinology recommending thyroid US to r/o Reidel's thyroiditis which could be related to IGG4 disease.

## 2020-07-05 NOTE — PROGRESS NOTE ADULT - SUBJECTIVE AND OBJECTIVE BOX
Patient is a 45y old  Male who presents with a chief complaint of transfer from [place] with constrictive pericarditis. (05 Jul 2020 10:13)      Patient seen and examined at bedside.     ALLERGIES:  No Known Allergies  [facility] patient (Unknown)    MEDICATIONS  (STANDING):  atorvastatin 10 milliGRAM(s) Oral at bedtime  buMETAnide Injectable 2 milliGRAM(s) IV Push two times a day  chlorhexidine 0.12% Liquid 15 milliLiter(s) Oral Mucosa two times a day  dextrose 5%. 1000 milliLiter(s) (50 mL/Hr) IV Continuous <Continuous>  dextrose 50% Injectable 12.5 Gram(s) IV Push once  dextrose 50% Injectable 25 Gram(s) IV Push once  dextrose 50% Injectable 25 Gram(s) IV Push once  insulin glargine Injectable (LANTUS) 16 Unit(s) SubCutaneous at bedtime  insulin lispro (HumaLOG) corrective regimen sliding scale   SubCutaneous four times a day before meals  insulin lispro Injectable (HumaLOG) 4 Unit(s) SubCutaneous three times a day before meals  melatonin 10 milliGRAM(s) Oral at bedtime  methylPREDNISolone sodium succinate Injectable 20 milliGRAM(s) IV Push daily  mupirocin 2% Ointment 1 Application(s) Topical two times a day  pantoprazole    Tablet 40 milliGRAM(s) Oral before breakfast  sodium chloride 0.9% lock flush 3 milliLiter(s) IV Push every 8 hours    MEDICATIONS  (PRN):  acetaminophen   Tablet .. 650 milliGRAM(s) Oral every 6 hours PRN Mild Pain (1 - 3), Moderate Pain (4 - 6)  dextrose 40% Gel 15 Gram(s) Oral once PRN Blood Glucose LESS THAN 70 milliGRAM(s)/deciliter  glucagon  Injectable 1 milliGRAM(s) IntraMuscular once PRN Glucose LESS THAN 70 milligrams/deciliter    Vital Signs Last 24 Hrs  T(F): 97.7 (05 Jul 2020 08:11), Max: 98.6 (04 Jul 2020 15:20)  HR: 97 (05 Jul 2020 08:11) (93 - 112)  BP: 109/73 (05 Jul 2020 08:11) (109/73 - 129/82)  RR: 18 (05 Jul 2020 08:11) (17 - 19)  SpO2: 97% (05 Jul 2020 08:11) (94% - 97%)  I&O's Summary    04 Jul 2020 07:01  -  05 Jul 2020 07:00  --------------------------------------------------------  IN: 710 mL / OUT: 4540 mL / NET: -3830 mL    05 Jul 2020 07:01  -  05 Jul 2020 13:39  --------------------------------------------------------  IN: 240 mL / OUT: 300 mL / NET: -60 mL      PHYSICAL EXAM:  General: NAD, Alert  ENT: MMM, no thrush  Neck: Supple, No JVD  Lungs: decreased breath sounds b/l bases  +pigtail  Cardio: +s1/s2 +edema b/l le  Abdomen: Soft, Nontender, Nondistended; Bowel sounds present  Extremities: No calf tenderness    LABS:                        12.6   14.44 )-----------( 459      ( 05 Jul 2020 02:03 )             39.4     07-05    136  |  92  |  16.0  ----------------------------<  170  4.3   |  32.0  |  0.73    Ca    9.2      05 Jul 2020 02:03  Phos  3.9     07-05  Mg     2.2     07-05    TPro  7.0  /  Alb  3.0  /  TBili  0.8  /  DBili  x   /  AST  17  /  ALT  11  /  AlkPhos  90  07-05    eGFR if Non African American: 112 mL/min/1.73M2 (07-05-20 @ 02:03)  eGFR if African American: 130 mL/min/1.73M2 (07-05-20 @ 02:03)    PT/INR - ( 05 Jul 2020 02:03 )   PT: 17.6 sec;   INR: 1.55 ratio    PTT - ( 05 Jul 2020 02:03 )  PTT:34.7 sec    ABG - ( 05 Jul 2020 02:31 )  pH, Arterial: 7.50  pH, Blood: x     /  pCO2: 43    /  pO2: 64    / HCO3: 34    / Base Excess: 9.9   /  SaO2: 93        Glucose  POCT Blood Glucose.: 219 mg/dL (05 Jul 2020 11:59)  POCT Blood Glucose.: 154 mg/dL (05 Jul 2020 07:51)  POCT Blood Glucose.: 210 mg/dL (04 Jul 2020 21:18)  POCT Blood Glucose.: 236 mg/dL (04 Jul 2020 17:42)    Cultures  Culture - Body Fluid with Gram Stain (collected 03 Jul 2020 03:50)  Source: .Body Fluid Pleural Fluid  Gram Stain (03 Jul 2020 07:33):    polymorphonuclear leukocytes seen    No organisms seen    by cytocentrifuge  Preliminary Report (04 Jul 2020 09:29):    No growth    RADIOLOGY & ADDITIONAL TESTS:  < from: Xray Chest 1 View- PORTABLE-Routine (07.05.20 @ 04:53) >  IMPRESSION: Improved effusions with slight residual.  < end of copied text >    < from: CT Chest Abdomen and Pelvis w/ IV Cont (07.01.20 @ 11:52) >  IMPRESSION:   Infiltrative soft tissue process surrounding the heart and encasing vessels, and soft tissue infiltrative process surrounding the kidneys with bilateral hydronephrosis and vasculature in the retroperitoneum. This may be due to a fibrotic infiltrative disease or an autoimmune process such as IgG 4 disease. Clinical correlation is suggested. While the bulk of disease does not appear significantly changed, the soft tissue infiltration surrounding vessels visually appears mildly increased. Clinical correlation is suggested.   Focal area of low in attenuation within the fibrotic changes along the right side of the heart. This is unchanged. Infected fluid cannot entirely be excluded.  Moderate left and small right pleural effusions. Atelectatic lung. Additional findings as above.  < end of copied text >    < from: JAMES Echo Doppler (07.01.20 @ 14:17) >  Summary:   1. Technically good study.   2. Normal global left ventricular systolic function.   3. Left ventricular ejection fraction, by visual estimation, is 60 to 65%.   4. Normal right ventricular size and function.   5. Trace mitral valveregurgitation.   6. The pericardium appears thickened. There is a small pericardial effusion. There is a LV septal bounce present. Tissue Doppler is higher in the medial annuls than lateral annulus, this maybe due to constriction. IVC is plethoric. There is no significant expiratory reversal of the hepatic vein.   7. Findings were DW Dr. Peters.  < end of copied text >    Care Discussed with Consultants/Other Providers:   Cardio   ID

## 2020-07-05 NOTE — PROGRESS NOTE ADULT - PROBLEM SELECTOR PLAN 7
Hold oral agents for now.  Consistent carb diet.  Continue lantus with ISS coverage PRN.   Premeal insulin added.  BS goal .  Endocrine input appreciated.   Blood sugar likely to be elevated with initiation of steroid treatment.   Self-injecting insulin teaching started 7/4 as patient is likely to be discharged on insulin.

## 2020-07-05 NOTE — PROGRESS NOTE ADULT - SUBJECTIVE AND OBJECTIVE BOX
Subjective:  Interviewed w/ Dr Singer in [place]  Denies complaints, understands plan for upcoming tests MRI/poss PET scan  Questions answered    Tele:       SR  70-90                     Radha I /  II    T(C): 36.5 (07-05-20 @ 08:11), Max: 37 (07-04-20 @ 15:20)  HR: 97 (07-05-20 @ 08:11) (93 - 112)  BP: 109/73 (07-05-20 @ 08:11) (109/73 - 129/82)  RR: 18 (07-05-20 @ 08:11) (17 - 19)  SpO2: 97% (07-05-20 @ 08:11) (94% - 97%)    LVEF: 55%      07-05    136  |  92<L>  |  16.0  ----------------------------<  170<H>  4.3   |  32.0<H>  |  0.73    Ca    9.2      05 Jul 2020 02:03  Phos  3.9     07-05  Mg     2.2     07-05    TPro  7.0  /  Alb  3.0<L>  /  TBili  0.8  /  DBili  x   /  AST  17  /  ALT  11  /  AlkPhos  90  07-05                               12.6   14.44 )-----------( 459      ( 05 Jul 2020 02:03 )             39.4        PT/INR - ( 05 Jul 2020 02:03 )   PT: 17.6 sec;   INR: 1.55 ratio         PTT - ( 05 Jul 2020 02:03 )  PTT:34.7 sec    CAPILLARY BLOOD GLUCOSE      POCT Blood Glucose.: 154 mg/dL (05 Jul 2020 07:51)  POCT Blood Glucose.: 210 mg/dL (04 Jul 2020 21:18)  POCT Blood Glucose.: 236 mg/dL (04 Jul 2020 17:42)  POCT Blood Glucose.: 212 mg/dL (04 Jul 2020 12:04)           CXR: L pigtail insitu  No PTX      Assessment    Neurology: alert and oriented x 3, nonfocal, no gross deficits    Respiratory: Decreased BSs at b/l bases. No accessory muscle use noted.     CV: regular rate and rhythm, normal S1, S2.    Abdomen: soft, nontender, nondistended, positive bowel sounds    Extremities: warm, well perfused. +2- 3 BLE edema. + DP pulses bilaterally    Tubes: + Left Chest Tube to waterseal, +Serous output, no airleak noted.   clear yellow drainage        MEDICATIONS  (STANDING):  atorvastatin 10 milliGRAM(s) Oral at bedtime  buMETAnide Injectable 2 milliGRAM(s) IV Push two times a day  chlorhexidine 0.12% Liquid 15 milliLiter(s) Oral Mucosa two times a day  dextrose 5%. 1000 milliLiter(s) (50 mL/Hr) IV Continuous <Continuous>  dextrose 50% Injectable 12.5 Gram(s) IV Push once  dextrose 50% Injectable 25 Gram(s) IV Push once  dextrose 50% Injectable 25 Gram(s) IV Push once  insulin glargine Injectable (LANTUS) 16 Unit(s) SubCutaneous at bedtime  insulin lispro (HumaLOG) corrective regimen sliding scale   SubCutaneous four times a day before meals  insulin lispro Injectable (HumaLOG) 2 Unit(s) SubCutaneous three times a day with meals  melatonin 10 milliGRAM(s) Oral at bedtime  methylPREDNISolone sodium succinate Injectable 20 milliGRAM(s) IV Push daily  mupirocin 2% Ointment 1 Application(s) Topical two times a day  pantoprazole    Tablet 40 milliGRAM(s) Oral before breakfast  sodium chloride 0.9% lock flush 3 milliLiter(s) IV Push every 8 hours       PAST MEDICAL & SURGICAL HISTORY:  Hydronephrosis  Pericardial effusion  Lyme disease  Pericarditis  Heart failure  Cardiomegaly  Nonsmoker  Diabetes mellitus  Hypertension  S/P pericardial window creation  S/P pericardiocentesis

## 2020-07-05 NOTE — PROGRESS NOTE ADULT - PROBLEM SELECTOR PLAN 4
s/p left pigtail placed 7/2.  Chest tube to waterseal with serous output.   Follow up CXR in AM.   Monitor strict output per shift.  May be able to remove CT later today if minimal output overnight.

## 2020-07-05 NOTE — PROGRESS NOTE ADULT - PROBLEM SELECTOR PLAN 6
Holding Beta blockers secondary to missed QRS beats (Mobitz Type 2).  Continue to monitor telemetry.

## 2020-07-05 NOTE — PROGRESS NOTE ADULT - PROBLEM SELECTOR PLAN 1
CT chest and JAMES done, report as above.  Cardiac MRI Monday  Rheumatology consulted, input appreciated.  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   Continue solumedrol 20mg/day for now with plan to taper to prednisone 20mg/day in 2-3 days.   Plan for PET scan at the [facility] this week to determine appropriate biopsy location.  Endocrinology recommending thyroid US to r/o Reidel's thyroiditis which could be related to IGG4 disease.

## 2020-07-05 NOTE — PROGRESS NOTE ADULT - ASSESSMENT
44 year old male with PMH cardiomegaly, DM II, HTN, and recurrent pericardial effusions with pericardiocentesis x2 ( 700ml serous drainage both times). He subsequently underwent a subxiphoid pericardial window and pericardial biopsy on 9/30/19 at [facility] with Dr. Peters .  Postoperative course complicated by AF/SVT which converted to SR.  Was found to be + lymes on that admission and has completed his course of IV and PO antibiotics.  Patient then presented to the ER on 11/12/19 from Dr. Castro office s/p in office TTE that showed an increased pericardial effusion. Patient found to have infiltrate on Chest CT performed 11/13/19 that is concerning for infiltrative process / neoplastic disease, hematology/ oncology recommend ID follow up as outpatient.  CT scan of abdomen pelvis with contrast on 11/14/19 showed infiltrative process progressing to the retroperitoneal soft tissue surrounding both kidneys causing moderate bilateral hydronephrosis. On 11/15/19 he underwent IR pericardial drain placement for 55ml of bloody drainage.  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 margoth. hydronephrosis, fibrosis prox. SMA, RIAN, margoth. renal artery, resulting luminal narrowing.  He then underwent a Right and left cardiac cath on 11/18/19 that showed normal coronaries, increased wedge pressure 24, no evidence of constriction or restriction.  His pericardial AI was removed 11/19/19 and he was discharged home.  He was following with his cardiologist (Dr. Mcginnis) and a rheumatologist as an outpatient.    He [place] to [facility] [place] sent from his cardiologists office with c/o SOB on minimal exertion.  Pt reports that he gets SOB and fatigued with minimal exertion such as doing ADL's and even while talking. He was ruled out for an MI. CT chest showed stable moderate bilateral pleural effusions with atelectasis, as well as a small stable pericardial effusion with adjacent inflammatory change and small loculated fluid along the right atrial border that could correlate with pericarditis.  6/30/20 he underwent a TTE and right and left heart cath at [facility]. Patient was transferred to [facility] for evaluation of constrictive pericarditis /pericardial stripping.

## 2020-07-06 ENCOUNTER — RESULT REVIEW (OUTPATIENT)
Age: 45
End: 2020-07-06

## 2020-07-06 LAB
ALBUMIN SERPL ELPH-MCNC: 2.9 G/DL — LOW (ref 3.3–5.2)
ALP SERPL-CCNC: 85 U/L — SIGNIFICANT CHANGE UP (ref 40–120)
ALT FLD-CCNC: 12 U/L — SIGNIFICANT CHANGE UP
ANION GAP SERPL CALC-SCNC: 11 MMOL/L — SIGNIFICANT CHANGE UP (ref 5–17)
AST SERPL-CCNC: 17 U/L — SIGNIFICANT CHANGE UP
BILIRUB SERPL-MCNC: 0.4 MG/DL — SIGNIFICANT CHANGE UP (ref 0.4–2)
BUN SERPL-MCNC: 21 MG/DL — HIGH (ref 8–20)
CALCIUM SERPL-MCNC: 9.4 MG/DL — SIGNIFICANT CHANGE UP (ref 8.6–10.2)
CHLORIDE SERPL-SCNC: 91 MMOL/L — LOW (ref 98–107)
CO2 SERPL-SCNC: 34 MMOL/L — HIGH (ref 22–29)
CREAT SERPL-MCNC: 0.68 MG/DL — SIGNIFICANT CHANGE UP (ref 0.5–1.3)
GLUCOSE BLDC GLUCOMTR-MCNC: 145 MG/DL — HIGH (ref 70–99)
GLUCOSE BLDC GLUCOMTR-MCNC: 149 MG/DL — HIGH (ref 70–99)
GLUCOSE BLDC GLUCOMTR-MCNC: 166 MG/DL — HIGH (ref 70–99)
GLUCOSE BLDC GLUCOMTR-MCNC: 282 MG/DL — HIGH (ref 70–99)
GLUCOSE BLDC GLUCOMTR-MCNC: 308 MG/DL — HIGH (ref 70–99)
GLUCOSE SERPL-MCNC: 133 MG/DL — HIGH (ref 70–99)
HCT VFR BLD CALC: 40.4 % — SIGNIFICANT CHANGE UP (ref 39–50)
HGB BLD-MCNC: 12.8 G/DL — LOW (ref 13–17)
MCHC RBC-ENTMCNC: 26 PG — LOW (ref 27–34)
MCHC RBC-ENTMCNC: 31.7 GM/DL — LOW (ref 32–36)
MCV RBC AUTO: 82.1 FL — SIGNIFICANT CHANGE UP (ref 80–100)
PLATELET # BLD AUTO: 503 K/UL — HIGH (ref 150–400)
POTASSIUM SERPL-MCNC: 3.4 MMOL/L — LOW (ref 3.5–5.3)
POTASSIUM SERPL-SCNC: 3.4 MMOL/L — LOW (ref 3.5–5.3)
PROT SERPL-MCNC: 6.7 G/DL — SIGNIFICANT CHANGE UP (ref 6.6–8.7)
RBC # BLD: 4.92 M/UL — SIGNIFICANT CHANGE UP (ref 4.2–5.8)
RBC # FLD: 13.6 % — SIGNIFICANT CHANGE UP (ref 10.3–14.5)
SODIUM SERPL-SCNC: 136 MMOL/L — SIGNIFICANT CHANGE UP (ref 135–145)
WBC # BLD: 20 K/UL — HIGH (ref 3.8–10.5)
WBC # FLD AUTO: 20 K/UL — HIGH (ref 3.8–10.5)

## 2020-07-06 PROCEDURE — 71551 MRI CHEST W/DYE: CPT | Mod: 26

## 2020-07-06 PROCEDURE — 88305 TISSUE EXAM BY PATHOLOGIST: CPT | Mod: 26

## 2020-07-06 PROCEDURE — 71045 X-RAY EXAM CHEST 1 VIEW: CPT | Mod: 26

## 2020-07-06 PROCEDURE — 88112 CYTOPATH CELL ENHANCE TECH: CPT | Mod: 26

## 2020-07-06 PROCEDURE — 99233 SBSQ HOSP IP/OBS HIGH 50: CPT

## 2020-07-06 PROCEDURE — 99232 SBSQ HOSP IP/OBS MODERATE 35: CPT

## 2020-07-06 RX ORDER — POTASSIUM CHLORIDE 20 MEQ
40 PACKET (EA) ORAL EVERY 4 HOURS
Refills: 0 | Status: COMPLETED | OUTPATIENT
Start: 2020-07-06 | End: 2020-07-06

## 2020-07-06 RX ORDER — POTASSIUM CHLORIDE 20 MEQ
40 PACKET (EA) ORAL ONCE
Refills: 0 | Status: COMPLETED | OUTPATIENT
Start: 2020-07-06 | End: 2020-07-06

## 2020-07-06 RX ADMIN — Medication 4 UNIT(S): at 13:14

## 2020-07-06 RX ADMIN — BUMETANIDE 2 MILLIGRAM(S): 0.25 INJECTION INTRAMUSCULAR; INTRAVENOUS at 17:43

## 2020-07-06 RX ADMIN — Medication 40 MILLIEQUIVALENT(S): at 13:14

## 2020-07-06 RX ADMIN — PANTOPRAZOLE SODIUM 40 MILLIGRAM(S): 20 TABLET, DELAYED RELEASE ORAL at 05:59

## 2020-07-06 RX ADMIN — Medication 20 MILLIGRAM(S): at 05:58

## 2020-07-06 RX ADMIN — Medication 8: at 18:25

## 2020-07-06 RX ADMIN — SODIUM CHLORIDE 3 MILLILITER(S): 9 INJECTION INTRAMUSCULAR; INTRAVENOUS; SUBCUTANEOUS at 13:57

## 2020-07-06 RX ADMIN — Medication 4 UNIT(S): at 08:59

## 2020-07-06 RX ADMIN — BUMETANIDE 2 MILLIGRAM(S): 0.25 INJECTION INTRAMUSCULAR; INTRAVENOUS at 06:14

## 2020-07-06 RX ADMIN — Medication 2: at 21:10

## 2020-07-06 RX ADMIN — CHLORHEXIDINE GLUCONATE 15 MILLILITER(S): 213 SOLUTION TOPICAL at 17:44

## 2020-07-06 RX ADMIN — INSULIN GLARGINE 16 UNIT(S): 100 INJECTION, SOLUTION SUBCUTANEOUS at 21:11

## 2020-07-06 RX ADMIN — CHLORHEXIDINE GLUCONATE 15 MILLILITER(S): 213 SOLUTION TOPICAL at 05:58

## 2020-07-06 RX ADMIN — SODIUM CHLORIDE 3 MILLILITER(S): 9 INJECTION INTRAMUSCULAR; INTRAVENOUS; SUBCUTANEOUS at 21:11

## 2020-07-06 RX ADMIN — SODIUM CHLORIDE 3 MILLILITER(S): 9 INJECTION INTRAMUSCULAR; INTRAVENOUS; SUBCUTANEOUS at 05:05

## 2020-07-06 RX ADMIN — Medication 4 UNIT(S): at 18:25

## 2020-07-06 RX ADMIN — ATORVASTATIN CALCIUM 10 MILLIGRAM(S): 80 TABLET, FILM COATED ORAL at 21:10

## 2020-07-06 RX ADMIN — MUPIROCIN 1 APPLICATION(S): 20 OINTMENT TOPICAL at 05:59

## 2020-07-06 RX ADMIN — Medication 40 MILLIEQUIVALENT(S): at 17:44

## 2020-07-06 RX ADMIN — Medication 10 MILLIGRAM(S): at 21:10

## 2020-07-06 NOTE — PROGRESS NOTE ADULT - PROBLEM SELECTOR PLAN 9
Treated during admission in November 2019.
s/p left pigtail placed   maintain today to waterseal   CXR in am

## 2020-07-06 NOTE — PROGRESS NOTE ADULT - PROBLEM SELECTOR PROBLEM 5
Type 2 diabetes mellitus without complication, without long-term current use of insulin
Type 2 diabetes mellitus without complication, without long-term current use of insulin
Heart block AV second degree
Heart block AV second degree
Pleural effusion
Type 2 diabetes mellitus without complication, without long-term current use of insulin
Heart block AV second degree
I will START or STAY ON the medications listed below when I get home from the hospital:    predniSONE 20 mg oral tablet  -- 3 tab(s) by mouth once a day  -- Indication: For Pulmonary infiltrates    Aspirin Enteric Coated 81 mg oral delayed release tablet  -- 1 tab(s) by mouth once a day  -- Indication: For Hypercholesterolemia    Tylenol PM 1000 mg-50 mg/30 mL oral liquid  -- 30 milliliter(s) by mouth once a day (at bedtime), As Needed  -- Indication: For Pain    ramipril 2.5 mg oral capsule  -- 1 cap(s) by mouth once a day -for allergy symptoms   -- Indication: For Diastolic CHF    tamsulosin 0.4 mg oral capsule  -- 1 cap(s) by mouth once a day (at bedtime)  -- Indication: For Urinary retention    Eliquis 5 mg oral tablet  -- 1 tab(s) by mouth 2 times a day  -- Indication: For Atrial fibrillation, unspecified type    simvastatin 40 mg oral tablet  -- 1 tab(s) by mouth once a day (at bedtime)  -- Indication: For Hypercholesterolemia    atenolol 50 mg oral tablet  -- 1 tab(s) by mouth once a day  -- Indication: For Atrial fibrillation, unspecified type    Lasix 40 mg oral tablet  -- 1 tab(s) by mouth once a day  -- Indication: For Diastolic CHF    potassium chloride 20 mEq oral tablet, extended release  -- 1 tab(s) by mouth once a day  -- Indication: For Supplementation    pantoprazole 40 mg oral delayed release tablet  -- 1 tab(s) by mouth once a day (before a meal)  -- Indication: For Gastric protection    sulfamethoxazole-trimethoprim 800 mg-160 mg oral tablet  -- 1 tab(s) by mouth Monday, Wednesday, and Friday   -- Indication: For Prophylactic measure    Calcium 600+D oral tablet  -- 1 tab(s) by mouth 2 times a day  -- Indication: For Supplement

## 2020-07-06 NOTE — PROGRESS NOTE ADULT - PROBLEM SELECTOR PROBLEM 8
Essential hypertension
Leukocytosis
Prophylactic measure
Prophylactic measure

## 2020-07-06 NOTE — PROGRESS NOTE ADULT - ASSESSMENT
44 year old male with PMH cardiomegaly, DM II, HTN, and recurrent pericardial effusions with pericardiocentesis x2 ( 700ml serous drainage both times). He subsequently underwent a subxiphoid pericardial window and pericardial biopsy on 9/30/19 at [facility] with Dr. Peters .  Postoperative course complicated by AF/SVT which converted to SR.  Was found to be + lymes on that admission and has completed his course of IV and PO antibiotics.  Patient then presented to the ER on 11/12/19 from Dr. Castro office s/p in office TTE that showed an increased pericardial effusion. Patient found to have infiltrate on Chest CT performed 11/13/19 that is concerning for infiltrative process / neoplastic disease, hematology/ oncology recommend ID follow up as outpatient.  CT scan of abdomen pelvis with contrast on 11/14/19 showed infiltrative process progressing to the retroperitoneal soft tissue surrounding both kidneys causing moderate bilateral hydronephrosis. On 11/15/19 he underwent IR pericardial drain placement for 55ml of bloody drainage.  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 margoth. hydronephrosis, fibrosis prox. SMA, RIAN, margoth. renal artery, resulting luminal narrowing.  He then underwent a Right and left cardiac cath on 11/18/19 that showed normal coronaries, increased wedge pressure 24, no evidence of constriction or restriction.  His pericardial AI was removed 11/19/19 and he was discharged home.  He was following with his cardiologist (Dr. Mcginnis) and a rheumatologist as an outpatient.    He presented to [facility] 6/29 sent from his cardiologists office with c/o SOB on minimal exertion.  Pt reports that he gets SOB and fatigued with minimal exertion such as doing ADL's and even while talking. He was ruled out for an MI. CT chest showed stable moderate bilateral pleural effusions with atelectasis, as well as a small stable pericardial effusion with adjacent inflammatory change and small loculated fluid along the right atrial border that could correlate with pericarditis.  6/30/20 he underwent a TTE and right and left heart cath at [facility]. Patient was transferred to [facility] for evaluation of recurrent pericardial effusion /constrictive pericarditis /pericardial stripping.  7/5   Placed on medicine service to coordinate comprehensive care due to multiple multiple medical conditions, including  poss fibrotic chronic disease  Maintain pigtail for now Plan for  Cardiac MRI eval pericardium  Will closely follow    7/6 Pigtail removed  Cardiac MRI today eval thickened pericardium  Will need biopsy as per radames, unsure of feasibility of cardiac biopsy as per Dr Peters, suggest empiric treatment of connective tissue  disorder will discuss with medical team

## 2020-07-06 NOTE — PROGRESS NOTE ADULT - PROBLEM SELECTOR PLAN 1
CT chest and JAMES done, report as above.  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   Continue solumedrol 20mg/day completes today  Start prednisone 20mg 7/7   May need PET scan at the [facility] this week to determine appropriate biopsy location.  Endocrinology recommending thyroid US to r/o Reidel's thyroiditis which could be related to IGG4 disease.

## 2020-07-06 NOTE — PROGRESS NOTE ADULT - ASSESSMENT
44 year old male with PMH cardiomegaly, DM II, HTN, and recurrent pericardial effusions with pericardiocentesis x2 ( 700ml serous drainage both times). He subsequently underwent a subxiphoid pericardial window and pericardial biopsy on 9/30/19 at [facility] with Dr. Peters .  Postoperative course complicated by AF/SVT which converted to SR.  Was found to be + lymes on that admission and has completed his course of IV and PO antibiotics.  Patient then presented to the ER on 11/12/19 from Dr. Castro office s/p in office TTE that showed an increased pericardial effusion. Patient found to have infiltrate on Chest CT performed 11/13/19 that is concerning for infiltrative process / neoplastic disease, hematology/ oncology recommend ID follow up as outpatient.  CT scan of abdomen pelvis with contrast on 11/14/19 showed infiltrative process progressing to the retroperitoneal soft tissue surrounding both kidneys causing moderate bilateral hydronephrosis. On 11/15/19 he underwent IR pericardial drain placement for 55ml of bloody drainage.  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 margoth. hydronephrosis, fibrosis prox. SMA, RIAN, margoth. renal artery, resulting luminal narrowing.  He then underwent a Right and left cardiac cath on 11/18/19 that showed normal coronaries, increased wedge pressure 24, no evidence of constriction or restriction.  His pericardial AI was removed 11/19/19 and he was discharged home.  He was following with his cardiologist (Dr. Mcginnis) and a rheumatologist as an outpatient.    [place] [facility] [place] sent from his cardiologists office with c/o SOB on minimal exertion.  Pt reports that he gets SOB and fatigued with minimal exertion such as doing ADL's and even while talking. He was ruled out for an MI. CT chest showed stable moderate bilateral pleural effusions with atelectasis, as well as a small stable pericardial effusion with adjacent inflammatory change and small loculated fluid along the right atrial border that could correlate with pericarditis.  6/30/20 he underwent a TTE and right and left heart cath at [facility]. Patient was transferred to [facility] for evaluation of recurrent pericardial effusion /constrictive pericarditis /pericardial stripping.    Patient now being transferred to medicine from ct surgery to help coordinate care of multiple medical conditions- patient appears to be having fibrotic connective igg4 disease however biopsy and pet scan pending      #Chronic constrictive pericarditis  - may be 2/2 igg disease  - cardiac mri read pending  - rheum consult apprecaited - likely igg4 disease  - solumedrol taper   - pet scan pending     #possible thyroid enlargement  - thyroid us w/ nodules- will need follow up u/s  - endo consult appreciated      #Retroperitoneal fibrosis  - Seen on MRI on last admission in November.  - Urology not recommend retroperitoneal bx at this time.     #Hydronephrosis  - Moderate hydronephrosis noted on prior admission.  -  input appreciated, no acute intervention required,  signed off.     #Pleural effusion  - s/p pigtail placed 7/2- mgmt per ct surgery     #Heart block AV second degree (mobitz 2)  - ep consult appreciated - periods of nocturnal bradycardia with short pauses which are consistent with vagal events (PP prolongation, KS prolongation, gradual slowing and resumption). He is overweight and has had periods of PND which may be secondary to WALKER or CHF. No indication for pacemaker at this time  - tele monitor     #HTN- essential-> however currently normotensive   - bumex (holding betablocker 2/2 missed qrs beats-> mobitz 2 on tele monitor)  - monitor blood pressure     #Type 2 diabetes  - a1c 8.9 on admit   - iss   - lantus w/ premeal    #Leukocytosis  - trend and monitor for fever- hold off abx for now given stable  - currently on steroid taper which may affect wbc count as well     #hypokalemia  - repleted  - monitor   - check mg and phos    #DVT prophylaxis  - venodynes 44 year old male with PMH cardiomegaly, DM II, HTN, and recurrent pericardial effusions with pericardiocentesis x2 ( 700ml serous drainage both times). He subsequently underwent a subxiphoid pericardial window and pericardial biopsy on 9/30/19 at [facility] with Dr. Peters .  Postoperative course complicated by AF/SVT which converted to SR.  Was found to be + lymes on that admission and has completed his course of IV and PO antibiotics.  Patient then presented to the ER on 11/12/19 from Dr. Castro office s/p in office TTE that showed an increased pericardial effusion. Patient found to have infiltrate on Chest CT performed 11/13/19 that is concerning for infiltrative process / neoplastic disease, hematology/ oncology recommend ID follow up as outpatient.  CT scan of abdomen pelvis with contrast on 11/14/19 showed infiltrative process progressing to the retroperitoneal soft tissue surrounding both kidneys causing moderate bilateral hydronephrosis. On 11/15/19 he underwent IR pericardial drain placement for 55ml of bloody drainage.  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 margoth. hydronephrosis, fibrosis prox. SMA, RIAN, margoth. renal artery, resulting luminal narrowing.  He then underwent a Right and left cardiac cath on 11/18/19 that showed normal coronaries, increased wedge pressure 24, no evidence of constriction or restriction.  His pericardial AI was removed 11/19/19 and he was discharged home.  He was following with his cardiologist (Dr. Mcginnis) and a rheumatologist as an outpatient.    He presented to [facility] 6/29 sent from his cardiologists office with c/o SOB on minimal exertion.  Pt reports that he gets SOB and fatigued with minimal exertion such as doing ADL's and even while talking. He was ruled out for an MI. CT chest showed stable moderate bilateral pleural effusions with atelectasis, as well as a small stable pericardial effusion with adjacent inflammatory change and small loculated fluid along the right atrial border that could correlate with pericarditis.  6/30/20 he underwent a TTE and right and left heart cath at [facility]. Patient was transferred to [facility] for evaluation of recurrent pericardial effusion /constrictive pericarditis /pericardial stripping.    Patient now being transferred to medicine from ct surgery to help coordinate care of multiple medical conditions- patient appears to be having fibrotic connective igg4 disease however biopsy and pet scan pending      #Chronic constrictive pericarditis  - may be 2/2 igg disease  - cardiac mri read pending  - rheum consult apprecaited - likely igg4 disease  - solumedrol taper   - pet scan pending     #possible thyroid enlargement  - thyroid us w/ nodules- will need follow up u/s  - endo consult appreciated      #Retroperitoneal fibrosis  - Seen on MRI on last admission in November.  - Urology not recommend retroperitoneal bx at this time.     #Hydronephrosis  - Moderate hydronephrosis noted on prior admission.  -  input appreciated, no acute intervention required,  signed off.     #Pleural effusion  - s/p pigtail     #Heart block AV second degree (mobitz 2)  - ep consult appreciated - periods of nocturnal bradycardia with short pauses which are consistent with vagal events (PP prolongation, AL prolongation, gradual slowing and resumption). He is overweight and has had periods of PND which may be secondary to WALKER or CHF. No indication for pacemaker at this time  - tele monitor     #HTN- essential-> however currently normotensive   - bumex (holding betablocker 2/2 missed qrs beats-> mobitz 2 on tele monitor)  - monitor blood pressure     #Type 2 diabetes  - a1c 8.9 on admit   - iss   - lantus w/ premeal    #Leukocytosis  - trend and monitor for fever- hold off abx for now given stable  - currently on steroid taper which may affect wbc count as well     #hypokalemia  - repleted  - monitor   - check mg and phos    #DVT prophylaxis  - venodynes 44 year old male with PMH cardiomegaly, DM II, HTN, and recurrent pericardial effusions with pericardiocentesis x2 ( 700ml serous drainage both times). He subsequently underwent a subxiphoid pericardial window and pericardial biopsy on 9/30/19 at [facility] with Dr. Peters .  Postoperative course complicated by AF/SVT which converted to SR.  Was found to be + lymes on that admission and has completed his course of IV and PO antibiotics.  Patient then presented to the ER on 11/12/19 from Dr. Castro office s/p in office TTE that showed an increased pericardial effusion. Patient found to have infiltrate on Chest CT performed 11/13/19 that is concerning for infiltrative process / neoplastic disease, hematology/ oncology recommend ID follow up as outpatient.  CT scan of abdomen pelvis with contrast on 11/14/19 showed infiltrative process progressing to the retroperitoneal soft tissue surrounding both kidneys causing moderate bilateral hydronephrosis. On 11/15/19 he underwent IR pericardial drain placement for 55ml of bloody drainage.  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 margoth. hydronephrosis, fibrosis prox. SMA, RIAN, margoth. renal artery, resulting luminal narrowing.  He then underwent a Right and left cardiac cath on 11/18/19 that showed normal coronaries, increased wedge pressure 24, no evidence of constriction or restriction.  His pericardial AI was removed 11/19/19 and he was discharged home.  He was following with his cardiologist (Dr. Mcginnis) and a rheumatologist as an outpatient.    He presented to [facility] 6/29 sent from his cardiologists office with c/o SOB on minimal exertion.  Pt reports that he gets SOB and fatigued with minimal exertion such as doing ADL's and even while talking. He was ruled out for an MI. CT chest showed stable moderate bilateral pleural effusions with atelectasis, as well as a small stable pericardial effusion with adjacent inflammatory change and small loculated fluid along the right atrial border that could correlate with pericarditis.  6/30/20 he underwent a TTE and right and left heart cath at [facility]. Patient was transferred to [facility] for evaluation of recurrent pericardial effusion /constrictive pericarditis /pericardial stripping.    Patient now being transferred to medicine from ct surgery to help coordinate care of multiple medical conditions- patient appears to be having fibrotic connective igg4 disease however biopsy and pet scan pending      #Chronic constrictive pericarditis  - may be 2/2 igg disease  - cardiac mri read pending  - rheum consult apprecaited - likely igg4 disease  - pet scan pending     #possible thyroid enlargement  - thyroid us w/ nodules- will need follow up u/s  - endo consult appreciated      #Retroperitoneal fibrosis  - Seen on MRI on last admission in November.  - Urology not recommend retroperitoneal bx at this time.     #Hydronephrosis  - Moderate hydronephrosis noted on prior admission.  -  input appreciated, no acute intervention required,  signed off.     #Pleural effusion  - s/p pigtail     #Heart block AV second degree (mobitz 2)  - ep consult appreciated - periods of nocturnal bradycardia with short pauses which are consistent with vagal events (PP prolongation, UT prolongation, gradual slowing and resumption). He is overweight and has had periods of PND which may be secondary to WALKER or CHF. No indication for pacemaker at this time  - tele monitor     #HTN- essential-> however currently normotensive   - bumex (holding betablocker 2/2 missed qrs beats-> mobitz 2 on tele monitor)  - monitor blood pressure     #Type 2 diabetes  - a1c 8.9 on admit   - iss   - lantus w/ premeal    #Leukocytosis  - trend and monitor for fever- hold off abx for now given stable  - currently on steroid taper which may affect wbc count as well     #hypokalemia  - repleted  - monitor   - check mg and phos    #DVT prophylaxis  - venodynes

## 2020-07-06 NOTE — PROGRESS NOTE ADULT - PROBLEM SELECTOR PLAN 8
Continue to trend.   Patient remains afebrile.  Leukocytosis likely to worsen with initiation of steroid treatment.
SCD for DVT prophylaxis  Protonix for PUD prophylaxis  Plan needs to discuss with CTS team in AM
Holding Beta blockers secondary to missed QRS beats (Mobitz Type 2).  Continue to monitor telemetry.
SCD for DVT prophylaxis  Protonix for PUD prophylaxis  Plan needs to discuss with CTS team in AM

## 2020-07-06 NOTE — PROGRESS NOTE ADULT - PROBLEM SELECTOR PROBLEM 6
Essential hypertension
Leukocytosis
Essential hypertension

## 2020-07-06 NOTE — PROGRESS NOTE ADULT - SUBJECTIVE AND OBJECTIVE BOX
Patient is a 45y old  Male who presents with a chief complaint of transfer from [place] with constrictive pericarditis. (06 Jul 2020 10:18)    Patient seen and examined at bedside.     ALLERGIES:  No Known Allergies  [facility] patient (Unknown)    MEDICATIONS  (STANDING):  atorvastatin 10 milliGRAM(s) Oral at bedtime  buMETAnide Injectable 2 milliGRAM(s) IV Push two times a day  chlorhexidine 0.12% Liquid 15 milliLiter(s) Oral Mucosa two times a day  dextrose 5%. 1000 milliLiter(s) (50 mL/Hr) IV Continuous <Continuous>  dextrose 50% Injectable 12.5 Gram(s) IV Push once  dextrose 50% Injectable 25 Gram(s) IV Push once  dextrose 50% Injectable 25 Gram(s) IV Push once  insulin glargine Injectable (LANTUS) 16 Unit(s) SubCutaneous at bedtime  insulin lispro (HumaLOG) corrective regimen sliding scale   SubCutaneous four times a day before meals  insulin lispro Injectable (HumaLOG) 4 Unit(s) SubCutaneous three times a day before meals  melatonin 10 milliGRAM(s) Oral at bedtime  pantoprazole    Tablet 40 milliGRAM(s) Oral before breakfast  potassium chloride    Tablet ER 40 milliEquivalent(s) Oral every 4 hours  sodium chloride 0.9% lock flush 3 milliLiter(s) IV Push every 8 hours    MEDICATIONS  (PRN):  acetaminophen   Tablet .. 650 milliGRAM(s) Oral every 6 hours PRN Mild Pain (1 - 3), Moderate Pain (4 - 6)  dextrose 40% Gel 15 Gram(s) Oral once PRN Blood Glucose LESS THAN 70 milliGRAM(s)/deciliter  glucagon  Injectable 1 milliGRAM(s) IntraMuscular once PRN Glucose LESS THAN 70 milligrams/deciliter    Vital Signs Last 24 Hrs  T(F): 97.8 (06 Jul 2020 10:15), Max: 97.9 (06 Jul 2020 05:56)  HR: 73 (06 Jul 2020 10:15) (73 - 89)  BP: 132/83 (06 Jul 2020 10:15) (121/79 - 132/83)  RR: 18 (06 Jul 2020 10:15) (18 - 18)  SpO2: 100% (06 Jul 2020 05:56) (98% - 100%)  I&O's Summary    05 Jul 2020 07:01  -  06 Jul 2020 07:00  --------------------------------------------------------  IN: 480 mL / OUT: 3210 mL / NET: -2730 mL    06 Jul 2020 07:01  -  06 Jul 2020 16:59  --------------------------------------------------------  IN: 0 mL / OUT: 50 mL / NET: -50 mL    PHYSICAL EXAM:  General: NAD, Alert  ENT: MMM, no thrush  Neck: Supple, No JVD  Lungs: decreased breath sounds b/l bases  +pigtail  Cardio: +s1/s2 +edema b/l le  Abdomen: Soft, Nontender, Nondistended; Bowel sounds present  Extremities: No calf tenderness      LABS:                        12.8   20.00 )-----------( 503      ( 06 Jul 2020 06:17 )             40.4     07-06    136  |  91  |  21.0  ----------------------------<  133  3.4   |  34.0  |  0.68    Ca    9.4      06 Jul 2020 06:17  Phos  3.9     07-05  Mg     2.2     07-05    TPro  6.7  /  Alb  2.9  /  TBili  0.4  /  DBili  x   /  AST  17  /  ALT  12  /  AlkPhos  85  07-06    eGFR if Non African American: 115 mL/min/1.73M2 (07-06-20 @ 06:17)  eGFR if : 134 mL/min/1.73M2 (07-06-20 @ 06:17)    PT/INR - ( 05 Jul 2020 02:03 )   PT: 17.6 sec;   INR: 1.55 ratio    PTT - ( 05 Jul 2020 02:03 )  PTT:34.7 sec    ABG - ( 05 Jul 2020 02:31 )  pH, Arterial: 7.50  pH, Blood: x     /  pCO2: 43    /  pO2: 64    / HCO3: 34    / Base Excess: 9.9   /  SaO2: 93        Glucose  POCT Blood Glucose.: 149 mg/dL (06 Jul 2020 13:01)  POCT Blood Glucose.: 145 mg/dL (06 Jul 2020 08:24)  POCT Blood Glucose.: 185 mg/dL (05 Jul 2020 21:41)    Cultures  Culture - Fungal, Body Fluid (collected 03 Jul 2020 03:50)  Source: .Body Fluid Pleural Fluid  Preliminary Report (06 Jul 2020 07:38):    Testing in progress    Culture - Body Fluid with Gram Stain (collected 03 Jul 2020 03:50)  Source: .Body Fluid Pleural Fluid  Gram Stain (03 Jul 2020 07:33):    polymorphonuclear leukocytes seen    No organisms seen    by cytocentrifuge  Preliminary Report (04 Jul 2020 09:29):    No growth      RADIOLOGY & ADDITIONAL TESTS:  < from: US Thyroid + Parathyroid (07.05.20 @ 18:11) >  IMPRESSION:   1.  Symmetric 1 cm nodules in the posterior mid aspect of the bilateral thyroid lobes. It is unclear if these are thyroid or parathyroid in origin. Both nodules meet TI-RADS 4 criteria.   < end of copied text > Patient is a 45y old  Male who presents with a chief complaint of transfer from [place] with constrictive pericarditis. (06 Jul 2020 10:18)    Patient seen and examined at bedside.     ALLERGIES:  No Known Allergies  [facility] patient (Unknown)    MEDICATIONS  (STANDING):  atorvastatin 10 milliGRAM(s) Oral at bedtime  buMETAnide Injectable 2 milliGRAM(s) IV Push two times a day  chlorhexidine 0.12% Liquid 15 milliLiter(s) Oral Mucosa two times a day  dextrose 5%. 1000 milliLiter(s) (50 mL/Hr) IV Continuous <Continuous>  dextrose 50% Injectable 12.5 Gram(s) IV Push once  dextrose 50% Injectable 25 Gram(s) IV Push once  dextrose 50% Injectable 25 Gram(s) IV Push once  insulin glargine Injectable (LANTUS) 16 Unit(s) SubCutaneous at bedtime  insulin lispro (HumaLOG) corrective regimen sliding scale   SubCutaneous four times a day before meals  insulin lispro Injectable (HumaLOG) 4 Unit(s) SubCutaneous three times a day before meals  melatonin 10 milliGRAM(s) Oral at bedtime  pantoprazole    Tablet 40 milliGRAM(s) Oral before breakfast  potassium chloride    Tablet ER 40 milliEquivalent(s) Oral every 4 hours  sodium chloride 0.9% lock flush 3 milliLiter(s) IV Push every 8 hours    MEDICATIONS  (PRN):  acetaminophen   Tablet .. 650 milliGRAM(s) Oral every 6 hours PRN Mild Pain (1 - 3), Moderate Pain (4 - 6)  dextrose 40% Gel 15 Gram(s) Oral once PRN Blood Glucose LESS THAN 70 milliGRAM(s)/deciliter  glucagon  Injectable 1 milliGRAM(s) IntraMuscular once PRN Glucose LESS THAN 70 milligrams/deciliter    Vital Signs Last 24 Hrs  T(F): 97.8 (06 Jul 2020 10:15), Max: 97.9 (06 Jul 2020 05:56)  HR: 73 (06 Jul 2020 10:15) (73 - 89)  BP: 132/83 (06 Jul 2020 10:15) (121/79 - 132/83)  RR: 18 (06 Jul 2020 10:15) (18 - 18)  SpO2: 100% (06 Jul 2020 05:56) (98% - 100%)  I&O's Summary    05 Jul 2020 07:01  -  06 Jul 2020 07:00  --------------------------------------------------------  IN: 480 mL / OUT: 3210 mL / NET: -2730 mL    06 Jul 2020 07:01  -  06 Jul 2020 16:59  --------------------------------------------------------  IN: 0 mL / OUT: 50 mL / NET: -50 mL    PHYSICAL EXAM:  General: NAD, Alert  ENT: MMM, no thrush  Neck: Supple, No JVD  Lungs: decreased breath sounds b/l bases  Cardio: +s1/s2 +edema b/l le  Abdomen: Soft, Nontender, Nondistended; Bowel sounds present  Extremities: No calf tenderness      LABS:                        12.8   20.00 )-----------( 503      ( 06 Jul 2020 06:17 )             40.4     07-06    136  |  91  |  21.0  ----------------------------<  133  3.4   |  34.0  |  0.68    Ca    9.4      06 Jul 2020 06:17  Phos  3.9     07-05  Mg     2.2     07-05    TPro  6.7  /  Alb  2.9  /  TBili  0.4  /  DBili  x   /  AST  17  /  ALT  12  /  AlkPhos  85  07-06    eGFR if Non African American: 115 mL/min/1.73M2 (07-06-20 @ 06:17)  eGFR if : 134 mL/min/1.73M2 (07-06-20 @ 06:17)    PT/INR - ( 05 Jul 2020 02:03 )   PT: 17.6 sec;   INR: 1.55 ratio    PTT - ( 05 Jul 2020 02:03 )  PTT:34.7 sec    ABG - ( 05 Jul 2020 02:31 )  pH, Arterial: 7.50  pH, Blood: x     /  pCO2: 43    /  pO2: 64    / HCO3: 34    / Base Excess: 9.9   /  SaO2: 93        Glucose  POCT Blood Glucose.: 149 mg/dL (06 Jul 2020 13:01)  POCT Blood Glucose.: 145 mg/dL (06 Jul 2020 08:24)  POCT Blood Glucose.: 185 mg/dL (05 Jul 2020 21:41)    Cultures  Culture - Fungal, Body Fluid (collected 03 Jul 2020 03:50)  Source: .Body Fluid Pleural Fluid  Preliminary Report (06 Jul 2020 07:38):    Testing in progress    Culture - Body Fluid with Gram Stain (collected 03 Jul 2020 03:50)  Source: .Body Fluid Pleural Fluid  Gram Stain (03 Jul 2020 07:33):    polymorphonuclear leukocytes seen    No organisms seen    by cytocentrifuge  Preliminary Report (04 Jul 2020 09:29):    No growth      RADIOLOGY & ADDITIONAL TESTS:  < from: US Thyroid + Parathyroid (07.05.20 @ 18:11) >  IMPRESSION:   1.  Symmetric 1 cm nodules in the posterior mid aspect of the bilateral thyroid lobes. It is unclear if these are thyroid or parathyroid in origin. Both nodules meet TI-RADS 4 criteria.   < end of copied text >

## 2020-07-06 NOTE — PROGRESS NOTE ADULT - PROBLEM SELECTOR PLAN 4
s/p left pigtail placed 7/2.  Chest tube to waterseal with serous output.    CXR in AM. s/p left pigtail placed 7/2.  Chest tube removed   CXR in AM.

## 2020-07-06 NOTE — PROGRESS NOTE ADULT - PROBLEM SELECTOR PLAN 10
SCD for DVT prophylaxis.  Protonix for PUD prophylaxis.    Case and plan needs to be discussed with CT Surgery attending/ team in AM rounds.
16 to 20 today  will trend  afebrile

## 2020-07-06 NOTE — PROGRESS NOTE ADULT - PROBLEM SELECTOR PLAN 5
Tele-monitor with intermittent type 2 AV block (Mobitz Type 2) and intermittent pauses during sleep.   EP consult appreciated. Periods of nocturnal bradycardia with short pauses consistent with vagal events may be secondary to WALKER or CHF.  Patient asymptomatic, vital signs remain stable.  Will continue to monitor.
Tele-monitor with intermittent type 2 AV block (Mobitz Type 2) and intermittent pauses during sleep.   EP consult appreciated. Periods of nocturnal bradycardia with short pauses consistent with vagal events may be secondary to WALKER or CHF.  Patient asymptomatic, vital signs remain stable.  Will continue to monitor.
Hold oral agents for now.  Consistent carb diet.  LUCERO SEGURA/HS. Mili
Hold oral agents for now.  Consistent carb diet.  LUCERO SEGURA/HS. Mili
s/p left pigtail placed 7/2.  Chest tube to waterseal with serous output.   Follow up CXR in AM.   Monitor strict output per shift.  Plan to d/c when output decreases.
Hold oral agents for now.  Consistent carb diet.  LUCERO AC/HS.
Tele-monitor with intermittent type 2 AV block (Mobitz Type 2) and intermittent pauses during sleep.   EP consult appreciated. Periods of nocturnal bradycardia with short pauses consistent with vagal events may be secondary to WALKER or CHF.  Patient asymptomatic, vital signs remain stable.  Will continue to monitor.

## 2020-07-06 NOTE — PROGRESS NOTE ADULT - SUBJECTIVE AND OBJECTIVE BOX
Subjective: "Im ok, just not sure what their going to do for me"  Sleeping for short intervals     Tele:  SR  70-80                              T(C): 36.6 (07-06-20 @ 05:56), Max: 36.6 (07-05-20 @ 15:20)  HR: 78 (07-06-20 @ 05:56) (78 - 94)  BP: 121/79 (07-06-20 @ 05:56) (121/79 - 126/85)  RR: 18 (07-06-20 @ 05:56) (17 - 18)  SpO2: 100% (07-06-20 @ 05:56) (98% - 100%)     LVEF: 45%      07-06    136  |  91<L>  |  21.0<H>  ----------------------------<  133<H>  3.4<L>   |  34.0<H>  |  0.68    Ca    9.4      06 Jul 2020 06:17  Phos  3.9     07-05  Mg     2.2     07-05    TPro  6.7  /  Alb  2.9<L>  /  TBili  0.4  /  DBili  x   /  AST  17  /  ALT  12  /  AlkPhos  85  07-06                               12.8   20.00 )-----------( 503      ( 06 Jul 2020 06:17 )             40.4        PT/INR - ( 05 Jul 2020 02:03 )   PT: 17.6 sec;   INR: 1.55 ratio         PTT - ( 05 Jul 2020 02:03 )  PTT:34.7 sec    CAPILLARY BLOOD GLUCOSE      POCT Blood Glucose.: 145 mg/dL (06 Jul 2020 08:24)  POCT Blood Glucose.: 185 mg/dL (05 Jul 2020 21:41)  POCT Blood Glucose.: 262 mg/dL (05 Jul 2020 16:57)  POCT Blood Glucose.: 219 mg/dL (05 Jul 2020 11:59)            Assessment    Neurology: alert and oriented x 3, nonfocal, no gross deficits    Respiratory: Decreased BSs at b/l bases. No accessory muscle use noted.     CV: regular rate and rhythm, normal S1, S2.    Abdomen: soft, nontender, nondistended, positive bowel sounds    Extremities: warm, well perfused. +2- 3 BLE edema. + DP pulses bilaterally    Tubes: + Left Chest Tube to waterseal, +Serous output, no airleak noted.   clear yellow drainage> removed this am w/o incident  DSD placed        MEDICATIONS  (STANDING):  atorvastatin 10 milliGRAM(s) Oral at bedtime  buMETAnide Injectable 2 milliGRAM(s) IV Push two times a day  chlorhexidine 0.12% Liquid 15 milliLiter(s) Oral Mucosa two times a day  dextrose 5%. 1000 milliLiter(s) (50 mL/Hr) IV Continuous <Continuous>  dextrose 50% Injectable 12.5 Gram(s) IV Push once  dextrose 50% Injectable 25 Gram(s) IV Push once  dextrose 50% Injectable 25 Gram(s) IV Push once  insulin glargine Injectable (LANTUS) 16 Unit(s) SubCutaneous at bedtime  insulin lispro (HumaLOG) corrective regimen sliding scale   SubCutaneous four times a day before meals  insulin lispro Injectable (HumaLOG) 4 Unit(s) SubCutaneous three times a day before meals  melatonin 10 milliGRAM(s) Oral at bedtime  methylPREDNISolone sodium succinate Injectable 20 milliGRAM(s) IV Push daily  pantoprazole    Tablet 40 milliGRAM(s) Oral before breakfast  potassium chloride    Tablet ER 40 milliEquivalent(s) Oral every 4 hours  sodium chloride 0.9% lock flush 3 milliLiter(s) IV Push every 8 hours       PAST MEDICAL & SURGICAL HISTORY:  Hydronephrosis  Pericardial effusion  Lyme disease  Pericarditis  Heart failure  Cardiomegaly  Nonsmoker  Diabetes mellitus  Hypertension  S/P pericardial window creation  S/P pericardiocentesis

## 2020-07-07 LAB
ANION GAP SERPL CALC-SCNC: 9 MMOL/L — SIGNIFICANT CHANGE UP (ref 5–17)
BUN SERPL-MCNC: 21 MG/DL — HIGH (ref 8–20)
CALCIUM SERPL-MCNC: 9.3 MG/DL — SIGNIFICANT CHANGE UP (ref 8.6–10.2)
CHLORIDE SERPL-SCNC: 93 MMOL/L — LOW (ref 98–107)
CO2 SERPL-SCNC: 36 MMOL/L — HIGH (ref 22–29)
CREAT SERPL-MCNC: 0.9 MG/DL — SIGNIFICANT CHANGE UP (ref 0.5–1.3)
GLUCOSE BLDC GLUCOMTR-MCNC: 125 MG/DL — HIGH (ref 70–99)
GLUCOSE BLDC GLUCOMTR-MCNC: 145 MG/DL — HIGH (ref 70–99)
GLUCOSE BLDC GLUCOMTR-MCNC: 306 MG/DL — HIGH (ref 70–99)
GLUCOSE BLDC GLUCOMTR-MCNC: 98 MG/DL — SIGNIFICANT CHANGE UP (ref 70–99)
GLUCOSE SERPL-MCNC: 129 MG/DL — HIGH (ref 70–99)
HCT VFR BLD CALC: 42.7 % — SIGNIFICANT CHANGE UP (ref 39–50)
HGB BLD-MCNC: 13.3 G/DL — SIGNIFICANT CHANGE UP (ref 13–17)
MAGNESIUM SERPL-MCNC: 2.2 MG/DL — SIGNIFICANT CHANGE UP (ref 1.8–2.6)
MCHC RBC-ENTMCNC: 26 PG — LOW (ref 27–34)
MCHC RBC-ENTMCNC: 31.1 GM/DL — LOW (ref 32–36)
MCV RBC AUTO: 83.6 FL — SIGNIFICANT CHANGE UP (ref 80–100)
PHOSPHATE SERPL-MCNC: 4 MG/DL — SIGNIFICANT CHANGE UP (ref 2.4–4.7)
PLATELET # BLD AUTO: 508 K/UL — HIGH (ref 150–400)
POTASSIUM SERPL-MCNC: 3.7 MMOL/L — SIGNIFICANT CHANGE UP (ref 3.5–5.3)
POTASSIUM SERPL-SCNC: 3.7 MMOL/L — SIGNIFICANT CHANGE UP (ref 3.5–5.3)
RBC # BLD: 5.11 M/UL — SIGNIFICANT CHANGE UP (ref 4.2–5.8)
RBC # FLD: 13.4 % — SIGNIFICANT CHANGE UP (ref 10.3–14.5)
SODIUM SERPL-SCNC: 138 MMOL/L — SIGNIFICANT CHANGE UP (ref 135–145)
WBC # BLD: 18.28 K/UL — HIGH (ref 3.8–10.5)
WBC # FLD AUTO: 18.28 K/UL — HIGH (ref 3.8–10.5)

## 2020-07-07 PROCEDURE — 99233 SBSQ HOSP IP/OBS HIGH 50: CPT

## 2020-07-07 PROCEDURE — 99232 SBSQ HOSP IP/OBS MODERATE 35: CPT

## 2020-07-07 RX ORDER — SENNA PLUS 8.6 MG/1
2 TABLET ORAL AT BEDTIME
Refills: 0 | Status: DISCONTINUED | OUTPATIENT
Start: 2020-07-07 | End: 2020-07-13

## 2020-07-07 RX ORDER — POLYETHYLENE GLYCOL 3350 17 G/17G
17 POWDER, FOR SOLUTION ORAL AT BEDTIME
Refills: 0 | Status: DISCONTINUED | OUTPATIENT
Start: 2020-07-07 | End: 2020-07-13

## 2020-07-07 RX ADMIN — SODIUM CHLORIDE 3 MILLILITER(S): 9 INJECTION INTRAMUSCULAR; INTRAVENOUS; SUBCUTANEOUS at 14:43

## 2020-07-07 RX ADMIN — BUMETANIDE 2 MILLIGRAM(S): 0.25 INJECTION INTRAMUSCULAR; INTRAVENOUS at 05:36

## 2020-07-07 RX ADMIN — Medication 4 UNIT(S): at 12:33

## 2020-07-07 RX ADMIN — ATORVASTATIN CALCIUM 10 MILLIGRAM(S): 80 TABLET, FILM COATED ORAL at 21:53

## 2020-07-07 RX ADMIN — POLYETHYLENE GLYCOL 3350 17 GRAM(S): 17 POWDER, FOR SOLUTION ORAL at 21:53

## 2020-07-07 RX ADMIN — SODIUM CHLORIDE 3 MILLILITER(S): 9 INJECTION INTRAMUSCULAR; INTRAVENOUS; SUBCUTANEOUS at 05:36

## 2020-07-07 RX ADMIN — SENNA PLUS 2 TABLET(S): 8.6 TABLET ORAL at 21:53

## 2020-07-07 RX ADMIN — INSULIN GLARGINE 16 UNIT(S): 100 INJECTION, SOLUTION SUBCUTANEOUS at 21:53

## 2020-07-07 RX ADMIN — Medication 8: at 12:33

## 2020-07-07 RX ADMIN — SODIUM CHLORIDE 3 MILLILITER(S): 9 INJECTION INTRAMUSCULAR; INTRAVENOUS; SUBCUTANEOUS at 21:42

## 2020-07-07 RX ADMIN — CHLORHEXIDINE GLUCONATE 15 MILLILITER(S): 213 SOLUTION TOPICAL at 05:36

## 2020-07-07 RX ADMIN — Medication 4 UNIT(S): at 17:40

## 2020-07-07 RX ADMIN — BUMETANIDE 2 MILLIGRAM(S): 0.25 INJECTION INTRAMUSCULAR; INTRAVENOUS at 17:40

## 2020-07-07 RX ADMIN — Medication 4 UNIT(S): at 08:24

## 2020-07-07 RX ADMIN — PANTOPRAZOLE SODIUM 40 MILLIGRAM(S): 20 TABLET, DELAYED RELEASE ORAL at 05:35

## 2020-07-07 RX ADMIN — Medication 10 MILLIGRAM(S): at 21:53

## 2020-07-07 RX ADMIN — Medication 20 MILLIGRAM(S): at 05:36

## 2020-07-07 NOTE — PROGRESS NOTE ADULT - SUBJECTIVE AND OBJECTIVE BOX
Subjective: Patient denies acute pain with radiating or aggravating factors.  He denies chest pain, shortness of breath, palpitations, headache, dizziness, nausea, or vomiting.     Vital Signs:  Vital Signs Last 24 Hrs  T(C): 36.4 (07-07-20 @ 05:25), Max: 36.8 (07-06-20 @ 17:20)  T(F): 97.5 (07-07-20 @ 05:25), Max: 98.2 (07-06-20 @ 17:20)  HR: 78 (07-07-20 @ 05:25) (73 - 92)  BP: 125/82 (07-07-20 @ 05:25) (125/67 - 135/83)  RR: 18 (07-07-20 @ 05:25) (18 - 18)  SpO2: 99% (07-07-20 @ 05:25) (96% - 99%) on (O2)    Relevant labs, radiology and Medications reviewed    Pertinent Physical Exam  General: Well appearing, NAD  Neuro: AxO x3, non-focal, MARIA  Cardiac: S1S2, no murmurs  Pulm: CTA b/l, no wheezing or rales  Abdomen: Soft, NT, ND, hypoactive BS  Peripheral: +DP pulses b/l, no peripheral edema     07-06 @ 07:01  -  07-07 @ 07:00  --------------------------------------------------------  IN:    Oral Fluid: 840 mL  Total IN: 840 mL    OUT:    Chest Tube: 50 mL    Voided: 9550 mL  Total OUT: 9600 mL    Total NET: -8760 mL      07-07 @ 07:01  -  07-07 @ 09:30  --------------------------------------------------------  IN:  Total IN: 0 mL    OUT:    Voided: 1400 mL  Total OUT: 1400 mL    Total NET: -1400 mL Subjective: Patient denies acute pain with radiating or aggravating factors.  He denies chest pain, shortness of breath, palpitations, headache, dizziness, nausea, or vomiting. He admits to lower extremity swelling and constipation.  Patient had many questions regarding ongoing imaging, results, and future plans. As a native [place] speaker, I discussed ongoing testing with him and he demonstrated understanding. He is aware Dr. Peters will review Cardiac MRI imaging with Cardiology and he is aware further Heme / Onc input is pending.     Vital Signs:  Vital Signs Last 24 Hrs  T(C): 36.4 (07-07-20 @ 05:25), Max: 36.8 (07-06-20 @ 17:20)  T(F): 97.5 (07-07-20 @ 05:25), Max: 98.2 (07-06-20 @ 17:20)  HR: 78 (07-07-20 @ 05:25) (73 - 92)  BP: 125/82 (07-07-20 @ 05:25) (125/67 - 135/83)  RR: 18 (07-07-20 @ 05:25) (18 - 18)  SpO2: 99% (07-07-20 @ 05:25) (96% - 99%) on (O2)    Relevant labs, radiology and Medications reviewed    Pertinent Physical Exam  General: Well appearing, NAD  Neuro: AxO x3, non-focal, MARIA  Cardiac: S1S2, no murmurs  Pulm: CTA b/l, no wheezing or rales  Abdomen: Soft, NT, ND, hypoactive BS  Peripheral: +DP pulses b/l, +2 peripheral edema     07-06 @ 07:01  -  07-07 @ 07:00  --------------------------------------------------------  IN:    Oral Fluid: 840 mL  Total IN: 840 mL    OUT:    Chest Tube: 50 mL    Voided: 9550 mL  Total OUT: 9600 mL    Total NET: -8760 mL      07-07 @ 07:01  -  07-07 @ 09:30  --------------------------------------------------------  IN:  Total IN: 0 mL    OUT:    Voided: 1400 mL  Total OUT: 1400 mL    Total NET: -1400 mL

## 2020-07-07 NOTE — PROGRESS NOTE ADULT - PROBLEM SELECTOR PLAN 1
CT chest and JAMES completed  Cardiac MRI completed - showing constrictive pericarditis, Dr. 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

## 2020-07-07 NOTE — PROGRESS NOTE ADULT - REASON FOR ADMISSION
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 transfer from [facility] with constrictive pericarditis.

## 2020-07-07 NOTE — DIETITIAN INITIAL EVALUATION ADULT. - PROBLEM SELECTOR PLAN 4
Seen on MRI on last admission in November.  Seen by Rheumatology on that admission.  Was recommended to have a CT guided biopsy to confirm.  Was following with a rheumatologist as an outpatient.  Will recall rheumatology.

## 2020-07-07 NOTE — PROGRESS NOTE ADULT - ASSESSMENT
44 year old male with PMH cardiomegaly, DM II, HTN, and recurrent pericardial effusions with pericardiocentesis x2 ( 700ml serous drainage both times). He subsequently underwent a subxiphoid pericardial window and pericardial biopsy on 9/30/19 at [facility] with Dr. Peters .  Postoperative course complicated by AF/SVT which converted to SR.  Was found to be + lymes on that admission and has completed his course of IV and PO antibiotics.  Patient then presented to the ER on 11/12/19 from Dr. Castro office s/p in office TTE that showed an increased pericardial effusion. Patient found to have infiltrate on Chest CT performed 11/13/19 that is concerning for infiltrative process / neoplastic disease, hematology/ oncology recommend ID follow up as outpatient.  CT scan of abdomen pelvis with contrast on 11/14/19 showed infiltrative process progressing to the retroperitoneal soft tissue surrounding both kidneys causing moderate bilateral hydronephrosis. On 11/15/19 he underwent IR pericardial drain placement for 55ml of bloody drainage.  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 margoth. hydronephrosis, fibrosis prox. SMA, RIAN, margoth. renal artery, resulting luminal narrowing.  He then underwent a Right and left cardiac cath on 11/18/19 that showed normal coronaries, increased wedge pressure 24, no evidence of constriction or restriction.  His pericardial AI was removed 11/19/19 and he was discharged home.  He was following with his cardiologist (Dr. Mcginnis) and a rheumatologist as an outpatient.    He presented to [facility] 6/29 sent from his cardiologists office with c/o SOB on minimal exertion.  Pt reports that he gets SOB and fatigued with minimal exertion such as doing ADL's and even while talking. He was ruled out for an MI. CT chest showed stable moderate bilateral pleural effusions with atelectasis, as well as a small stable pericardial effusion with adjacent inflammatory change and small loculated fluid along the right atrial border that could correlate with pericarditis.  6/30/20 he underwent a TTE and right and left heart cath at [facility]. Patient was transferred to [facility] for evaluation of recurrent pericardial effusion /constrictive pericarditis /pericardial stripping.    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  - Urology not recommend retroperitoneal bx at this time.     #Hydronephrosis  - Moderate hydronephrosis noted on prior admission.  -  input appreciated, no acute intervention required,  signed off.     #Pleural effusion  - s/p pigtail     #Heart block AV second degree (mobitz 2)  - ep consult appreciated - periods of nocturnal bradycardia with short pauses which are consistent with vagal events (PP prolongation, AZ prolongation, gradual slowing and resumption). He is overweight and has had periods of PND which may be secondary to WALKER or CHF. No indication for pacemaker at this time  - tele monitor     #HTN- essential-> however currently normotensive   - bumex (holding betablocker 2/2 missed qrs beats-> mobitz 2 on tele monitor)  - monitor blood pressure     #Type 2 diabetes  - a1c 8.9 on admit   - iss   - lantus w/ premeal  - hold premeal and sliding scale insulin tomorrow while patient npo per PET Scan Team    #Leukocytosis  - trend and monitor for fever- hold off abx for now given stable  - currently on steroid taper which may affect wbc count as well     #DVT prophylaxis  - venodynes 44 year old male with PMH cardiomegaly, DM II, HTN, and recurrent pericardial effusions with pericardiocentesis x2 ( 700ml serous drainage both times). He subsequently underwent a subxiphoid pericardial window and pericardial biopsy on 9/30/19 at [facility] with Dr. Peters .  Postoperative course complicated by AF/SVT which converted to SR.  Was found to be + lymes on that admission and has completed his course of IV and PO antibiotics.  Patient then presented to the ER on 11/12/19 from Dr. Castro office s/p in office TTE that showed an increased pericardial effusion. Patient found to have infiltrate on Chest CT performed 11/13/19 that is concerning for infiltrative process / neoplastic disease, hematology/ oncology recommend ID follow up as outpatient.  CT scan of abdomen pelvis with contrast on 11/14/19 showed infiltrative process progressing to the retroperitoneal soft tissue surrounding both kidneys causing moderate bilateral hydronephrosis. On 11/15/19 he underwent IR pericardial drain placement for 55ml of bloody drainage.  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 margoth. hydronephrosis, fibrosis prox. SMA, RIAN, margoth. renal artery, resulting luminal narrowing.  He then underwent a Right and left cardiac cath on 11/18/19 that showed normal coronaries, increased wedge pressure 24, no evidence of constriction or restriction.  His pericardial AI was removed 11/19/19 and he was discharged home.  He was following with his cardiologist (Dr. Mcginnis) and a rheumatologist as an outpatient.    He presented to [facility] 6/29 sent from his cardiologists office with c/o SOB on minimal exertion.  Pt reports that he gets SOB and fatigued with minimal exertion such as doing ADL's and even while talking. He was ruled out for an MI. CT chest showed stable moderate bilateral pleural effusions with atelectasis, as well as a small stable pericardial effusion with adjacent inflammatory change and small loculated fluid along the right atrial border that could correlate with pericarditis.  6/30/20 he underwent a TTE and right and left heart cath at [facility]. Patient was transferred to [facility] for evaluation of recurrent pericardial effusion /constrictive pericarditis /pericardial stripping.    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  - Urology not recommend retroperitoneal bx at this time.     #Hydronephrosis  - Moderate hydronephrosis noted on prior admission.  -  input appreciated, no acute intervention required,  signed off.     #Pleural effusion  - s/p pigtail     #Heart block AV second degree (mobitz 2)  - ep consult appreciated - periods of nocturnal bradycardia with short pauses which are consistent with vagal events (PP prolongation, LA prolongation, gradual slowing and resumption). He is overweight and has had periods of PND which may be secondary to WALKER or CHF. No indication for pacemaker at this time  - tele monitor     #HTN- essential-> however currently normotensive   - bumex (holding betablocker 2/2 missed qrs beats-> mobitz 2 on tele monitor)  - monitor blood pressure     #Type 2 diabetes  - a1c 8.9 on admit   - iss   - lantus w/ premeal  - hold premeal and sliding scale insulin tomorrow while patient npo per PET Scan Team    #Leukocytosis  - trend and monitor for fever- hold off abx for now given stable  - currently on steroid taper which may affect wbc count as well     #DVT prophylaxis  - venodynes

## 2020-07-07 NOTE — DIETITIAN INITIAL EVALUATION ADULT. - PERTINENT LABORATORY DATA
07-07 Na138 mmol/L Glu 129 mg/dL<H> K+ 3.7 mmol/L Cr  0.90 mg/dL BUN 21.0 mg/dL<H> Phos 4.0 mg/dL Alb n/a   PAB n/a

## 2020-07-07 NOTE — DIETITIAN INITIAL EVALUATION ADULT. - PROBLEM SELECTOR PLAN 1
Admit to CT surgery service to Dr. Peters.  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  Cath report and TTE from [facility] unavailable.  Small stable pericardial effusion on ct scan at [facility].  Transferred for eval for possible pericardial stripping.  labs ordered.  TTE ordered.  Will call rheumatology consult.

## 2020-07-07 NOTE — DIETITIAN INITIAL EVALUATION ADULT. - OTHER INFO
Pt with h/o cardiomegaly, DM2, HTN, and recurrent pericardial effusions with pericardiocentesis x2.  On 6/30/20 pt underwent a TTE and right and left heart cath at [facility]. Pt transferred to [facility] for evaluation of recurrent pericardial effusion /constrictive pericarditis /pericardial stripping.  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  Pt reports good po intake at meals.  Pt states mostly good po intake prior to admission and denies unintentional wt loss.  Pt does not follow DASH/TLC meal plan at home despite prior education.  Briefly reinforced meal plan- pt appeared uninterested at this time.

## 2020-07-07 NOTE — PROGRESS NOTE ADULT - PROBLEM SELECTOR PLAN 2
Seen on MRI on last admission in November  Urology not recommend retroperitoneal bx at this time  Needs to be seen by Heme / Onc again while inhouse for further planning

## 2020-07-07 NOTE — PROGRESS NOTE ADULT - ASSESSMENT
44 year old male with PMH cardiomegaly, DM II (A1C 8.9), HTN, and recurrent pericardial effusions with two prior pericardiocentesis, ultimately required a subxiphoid pericardial window and pericardial biopsy (inconclusive) on 9/30/19 at [facility] with Dr. Peters.  At the time he had lymes disease and completed course of IV and PO antibiotics. Since, he's had multiple readmissions for recurrent pericardial effusions. In November 2019 Ct C/A/P showed concern for neoplastic disease / infiltrative process, specifically progressing to the retroperitoneal soft tissue surrounding both kidneys causing moderate bilateral hydronephrosis. Subsequent Abd/Renal MRI showed retroperitoneal fibrosis, moderate bilateral hydronephrosis, fibrosis proximal SMA, RIAN, bilateral renal artery. He then underwent a right and left cardiac cath that showed normal coronaries without evidence of constriction or restriction. He was following with his cardiologist (Dr. Mcginnis) and a rheumatologist as an outpatient. Now, patient was again presents to [facility] 6/29 with complaints of SOB on minimal exertion. CT chest showed stable moderate bilateral pleural effusions with atelectasis, as well as a small stable pericardial effusion with adjacent inflammatory change and small loculated fluid along the right atrial border. 6/30/20 TTE and right and left heart cath were done at [facility] and patient was then transferred to [facility] for evaluation of recurrent pericardial effusion /constrictive pericarditis /pericardial stripping. A left pigtail was placed for pleural effusion that is now removed. 7/5 Patient was transferred to medicine service to coordinate comprehensive care due to multiple medical conditions, including  concern for chronic fibrotic disease. Cardiac MRI was done that ultimately showed concern for constrictive pericarditis. [facility] is requesting an additional pericardial biopsy however, unsure of feasibility of cardiac biopsy as per Dr Peters and ultimately suggest empiric treatment of connective tissue. 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 Thyroid US was done 7/5 showing 1 cm bilateral nodules in the posterior aspect of thyroid lobes, pending Endocrine follow up.

## 2020-07-07 NOTE — PROGRESS NOTE ADULT - SUBJECTIVE AND OBJECTIVE BOX
Patient is a 45y old  Male who presents with a chief complaint of [place] [facility] [place] constrictive pericarditis. (07 Jul 2020 09:05)    Patient seen and examined at bedside. spoke to patient via - video [id number]    ALLERGIES:  No Known Allergies  [facility] patient (Unknown)    MEDICATIONS  (STANDING):  atorvastatin 10 milliGRAM(s) Oral at bedtime  buMETAnide Injectable 2 milliGRAM(s) IV Push two times a day  dextrose 5%. 1000 milliLiter(s) (50 mL/Hr) IV Continuous <Continuous>  dextrose 50% Injectable 12.5 Gram(s) IV Push once  dextrose 50% Injectable 25 Gram(s) IV Push once  dextrose 50% Injectable 25 Gram(s) IV Push once  insulin glargine Injectable (LANTUS) 16 Unit(s) SubCutaneous at bedtime  insulin lispro (HumaLOG) corrective regimen sliding scale   SubCutaneous four times a day before meals  insulin lispro Injectable (HumaLOG) 4 Unit(s) SubCutaneous three times a day before meals  melatonin 10 milliGRAM(s) Oral at bedtime  pantoprazole    Tablet 40 milliGRAM(s) Oral before breakfast  polyethylene glycol 3350 17 Gram(s) Oral at bedtime  predniSONE   Tablet 20 milliGRAM(s) Oral daily  senna 2 Tablet(s) Oral at bedtime  sodium chloride 0.9% lock flush 3 milliLiter(s) IV Push every 8 hours    MEDICATIONS  (PRN):  acetaminophen   Tablet .. 650 milliGRAM(s) Oral every 6 hours PRN Mild Pain (1 - 3), Moderate Pain (4 - 6)  dextrose 40% Gel 15 Gram(s) Oral once PRN Blood Glucose LESS THAN 70 milliGRAM(s)/deciliter  glucagon  Injectable 1 milliGRAM(s) IntraMuscular once PRN Glucose LESS THAN 70 milligrams/deciliter    Vital Signs Last 24 Hrs  T(F): 98.1 (07 Jul 2020 09:25), Max: 98.2 (06 Jul 2020 17:20)  HR: 91 (07 Jul 2020 09:25) (78 - 92)  BP: 144/85 (07 Jul 2020 09:25) (125/67 - 144/85)  RR: 17 (07 Jul 2020 09:25) (17 - 18)  SpO2: 98% (07 Jul 2020 09:25) (96% - 99%)  I&O's Summary    06 Jul 2020 07:01  -  07 Jul 2020 07:00  --------------------------------------------------------  IN: 840 mL / OUT: 9600 mL / NET: -8760 mL    07 Jul 2020 07:01  -  07 Jul 2020 11:18  --------------------------------------------------------  IN: 0 mL / OUT: 1400 mL / NET: -1400 mL    PHYSICAL EXAM:  General: NAD, Alert  ENT: MMM, no thrush  Neck: Supple, No JVD  Lungs: decreased breath sounds b/l bases  Cardio: +s1/s2 +edema b/l le  Abdomen: Soft, Nontender, Nondistended; Bowel sounds present  Extremities: No calf tenderness      LABS:                        13.3   18.28 )-----------( 508      ( 07 Jul 2020 06:10 )             42.7     07-07    138  |  93  |  21.0  ----------------------------<  129  3.7   |  36.0  |  0.90    Ca    9.3      07 Jul 2020 06:10  Phos  4.0     07-07  Mg     2.2     07-07    TPro  6.7  /  Alb  2.9  /  TBili  0.4  /  DBili  x   /  AST  17  /  ALT  12  /  AlkPhos  85  07-06        eGFR if Non African American: 103 mL/min/1.73M2 (07-07-20 @ 06:10)  eGFR if African American: 119 mL/min/1.73M2 (07-07-20 @ 06:10)    PT/INR - ( 05 Jul 2020 02:03 )   PT: 17.6 sec;   INR: 1.55 ratio         PTT - ( 05 Jul 2020 02:03 )  PTT:34.7 sec                ABG - ( 05 Jul 2020 02:31 )  pH, Arterial: 7.50  pH, Blood: x     /  pCO2: 43    /  pO2: 64    / HCO3: 34    / Base Excess: 9.9   /  SaO2: 93                      POCT Blood Glucose.: 125 mg/dL (07 Jul 2020 08:04)  POCT Blood Glucose.: 166 mg/dL (06 Jul 2020 21:04)  POCT Blood Glucose.: 308 mg/dL (06 Jul 2020 18:23)  POCT Blood Glucose.: 282 mg/dL (06 Jul 2020 16:59)  POCT Blood Glucose.: 149 mg/dL (06 Jul 2020 13:01)          Culture - Fungal, Body Fluid (collected 03 Jul 2020 03:50)  Source: .Body Fluid Pleural Fluid  Preliminary Report (06 Jul 2020 07:38):    Testing in progress    Culture - Body Fluid with Gram Stain (collected 03 Jul 2020 03:50)  Source: .Body Fluid Pleural Fluid  Gram Stain (03 Jul 2020 07:33):    polymorphonuclear leukocytes seen    No organisms seen    by cytocentrifuge  Preliminary Report (04 Jul 2020 09:29):    No growth      RADIOLOGY & ADDITIONAL TESTS:    Care Discussed with Consultants/Other Providers: Patient is a 45y old  Male who presents with a chief complaint of transfer from [facility] [place] with constrictive pericarditis. (07 Jul 2020 09:05)    Patient seen and examined at bedside. spoke to patient via - video [id number] all questions answered.   d/w Interventional Radiology- no biopsiable area currently would need pet scan to see if area lights up for biopsy  CT Surgery d/w Dr Sweeney - no biopsiable area currently;     mri results d/w ct surgery pa - constrictive pericarditis however no biopsy obtainable - patient would benefit from pet scan     pet scan setup for 7/7/2020 3PM- npo after midnight ; no premeal or sliding scale while npo     ALLERGIES:  No Known Allergies  [facility] patient (Unknown)    MEDICATIONS  (STANDING):  atorvastatin 10 milliGRAM(s) Oral at bedtime  buMETAnide Injectable 2 milliGRAM(s) IV Push two times a day  dextrose 5%. 1000 milliLiter(s) (50 mL/Hr) IV Continuous <Continuous>  dextrose 50% Injectable 12.5 Gram(s) IV Push once  dextrose 50% Injectable 25 Gram(s) IV Push once  dextrose 50% Injectable 25 Gram(s) IV Push once  insulin glargine Injectable (LANTUS) 16 Unit(s) SubCutaneous at bedtime  insulin lispro (HumaLOG) corrective regimen sliding scale   SubCutaneous four times a day before meals  insulin lispro Injectable (HumaLOG) 4 Unit(s) SubCutaneous three times a day before meals  melatonin 10 milliGRAM(s) Oral at bedtime  pantoprazole    Tablet 40 milliGRAM(s) Oral before breakfast  polyethylene glycol 3350 17 Gram(s) Oral at bedtime  predniSONE   Tablet 20 milliGRAM(s) Oral daily  senna 2 Tablet(s) Oral at bedtime  sodium chloride 0.9% lock flush 3 milliLiter(s) IV Push every 8 hours    MEDICATIONS  (PRN):  acetaminophen   Tablet .. 650 milliGRAM(s) Oral every 6 hours PRN Mild Pain (1 - 3), Moderate Pain (4 - 6)  dextrose 40% Gel 15 Gram(s) Oral once PRN Blood Glucose LESS THAN 70 milliGRAM(s)/deciliter  glucagon  Injectable 1 milliGRAM(s) IntraMuscular once PRN Glucose LESS THAN 70 milligrams/deciliter    Vital Signs Last 24 Hrs  T(F): 98.1 (07 Jul 2020 09:25), Max: 98.2 (06 Jul 2020 17:20)  HR: 91 (07 Jul 2020 09:25) (78 - 92)  BP: 144/85 (07 Jul 2020 09:25) (125/67 - 144/85)  RR: 17 (07 Jul 2020 09:25) (17 - 18)  SpO2: 98% (07 Jul 2020 09:25) (96% - 99%)  I&O's Summary    06 Jul 2020 07:01  -  07 Jul 2020 07:00  --------------------------------------------------------  IN: 840 mL / OUT: 9600 mL / NET: -8760 mL    07 Jul 2020 07:01  -  07 Jul 2020 11:18  --------------------------------------------------------  IN: 0 mL / OUT: 1400 mL / NET: -1400 mL    PHYSICAL EXAM:  General: NAD, Alert  ENT: MMM, no thrush  Neck: Supple, No JVD  Lungs: decreased breath sounds b/l bases  Cardio: +s1/s2 +edema b/l le  Abdomen: Soft, Nontender, Nondistended; Bowel sounds present  Extremities: No calf tenderness      LABS:                        13.3   18.28 )-----------( 508      ( 07 Jul 2020 06:10 )             42.7     07-07    138  |  93  |  21.0  ----------------------------<  129  3.7   |  36.0  |  0.90    Ca    9.3      07 Jul 2020 06:10  Phos  4.0     07-07  Mg     2.2     07-07    TPro  6.7  /  Alb  2.9  /  TBili  0.4  /  DBili  x   /  AST  17  /  ALT  12  /  AlkPhos  85  07-06        eGFR if Non African American: 103 mL/min/1.73M2 (07-07-20 @ 06:10)  eGFR if African American: 119 mL/min/1.73M2 (07-07-20 @ 06:10)    PT/INR - ( 05 Jul 2020 02:03 )   PT: 17.6 sec;   INR: 1.55 ratio         PTT - ( 05 Jul 2020 02:03 )  PTT:34.7 sec                ABG - ( 05 Jul 2020 02:31 )  pH, Arterial: 7.50  pH, Blood: x     /  pCO2: 43    /  pO2: 64    / HCO3: 34    / Base Excess: 9.9   /  SaO2: 93                      POCT Blood Glucose.: 125 mg/dL (07 Jul 2020 08:04)  POCT Blood Glucose.: 166 mg/dL (06 Jul 2020 21:04)  POCT Blood Glucose.: 308 mg/dL (06 Jul 2020 18:23)  POCT Blood Glucose.: 282 mg/dL (06 Jul 2020 16:59)  POCT Blood Glucose.: 149 mg/dL (06 Jul 2020 13:01)          Culture - Fungal, Body Fluid (collected 03 Jul 2020 03:50)  Source: .Body Fluid Pleural Fluid  Preliminary Report (06 Jul 2020 07:38):    Testing in progress    Culture - Body Fluid with Gram Stain (collected 03 Jul 2020 03:50)  Source: .Body Fluid Pleural Fluid  Gram Stain (03 Jul 2020 07:33):    polymorphonuclear leukocytes seen    No organisms seen    by cytocentrifuge  Preliminary Report (04 Jul 2020 09:29):    No growth      RADIOLOGY & ADDITIONAL TESTS:    Care Discussed with Consultants/Other Providers:

## 2020-07-08 ENCOUNTER — APPOINTMENT (OUTPATIENT)
Dept: NUCLEAR MEDICINE | Facility: CLINIC | Age: 45
End: 2020-07-08
Payer: MEDICAID

## 2020-07-08 ENCOUNTER — OUTPATIENT (OUTPATIENT)
Dept: OUTPATIENT SERVICES | Facility: HOSPITAL | Age: 45
LOS: 1 days | End: 2020-07-08

## 2020-07-08 ENCOUNTER — APPOINTMENT (OUTPATIENT)
Dept: NUCLEAR MEDICINE | Facility: CLINIC | Age: 45
End: 2020-07-08

## 2020-07-08 DIAGNOSIS — Z98.890 OTHER SPECIFIED POSTPROCEDURAL STATES: Chronic | ICD-10-CM

## 2020-07-08 DIAGNOSIS — Z00.8 ENCOUNTER FOR OTHER GENERAL EXAMINATION: ICD-10-CM

## 2020-07-08 LAB
CULTURE RESULTS: SIGNIFICANT CHANGE UP
GLUCOSE BLDC GLUCOMTR-MCNC: 108 MG/DL — HIGH (ref 70–99)
GLUCOSE BLDC GLUCOMTR-MCNC: 109 MG/DL — HIGH (ref 70–99)
GLUCOSE BLDC GLUCOMTR-MCNC: 112 MG/DL — HIGH (ref 70–99)
GLUCOSE BLDC GLUCOMTR-MCNC: 120 MG/DL — HIGH (ref 70–99)
GLUCOSE BLDC GLUCOMTR-MCNC: 327 MG/DL — HIGH (ref 70–99)
NON-GYNECOLOGICAL CYTOLOGY STUDY: SIGNIFICANT CHANGE UP
SPECIMEN SOURCE: SIGNIFICANT CHANGE UP

## 2020-07-08 PROCEDURE — 99233 SBSQ HOSP IP/OBS HIGH 50: CPT

## 2020-07-08 PROCEDURE — 99232 SBSQ HOSP IP/OBS MODERATE 35: CPT

## 2020-07-08 PROCEDURE — 78815 PET IMAGE W/CT SKULL-THIGH: CPT | Mod: 26,PI

## 2020-07-08 RX ADMIN — BUMETANIDE 2 MILLIGRAM(S): 0.25 INJECTION INTRAMUSCULAR; INTRAVENOUS at 05:18

## 2020-07-08 RX ADMIN — Medication 20 MILLIGRAM(S): at 17:27

## 2020-07-08 RX ADMIN — Medication 10 MILLIGRAM(S): at 21:34

## 2020-07-08 RX ADMIN — SODIUM CHLORIDE 3 MILLILITER(S): 9 INJECTION INTRAMUSCULAR; INTRAVENOUS; SUBCUTANEOUS at 21:39

## 2020-07-08 RX ADMIN — ATORVASTATIN CALCIUM 10 MILLIGRAM(S): 80 TABLET, FILM COATED ORAL at 21:33

## 2020-07-08 RX ADMIN — Medication 8: at 21:35

## 2020-07-08 RX ADMIN — BUMETANIDE 2 MILLIGRAM(S): 0.25 INJECTION INTRAMUSCULAR; INTRAVENOUS at 17:27

## 2020-07-08 RX ADMIN — INSULIN GLARGINE 16 UNIT(S): 100 INJECTION, SOLUTION SUBCUTANEOUS at 21:33

## 2020-07-08 RX ADMIN — Medication 4 UNIT(S): at 17:38

## 2020-07-08 RX ADMIN — SODIUM CHLORIDE 3 MILLILITER(S): 9 INJECTION INTRAMUSCULAR; INTRAVENOUS; SUBCUTANEOUS at 12:08

## 2020-07-08 NOTE — PROGRESS NOTE ADULT - SUBJECTIVE AND OBJECTIVE BOX
Interval Events:  no overnight events  follow up on diabetes    patient seen and examined at bedside.  pet scan pending  no complaints  FS well controlled      REVIEW OF SYSTEMS:    CONSTITUTIONAL: No fever, weight loss, or fatigue  EYES: No eye pain, visual disturbances, or discharge  ENMT:  No difficulty hearing, tinnitus, vertigo; No sinus or throat pain  NECK: No pain or stiffness  RESPIRATORY: No cough, wheezing, chills or hemoptysis; No shortness of breath  CARDIOVASCULAR: No chest pain, palpitations, dizziness, or leg swelling  GASTROINTESTINAL: No abdominal or epigastric pain. No nausea, vomiting, or hematemesis; No diarrhea or constipation. No melena or hematochezia.  NEUROLOGICAL: No headaches, memory loss, loss of strength, numbness, or tremors  SKIN: No itching, burning, rashes, or lesions   MUSCULOSKELETAL: No joint pain or swelling; No muscle, back, or extremity pain  PSYCHIATRIC: No depression, anxiety, mood swings, or difficulty sleeping        No Known Allergies  [facility] patient (Unknown)      MEDICATIONS  (STANDING):  atorvastatin 10 milliGRAM(s) Oral at bedtime  buMETAnide Injectable 2 milliGRAM(s) IV Push two times a day  dextrose 5%. 1000 milliLiter(s) (50 mL/Hr) IV Continuous <Continuous>  dextrose 50% Injectable 12.5 Gram(s) IV Push once  dextrose 50% Injectable 25 Gram(s) IV Push once  dextrose 50% Injectable 25 Gram(s) IV Push once  insulin glargine Injectable (LANTUS) 16 Unit(s) SubCutaneous at bedtime  insulin lispro (HumaLOG) corrective regimen sliding scale   SubCutaneous four times a day before meals  insulin lispro Injectable (HumaLOG) 4 Unit(s) SubCutaneous three times a day before meals  melatonin 10 milliGRAM(s) Oral at bedtime  pantoprazole    Tablet 40 milliGRAM(s) Oral before breakfast  polyethylene glycol 3350 17 Gram(s) Oral at bedtime  predniSONE   Tablet 20 milliGRAM(s) Oral daily  senna 2 Tablet(s) Oral at bedtime  sodium chloride 0.9% lock flush 3 milliLiter(s) IV Push every 8 hours    MEDICATIONS  (PRN):  acetaminophen   Tablet .. 650 milliGRAM(s) Oral every 6 hours PRN Mild Pain (1 - 3), Moderate Pain (4 - 6)  dextrose 40% Gel 15 Gram(s) Oral once PRN Blood Glucose LESS THAN 70 milliGRAM(s)/deciliter  glucagon  Injectable 1 milliGRAM(s) IntraMuscular once PRN Glucose LESS THAN 70 milligrams/deciliter      Vital Signs Last 24 Hrs  T(C): 36.5 (08 Jul 2020 09:23), Max: 36.5 (08 Jul 2020 09:23)  T(F): 97.7 (08 Jul 2020 09:23), Max: 97.7 (08 Jul 2020 09:23)  HR: 93 (08 Jul 2020 14:03) (71 - 98)  BP: 136/81 (08 Jul 2020 14:03) (117/79 - 136/81)  BP(mean): --  RR: 18 (08 Jul 2020 09:23) (18 - 18)  SpO2: 98% (08 Jul 2020 09:23) (97% - 99%)    PHYSICAL EXAM:    Constitutional: NAD, obese  HEENT: EOMI, no exophalmos  Neck: trachea midline, no thyroid enlargement  Respiratory: CTAB  Cardiovascular: S1 and S2, RRR  Gastrointestinal: BS+, soft, ntnd  Extremities: No peripheral edema  Neurological: A/O x 3, no focal deficits  Psychiatric: Normal mood, normal affect  Skin: No rashes, no acanthosis        LABS  07-07    138  |  93<L>  |  21.0<H>  ----------------------------<  129<H>  3.7   |  36.0<H>  |  0.90    Ca    9.3      07 Jul 2020 06:10  Phos  4.0     07-07  Mg     2.2     07-07                            13.3   18.28 )-----------( 508      ( 07 Jul 2020 06:10 )             42.7             CAPILLARY BLOOD GLUCOSE      POCT Blood Glucose.: 112 mg/dL (08 Jul 2020 11:52)  POCT Blood Glucose.: 120 mg/dL (08 Jul 2020 08:02)  POCT Blood Glucose.: 109 mg/dL (08 Jul 2020 05:20)  POCT Blood Glucose.: 145 mg/dL (07 Jul 2020 21:05)  POCT Blood Glucose.: 98 mg/dL (07 Jul 2020 17:05)

## 2020-07-08 NOTE — PROGRESS NOTE ADULT - PROBLEM SELECTOR PROBLEM 3
Hydronephrosis, unspecified hydronephrosis type

## 2020-07-08 NOTE — PROGRESS NOTE ADULT - ASSESSMENT
44M, Ukrainian speaking with PMH cardiomegaly, DM II, HTN, and recurrent pericardial effusions with pericardiocentesis x2 ( 700ml serous drainage both times). s/p subxiphoid pericardial window and pericardial biopsy on 9/30/19 at [facility] with Dr. 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 margoth. hydronephrosis, fibrosis prox. SMA, RIAN, margoth. renal artery, resulting luminal narrowing with normal RHC and LHC  [place] [facility] from his cardiologists office with c/o SOB on minimal exertion.  Pt reports that he gets SOB and fatigued with minimal exertion such as doing ADL's and even while talking. He was ruled out for an dMI. CT chest showed stable moderate bilateral pleural effusions with atelectasis, as well as a small stable pericardial effusion with adjacent inflammatory change and small loculated fluid along the right atrial border that could correlate with pericarditis.   He underwent a TTE and right and left heart cath at [facility] but the reports are not available.  He was transferred to [facility] today for evaluation of constrictive pericarditis /pericardial stripping.  consult called for management of diabetes, as pt. 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  Past h/o type 2 diabetes on oral agents. 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

## 2020-07-08 NOTE — PROGRESS NOTE ADULT - PROBLEM SELECTOR PLAN 1
CT chest and JAMES completed  Cardiac MRI completed - showing constrictive pericarditis, Dr. 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

## 2020-07-08 NOTE — PROGRESS NOTE ADULT - SUBJECTIVE AND OBJECTIVE BOX
Subjective: Patient lying in bed,  no acute distress noted, stated "I am feeling okay" denies chest pain, palpitation, fever, chills, dizziness, headache, shortness of breath, cough, abdominal pain, N/V/D.     V/S  T(C): 36.4 (07-07-20 @ 21:57), Max: 36.7 (07-07-20 @ 09:25)  HR: 79 (07-07-20 @ 21:57) (71 - 91)  BP: 132/74 (07-07-20 @ 21:57) (125/82 - 144/85)  RR: 18 (07-07-20 @ 21:57) (17 - 18)  SpO2: 99% (07-07-20 @ 21:57) (97% - 99%) on room air                                              MEDICATIONS  (STANDING):  atorvastatin 10 milliGRAM(s) Oral at bedtime  buMETAnide Injectable 2 milliGRAM(s) IV Push two times a day  insulin glargine Injectable (LANTUS) 16 Unit(s) SubCutaneous at bedtime  insulin lispro (HumaLOG) corrective regimen sliding scale   SubCutaneous four times a day before meals  insulin lispro Injectable (HumaLOG) 4 Unit(s) SubCutaneous three times a day before meals  melatonin 10 milliGRAM(s) Oral at bedtime  pantoprazole    Tablet 40 milliGRAM(s) Oral before breakfast  polyethylene glycol 3350 17 Gram(s) Oral at bedtime  predniSONE   Tablet 20 milliGRAM(s) Oral daily  senna 2 Tablet(s) Oral at bedtime  sodium chloride 0.9% lock flush 3 milliLiter(s) IV Push every 8 hours      07-07    138  |  93<L>  |  21.0<H>  ----------------------------<  129<H>  3.7   |  36.0<H>  |  0.90    Ca    9.3      07 Jul 2020 06:10  Phos  4.0     07-07  Mg     2.2     07-07    TPro  6.7  /  Alb  2.9<L>  /  TBili  0.4  /  DBili  x   /  AST  17  /  ALT  12  /  AlkPhos  85  07-06                               13.3   18.28 )-----------( 508      ( 07 Jul 2020 06:10 )             42.7                 CAPILLARY BLOOD GLUCOSE      POCT Blood Glucose.: 145 mg/dL (07 Jul 2020 21:05)  POCT Blood Glucose.: 98 mg/dL (07 Jul 2020 17:05)  POCT Blood Glucose.: 306 mg/dL (07 Jul 2020 11:53)  POCT Blood Glucose.: 125 mg/dL (07 Jul 2020 08:04)           CXR:    < from: MR Chest w/ IV Cont (07.06.20 @ 12:48) >      IMPRESSION:    Diffuse thickening of the pericardium with associated late gadolinium enhancement. On cine imaging there is an abnormal septal bounce with associated increased ventricular interdependence. Findings consistent with constrictive pericarditis.    Normal left ventricular size and systolic function. No myocardial edema. No delayed enhancement was visualized in the left ventricular myocardium.    Moderate right and small left pleural effusions    < end of copied text >    < from: Xray Chest 1 View- PORTABLE-Urgent (07.06.20 @ 09:40) >    Frontal expiratory view of the chest shows the heart to be similarly enlarged in size. Left pigtail catheter has been removed. The lungs show small right base infiltrate versus atelectasis with questionable tiny left base pneumothorax.    IMPRESSION:  Questionable tiny left pneumothorax.      < end of copied text >       < from: TTE Echo Complete w/ Contrast w/ Doppler (06.30.20 @ 21:33) >    Summary:   1. Technically difficult study.   2. Endocardial visualization was enhanced with intravenous echo contrast.   3. Left ventricular ejection fraction, by visual estimation, is 60 to 65%.   4. Normal global left ventricular systolic function.   5. The left ventricular diastolic function could not be assessed in this study.   6. Normal left ventricular internal cavity size.   7. Thickening of the anterior and posterior mitral valve leaflets.   8. Trace mitral valve regurgitation.   9. Small pericardial effusion.    < end of copied text >  Extremities:         PAST MEDICAL & SURGICAL HISTORY:  Hydronephrosis  Pericardial effusion  Lyme disease  Pericarditis  Heart failure  Cardiomegaly  Nonsmoker  Diabetes mellitus  Hypertension  S/P pericardial window creation  S/P pericardiocentesis

## 2020-07-08 NOTE — PROGRESS NOTE ADULT - SUBJECTIVE AND OBJECTIVE BOX
Patient is a 45y old  Male who presents with a chief complaint of Transfer from [place] with constrictive pericarditis. (08 Jul 2020 03:59)    Patient seen and examined at bedside.     ALLERGIES:  No Known Allergies  [facility] patient (Unknown)    MEDICATIONS  (STANDING):  atorvastatin 10 milliGRAM(s) Oral at bedtime  buMETAnide Injectable 2 milliGRAM(s) IV Push two times a day  dextrose 5%. 1000 milliLiter(s) (50 mL/Hr) IV Continuous <Continuous>  dextrose 50% Injectable 12.5 Gram(s) IV Push once  dextrose 50% Injectable 25 Gram(s) IV Push once  dextrose 50% Injectable 25 Gram(s) IV Push once  insulin glargine Injectable (LANTUS) 16 Unit(s) SubCutaneous at bedtime  insulin lispro (HumaLOG) corrective regimen sliding scale   SubCutaneous four times a day before meals  insulin lispro Injectable (HumaLOG) 4 Unit(s) SubCutaneous three times a day before meals  melatonin 10 milliGRAM(s) Oral at bedtime  pantoprazole    Tablet 40 milliGRAM(s) Oral before breakfast  polyethylene glycol 3350 17 Gram(s) Oral at bedtime  predniSONE   Tablet 20 milliGRAM(s) Oral daily  senna 2 Tablet(s) Oral at bedtime  sodium chloride 0.9% lock flush 3 milliLiter(s) IV Push every 8 hours    MEDICATIONS  (PRN):  acetaminophen   Tablet .. 650 milliGRAM(s) Oral every 6 hours PRN Mild Pain (1 - 3), Moderate Pain (4 - 6)  dextrose 40% Gel 15 Gram(s) Oral once PRN Blood Glucose LESS THAN 70 milliGRAM(s)/deciliter  glucagon  Injectable 1 milliGRAM(s) IntraMuscular once PRN Glucose LESS THAN 70 milligrams/deciliter    Vital Signs Last 24 Hrs  T(F): 97.7 (08 Jul 2020 09:23), Max: 97.7 (08 Jul 2020 09:23)  HR: 98 (08 Jul 2020 09:23) (71 - 98)  BP: 120/75 (08 Jul 2020 09:23) (117/79 - 134/81)  RR: 18 (08 Jul 2020 09:23) (18 - 18)  SpO2: 98% (08 Jul 2020 09:23) (97% - 99%)  I&O's Summary    07 Jul 2020 07:01  -  08 Jul 2020 07:00  --------------------------------------------------------  IN: 1140 mL / OUT: 3200 mL / NET: -2060 mL    08 Jul 2020 07:01  -  08 Jul 2020 11:20  --------------------------------------------------------  IN: 0 mL / OUT: 600 mL / NET: -600 mL    PHYSICAL EXAM:  General: NAD, Alert  ENT: MMM, no thrush  Neck: Supple, No JVD  Lungs: decreased breath sounds b/l bases  Cardio: +s1/s2 +edema b/l le  Abdomen: Soft, Nontender, Nondistended; Bowel sounds present  Extremities: No calf tenderness    LABS:                        13.3   18.28 )-----------( 508      ( 07 Jul 2020 06:10 )             42.7     07-07    138  |  93  |  21.0  ----------------------------<  129  3.7   |  36.0  |  0.90    Ca    9.3      07 Jul 2020 06:10  Phos  4.0     07-07  Mg     2.2     07-07    TPro  6.7  /  Alb  2.9  /  TBili  0.4  /  DBili  x   /  AST  17  /  ALT  12  /  AlkPhos  85  07-06        eGFR if Non African American: 103 mL/min/1.73M2 (07-07-20 @ 06:10)  eGFR if African American: 119 mL/min/1.73M2 (07-07-20 @ 06:10)    Glucose  POCT Blood Glucose.: 120 mg/dL (08 Jul 2020 08:02)  POCT Blood Glucose.: 109 mg/dL (08 Jul 2020 05:20)  POCT Blood Glucose.: 145 mg/dL (07 Jul 2020 21:05)  POCT Blood Glucose.: 98 mg/dL (07 Jul 2020 17:05)  POCT Blood Glucose.: 306 mg/dL (07 Jul 2020 11:53)    Cultures  Culture - Fungal, Body Fluid (collected 03 Jul 2020 03:50)  Source: .Body Fluid Pleural Fluid  Preliminary Report (06 Jul 2020 07:38):    Testing in progress    Culture - Body Fluid with Gram Stain (collected 03 Jul 2020 03:50)  Source: .Body Fluid Pleural Fluid  Gram Stain (03 Jul 2020 07:33):    polymorphonuclear leukocytes seen    No organisms seen    by cytocentrifuge  Preliminary Report (04 Jul 2020 09:29):    No growth      RADIOLOGY & ADDITIONAL TESTS:  - no new tests

## 2020-07-08 NOTE — PROGRESS NOTE ADULT - PROBLEM SELECTOR PLAN 4
s/p left pigtail placed 7/2 with subsequent removal  Plan needs to discuss with CTs team in AM rounds

## 2020-07-08 NOTE — PROGRESS NOTE ADULT - ASSESSMENT
44 year old male with PMH cardiomegaly, DM II (A1C 8.9), HTN, and recurrent pericardial effusions with two prior pericardiocentesis, ultimately required a subxiphoid pericardial window and pericardial biopsy (inconclusive) on 9/30/19 at [facility] with Dr. Peters.  At the time he had lymes disease and completed course of IV and PO antibiotics. Since, he's had multiple readmissions for recurrent pericardial effusions. In November 2019 Ct C/A/P showed concern for neoplastic disease / infiltrative process, specifically progressing to the retroperitoneal soft tissue surrounding both kidneys causing moderate bilateral hydronephrosis. Subsequent Abd/Renal MRI showed retroperitoneal fibrosis, moderate bilateral hydronephrosis, fibrosis proximal SMA, RIAN, bilateral renal artery. He then underwent a right and left cardiac cath that showed normal coronaries without evidence of constriction or restriction. He was following with his cardiologist (Dr. Mcginnis) and a rheumatologist as an outpatient. Now, patient was again presents to [facility] 6/29 with complaints of SOB on minimal exertion. CT chest showed stable moderate bilateral pleural effusions with atelectasis, as well as a small stable pericardial effusion with adjacent inflammatory change and small loculated fluid along the right atrial border. 6/30/20 TTE and right and left heart cath were done at [facility] and patient was then transferred to [facility] for evaluation of recurrent pericardial effusion /constrictive pericarditis /pericardial stripping. A left pigtail was placed for pleural effusion that is now removed. 7/5 Patient was transferred to medicine service to coordinate comprehensive care due to multiple medical conditions, including  concern for chronic fibrotic disease. Cardiac MRI was done that ultimately showed concern for constrictive pericarditis. [facility] is requesting an additional pericardial biopsy however, unsure of feasibility of cardiac biopsy as per Dr Peters and ultimately suggest empiric treatment of connective tissue. 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 Thyroid US was done 7/5 showing 1 cm bilateral nodules in the posterior aspect of thyroid lobes, Endocrine following.

## 2020-07-08 NOTE — PROGRESS NOTE ADULT - ASSESSMENT
44 year old male with PMH cardiomegaly, DM II, HTN, and recurrent pericardial effusions with pericardiocentesis x2 ( 700ml serous drainage both times). He subsequently underwent a subxiphoid pericardial window and pericardial biopsy on 9/30/19 at [facility] with Dr. Peters .  Postoperative course complicated by AF/SVT which converted to SR.  Was found to be + lymes on that admission and has completed his course of IV and PO antibiotics.  Patient then presented to the ER on 11/12/19 from Dr. Castro office s/p in office TTE that showed an increased pericardial effusion. Patient found to have infiltrate on Chest CT performed 11/13/19 that is concerning for infiltrative process / neoplastic disease, hematology/ oncology recommend ID follow up as outpatient.  CT scan of abdomen pelvis with contrast on 11/14/19 showed infiltrative process progressing to the retroperitoneal soft tissue surrounding both kidneys causing moderate bilateral hydronephrosis. On 11/15/19 he underwent IR pericardial drain placement for 55ml of bloody drainage.  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 margoth. hydronephrosis, fibrosis prox. SMA, RIAN, margoth. renal artery, resulting luminal narrowing.  He then underwent a Right and left cardiac cath on 11/18/19 that showed normal coronaries, increased wedge pressure 24, no evidence of constriction or restriction.  His pericardial AI was removed 11/19/19 and he was discharged home.  He was following with his cardiologist (Dr. Mcginnis) and a rheumatologist as an outpatient.    He presented to [facility] 6/29 sent from his cardiologists office with c/o SOB on minimal exertion.  Pt reports that he gets SOB and fatigued with minimal exertion such as doing ADL's and even while talking. He was ruled out for an MI. CT chest showed stable moderate bilateral pleural effusions with atelectasis, as well as a small stable pericardial effusion with adjacent inflammatory change and small loculated fluid along the right atrial border that could correlate with pericarditis.  6/30/20 he underwent a TTE and right and left heart cath at [facility]. Patient was transferred to [facility] for evaluation of recurrent pericardial effusion /constrictive pericarditis /pericardial stripping.    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  - Urology not recommend retroperitoneal bx at this time.     #Hydronephrosis  - Moderate hydronephrosis noted on prior admission.  -  input appreciated, no acute intervention required,  signed off.     #Pleural effusion  - s/p pigtail     #Heart block AV second degree (mobitz 2)  - ep consult appreciated - periods of nocturnal bradycardia with short pauses which are consistent with vagal events (PP prolongation, [place] prolongation, gradual slowing and resumption). He is overweight and has had periods of PND which may be secondary to WALKER or CHF. No indication for pacemaker at this time  - tele monitor     #HTN- essential-> however currently normotensive   - bumex (holding betablocker 2/2 missed qrs beats-> mobitz 2 on tele monitor)  - monitor blood pressure     #Type 2 diabetes  - a1c 8.9 on admit   - iss   - lantus w/ premeal insulin will resume post pet scan    #Leukocytosis  - trend and monitor for fever- hold off abx for now given stable  - currently on steroid taper which may affect wbc count as well     #DVT prophylaxis  - venodynes and ambulation 44 year old male with PMH cardiomegaly, DM II, HTN, and recurrent pericardial effusions with pericardiocentesis x2 ( 700ml serous drainage both times). He subsequently underwent a subxiphoid pericardial window and pericardial biopsy on 9/30/19 at [facility] with Dr. Peters .  Postoperative course complicated by AF/SVT which converted to SR.  Was found to be + lymes on that admission and has completed his course of IV and PO antibiotics.  Patient then presented to the ER on 11/12/19 from Dr. Castro office s/p in office TTE that showed an increased pericardial effusion. Patient found to have infiltrate on Chest CT performed 11/13/19 that is concerning for infiltrative process / neoplastic disease, hematology/ oncology recommend ID follow up as outpatient.  CT scan of abdomen pelvis with contrast on 11/14/19 showed infiltrative process progressing to the retroperitoneal soft tissue surrounding both kidneys causing moderate bilateral hydronephrosis. On 11/15/19 he underwent IR pericardial drain placement for 55ml of bloody drainage.  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 margoth. hydronephrosis, fibrosis prox. SMA, RIAN, margoth. renal artery, resulting luminal narrowing.  He then underwent a Right and left cardiac cath on 11/18/19 that showed normal coronaries, increased wedge pressure 24, no evidence of constriction or restriction.  His pericardial AI was removed 11/19/19 and he was discharged home.  He was following with his cardiologist (Dr. Mcginnis) and a rheumatologist as an outpatient.    He presented to [facility] 6/29 sent from his cardiologists office with c/o SOB on minimal exertion.  Pt reports that he gets SOB and fatigued with minimal exertion such as doing ADL's and even while talking. He was ruled out for an MI. CT chest showed stable moderate bilateral pleural effusions with atelectasis, as well as a small stable pericardial effusion with adjacent inflammatory change and small loculated fluid along the right atrial border that could correlate with pericarditis.  6/30/20 he underwent a TTE and right and left heart cath at [facility]. Patient was transferred to [facility] for evaluation of recurrent pericardial effusion /constrictive pericarditis /pericardial stripping.    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  - Urology not recommend retroperitoneal bx at this time.     #Hydronephrosis  - Moderate hydronephrosis noted on prior admission.  -  input appreciated, no acute intervention required,  signed off.     #Pleural effusion  - s/p pigtail     #Heart block AV second degree (mobitz 2)  - ep consult appreciated - periods of nocturnal bradycardia with short pauses which are consistent with vagal events (PP prolongation, [place] prolongation, gradual slowing and resumption). He is overweight and has had periods of PND which may be secondary to WALKER or CHF. No indication for pacemaker at this time  - tele monitor     #HTN- essential-> however currently normotensive   - bumex (holding betablocker 2/2 missed qrs beats-> mobitz 2 on tele monitor)  - monitor blood pressure     #Type 2 diabetes  - a1c 8.9 on admit   - iss   - lantus w/ premeal insulin will resume post pet scan    #Leukocytosis  - trend and monitor for fever- hold off abx for now given stable  - currently on steroid taper which may affect wbc count as well     #DVT prophylaxis  - venodynes and ambulation

## 2020-07-08 NOTE — PROGRESS NOTE ADULT - PROBLEM SELECTOR PROBLEM 4
Pleural effusion
Retroperitoneal fibrosis
Retroperitoneal fibrosis
Pleural effusion
Pleural effusion
Retroperitoneal fibrosis
Retroperitoneal fibrosis

## 2020-07-08 NOTE — PROGRESS NOTE ADULT - PROBLEM SELECTOR PLAN 3
Noted on prior admission renal MRI related to inflammatory process.
Moderate hydronephrosis noted on prior admission.   input appreciated, no acute intervention required,  signed off.   Strict I/Os.   Monitor BUN/Cr.
Moderate hydronephrosis noted on prior admission   input appreciated, no acute intervention required,  signed off  Strict I/Os
Moderate hydronephrosis noted on prior admission   input appreciated, no acute intervention required,  signed off  Strict I/Os
Noted on prior admission renal MRI related to inflammatory process.
Moderate hydronephrosis noted on prior admission.   input appreciated, no acute intervention required,  signed off.   Strict I/Os.   Monitor BUN/Cr.
Noted on prior admission renal MRI related to inflammatory process.

## 2020-07-09 LAB
ANION GAP SERPL CALC-SCNC: 12 MMOL/L — SIGNIFICANT CHANGE UP (ref 5–17)
BUN SERPL-MCNC: 15 MG/DL — SIGNIFICANT CHANGE UP (ref 8–20)
C PEPTIDE SERPL-MCNC: 1.7 NG/ML — SIGNIFICANT CHANGE UP (ref 1.1–4.4)
CALCIUM SERPL-MCNC: 9.1 MG/DL — SIGNIFICANT CHANGE UP (ref 8.6–10.2)
CHLORIDE SERPL-SCNC: 88 MMOL/L — LOW (ref 98–107)
CO2 SERPL-SCNC: 38 MMOL/L — HIGH (ref 22–29)
CREAT SERPL-MCNC: 0.81 MG/DL — SIGNIFICANT CHANGE UP (ref 0.5–1.3)
GLUCOSE BLDC GLUCOMTR-MCNC: 118 MG/DL — HIGH (ref 70–99)
GLUCOSE BLDC GLUCOMTR-MCNC: 141 MG/DL — HIGH (ref 70–99)
GLUCOSE BLDC GLUCOMTR-MCNC: 176 MG/DL — HIGH (ref 70–99)
GLUCOSE BLDC GLUCOMTR-MCNC: 268 MG/DL — HIGH (ref 70–99)
GLUCOSE SERPL-MCNC: 165 MG/DL — HIGH (ref 70–99)
HCT VFR BLD CALC: 44.3 % — SIGNIFICANT CHANGE UP (ref 39–50)
HGB BLD-MCNC: 14 G/DL — SIGNIFICANT CHANGE UP (ref 13–17)
MCHC RBC-ENTMCNC: 25.9 PG — LOW (ref 27–34)
MCHC RBC-ENTMCNC: 31.6 GM/DL — LOW (ref 32–36)
MCV RBC AUTO: 82 FL — SIGNIFICANT CHANGE UP (ref 80–100)
PLATELET # BLD AUTO: 565 K/UL — HIGH (ref 150–400)
POTASSIUM SERPL-MCNC: 3 MMOL/L — LOW (ref 3.5–5.3)
POTASSIUM SERPL-SCNC: 3 MMOL/L — LOW (ref 3.5–5.3)
RBC # BLD: 5.4 M/UL — SIGNIFICANT CHANGE UP (ref 4.2–5.8)
RBC # FLD: 13.5 % — SIGNIFICANT CHANGE UP (ref 10.3–14.5)
SODIUM SERPL-SCNC: 138 MMOL/L — SIGNIFICANT CHANGE UP (ref 135–145)
WBC # BLD: 21.6 K/UL — HIGH (ref 3.8–10.5)
WBC # FLD AUTO: 21.6 K/UL — HIGH (ref 3.8–10.5)

## 2020-07-09 PROCEDURE — 99233 SBSQ HOSP IP/OBS HIGH 50: CPT

## 2020-07-09 PROCEDURE — 99232 SBSQ HOSP IP/OBS MODERATE 35: CPT

## 2020-07-09 RX ORDER — POTASSIUM CHLORIDE 20 MEQ
40 PACKET (EA) ORAL ONCE
Refills: 0 | Status: COMPLETED | OUTPATIENT
Start: 2020-07-09 | End: 2020-07-09

## 2020-07-09 RX ORDER — POTASSIUM CHLORIDE 20 MEQ
10 PACKET (EA) ORAL
Refills: 0 | Status: DISCONTINUED | OUTPATIENT
Start: 2020-07-09 | End: 2020-07-09

## 2020-07-09 RX ORDER — INSULIN LISPRO 100/ML
6 VIAL (ML) SUBCUTANEOUS
Refills: 0 | Status: DISCONTINUED | OUTPATIENT
Start: 2020-07-09 | End: 2020-07-13

## 2020-07-09 RX ADMIN — Medication 4 UNIT(S): at 08:50

## 2020-07-09 RX ADMIN — INSULIN GLARGINE 16 UNIT(S): 100 INJECTION, SOLUTION SUBCUTANEOUS at 21:47

## 2020-07-09 RX ADMIN — SODIUM CHLORIDE 3 MILLILITER(S): 9 INJECTION INTRAMUSCULAR; INTRAVENOUS; SUBCUTANEOUS at 09:07

## 2020-07-09 RX ADMIN — Medication 20 MILLIGRAM(S): at 05:06

## 2020-07-09 RX ADMIN — PANTOPRAZOLE SODIUM 40 MILLIGRAM(S): 20 TABLET, DELAYED RELEASE ORAL at 05:06

## 2020-07-09 RX ADMIN — Medication 40 MILLIEQUIVALENT(S): at 17:06

## 2020-07-09 RX ADMIN — Medication 4 UNIT(S): at 12:20

## 2020-07-09 RX ADMIN — SODIUM CHLORIDE 3 MILLILITER(S): 9 INJECTION INTRAMUSCULAR; INTRAVENOUS; SUBCUTANEOUS at 11:49

## 2020-07-09 RX ADMIN — BUMETANIDE 2 MILLIGRAM(S): 0.25 INJECTION INTRAMUSCULAR; INTRAVENOUS at 17:06

## 2020-07-09 RX ADMIN — Medication 6 UNIT(S): at 17:15

## 2020-07-09 RX ADMIN — Medication 6: at 12:20

## 2020-07-09 RX ADMIN — SODIUM CHLORIDE 3 MILLILITER(S): 9 INJECTION INTRAMUSCULAR; INTRAVENOUS; SUBCUTANEOUS at 23:19

## 2020-07-09 RX ADMIN — Medication 2: at 21:47

## 2020-07-09 RX ADMIN — Medication 40 MILLIEQUIVALENT(S): at 14:19

## 2020-07-09 RX ADMIN — BUMETANIDE 2 MILLIGRAM(S): 0.25 INJECTION INTRAMUSCULAR; INTRAVENOUS at 05:06

## 2020-07-09 RX ADMIN — Medication 10 MILLIGRAM(S): at 21:46

## 2020-07-09 RX ADMIN — ATORVASTATIN CALCIUM 10 MILLIGRAM(S): 80 TABLET, FILM COATED ORAL at 21:46

## 2020-07-09 NOTE — PROGRESS NOTE ADULT - ASSESSMENT
44M, Greek speaking with PMH cardiomegaly, DM II, HTN, and recurrent pericardial effusions with pericardiocentesis x2 ( 700ml serous drainage both times). s/p subxiphoid pericardial window and pericardial biopsy on 9/30/19 at [facility] with Dr. 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 margoth. hydronephrosis, fibrosis prox. SMA, RIAN, margoth. renal artery, resulting luminal narrowing with normal RHC and LHC  [place] [facility] [place] sent from his cardiologists office with c/o SOB on minimal exertion.  Pt reports that he gets SOB and fatigued with minimal exertion such as doing ADL's and even while talking. He was ruled out for an dMI. CT chest showed stable moderate bilateral pleural effusions with atelectasis, as well as a small stable pericardial effusion with adjacent inflammatory change and small loculated fluid along the right atrial border that could correlate with pericarditis.   He underwent a TTE and right and left heart cath at [facility] but the reports are not available.  He was transferred to [facility] today for evaluation of constrictive pericarditis /pericardial stripping.  consult called for management of diabetes, as pt. 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  Past h/o type 2 diabetes on oral agents. Current control suboptimal with A1C of 8.9    T2DM- hyperglycemic due to missing doses of insulin  -continue lantus 16 units  -increase lispro to 6 TID  -continue sliding scale  -check cpeptide VLAD to r/o autoimmune diabetes disease    HLD- on statin    IGG4 disease- pet scan pending, being followed by rheum

## 2020-07-09 NOTE — ADVANCED PRACTICE NURSE CONSULT - ASSESSMENT
went to see pt in am, pt is a+ox3 co 0 pain. he was using insulin @ home he has a glucometer and he test bg 2xdaily. he takes insulin via vial and syringe

## 2020-07-09 NOTE — PROGRESS NOTE ADULT - ASSESSMENT
44 year old male with PMH cardiomegaly, DM II, HTN, and recurrent pericardial effusions with pericardiocentesis x2 ( 700ml serous drainage both times). He subsequently underwent a subxiphoid pericardial window and pericardial biopsy on 9/30/19 at [facility] with Dr. Peters .  Postoperative course complicated by AF/SVT which converted to SR.  Was found to be + lymes on that admission and has completed his course of IV and PO antibiotics.  Patient then presented to the ER on 11/12/19 from Dr. Castro office s/p in office TTE that showed an increased pericardial effusion. Patient found to have infiltrate on Chest CT performed 11/13/19 that is concerning for infiltrative process / neoplastic disease, hematology/ oncology recommend ID follow up as outpatient.  CT scan of abdomen pelvis with contrast on 11/14/19 showed infiltrative process progressing to the retroperitoneal soft tissue surrounding both kidneys causing moderate bilateral hydronephrosis. On 11/15/19 he underwent IR pericardial drain placement for 55ml of bloody drainage.  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 margoth. hydronephrosis, fibrosis prox. SMA, RIAN, margoth. renal artery, resulting luminal narrowing.  He then underwent a Right and left cardiac cath on 11/18/19 that showed normal coronaries, increased wedge pressure 24, no evidence of constriction or restriction.  His pericardial AI was removed 11/19/19 and he was discharged home.  He was following with his cardiologist (Dr. Mcginnis) and a rheumatologist as an outpatient.    He presented to [facility] 6/29 sent from his cardiologists office with c/o SOB on minimal exertion.  Pt reports that he gets SOB and fatigued with minimal exertion such as doing ADL's and even while talking. He was ruled out for an MI. CT chest showed stable moderate bilateral pleural effusions with atelectasis, as well as a small stable pericardial effusion with adjacent inflammatory change and small loculated fluid along the right atrial border that could correlate with pericarditis.  6/30/20 he underwent a TTE and right and left heart cath at [facility]. Patient was transferred to [facility] for evaluation of recurrent pericardial effusion /constrictive pericarditis /pericardial stripping.    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  - Urology not recommend retroperitoneal bx at this time.     #Hydronephrosis  - Moderate hydronephrosis noted on prior admission.  -  input appreciated, no acute intervention required,  signed off.     #Pleural effusion  - s/p pigtail     #Heart block AV second degree (mobitz 2)  - ep consult appreciated - periods of nocturnal bradycardia with short pauses which are consistent with vagal events (PP prolongation, [place] prolongation, gradual slowing and resumption). He is overweight and has had periods of PND which may be secondary to WALKER or CHF. No indication for pacemaker at this time  - tele monitor     #HTN- essential-> however currently normotensive   - bumex (holding betablocker 2/2 missed qrs beats-> mobitz 2 on tele monitor)  - monitor blood pressure     #Type 2 diabetes  - a1c 8.9 on admit   - iss   - lantus w/ premeal insulin     #Leukocytosis  - trend and monitor for fever- hold off abx for now given stable  - currently on steroid taper which may affect wbc count as well     #hypokalemia  - repleted  - monitor   - check mg and phos in am     #DVT prophylaxis  - venodynes and ambulation 44 year old male with PMH cardiomegaly, DM II, HTN, and recurrent pericardial effusions with pericardiocentesis x2 ( 700ml serous drainage both times). He subsequently underwent a subxiphoid pericardial window and pericardial biopsy on 9/30/19 at [facility] with Dr. Peters .  Postoperative course complicated by AF/SVT which converted to SR.  Was found to be + lymes on that admission and has completed his course of IV and PO antibiotics.  Patient then presented to the ER on 11/12/19 from Dr. Castro office s/p in office TTE that showed an increased pericardial effusion. Patient found to have infiltrate on Chest CT performed 11/13/19 that is concerning for infiltrative process / neoplastic disease, hematology/ oncology recommend ID follow up as outpatient.  CT scan of abdomen pelvis with contrast on 11/14/19 showed infiltrative process progressing to the retroperitoneal soft tissue surrounding both kidneys causing moderate bilateral hydronephrosis. On 11/15/19 he underwent IR pericardial drain placement for 55ml of bloody drainage.  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 margoth. hydronephrosis, fibrosis prox. SMA, RIAN, margoth. renal artery, resulting luminal narrowing.  He then underwent a Right and left cardiac cath on 11/18/19 that showed normal coronaries, increased wedge pressure 24, no evidence of constriction or restriction.  His pericardial AI was removed 11/19/19 and he was discharged home.  He was following with his cardiologist (Dr. Mcginnis) and a rheumatologist as an outpatient.    He presented to [facility] 6/29 sent from his cardiologists office with c/o SOB on minimal exertion.  Pt reports that he gets SOB and fatigued with minimal exertion such as doing ADL's and even while talking. He was ruled out for an MI. CT chest showed stable moderate bilateral pleural effusions with atelectasis, as well as a small stable pericardial effusion with adjacent inflammatory change and small loculated fluid along the right atrial border that could correlate with pericarditis.  6/30/20 he underwent a TTE and right and left heart cath at [facility]. Patient was transferred to [facility] for evaluation of recurrent pericardial effusion /constrictive pericarditis /pericardial stripping.    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  - Urology not recommend retroperitoneal bx at this time.     #Hydronephrosis  - Moderate hydronephrosis noted on prior admission.  -  input appreciated, no acute intervention required,  signed off.     #Pleural effusion  - s/p pigtail     #Heart block AV second degree (mobitz 2)  - ep consult appreciated - periods of nocturnal bradycardia with short pauses which are consistent with vagal events (PP prolongation, [place] prolongation, gradual slowing and resumption). He is overweight and has had periods of PND which may be secondary to WALKER or CHF. No indication for pacemaker at this time  - tele monitor     #HTN- essential-> however currently normotensive   - bumex (holding betablocker 2/2 missed qrs beats-> mobitz 2 on tele monitor)  - monitor blood pressure     #Type 2 diabetes  - a1c 8.9 on admit   - iss   - lantus w/ premeal insulin     #Leukocytosis  - trend and monitor for fever- hold off abx for now given stable  - currently on steroid taper which may affect wbc count as well     #hypokalemia  - repleted  - monitor   - check mg and phos in am     #DVT prophylaxis  - venodynes and ambulation

## 2020-07-09 NOTE — PROGRESS NOTE ADULT - SUBJECTIVE AND OBJECTIVE BOX
Interval Events:  no overnight events  follow up on diabetes    patient seen and examined at bedside.  no complaints  no premeal insulin this morning    REVIEW OF SYSTEMS:    CONSTITUTIONAL: No fever, weight loss, or fatigue  EYES: No eye pain, visual disturbances, or discharge  ENMT:  No difficulty hearing, tinnitus, vertigo; No sinus or throat pain  NECK: No pain or stiffness  RESPIRATORY: No cough, wheezing, chills or hemoptysis; No shortness of breath  CARDIOVASCULAR: No chest pain, palpitations, dizziness, or leg swelling  GASTROINTESTINAL: No abdominal or epigastric pain. No nausea, vomiting, or hematemesis; No diarrhea or constipation. No melena or hematochezia.  NEUROLOGICAL: No headaches, memory loss, loss of strength, numbness, or tremors  SKIN: No itching, burning, rashes, or lesions   MUSCULOSKELETAL: No joint pain or swelling; No muscle, back, or extremity pain  PSYCHIATRIC: No depression, anxiety, mood swings, or difficulty sleeping        No Known Allergies  [facility] patient (Unknown)      MEDICATIONS  (STANDING):  atorvastatin 10 milliGRAM(s) Oral at bedtime  buMETAnide Injectable 2 milliGRAM(s) IV Push two times a day  dextrose 5%. 1000 milliLiter(s) (50 mL/Hr) IV Continuous <Continuous>  dextrose 50% Injectable 12.5 Gram(s) IV Push once  dextrose 50% Injectable 25 Gram(s) IV Push once  dextrose 50% Injectable 25 Gram(s) IV Push once  insulin glargine Injectable (LANTUS) 16 Unit(s) SubCutaneous at bedtime  insulin lispro (HumaLOG) corrective regimen sliding scale   SubCutaneous four times a day before meals  insulin lispro Injectable (HumaLOG) 6 Unit(s) SubCutaneous three times a day before meals  melatonin 10 milliGRAM(s) Oral at bedtime  pantoprazole    Tablet 40 milliGRAM(s) Oral before breakfast  polyethylene glycol 3350 17 Gram(s) Oral at bedtime  predniSONE   Tablet 20 milliGRAM(s) Oral daily  senna 2 Tablet(s) Oral at bedtime  sodium chloride 0.9% lock flush 3 milliLiter(s) IV Push every 8 hours    MEDICATIONS  (PRN):  acetaminophen   Tablet .. 650 milliGRAM(s) Oral every 6 hours PRN Mild Pain (1 - 3), Moderate Pain (4 - 6)  dextrose 40% Gel 15 Gram(s) Oral once PRN Blood Glucose LESS THAN 70 milliGRAM(s)/deciliter  glucagon  Injectable 1 milliGRAM(s) IntraMuscular once PRN Glucose LESS THAN 70 milligrams/deciliter      Vital Signs Last 24 Hrs  T(C): 36.5 (09 Jul 2020 09:47), Max: 36.8 (08 Jul 2020 17:08)  T(F): 97.7 (09 Jul 2020 09:47), Max: 98.2 (08 Jul 2020 17:08)  HR: 91 (09 Jul 2020 09:47) (90 - 104)  BP: 116/79 (09 Jul 2020 09:47) (116/79 - 152/90)  BP(mean): --  RR: 17 (09 Jul 2020 09:47) (17 - 18)  SpO2: 98% (09 Jul 2020 09:47) (97% - 98%)    PHYSICAL EXAM:    Constitutional: NAD, well-groomed, well-developed  HEENT: EOMI, no exophalmos  Neck: trachea midline, no thyroid enlargement  Respiratory: CTAB  Cardiovascular: S1 and S2, RRR  Gastrointestinal: BS+, soft, ntnd  Extremities: No peripheral edema  Neurological: A/O x 3, no focal deficits  Psychiatric: Normal mood, normal affect  Skin: No rashes, no acanthosis        LABS  07-09    138  |  88<L>  |  15.0  ----------------------------<  165<H>  3.0<L>   |  38.0<H>  |  0.81    Ca    9.1      09 Jul 2020 07:31                            14.0   21.60 )-----------( 565      ( 09 Jul 2020 07:35 )             44.3             CAPILLARY BLOOD GLUCOSE      POCT Blood Glucose.: 268 mg/dL (09 Jul 2020 12:18)  POCT Blood Glucose.: 141 mg/dL (09 Jul 2020 07:54)  POCT Blood Glucose.: 327 mg/dL (08 Jul 2020 21:07)  POCT Blood Glucose.: 108 mg/dL (08 Jul 2020 17:21)

## 2020-07-09 NOTE — PROGRESS NOTE ADULT - SUBJECTIVE AND OBJECTIVE BOX
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 (09 Jul 2020 12:25)      Patient seen and examined at bedside.     ALLERGIES:  No Known Allergies  [facility] patient (Unknown)    MEDICATIONS  (STANDING):  atorvastatin 10 milliGRAM(s) Oral at bedtime  buMETAnide Injectable 2 milliGRAM(s) IV Push two times a day  dextrose 5%. 1000 milliLiter(s) (50 mL/Hr) IV Continuous <Continuous>  dextrose 50% Injectable 12.5 Gram(s) IV Push once  dextrose 50% Injectable 25 Gram(s) IV Push once  dextrose 50% Injectable 25 Gram(s) IV Push once  insulin glargine Injectable (LANTUS) 16 Unit(s) SubCutaneous at bedtime  insulin lispro (HumaLOG) corrective regimen sliding scale   SubCutaneous four times a day before meals  insulin lispro Injectable (HumaLOG) 6 Unit(s) SubCutaneous three times a day before meals  melatonin 10 milliGRAM(s) Oral at bedtime  pantoprazole    Tablet 40 milliGRAM(s) Oral before breakfast  polyethylene glycol 3350 17 Gram(s) Oral at bedtime  potassium chloride    Tablet ER 40 milliEquivalent(s) Oral once  potassium chloride  10 mEq/100 mL IVPB 10 milliEquivalent(s) IV Intermittent every 1 hour  predniSONE   Tablet 20 milliGRAM(s) Oral daily  senna 2 Tablet(s) Oral at bedtime  sodium chloride 0.9% lock flush 3 milliLiter(s) IV Push every 8 hours    MEDICATIONS  (PRN):  acetaminophen   Tablet .. 650 milliGRAM(s) Oral every 6 hours PRN Mild Pain (1 - 3), Moderate Pain (4 - 6)  dextrose 40% Gel 15 Gram(s) Oral once PRN Blood Glucose LESS THAN 70 milliGRAM(s)/deciliter  glucagon  Injectable 1 milliGRAM(s) IntraMuscular once PRN Glucose LESS THAN 70 milligrams/deciliter    Vital Signs Last 24 Hrs  T(F): 97.7 (09 Jul 2020 09:47), Max: 98.2 (08 Jul 2020 17:08)  HR: 91 (09 Jul 2020 09:47) (90 - 104)  BP: 116/79 (09 Jul 2020 09:47) (116/79 - 152/90)  RR: 17 (09 Jul 2020 09:47) (17 - 18)  SpO2: 98% (09 Jul 2020 09:47) (97% - 98%)  I&O's Summary    08 Jul 2020 07:01  -  09 Jul 2020 07:00  --------------------------------------------------------  IN: 200 mL / OUT: 1400 mL / NET: -1200 mL    09 Jul 2020 07:01  -  09 Jul 2020 13:50  --------------------------------------------------------  IN: 0 mL / OUT: 500 mL / NET: -500 mL    PHYSICAL EXAM:  General: NAD, Alert  ENT: MMM, no thrush  Neck: Supple, No JVD  Lungs: decreased breath sounds b/l bases  Cardio: +s1/s2 +edema b/l le  Abdomen: Soft, Nontender, Nondistended; Bowel sounds present  Extremities: No calf tenderness      LABS:                        14.0   21.60 )-----------( 565      ( 09 Jul 2020 07:35 )             44.3     07-09    138  |  88  |  15.0  ----------------------------<  165  3.0   |  38.0  |  0.81    Ca    9.1      09 Jul 2020 07:31  Phos  4.0     07-07  Mg     2.2     07-07    eGFR if Non : 107 mL/min/1.73M2 (07-09-20 @ 07:31)  eGFR if African American: 124 mL/min/1.73M2 (07-09-20 @ 07:31)    GLucose  POCT Blood Glucose.: 268 mg/dL (09 Jul 2020 12:18)  POCT Blood Glucose.: 141 mg/dL (09 Jul 2020 07:54)  POCT Blood Glucose.: 327 mg/dL (08 Jul 2020 21:07)  POCT Blood Glucose.: 108 mg/dL (08 Jul 2020 17:21)    Cultures  Culture - Fungal, Body Fluid (collected 03 Jul 2020 03:50)  Source: .Body Fluid Pleural Fluid  Preliminary Report (06 Jul 2020 07:38):    Testing in progress    Culture - Body Fluid with Gram Stain (collected 03 Jul 2020 03:50)  Source: .Body Fluid Pleural Fluid  Gram Stain (03 Jul 2020 07:33):    polymorphonuclear leukocytes seen    No organisms seen    by cytocentrifuge  Final Report (08 Jul 2020 12:08):    No growth at 5 days      RADIOLOGY & ADDITIONAL TESTS:  - no new tests    Care Discussed with Consultants/Other Providers:   Rheum

## 2020-07-10 LAB
ANION GAP SERPL CALC-SCNC: 8 MMOL/L — SIGNIFICANT CHANGE UP (ref 5–17)
BUN SERPL-MCNC: 15 MG/DL — SIGNIFICANT CHANGE UP (ref 8–20)
CALCIUM SERPL-MCNC: 9.3 MG/DL — SIGNIFICANT CHANGE UP (ref 8.6–10.2)
CHLORIDE SERPL-SCNC: 90 MMOL/L — LOW (ref 98–107)
CO2 SERPL-SCNC: 38 MMOL/L — HIGH (ref 22–29)
CREAT SERPL-MCNC: 0.69 MG/DL — SIGNIFICANT CHANGE UP (ref 0.5–1.3)
GLUCOSE BLDC GLUCOMTR-MCNC: 110 MG/DL — HIGH (ref 70–99)
GLUCOSE BLDC GLUCOMTR-MCNC: 125 MG/DL — HIGH (ref 70–99)
GLUCOSE BLDC GLUCOMTR-MCNC: 141 MG/DL — HIGH (ref 70–99)
GLUCOSE BLDC GLUCOMTR-MCNC: 162 MG/DL — HIGH (ref 70–99)
GLUCOSE BLDC GLUCOMTR-MCNC: 244 MG/DL — HIGH (ref 70–99)
GLUCOSE SERPL-MCNC: 92 MG/DL — SIGNIFICANT CHANGE UP (ref 70–99)
HCT VFR BLD CALC: 44.1 % — SIGNIFICANT CHANGE UP (ref 39–50)
HGB BLD-MCNC: 13.9 G/DL — SIGNIFICANT CHANGE UP (ref 13–17)
MAGNESIUM SERPL-MCNC: 2.3 MG/DL — SIGNIFICANT CHANGE UP (ref 1.6–2.6)
MCHC RBC-ENTMCNC: 26.1 PG — LOW (ref 27–34)
MCHC RBC-ENTMCNC: 31.5 GM/DL — LOW (ref 32–36)
MCV RBC AUTO: 82.7 FL — SIGNIFICANT CHANGE UP (ref 80–100)
PHOSPHATE SERPL-MCNC: 3.9 MG/DL — SIGNIFICANT CHANGE UP (ref 2.4–4.7)
PLATELET # BLD AUTO: 489 K/UL — HIGH (ref 150–400)
POTASSIUM SERPL-MCNC: 3.8 MMOL/L — SIGNIFICANT CHANGE UP (ref 3.5–5.3)
POTASSIUM SERPL-SCNC: 3.8 MMOL/L — SIGNIFICANT CHANGE UP (ref 3.5–5.3)
RBC # BLD: 5.33 M/UL — SIGNIFICANT CHANGE UP (ref 4.2–5.8)
RBC # FLD: 13.7 % — SIGNIFICANT CHANGE UP (ref 10.3–14.5)
SODIUM SERPL-SCNC: 136 MMOL/L — SIGNIFICANT CHANGE UP (ref 135–145)
WBC # BLD: 20.72 K/UL — HIGH (ref 3.8–10.5)
WBC # FLD AUTO: 20.72 K/UL — HIGH (ref 3.8–10.5)

## 2020-07-10 PROCEDURE — 99232 SBSQ HOSP IP/OBS MODERATE 35: CPT

## 2020-07-10 RX ADMIN — Medication 6 UNIT(S): at 08:35

## 2020-07-10 RX ADMIN — Medication 10 MILLIGRAM(S): at 21:16

## 2020-07-10 RX ADMIN — INSULIN GLARGINE 16 UNIT(S): 100 INJECTION, SOLUTION SUBCUTANEOUS at 21:16

## 2020-07-10 RX ADMIN — BUMETANIDE 2 MILLIGRAM(S): 0.25 INJECTION INTRAMUSCULAR; INTRAVENOUS at 05:11

## 2020-07-10 RX ADMIN — PANTOPRAZOLE SODIUM 40 MILLIGRAM(S): 20 TABLET, DELAYED RELEASE ORAL at 05:10

## 2020-07-10 RX ADMIN — Medication 20 MILLIGRAM(S): at 05:11

## 2020-07-10 RX ADMIN — Medication 6 UNIT(S): at 17:52

## 2020-07-10 RX ADMIN — SODIUM CHLORIDE 3 MILLILITER(S): 9 INJECTION INTRAMUSCULAR; INTRAVENOUS; SUBCUTANEOUS at 13:34

## 2020-07-10 RX ADMIN — BUMETANIDE 2 MILLIGRAM(S): 0.25 INJECTION INTRAMUSCULAR; INTRAVENOUS at 17:52

## 2020-07-10 RX ADMIN — SODIUM CHLORIDE 3 MILLILITER(S): 9 INJECTION INTRAMUSCULAR; INTRAVENOUS; SUBCUTANEOUS at 21:16

## 2020-07-10 RX ADMIN — Medication 6 UNIT(S): at 13:46

## 2020-07-10 RX ADMIN — Medication 2: at 13:46

## 2020-07-10 RX ADMIN — ATORVASTATIN CALCIUM 10 MILLIGRAM(S): 80 TABLET, FILM COATED ORAL at 21:16

## 2020-07-10 RX ADMIN — SODIUM CHLORIDE 3 MILLILITER(S): 9 INJECTION INTRAMUSCULAR; INTRAVENOUS; SUBCUTANEOUS at 05:17

## 2020-07-10 NOTE — CONSULT NOTE ADULT - REASON FOR ADMISSION
Transfer from [place] with constrictive pericarditis.
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

## 2020-07-10 NOTE — CONSULT NOTE ADULT - CONSULT REASON
bx of RP softissue infiltartive mass
infiltrative soft tissue mass.
Recurrent pericardial effusion/
diabetes
Evaluation for permanent pacemaker
r/o retroperitoneal fibrosis, bx

## 2020-07-10 NOTE — PROGRESS NOTE ADULT - ASSESSMENT
44 year old male with PMH cardiomegaly, DM II, HTN, and recurrent pericardial effusions with pericardiocentesis x2 ( 700ml serous drainage both times). He subsequently underwent a subxiphoid pericardial window and pericardial biopsy on 9/30/19 at [facility] with Dr. Peters .  Postoperative course complicated by AF/SVT which converted to SR.  Was found to be + lymes on that admission and has completed his course of IV and PO antibiotics.  Patient then presented to the ER on 11/12/19 from Dr. Castro office s/p in office TTE that showed an increased pericardial effusion. Patient found to have infiltrate on Chest CT performed 11/13/19 that is concerning for infiltrative process / neoplastic disease, hematology/ oncology recommend ID follow up as outpatient.  CT scan of abdomen pelvis with contrast on 11/14/19 showed infiltrative process progressing to the retroperitoneal soft tissue surrounding both kidneys causing moderate bilateral hydronephrosis. On 11/15/19 he underwent IR pericardial drain placement for 55ml of bloody drainage.  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 margoth. hydronephrosis, fibrosis prox. SMA, RIAN, margoth. renal artery, resulting luminal narrowing.  He then underwent a Right and left cardiac cath on 11/18/19 that showed normal coronaries, increased wedge pressure 24, no evidence of constriction or restriction.  His pericardial AI was removed 11/19/19 and he was discharged home.  He was following with his cardiologist (Dr. Mcginnis) and a rheumatologist as an outpatient.    He presented to [facility] sent from his cardiologists office with c/o SOB on minimal exertion.  Pt reports that he gets SOB and fatigued with minimal exertion such as doing ADL's and even while talking. He was ruled out for an MI. CT chest showed stable moderate bilateral pleural effusions with atelectasis, as well as a small stable pericardial effusion with adjacent inflammatory change and small loculated fluid along the right atrial border that could correlate with pericarditis.  6/30/20 he underwent a TTE and right and left heart cath at [facility]. Patient was transferred to [facility] for evaluation of recurrent pericardial effusion /constrictive pericarditis /pericardial stripping.    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  - Urology not recommend retroperitoneal bx at this time.     #Hydronephrosis  - Moderate hydronephrosis noted on prior admission.  -  input appreciated, no acute intervention required,  signed off.     #Pleural effusion  - s/p pigtail     #Heart block AV second degree (mobitz 2)  - ep consult appreciated - periods of nocturnal bradycardia with short pauses which are consistent with vagal events (PP prolongation, UT prolongation, gradual slowing and resumption). He is overweight and has had periods of PND which may be secondary to WALKER or CHF. No indication for pacemaker at this time  - tele monitor     #HTN- essential-> however currently normotensive   - bumex (holding betablocker 2/2 missed qrs beats-> mobitz 2 on tele monitor)  - monitor blood pressure     #Type 2 diabetes  - a1c 8.9 on admit   - iss   - lantus w/ premeal insulin     #Leukocytosis  - trend and monitor for fever- hold off abx for now given stable  - currently on steroid taper which may affect wbc count as well     #DVT prophylaxis  - venodynes and ambulation

## 2020-07-10 NOTE — CONSULT NOTE ADULT - SUBJECTIVE AND OBJECTIVE BOX
relevant chart and imaging reviewed.    patient is a 45yMale presented with  PET showing infiltrative process in pelvis with FDG activity.     Atherosclerosis of native coronary artery without angina pectoris  No pertinent family history in first degree relatives  Handoff  MEWS Score  Hydronephrosis  Pericardial effusion  Lyme disease  Pericarditis  Heart failure  Cardiomegaly  Nonsmoker  Diabetes mellitus  Hypertension  Leukocytosis  Pleural effusion  Heart block AV second degree  Prophylactic measure  Essential hypertension  Type 2 diabetes mellitus without complication, without long-term current use of insulin  Retroperitoneal fibrosis  Hydronephrosis, unspecified hydronephrosis type  Lyme disease  Chronic constrictive pericarditis, unspecified type  S/P pericardial window creation  S/P pericardiocentesis  No significant past surgical history  ATHSCL HEART DISEASE OF NATIVE      Allergies    No Known Allergies    Intolerances    [facility] patient (Unknown)      HPI:  44 year old male with PMH cardiomegaly, DM II, HTN, and recurrent pericardial effusions with pericardiocentesis x2 ( 700ml serous drainage both times). He subsequently underwent a subxiphoid pericardial window and pericardial biopsy on 9/30/19 at [facility] with Dr. Peters .  Postoperative course complicated by AF/SVT which converted to SR.  Was found to be + lymes on that admission and has completed his course of IV and PO antibiotics.  Patient then presented to the ER on 11/12 from Dr. Castro office s/p in office TTE that showed an increased pericardial effusion. Patient found to have infiltrate on Chest CT performed 11/13 that is concerning for infiltrative process / neoplastic disease, hematology/ oncology recommend ID follow up as outpatient.  Ct scan of abdomen pelvis with contrast on 11/14 show infiltrative process progressing to the retroperitoneal soft tissue surrounding both kidneys causing moderate bilateral hydronephrosis. ON 11/15 he underwent IR pericardial drain placement for 55ml of bloody drainage.  At that time, ID was consulted regarding infiltrative process on CT, and it was deemed likely not infectious.  Hem/onc recalled and Rheumatology consulted.  An Abd/Renal MRI was done that showed retroperitoneal fibrosis, mod. margoth. hydronephrosis, fibrosis prox. SMA, RIAN, margoth. renal artery, resulting luminal narrowing.  He then underwent a Right and left cardiac cath on 11/18 that showed normal coronaries, increased wedge pressure 24, no evidence of constriction or restriction.  His drain was removed 11/19 pericardial AI removed and he was discharged home.  He was following with his cardiologist (Dr. Mcginnis) and a rheumatologist as an outpatient.    He presented to [facility] 6/29 sent from his cardiologists office with c/o SOB on minimal exertion.  Pt reports that he gets SOB and fatigued with minimal exertion such as doing ADL's and even while talking. He was ruled out for an dMI. CT chest showed stable moderate bilateral pleural effusions with atelectasis, as well as a small stable pericardial effusion with adjacent inflammatory change and small loculated fluid along the right atrial border that could correlate with pericarditis.   He underwent a TTE and right and left heart cath at [facility] but the reports are not available.  He was transferred to [facility] today for evaluation of constrictive pericarditis /pericardial stripping.    Pt is currently sitting up in bed eating dinner.  Denies CP.  Mild SOB noted while pt is speaking to me.  NAD noted. (30 Jun 2020 17:47)      T(C): 36.6 (07-10-20 @ 15:40), Max: 36.7 (07-09-20 @ 17:23)  HR: 89 (07-10-20 @ 15:40) (69 - 93)  BP: 123/76 (07-10-20 @ 15:40) (115/75 - 128/79)  RR: 18 (07-10-20 @ 15:40) (18 - 18)  SpO2: 99% (07-10-20 @ 15:40) (97% - 99%)        07-10    136  |  90<L>  |  15.0  ----------------------------<  92  3.8   |  38.0<H>  |  0.69    Ca    9.3      10 Jul 2020 06:37  Phos  3.9     07-10  Mg     2.3     07-10                              13.9   20.72 )-----------( 489      ( 10 Jul 2020 06:37 )             44.1       Imaging shows:  see above    Plan:  plan to bx RP infiltrative mass with anesthesia on Monday, please make NPO MN sunday and hold any am anticoagulants for monday

## 2020-07-10 NOTE — CHART NOTE - NSCHARTNOTEFT_GEN_A_CORE
The patient's JAMES, cardiac MRI and CATH were reviewed.  Discussed with Dr. Richards who also reviewed with Dr Zenia Power at [facility].  The only nonspecific findings on JAMES and cardiac MRI consistent with constrictive paracardiac this is a septal bounce. This is a nonspecific finding. The dominant criteria for constriction are not satisfied. The patient does not have hemodynamically significant constrictive pericarditis. The inflammatory process involving the retroperitoneum, surrounding the aorta, including the pericardium needs to be addressed systemically. The patient will now undergo pericardial stripping.

## 2020-07-10 NOTE — PROGRESS NOTE ADULT - ASSESSMENT
45y M w/ recurrent pericarditis/effusion s/p subxiphoid window here for pericardial stripping due to recurrent symptoms. History of infiltrative mass resulting in luminal narrowing of renal arteries, SMA/RIAN questionable for IgG4- related disease, scant <10 IgG4 plasma cells on biopsy of pericardium. +DREAD with low-titer Scl70 noted as well in previous w/u.  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 Now s/p PET scan-pending read.     - Will f.u on PET scan read  - c/w prednisone 20mg po daily  - consider bx of soft tissue infiltrative mass in abdomen for more adequate specimen  - agree w/ cardiology mgmt for effusion    Call for any questions    Sara Figueroa DO  [phone]

## 2020-07-10 NOTE — PROGRESS NOTE ADULT - SUBJECTIVE AND OBJECTIVE BOX
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 (10 Jul 2020 08:23)      Patient seen and examined at bedside. No overnight events reported.     ALLERGIES:  No Known Allergies  [facility] patient (Unknown)    MEDICATIONS  (STANDING):  atorvastatin 10 milliGRAM(s) Oral at bedtime  buMETAnide Injectable 2 milliGRAM(s) IV Push two times a day  dextrose 5%. 1000 milliLiter(s) (50 mL/Hr) IV Continuous <Continuous>  dextrose 50% Injectable 12.5 Gram(s) IV Push once  dextrose 50% Injectable 25 Gram(s) IV Push once  dextrose 50% Injectable 25 Gram(s) IV Push once  insulin glargine Injectable (LANTUS) 16 Unit(s) SubCutaneous at bedtime  insulin lispro (HumaLOG) corrective regimen sliding scale   SubCutaneous four times a day before meals  insulin lispro Injectable (HumaLOG) 6 Unit(s) SubCutaneous three times a day before meals  melatonin 10 milliGRAM(s) Oral at bedtime  pantoprazole    Tablet 40 milliGRAM(s) Oral before breakfast  polyethylene glycol 3350 17 Gram(s) Oral at bedtime  predniSONE   Tablet 20 milliGRAM(s) Oral daily  senna 2 Tablet(s) Oral at bedtime  sodium chloride 0.9% lock flush 3 milliLiter(s) IV Push every 8 hours    MEDICATIONS  (PRN):  acetaminophen   Tablet .. 650 milliGRAM(s) Oral every 6 hours PRN Mild Pain (1 - 3), Moderate Pain (4 - 6)  dextrose 40% Gel 15 Gram(s) Oral once PRN Blood Glucose LESS THAN 70 milliGRAM(s)/deciliter  glucagon  Injectable 1 milliGRAM(s) IntraMuscular once PRN Glucose LESS THAN 70 milligrams/deciliter    Vital Signs Last 24 Hrs  T(F): 97.5 (10 Jul 2020 09:29), Max: 98 (09 Jul 2020 17:23)  HR: 92 (10 Jul 2020 09:29) (69 - 93)  BP: 115/75 (10 Jul 2020 09:29) (115/75 - 128/79)  RR: 18 (10 Jul 2020 09:29) (18 - 18)  SpO2: 97% (10 Jul 2020 09:29) (97% - 98%)  I&O's Summary    09 Jul 2020 07:01  -  10 Jul 2020 07:00  --------------------------------------------------------  IN: 1280 mL / OUT: 3700 mL / NET: -2420 mL    10 Jul 2020 07:01  -  10 Jul 2020 14:01  --------------------------------------------------------  IN: 0 mL / OUT: 2000 mL / NET: -2000 mL    PHYSICAL EXAM:  General: NAD, Alert  ENT: MMM, no thrush  Neck: Supple, No JVD  Lungs: cta b/l no wheezing  Cardio: +s1/s2 +edema b/l le  Abdomen: Soft, Nontender, Nondistended; Bowel sounds present  Extremities: No calf tenderness      LABS:                        13.9   20.72 )-----------( 489      ( 10 Jul 2020 06:37 )             44.1     07-10    136  |  90  |  15.0  ----------------------------<  92  3.8   |  38.0  |  0.69    Ca    9.3      10 Jul 2020 06:37  Phos  3.9     07-10  Mg     2.3     07-10    eGFR if : 133 mL/min/1.73M2 (07-10-20 @ 06:37)  eGFR if Non African American: 115 mL/min/1.73M2 (07-10-20 @ 06:37)    Glucose  POCT Blood Glucose.: 162 mg/dL (10 Jul 2020 13:29)  POCT Blood Glucose.: 244 mg/dL (10 Jul 2020 12:15)  POCT Blood Glucose.: 125 mg/dL (10 Jul 2020 08:15)  POCT Blood Glucose.: 176 mg/dL (09 Jul 2020 21:19)  POCT Blood Glucose.: 118 mg/dL (09 Jul 2020 17:11)    RADIOLOGY & ADDITIONAL TESTS:  < from: NM PET/CT Onc FDG Skull to Thigh, Inital (07.08.20 @ 16:39) >  1.  Infiltrative FDG avid soft tissue thickening involving the pericardium and aorta, retrocrural regions, perinephric, and retroperitoneum/peritoneal regions, indeterminate. Differential considerations include fibrosing/sclerosing and autoimmune conditions. Findings may be due to IgG4 disease, however are not definitive due to absent pancreatic and submandibular FDG avidity which is typically seen with IgG4 disease. Right retrocrural soft tissue is the most FDG avid.    2.  Bilateral perinephric infiltrative soft tissue causing bilateral hydronephrosis. Consider further evaluation with nuclear medicine diuretic renal scan to evaluate for obstruction.    3.  Difficult to delineate FDG avid foci left L4 and bilateral S1 neural foramina, right greater than left possibly representing infiltrative process involving the nerve roots. Suggest clinical correlation and possibly evaluation with MRI.    4.  Small loculated hydropneumothorax, probably due to recent pericardial biopsy/pericardial window. Clinical correlation suggested.    5.  FDG avid left level IIb cervical lymph node, indeterminate.    6.  FDG avid bilateral maxillary sinus opacification possibly acute sinusitis. Clinical correlation suggested.    < end of copied text >      Care Discussed with Consultants/Other Providers:   Rheumatology

## 2020-07-10 NOTE — PROGRESS NOTE ADULT - SUBJECTIVE AND OBJECTIVE BOX
Patient seen and examined at bedside. No acute overnight events. s/p PET scan- pending read.     MEDICATIONS  (STANDING):  atorvastatin 10 milliGRAM(s) Oral at bedtime  buMETAnide Injectable 2 milliGRAM(s) IV Push two times a day  dextrose 5%. 1000 milliLiter(s) (50 mL/Hr) IV Continuous <Continuous>  dextrose 50% Injectable 12.5 Gram(s) IV Push once  dextrose 50% Injectable 25 Gram(s) IV Push once  dextrose 50% Injectable 25 Gram(s) IV Push once  insulin glargine Injectable (LANTUS) 16 Unit(s) SubCutaneous at bedtime  insulin lispro (HumaLOG) corrective regimen sliding scale   SubCutaneous four times a day before meals  insulin lispro Injectable (HumaLOG) 6 Unit(s) SubCutaneous three times a day before meals  melatonin 10 milliGRAM(s) Oral at bedtime  pantoprazole    Tablet 40 milliGRAM(s) Oral before breakfast  polyethylene glycol 3350 17 Gram(s) Oral at bedtime  predniSONE   Tablet 20 milliGRAM(s) Oral daily  senna 2 Tablet(s) Oral at bedtime  sodium chloride 0.9% lock flush 3 milliLiter(s) IV Push every 8 hours    MEDICATIONS  (PRN):  acetaminophen   Tablet .. 650 milliGRAM(s) Oral every 6 hours PRN Mild Pain (1 - 3), Moderate Pain (4 - 6)  dextrose 40% Gel 15 Gram(s) Oral once PRN Blood Glucose LESS THAN 70 milliGRAM(s)/deciliter  glucagon  Injectable 1 milliGRAM(s) IntraMuscular once PRN Glucose LESS THAN 70 milligrams/deciliter    Vital Signs Last 24 Hrs  T(C): 36.6 (10 Jul 2020 05:06), Max: 36.7 (09 Jul 2020 17:23)  T(F): 97.8 (10 Jul 2020 05:06), Max: 98 (09 Jul 2020 17:23)  HR: 88 (10 Jul 2020 05:06) (69 - 93)  BP: 116/81 (10 Jul 2020 05:06) (116/79 - 128/79)  BP(mean): --  RR: 18 (10 Jul 2020 05:06) (17 - 18)  SpO2: 98% (10 Jul 2020 05:06) (98% - 98%)    Constitutional:  NAD  Eyes: EOMI  ENMT: MMM, no oral/nasal ulcers	  Neck: : supple  Respiratory: +BL BS  Cardiovascular: S1S2 RRR  Gastrointestinal: soft, NTND +BS  Genitourinary: normal  Vascular: 2+ PT, radial  Neurological: no FND  Skin: no rash  Musculoskeletal: no joint tenderness or synovitis  Psychiatric: appropriate      LABS:    CBC Full  -  ( 10 Jul 2020 06:37 )  WBC Count : 20.72 K/uL  RBC Count : 5.33 M/uL  Hemoglobin : 13.9 g/dL  Hematocrit : 44.1 %  Platelet Count - Automated : 489 K/uL  Mean Cell Volume : 82.7 fl  Mean Cell Hemoglobin : 26.1 pg  Mean Cell Hemoglobin Concentration : 31.5 gm/dL  Auto Neutrophil # : x  Auto Lymphocyte # : x  Auto Monocyte # : x  Auto Eosinophil # : x  Auto Basophil # : x  Auto Neutrophil % : x  Auto Lymphocyte % : x  Auto Monocyte % : x  Auto Eosinophil % : x  Auto Basophil % : x                          14.0   21.60 )-----------( 565      ( 09 Jul 2020 07:35 )             44.3     07-09    138  |  88  |  15.0  ----------------------------<  165  3.0   |  38.0  |  0.81    Ca    9.1      09 Jul 2020 07:31  Phos  4.0     07-07  Mg     2.2     07-07    eGFR if Non : 107 mL/min/1.73M2 (07-09-20 @ 07:31)  eGFR if African American: 124 mL/min/1.73M2 (07-09-20 @ 07:31)    Cultures  Culture - Fungal, Body Fluid (collected 03 Jul 2020 03:50)  Source: .Body Fluid Pleural Fluid  Preliminary Report (06 Jul 2020 07:38):    Testing in progress    Culture - Body Fluid with Gram Stain (collected 03 Jul 2020 03:50)  Source: .Body Fluid Pleural Fluid  Gram Stain (03 Jul 2020 07:33):    polymorphonuclear leukocytes seen    No organisms seen    by cytocentrifuge  Final Report (08 Jul 2020 12:08):    No growth at 5 days

## 2020-07-11 LAB
GLUCOSE BLDC GLUCOMTR-MCNC: 107 MG/DL — HIGH (ref 70–99)
GLUCOSE BLDC GLUCOMTR-MCNC: 139 MG/DL — HIGH (ref 70–99)
GLUCOSE BLDC GLUCOMTR-MCNC: 232 MG/DL — HIGH (ref 70–99)
GLUCOSE BLDC GLUCOMTR-MCNC: 239 MG/DL — HIGH (ref 70–99)

## 2020-07-11 PROCEDURE — 99232 SBSQ HOSP IP/OBS MODERATE 35: CPT

## 2020-07-11 RX ADMIN — Medication 20 MILLIGRAM(S): at 05:11

## 2020-07-11 RX ADMIN — BUMETANIDE 2 MILLIGRAM(S): 0.25 INJECTION INTRAMUSCULAR; INTRAVENOUS at 16:56

## 2020-07-11 RX ADMIN — SODIUM CHLORIDE 3 MILLILITER(S): 9 INJECTION INTRAMUSCULAR; INTRAVENOUS; SUBCUTANEOUS at 06:47

## 2020-07-11 RX ADMIN — PANTOPRAZOLE SODIUM 40 MILLIGRAM(S): 20 TABLET, DELAYED RELEASE ORAL at 05:11

## 2020-07-11 RX ADMIN — ATORVASTATIN CALCIUM 10 MILLIGRAM(S): 80 TABLET, FILM COATED ORAL at 21:12

## 2020-07-11 RX ADMIN — SODIUM CHLORIDE 3 MILLILITER(S): 9 INJECTION INTRAMUSCULAR; INTRAVENOUS; SUBCUTANEOUS at 21:13

## 2020-07-11 RX ADMIN — SODIUM CHLORIDE 3 MILLILITER(S): 9 INJECTION INTRAMUSCULAR; INTRAVENOUS; SUBCUTANEOUS at 08:46

## 2020-07-11 RX ADMIN — SODIUM CHLORIDE 3 MILLILITER(S): 9 INJECTION INTRAMUSCULAR; INTRAVENOUS; SUBCUTANEOUS at 05:10

## 2020-07-11 RX ADMIN — Medication 6 UNIT(S): at 16:56

## 2020-07-11 RX ADMIN — Medication 10 MILLIGRAM(S): at 21:12

## 2020-07-11 RX ADMIN — Medication 6 UNIT(S): at 12:34

## 2020-07-11 RX ADMIN — INSULIN GLARGINE 16 UNIT(S): 100 INJECTION, SOLUTION SUBCUTANEOUS at 21:12

## 2020-07-11 RX ADMIN — Medication 6 UNIT(S): at 08:58

## 2020-07-11 RX ADMIN — Medication 4: at 16:56

## 2020-07-11 RX ADMIN — BUMETANIDE 2 MILLIGRAM(S): 0.25 INJECTION INTRAMUSCULAR; INTRAVENOUS at 05:11

## 2020-07-11 RX ADMIN — Medication 4: at 12:34

## 2020-07-11 NOTE — PROGRESS NOTE ADULT - ASSESSMENT
44 year old male with PMH cardiomegaly, DM II, HTN, and recurrent pericardial effusions with pericardiocentesis x2 ( 700ml serous drainage both times). He subsequently underwent a subxiphoid pericardial window and pericardial biopsy on 9/30/19 at [facility] with Dr. Peters .  Postoperative course complicated by AF/SVT which converted to SR.  Was found to be + lymes on that admission and has completed his course of IV and PO antibiotics.  Patient then presented to the ER on 11/12/19 from Dr. Castro office s/p in office TTE that showed an increased pericardial effusion. Patient found to have infiltrate on Chest CT performed 11/13/19 that is concerning for infiltrative process / neoplastic disease, hematology/ oncology recommend ID follow up as outpatient.  CT scan of abdomen pelvis with contrast on 11/14/19 showed infiltrative process progressing to the retroperitoneal soft tissue surrounding both kidneys causing moderate bilateral hydronephrosis. On 11/15/19 he underwent IR pericardial drain placement for 55ml of bloody drainage.  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 margoth. hydronephrosis, fibrosis prox. SMA, RIAN, margoth. renal artery, resulting luminal narrowing.  He then underwent a Right and left cardiac cath on 11/18/19 that showed normal coronaries, increased wedge pressure 24, no evidence of constriction or restriction.  His pericardial AI was removed 11/19/19 and he was discharged home.  He was following with his cardiologist (Dr. Mcginnis) and a rheumatologist as an outpatient.    He presented to [facility] 6/29 sent from his cardiologists office with c/o SOB on minimal exertion.  Pt reports that he gets SOB and fatigued with minimal exertion such as doing ADL's and even while talking. He was ruled out for an MI. CT chest showed stable moderate bilateral pleural effusions with atelectasis, as well as a small stable pericardial effusion with adjacent inflammatory change and small loculated fluid along the right atrial border that could correlate with pericarditis.  6/30/20 he underwent a TTE and right and left heart cath at [facility]. Patient was transferred to [facility] for evaluation of recurrent pericardial effusion /constrictive pericarditis /pericardial stripping.    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  - Urology not recommend retroperitoneal bx at this time.     #Hydronephrosis  - Moderate hydronephrosis noted on prior admission.  -  input appreciated, no acute intervention required,  signed off.     #Pleural effusion  - s/p pigtail     #Heart block AV second degree (mobitz 2)  - ep consult appreciated - periods of nocturnal bradycardia with short pauses which are consistent with vagal events (PP prolongation, SD prolongation, gradual slowing and resumption). He is overweight and has had periods of PND which may be secondary to WALKER or CHF. No indication for pacemaker at this time  - tele monitor     #HTN- essential-> however currently normotensive   - bumex (holding betablocker 2/2 missed qrs beats-> mobitz 2 on tele monitor)  - monitor blood pressure     #Type 2 diabetes  - a1c 8.9 on admit   - iss   - lantus w/ premeal insulin     #Leukocytosis  - trend and monitor for fever- hold off abx for now given stable  - currently on steroid taper which may affect wbc count as well     #DVT prophylaxis  - venodynes and ambulation

## 2020-07-11 NOTE — PROGRESS NOTE ADULT - SUBJECTIVE AND OBJECTIVE BOX
Patient is a 45y old  Male who presents with a chief complaint of transfer from [place] with constrictive pericarditis. (10 Jul 2020 17:10)    Patient seen and examined at bedside.     ALLERGIES:  No Known Allergies  [facility] patient (Unknown)    MEDICATIONS  (STANDING):  atorvastatin 10 milliGRAM(s) Oral at bedtime  buMETAnide Injectable 2 milliGRAM(s) IV Push two times a day  dextrose 5%. 1000 milliLiter(s) (50 mL/Hr) IV Continuous <Continuous>  dextrose 50% Injectable 12.5 Gram(s) IV Push once  dextrose 50% Injectable 25 Gram(s) IV Push once  dextrose 50% Injectable 25 Gram(s) IV Push once  insulin glargine Injectable (LANTUS) 16 Unit(s) SubCutaneous at bedtime  insulin lispro (HumaLOG) corrective regimen sliding scale   SubCutaneous four times a day before meals  insulin lispro Injectable (HumaLOG) 6 Unit(s) SubCutaneous three times a day before meals  melatonin 10 milliGRAM(s) Oral at bedtime  pantoprazole    Tablet 40 milliGRAM(s) Oral before breakfast  polyethylene glycol 3350 17 Gram(s) Oral at bedtime  predniSONE   Tablet 20 milliGRAM(s) Oral daily  senna 2 Tablet(s) Oral at bedtime  sodium chloride 0.9% lock flush 3 milliLiter(s) IV Push every 8 hours    MEDICATIONS  (PRN):  acetaminophen   Tablet .. 650 milliGRAM(s) Oral every 6 hours PRN Mild Pain (1 - 3), Moderate Pain (4 - 6)  dextrose 40% Gel 15 Gram(s) Oral once PRN Blood Glucose LESS THAN 70 milliGRAM(s)/deciliter  glucagon  Injectable 1 milliGRAM(s) IntraMuscular once PRN Glucose LESS THAN 70 milligrams/deciliter    Vital Signs Last 24 Hrs  T(F): 98.4 (11 Jul 2020 09:20), Max: 98.4 (11 Jul 2020 09:20)  HR: 94 (11 Jul 2020 09:20) (86 - 95)  BP: 102/68 (11 Jul 2020 09:20) (102/68 - 123/81)  RR: 18 (11 Jul 2020 09:20) (18 - 18)  SpO2: 97% (11 Jul 2020 09:20) (96% - 99%)  I&O's Summary    10 Jul 2020 07:01  -  11 Jul 2020 07:00  --------------------------------------------------------  IN: 220 mL / OUT: 3450 mL / NET: -3230 mL    PHYSICAL EXAM:  General: NAD, Alert  ENT: MMM, no thrush  Neck: Supple, No JVD  Lungs: cta b/l no wheezing  Cardio: +s1/s2 +edema b/l le  Abdomen: Soft, Nontender, Nondistended; Bowel sounds present  Extremities: No calf tenderness      LABS:                        13.9   20.72 )-----------( 489      ( 10 Jul 2020 06:37 )             44.1     07-10    136  |  90  |  15.0  ----------------------------<  92  3.8   |  38.0  |  0.69    Ca    9.3      10 Jul 2020 06:37  Phos  3.9     07-10  Mg     2.3     07-10    eGFR if : 133 mL/min/1.73M2 (07-10-20 @ 06:37)  eGFR if Non African American: 115 mL/min/1.73M2 (07-10-20 @ 06:37)    Glucose  POCT Blood Glucose.: 107 mg/dL (11 Jul 2020 08:13)  POCT Blood Glucose.: 141 mg/dL (10 Jul 2020 21:10)  POCT Blood Glucose.: 110 mg/dL (10 Jul 2020 17:19)  POCT Blood Glucose.: 162 mg/dL (10 Jul 2020 13:29)  POCT Blood Glucose.: 244 mg/dL (10 Jul 2020 12:15)    RADIOLOGY & ADDITIONAL TESTS:  - no new tests

## 2020-07-12 LAB
GLUCOSE BLDC GLUCOMTR-MCNC: 113 MG/DL — HIGH (ref 70–99)
GLUCOSE BLDC GLUCOMTR-MCNC: 174 MG/DL — HIGH (ref 70–99)
GLUCOSE BLDC GLUCOMTR-MCNC: 176 MG/DL — HIGH (ref 70–99)
GLUCOSE BLDC GLUCOMTR-MCNC: 212 MG/DL — HIGH (ref 70–99)

## 2020-07-12 PROCEDURE — 99232 SBSQ HOSP IP/OBS MODERATE 35: CPT

## 2020-07-12 RX ADMIN — Medication 4: at 22:51

## 2020-07-12 RX ADMIN — PANTOPRAZOLE SODIUM 40 MILLIGRAM(S): 20 TABLET, DELAYED RELEASE ORAL at 05:12

## 2020-07-12 RX ADMIN — Medication 2: at 11:49

## 2020-07-12 RX ADMIN — BUMETANIDE 2 MILLIGRAM(S): 0.25 INJECTION INTRAMUSCULAR; INTRAVENOUS at 05:12

## 2020-07-12 RX ADMIN — SODIUM CHLORIDE 3 MILLILITER(S): 9 INJECTION INTRAMUSCULAR; INTRAVENOUS; SUBCUTANEOUS at 16:05

## 2020-07-12 RX ADMIN — Medication 10 MILLIGRAM(S): at 22:43

## 2020-07-12 RX ADMIN — INSULIN GLARGINE 16 UNIT(S): 100 INJECTION, SOLUTION SUBCUTANEOUS at 23:02

## 2020-07-12 RX ADMIN — BUMETANIDE 2 MILLIGRAM(S): 0.25 INJECTION INTRAMUSCULAR; INTRAVENOUS at 16:01

## 2020-07-12 RX ADMIN — Medication 20 MILLIGRAM(S): at 05:12

## 2020-07-12 RX ADMIN — SODIUM CHLORIDE 3 MILLILITER(S): 9 INJECTION INTRAMUSCULAR; INTRAVENOUS; SUBCUTANEOUS at 05:12

## 2020-07-12 RX ADMIN — Medication 2: at 16:04

## 2020-07-12 RX ADMIN — SODIUM CHLORIDE 3 MILLILITER(S): 9 INJECTION INTRAMUSCULAR; INTRAVENOUS; SUBCUTANEOUS at 22:53

## 2020-07-12 RX ADMIN — ATORVASTATIN CALCIUM 10 MILLIGRAM(S): 80 TABLET, FILM COATED ORAL at 22:43

## 2020-07-12 NOTE — PROGRESS NOTE ADULT - SUBJECTIVE AND OBJECTIVE BOX
Patient is a 45y old  Male who presents with a chief complaint of transfer from [place] with constrictive pericarditis. (11 Jul 2020 10:36)    Patient seen and examined at bedside.     ALLERGIES:  No Known Allergies  [facility] patient (Unknown)    MEDICATIONS  (STANDING):  atorvastatin 10 milliGRAM(s) Oral at bedtime  buMETAnide Injectable 2 milliGRAM(s) IV Push two times a day  dextrose 5%. 1000 milliLiter(s) (50 mL/Hr) IV Continuous <Continuous>  dextrose 50% Injectable 12.5 Gram(s) IV Push once  dextrose 50% Injectable 25 Gram(s) IV Push once  dextrose 50% Injectable 25 Gram(s) IV Push once  insulin glargine Injectable (LANTUS) 16 Unit(s) SubCutaneous at bedtime  insulin lispro (HumaLOG) corrective regimen sliding scale   SubCutaneous four times a day before meals  insulin lispro Injectable (HumaLOG) 6 Unit(s) SubCutaneous three times a day before meals  melatonin 10 milliGRAM(s) Oral at bedtime  pantoprazole    Tablet 40 milliGRAM(s) Oral before breakfast  polyethylene glycol 3350 17 Gram(s) Oral at bedtime  predniSONE   Tablet 20 milliGRAM(s) Oral daily  senna 2 Tablet(s) Oral at bedtime  sodium chloride 0.9% lock flush 3 milliLiter(s) IV Push every 8 hours    MEDICATIONS  (PRN):  acetaminophen   Tablet .. 650 milliGRAM(s) Oral every 6 hours PRN Mild Pain (1 - 3), Moderate Pain (4 - 6)  dextrose 40% Gel 15 Gram(s) Oral once PRN Blood Glucose LESS THAN 70 milliGRAM(s)/deciliter  glucagon  Injectable 1 milliGRAM(s) IntraMuscular once PRN Glucose LESS THAN 70 milligrams/deciliter    Vital Signs Last 24 Hrs  T(F): 97.7 (12 Jul 2020 07:46), Max: 98.5 (11 Jul 2020 15:09)  HR: 97 (12 Jul 2020 07:46) (85 - 97)  BP: 128/85 (12 Jul 2020 07:46) (111/73 - 136/87)  RR: 18 (12 Jul 2020 07:46) (18 - 19)  SpO2: 97% (12 Jul 2020 07:46) (95% - 98%)  I&O's Summary    11 Jul 2020 07:01  -  12 Jul 2020 07:00  --------------------------------------------------------  IN: 1280 mL / OUT: 950 mL / NET: 330 mL    PHYSICAL EXAM:  General: NAD, Alert  ENT: MMM, no thrush  Neck: Supple, No JVD  Lungs: cta b/l no wheezing  Cardio: +s1/s2 +edema b/l le  Abdomen: Soft, Nontender, Nondistended; Bowel sounds present  Extremities: No calf tenderness      LABS:                        13.9   20.72 )-----------( 489      ( 10 Jul 2020 06:37 )             44.1     07-10    136  |  90  |  15.0  ----------------------------<  92  3.8   |  38.0  |  0.69    Ca    9.3      10 Jul 2020 06:37  Phos  3.9     07-10  Mg     2.3     07-10    eGFR if : 133 mL/min/1.73M2 (07-10-20 @ 06:37)  eGFR if Non African American: 115 mL/min/1.73M2 (07-10-20 @ 06:37)    Glucose  POCT Blood Glucose.: 176 mg/dL (12 Jul 2020 11:45)  POCT Blood Glucose.: 113 mg/dL (12 Jul 2020 07:28)  POCT Blood Glucose.: 139 mg/dL (11 Jul 2020 21:10)  POCT Blood Glucose.: 239 mg/dL (11 Jul 2020 16:50)  POCT Blood Glucose.: 232 mg/dL (11 Jul 2020 12:32)    RADIOLOGY & ADDITIONAL TESTS:  - no new tests

## 2020-07-12 NOTE — PROGRESS NOTE ADULT - ASSESSMENT
44 year old male with PMH cardiomegaly, DM II, HTN, and recurrent pericardial effusions with pericardiocentesis x2 ( 700ml serous drainage both times). He subsequently underwent a subxiphoid pericardial window and pericardial biopsy on 9/30/19 at [facility] with Dr. Peters .  Postoperative course complicated by AF/SVT which converted to SR.  Was found to be + lymes on that admission and has completed his course of IV and PO antibiotics.  Patient then presented to the ER on 11/12/19 from Dr. Castro office s/p in office TTE that showed an increased pericardial effusion. Patient found to have infiltrate on Chest CT performed 11/13/19 that is concerning for infiltrative process / neoplastic disease, hematology/ oncology recommend ID follow up as outpatient.  CT scan of abdomen pelvis with contrast on 11/14/19 showed infiltrative process progressing to the retroperitoneal soft tissue surrounding both kidneys causing moderate bilateral hydronephrosis. On 11/15/19 he underwent IR pericardial drain placement for 55ml of bloody drainage.  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 margoth. hydronephrosis, fibrosis prox. SMA, RIAN, margoth. renal artery, resulting luminal narrowing.  He then underwent a Right and left cardiac cath on 11/18/19 that showed normal coronaries, increased wedge pressure 24, no evidence of constriction or restriction.  His pericardial AI was removed 11/19/19 and he was discharged home.  He was following with his cardiologist (Dr. Mcginnis) and a rheumatologist as an outpatient.    He [place] to [facility] [place] sent from his cardiologists office with c/o SOB on minimal exertion.  Pt reports that he gets SOB and fatigued with minimal exertion such as doing ADL's and even while talking. He was ruled out for an MI. CT chest showed stable moderate bilateral pleural effusions with atelectasis, as well as a small stable pericardial effusion with adjacent inflammatory change and small loculated fluid along the right atrial border that could correlate with pericarditis.  6/30/20 he underwent a TTE and right and left heart cath at [facility]. Patient was transferred to [facility] for evaluation of recurrent pericardial effusion /constrictive pericarditis /pericardial stripping.    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  - Urology not recommend retroperitoneal bx at this time.     #Hydronephrosis  - Moderate hydronephrosis noted on prior admission.  -  input appreciated, no acute intervention required,  signed off.     #Pleural effusion  - s/p pigtail     #Heart block AV second degree (mobitz 2)  - ep consult appreciated - periods of nocturnal bradycardia with short pauses which are consistent with vagal events (PP prolongation, OK prolongation, gradual slowing and resumption). He is overweight and has had periods of PND which may be secondary to WALKER or CHF. No indication for pacemaker at this time  - tele monitor     #HTN- essential-> however currently normotensive   - bumex (holding betablocker 2/2 missed qrs beats-> mobitz 2 on tele monitor)  - monitor blood pressure     #Type 2 diabetes  - a1c 8.9 on admit   - iss   - lantus w/ premeal insulin     #Leukocytosis  - trend and monitor for fever- hold off abx for now given stable  - currently on steroid taper which may affect wbc count as well     #DVT prophylaxis  - venodynes and ambulation

## 2020-07-13 ENCOUNTER — TRANSCRIPTION ENCOUNTER (OUTPATIENT)
Age: 45
End: 2020-07-13

## 2020-07-13 ENCOUNTER — RESULT REVIEW (OUTPATIENT)
Age: 45
End: 2020-07-13

## 2020-07-13 VITALS
RESPIRATION RATE: 18 BRPM | TEMPERATURE: 98 F | HEART RATE: 96 BPM | SYSTOLIC BLOOD PRESSURE: 116 MMHG | DIASTOLIC BLOOD PRESSURE: 83 MMHG | OXYGEN SATURATION: 96 %

## 2020-07-13 PROBLEM — A69.20 LYME DISEASE, UNSPECIFIED: Chronic | Status: ACTIVE | Noted: 2020-06-30

## 2020-07-13 PROBLEM — I31.3 PERICARDIAL EFFUSION (NONINFLAMMATORY): Chronic | Status: ACTIVE | Noted: 2020-06-30

## 2020-07-13 PROBLEM — I31.9 DISEASE OF PERICARDIUM, UNSPECIFIED: Chronic | Status: ACTIVE | Noted: 2020-06-30

## 2020-07-13 PROBLEM — N13.30 UNSPECIFIED HYDRONEPHROSIS: Chronic | Status: ACTIVE | Noted: 2020-06-30

## 2020-07-13 LAB
ANION GAP SERPL CALC-SCNC: 14 MMOL/L — SIGNIFICANT CHANGE UP (ref 5–17)
APTT BLD: 33.7 SEC — SIGNIFICANT CHANGE UP (ref 27.5–35.5)
BUN SERPL-MCNC: 19 MG/DL — SIGNIFICANT CHANGE UP (ref 8–20)
CALCIUM SERPL-MCNC: 9.1 MG/DL — SIGNIFICANT CHANGE UP (ref 8.6–10.2)
CHLORIDE SERPL-SCNC: 85 MMOL/L — LOW (ref 98–107)
CO2 SERPL-SCNC: 34 MMOL/L — HIGH (ref 22–29)
CREAT SERPL-MCNC: 0.88 MG/DL — SIGNIFICANT CHANGE UP (ref 0.5–1.3)
GLUCOSE BLDC GLUCOMTR-MCNC: 148 MG/DL — HIGH (ref 70–99)
GLUCOSE BLDC GLUCOMTR-MCNC: 320 MG/DL — HIGH (ref 70–99)
GLUCOSE BLDC GLUCOMTR-MCNC: 426 MG/DL — HIGH (ref 70–99)
GLUCOSE BLDC GLUCOMTR-MCNC: 432 MG/DL — HIGH (ref 70–99)
GLUCOSE SERPL-MCNC: 366 MG/DL — HIGH (ref 70–99)
HCT VFR BLD CALC: 43.7 % — SIGNIFICANT CHANGE UP (ref 39–50)
HGB BLD-MCNC: 13.9 G/DL — SIGNIFICANT CHANGE UP (ref 13–17)
INR BLD: 1.15 RATIO — SIGNIFICANT CHANGE UP (ref 0.88–1.16)
MCHC RBC-ENTMCNC: 25.9 PG — LOW (ref 27–34)
MCHC RBC-ENTMCNC: 31.8 GM/DL — LOW (ref 32–36)
MCV RBC AUTO: 81.5 FL — SIGNIFICANT CHANGE UP (ref 80–100)
PLATELET # BLD AUTO: 471 K/UL — HIGH (ref 150–400)
POTASSIUM SERPL-MCNC: 3.4 MMOL/L — LOW (ref 3.5–5.3)
POTASSIUM SERPL-SCNC: 3.4 MMOL/L — LOW (ref 3.5–5.3)
PROTHROM AB SERPL-ACNC: 13.3 SEC — SIGNIFICANT CHANGE UP (ref 10.6–13.6)
RBC # BLD: 5.36 M/UL — SIGNIFICANT CHANGE UP (ref 4.2–5.8)
RBC # FLD: 13.6 % — SIGNIFICANT CHANGE UP (ref 10.3–14.5)
SODIUM SERPL-SCNC: 133 MMOL/L — LOW (ref 135–145)
WBC # BLD: 16.59 K/UL — HIGH (ref 3.8–10.5)
WBC # FLD AUTO: 16.59 K/UL — HIGH (ref 3.8–10.5)

## 2020-07-13 PROCEDURE — U0003: CPT

## 2020-07-13 PROCEDURE — 88112 CYTOPATH CELL ENHANCE TECH: CPT

## 2020-07-13 PROCEDURE — 84157 ASSAY OF PROTEIN OTHER: CPT

## 2020-07-13 PROCEDURE — 82962 GLUCOSE BLOOD TEST: CPT

## 2020-07-13 PROCEDURE — 93320 DOPPLER ECHO COMPLETE: CPT

## 2020-07-13 PROCEDURE — 93005 ELECTROCARDIOGRAM TRACING: CPT

## 2020-07-13 PROCEDURE — 80048 BASIC METABOLIC PNL TOTAL CA: CPT

## 2020-07-13 PROCEDURE — 83036 HEMOGLOBIN GLYCOSYLATED A1C: CPT

## 2020-07-13 PROCEDURE — 77012 CT SCAN FOR NEEDLE BIOPSY: CPT

## 2020-07-13 PROCEDURE — 36600 WITHDRAWAL OF ARTERIAL BLOOD: CPT

## 2020-07-13 PROCEDURE — 36415 COLL VENOUS BLD VENIPUNCTURE: CPT

## 2020-07-13 PROCEDURE — 84681 ASSAY OF C-PEPTIDE: CPT

## 2020-07-13 PROCEDURE — 99232 SBSQ HOSP IP/OBS MODERATE 35: CPT

## 2020-07-13 PROCEDURE — 83735 ASSAY OF MAGNESIUM: CPT

## 2020-07-13 PROCEDURE — 82042 OTHER SOURCE ALBUMIN QUAN EA: CPT

## 2020-07-13 PROCEDURE — 86255 FLUORESCENT ANTIBODY SCREEN: CPT

## 2020-07-13 PROCEDURE — 81003 URINALYSIS AUTO W/O SCOPE: CPT

## 2020-07-13 PROCEDURE — 88312 SPECIAL STAINS GROUP 1: CPT | Mod: 26

## 2020-07-13 PROCEDURE — 82787 IGG 1 2 3 OR 4 EACH: CPT

## 2020-07-13 PROCEDURE — 83615 LACTATE (LD) (LDH) ENZYME: CPT

## 2020-07-13 PROCEDURE — 88184 FLOWCYTOMETRY/ TC 1 MARKER: CPT

## 2020-07-13 PROCEDURE — 84295 ASSAY OF SERUM SODIUM: CPT

## 2020-07-13 PROCEDURE — 84165 PROTEIN E-PHORESIS SERUM: CPT

## 2020-07-13 PROCEDURE — 93325 DOPPLER ECHO COLOR FLOW MAPG: CPT

## 2020-07-13 PROCEDURE — 99239 HOSP IP/OBS DSCHRG MGMT >30: CPT

## 2020-07-13 PROCEDURE — 87641 MR-STAPH DNA AMP PROBE: CPT

## 2020-07-13 PROCEDURE — 88305 TISSUE EXAM BY PATHOLOGIST: CPT | Mod: 26

## 2020-07-13 PROCEDURE — 84443 ASSAY THYROID STIM HORMONE: CPT

## 2020-07-13 PROCEDURE — 84134 ASSAY OF PREALBUMIN: CPT

## 2020-07-13 PROCEDURE — 87640 STAPH A DNA AMP PROBE: CPT

## 2020-07-13 PROCEDURE — 86900 BLOOD TYPING SEROLOGIC ABO: CPT

## 2020-07-13 PROCEDURE — 88185 FLOWCYTOMETRY/TC ADD-ON: CPT

## 2020-07-13 PROCEDURE — C8929: CPT

## 2020-07-13 PROCEDURE — 85730 THROMBOPLASTIN TIME PARTIAL: CPT

## 2020-07-13 PROCEDURE — 84100 ASSAY OF PHOSPHORUS: CPT

## 2020-07-13 PROCEDURE — 71260 CT THORAX DX C+: CPT

## 2020-07-13 PROCEDURE — 82803 BLOOD GASES ANY COMBINATION: CPT

## 2020-07-13 PROCEDURE — 87070 CULTURE OTHR SPECIMN AEROBIC: CPT

## 2020-07-13 PROCEDURE — 88341 IMHCHEM/IMCYTCHM EA ADD ANTB: CPT | Mod: 26

## 2020-07-13 PROCEDURE — 87075 CULTR BACTERIA EXCEPT BLOOD: CPT

## 2020-07-13 PROCEDURE — 86901 BLOOD TYPING SEROLOGIC RH(D): CPT

## 2020-07-13 PROCEDURE — 83516 IMMUNOASSAY NONANTIBODY: CPT

## 2020-07-13 PROCEDURE — 89051 BODY FLUID CELL COUNT: CPT

## 2020-07-13 PROCEDURE — 88312 SPECIAL STAINS GROUP 1: CPT

## 2020-07-13 PROCEDURE — 85610 PROTHROMBIN TIME: CPT

## 2020-07-13 PROCEDURE — 84132 ASSAY OF SERUM POTASSIUM: CPT

## 2020-07-13 PROCEDURE — 83986 ASSAY PH BODY FLUID NOS: CPT

## 2020-07-13 PROCEDURE — 76536 US EXAM OF HEAD AND NECK: CPT

## 2020-07-13 PROCEDURE — 86341 ISLET CELL ANTIBODY: CPT

## 2020-07-13 PROCEDURE — 85027 COMPLETE CBC AUTOMATED: CPT

## 2020-07-13 PROCEDURE — 88341 IMHCHEM/IMCYTCHM EA ADD ANTB: CPT

## 2020-07-13 PROCEDURE — 86769 SARS-COV-2 COVID-19 ANTIBODY: CPT

## 2020-07-13 PROCEDURE — 88188 FLOWCYTOMETRY/READ 9-15: CPT

## 2020-07-13 PROCEDURE — C1729: CPT

## 2020-07-13 PROCEDURE — 71551 MRI CHEST W/DYE: CPT

## 2020-07-13 PROCEDURE — 88305 TISSUE EXAM BY PATHOLOGIST: CPT

## 2020-07-13 PROCEDURE — 71045 X-RAY EXAM CHEST 1 VIEW: CPT

## 2020-07-13 PROCEDURE — 82945 GLUCOSE OTHER FLUID: CPT

## 2020-07-13 PROCEDURE — 94010 BREATHING CAPACITY TEST: CPT

## 2020-07-13 PROCEDURE — 85576 BLOOD PLATELET AGGREGATION: CPT

## 2020-07-13 PROCEDURE — 86235 NUCLEAR ANTIGEN ANTIBODY: CPT

## 2020-07-13 PROCEDURE — 82435 ASSAY OF BLOOD CHLORIDE: CPT

## 2020-07-13 PROCEDURE — 82947 ASSAY GLUCOSE BLOOD QUANT: CPT

## 2020-07-13 PROCEDURE — 87205 SMEAR GRAM STAIN: CPT

## 2020-07-13 PROCEDURE — 83605 ASSAY OF LACTIC ACID: CPT

## 2020-07-13 PROCEDURE — 85014 HEMATOCRIT: CPT

## 2020-07-13 PROCEDURE — 83880 ASSAY OF NATRIURETIC PEPTIDE: CPT

## 2020-07-13 PROCEDURE — 80053 COMPREHEN METABOLIC PANEL: CPT

## 2020-07-13 PROCEDURE — 86850 RBC ANTIBODY SCREEN: CPT

## 2020-07-13 PROCEDURE — 87102 FUNGUS ISOLATION CULTURE: CPT

## 2020-07-13 PROCEDURE — 82330 ASSAY OF CALCIUM: CPT

## 2020-07-13 PROCEDURE — 93312 ECHO TRANSESOPHAGEAL: CPT

## 2020-07-13 PROCEDURE — 74177 CT ABD & PELVIS W/CONTRAST: CPT

## 2020-07-13 PROCEDURE — 88342 IMHCHEM/IMCYTCHM 1ST ANTB: CPT | Mod: 26

## 2020-07-13 RX ORDER — ATORVASTATIN CALCIUM 80 MG/1
1 TABLET, FILM COATED ORAL
Qty: 30 | Refills: 0
Start: 2020-07-13 | End: 2020-08-11

## 2020-07-13 RX ORDER — INSULIN NPH HUM/REG INSULIN HM 70-30/ML
30 VIAL (ML) SUBCUTANEOUS
Qty: 1 | Refills: 0
Start: 2020-07-13 | End: 2020-08-11

## 2020-07-13 RX ORDER — INSULIN LISPRO 100/ML
12 VIAL (ML) SUBCUTANEOUS
Refills: 0 | Status: DISCONTINUED | OUTPATIENT
Start: 2020-07-13 | End: 2020-07-13

## 2020-07-13 RX ORDER — INSULIN HUMAN 100 [IU]/ML
1 INJECTION, SOLUTION SUBCUTANEOUS
Qty: 1 | Refills: 0
Start: 2020-07-13 | End: 2020-08-11

## 2020-07-13 RX ORDER — ISOPROPYL ALCOHOL, BENZOCAINE .7; .06 ML/ML; ML/ML
1 SWAB TOPICAL
Qty: 100 | Refills: 1
Start: 2020-07-13 | End: 2020-08-31

## 2020-07-13 RX ADMIN — Medication 12: at 11:51

## 2020-07-13 RX ADMIN — SODIUM CHLORIDE 3 MILLILITER(S): 9 INJECTION INTRAMUSCULAR; INTRAVENOUS; SUBCUTANEOUS at 14:07

## 2020-07-13 RX ADMIN — BUMETANIDE 2 MILLIGRAM(S): 0.25 INJECTION INTRAMUSCULAR; INTRAVENOUS at 05:33

## 2020-07-13 RX ADMIN — SODIUM CHLORIDE 3 MILLILITER(S): 9 INJECTION INTRAMUSCULAR; INTRAVENOUS; SUBCUTANEOUS at 05:38

## 2020-07-13 RX ADMIN — Medication 20 MILLIGRAM(S): at 05:27

## 2020-07-13 RX ADMIN — PANTOPRAZOLE SODIUM 40 MILLIGRAM(S): 20 TABLET, DELAYED RELEASE ORAL at 05:27

## 2020-07-13 NOTE — PROGRESS NOTE ADULT - SUBJECTIVE AND OBJECTIVE BOX
INTERVAL HPI/OVERNIGHT EVENTS: follow up of diabetes    MEDICATIONS  (STANDING):  atorvastatin 10 milliGRAM(s) Oral at bedtime  buMETAnide Injectable 2 milliGRAM(s) IV Push two times a day  dextrose 5%. 1000 milliLiter(s) (50 mL/Hr) IV Continuous <Continuous>  dextrose 50% Injectable 12.5 Gram(s) IV Push once  dextrose 50% Injectable 25 Gram(s) IV Push once  dextrose 50% Injectable 25 Gram(s) IV Push once  insulin glargine Injectable (LANTUS) 16 Unit(s) SubCutaneous at bedtime  insulin lispro (HumaLOG) corrective regimen sliding scale   SubCutaneous four times a day before meals  insulin lispro Injectable (HumaLOG) 12 Unit(s) SubCutaneous three times a day before meals  melatonin 10 milliGRAM(s) Oral at bedtime  pantoprazole    Tablet 40 milliGRAM(s) Oral before breakfast  polyethylene glycol 3350 17 Gram(s) Oral at bedtime  predniSONE   Tablet 20 milliGRAM(s) Oral daily  senna 2 Tablet(s) Oral at bedtime  sodium chloride 0.9% lock flush 3 milliLiter(s) IV Push every 8 hours    MEDICATIONS  (PRN):  acetaminophen   Tablet .. 650 milliGRAM(s) Oral every 6 hours PRN Mild Pain (1 - 3), Moderate Pain (4 - 6)  dextrose 40% Gel 15 Gram(s) Oral once PRN Blood Glucose LESS THAN 70 milliGRAM(s)/deciliter  glucagon  Injectable 1 milliGRAM(s) IntraMuscular once PRN Glucose LESS THAN 70 milligrams/deciliter      Allergies    No Known Allergies    Intolerances    [facility] patient (Unknown)      Review of systems: good appetite    Vital Signs Last 24 Hrs  T(C): 36.2 (13 Jul 2020 07:52), Max: 36.7 (12 Jul 2020 15:43)  T(F): 97.2 (13 Jul 2020 07:52), Max: 98 (12 Jul 2020 15:43)  HR: 97 (13 Jul 2020 07:52) (86 - 97)  BP: 120/82 (13 Jul 2020 07:52) (112/77 - 120/82)  BP(mean): --  RR: 18 (13 Jul 2020 07:52) (18 - 20)  SpO2: 99% (13 Jul 2020 07:52) (94% - 99%)          POCT Blood Glucose.: 426 mg/dL (07-13-20 @ 12:21)  POCT Blood Glucose.: 432 mg/dL (07-13-20 @ 11:47)  POCT Blood Glucose.: 148 mg/dL (07-13-20 @ 07:39)  POCT Blood Glucose.: 212 mg/dL (07-12-20 @ 22:48)  POCT Blood Glucose.: 174 mg/dL (07-12-20 @ 16:04)  POCT Blood Glucose.: 176 mg/dL (07-12-20 @ 11:45)  POCT Blood Glucose.: 113 mg/dL (07-12-20 @ 07:28)  POCT Blood Glucose.: 139 mg/dL (07-11-20 @ 21:10)  POCT Blood Glucose.: 239 mg/dL (07-11-20 @ 16:50)  POCT Blood Glucose.: 232 mg/dL (07-11-20 @ 12:32)  POCT Blood Glucose.: 107 mg/dL (07-11-20 @ 08:13)  POCT Blood Glucose.: 141 mg/dL (07-10-20 @ 21:10)  POCT Blood Glucose.: 110 mg/dL (07-10-20 @ 17:19)

## 2020-07-13 NOTE — PROGRESS NOTE ADULT - ASSESSMENT
Jae in blood sugar noted, due to prednisone. Pt. has no insurance coverage for lantus and humalog; suggest discharging on novolin 70/30 30 units daily, with correction sodes of regular insulin, 4 units pre-meal for 200-299, and 6 units for >300.

## 2020-07-13 NOTE — PROGRESS NOTE ADULT - SUBJECTIVE AND OBJECTIVE BOX
Vascular & Interventional Radiology Post-Procedure Note    Pre-Procedure Diagnosis: FDG avid RP infiltration  Post-Procedure Diagnosis: Same as pre.      : silke  Assistant(s): ____    Procedure Details/Findings:     4 cores of left FDG avid RP infiltrative mass.  (3 in formalin, 1 in rpmi)    Access (if applicable): ____    Complications: 0  Estimated Blood Loss: Minimal  Anethesia: 1% Lidocane SQ    Sedation: MAC  Patient Condition/Disposition: Stable    Plan:     f/u resutls

## 2020-07-13 NOTE — DISCHARGE NOTE PROVIDER - NSDCCPCAREPLAN_GEN_ALL_CORE_FT
PRINCIPAL DISCHARGE DIAGNOSIS  Diagnosis: Chronic constrictive pericarditis, unspecified type  Assessment and Plan of Treatment: Chronic constrictive pericarditis, unspecified type PRINCIPAL DISCHARGE DIAGNOSIS  Diagnosis: Pericardial effusion  Assessment and Plan of Treatment: recurrent. 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 Patient to follow up with rheumatologist on discharge. Patient to continue steroids 20mg daily      SECONDARY DISCHARGE DIAGNOSES  Diagnosis: History of snoring  Assessment and Plan of Treatment: discussed with patient follow up with PMD regarding sleep study as outpatient to evaluate for WALKER. Patient in agreement    Diagnosis: Type II diabetes mellitus  Assessment and Plan of Treatment: continue 70/30 insulin with corrective sliding scale on discharge. Will have him follow up with endocrinology on discharge.

## 2020-07-13 NOTE — PROGRESS NOTE ADULT - PROVIDER SPECIALTY LIST ADULT
CT Surgery
Cardiology
Cardiology
Endocrinology
Hospitalist
Intervent Radiology
Rheumatology
Hospitalist
CT Surgery

## 2020-07-13 NOTE — DISCHARGE NOTE PROVIDER - CARE PROVIDER_API CALL
Brett   [facility] [address], [facility] [address]  Phone: [phone]  Fax: (   )    -  Scheduled Appointment: 07/20/2020 10:30 AM Nicolás Lindsey  ENDOCRINOLOGY/METAB/DIABETES  [address] [place] [address]  Phone: [phone]  Fax: [phone]  Follow Up Time: 2 weeks    Brett   [facility]. [address] [address]  Phone: [phone]  Fax: (   )    -  Scheduled Appointment: 07/20/2020 10:30 AM    Sara Figueroa  INTERNAL MEDICINE  [address] [address]  Phone: [phone]  Fax: [phone]  Follow Up Time: 1 week

## 2020-07-13 NOTE — DISCHARGE NOTE PROVIDER - NSDCMRMEDTOKEN_GEN_ALL_CORE_FT
Lasix 40 mg oral tablet: 1 tab(s) orally once a day   metoprolol tartrate 25 mg oral tablet: 1 tab(s) orally 2 times a day  PET Scan : PET Scan   Diagnosis r/o connective tissue disorder       potassium chloride 20 mEq oral tablet, extended release: 1 tab(s) orally once a day Lasix 40 mg oral tablet: 1 tab(s) orally once a day   metoprolol tartrate 25 mg oral tablet: 1 tab(s) orally 2 times a day  potassium chloride 20 mEq oral tablet, extended release: 1 tab(s) orally once a day   predniSONE 20 mg oral tablet: 1 tab(s) orally once a day atorvastatin 10 mg oral tablet: 1 tab(s) orally once a day (at bedtime)  HumuLIN 70/30 subcutaneous suspension: 30 unit(s) subcutaneous once a day in the morning before breakfast.  insluin syringes: 30 unit(s) subcutaneous once a day   Lasix 40 mg oral tablet: 1 tab(s) orally once a day   metoprolol tartrate 25 mg oral tablet: 1 tab(s) orally 2 times a day  potassium chloride 20 mEq oral tablet, extended release: 1 tab(s) orally once a day   predniSONE 20 mg oral tablet: 1 tab(s) orally once a day  ReliOn/NovoLIN R 100 units/mL injectable solution: X unit(s) injectable 3 times a day before meals for blood glucose correction.  If glucose is greater than:   200: please use 4 units  300: please 6 units

## 2020-07-13 NOTE — DISCHARGE NOTE NURSING/CASE MANAGEMENT/SOCIAL WORK - PATIENT PORTAL LINK FT
You can access the FollowMyHealth Patient Portal offered by [facility] by registering at the following website: [email or web address]. By joining [employer]’s FollowMyHealth portal, you will also be able to view your health information using other applications (apps) compatible with our system.

## 2020-07-13 NOTE — DISCHARGE NOTE PROVIDER - PROVIDER TOKENS
FREE:[LAST:[Brett],PHONE:[phone]],FAX:[(   )    -],ADDRESS:[[facility]. [address] [place] [address], [place] [address]],SCHEDULEDAPPT:[07/20/2020],SCHEDULEDAPPTTIME:[10:30 AM]] PROVIDER:[TOKEN:[[id number]],FOLLOWUP:[2 weeks]],FREE:[LAST:[Caverese],PHONE:[phone]:[(   )    -],ADDRESS:[[place] [address]],SCHEDULEDAPPT:[07/20/2020],SCHEDULEDAPPTTIME:[10:30 AM]],PROVIDER:[TOKEN:[[id number]],FOLLOWUP:[1 week]]

## 2020-07-13 NOTE — DISCHARGE NOTE PROVIDER - HOSPITAL COURSE
44 year old male with PMH cardiomegaly, DM II, HTN, and chornic constrictive disease presented to [facility] 6/29 sent from his cardiologists office with c/o SOB on minimal exertion.  Pt reports that he gets SOB and fatigued with minimal exertion such as doing ADL's and even while talking. He was ruled out for an MI. CT chest showed stable moderate bilateral pleural effusions with atelectasis, as well as a small stable pericardial effusion with adjacent inflammatory change and small loculated fluid along the right atrial border that could correlate with pericarditis.  6/30/20 he underwent a TTE and right and left heart cath at [facility]. 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 Will start on PO prednisone 20mg  daily per Rheumatology. 44 year old male with PMH cardiomegaly, DM II, HTN, and chornic constrictive disease presented to [facility] 6/29 sent from his cardiologists office with c/o SOB on minimal exertion.  Pt reports that he gets SOB and fatigued with minimal exertion such as doing ADL's and even while talking. He was ruled out for an MI. CT chest showed stable moderate bilateral pleural effusions with atelectasis, as well as a small stable pericardial effusion with adjacent inflammatory change and small loculated fluid along the right atrial border that could correlate with pericarditis.  6/30/20 he underwent a TTE and right and left heart cath at [facility]. Patient was transferred to [facility] for evaluation of recurrent pericardial effusion /constrictive pericarditis /pericardial stripping. 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 Will start on PO prednisone 20mg  daily per Rheumatology.         GENERAL:  Well-appearing M/F patient sitting comfortably in bed in a hospital gown.  NAD    CV: + s1,s2 regular rate and rhythm    Lungs: normal effort, CTA bilat. no wheezes, rales or rhonchi    ABDOMEN: soft, nontender, nondistended    EXTREMITIES:  no edema, cyanosis    skin: warm, dry, no rashes.     Neuro: A&O x 3, no sensory or motor deficit. 44 year old male with PMH cardiomegaly, DM II, HTN, and chornic constrictive disease presented to [facility] 6/29 sent from his cardiologists office with c/o SOB on minimal exertion.  Pt reports that he gets SOB and fatigued with minimal exertion such as doing ADL's and even while talking. He was ruled out for an MI. CT chest showed stable moderate bilateral pleural effusions with atelectasis, as well as a small stable pericardial effusion with adjacent inflammatory change and small loculated fluid along the right atrial border that could correlate with pericarditis.  6/30/20 he underwent a TTE and right and left heart cath at [facility]. Patient was transferred to [facility] for evaluation of recurrent pericardial effusion /constrictive pericarditis /pericardial stripping. 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 Will start on PO prednisone 20mg  daily per Rheumatology.         Attending Attestation:    I personally saw and evaluated the patient and agree with the above assessment and plan        Vital Signs Last 24 Hrs    T(C): 36.6 (13 Jul 2020 15:33), Max: 36.7 (12 Jul 2020 15:43)    T(F): 97.8 (13 Jul 2020 15:33), Max: 98 (12 Jul 2020 15:43)    HR: 96 (13 Jul 2020 15:33) (86 - 97)    BP: 116/83 (13 Jul 2020 15:33) (112/77 - 120/82)    BP(mean): --    RR: 18 (13 Jul 2020 15:33) (18 - 20)    SpO2: 96% (13 Jul 2020 15:33) (94% - 99%)        GENERAL:  Well-appearing M/F patient sitting comfortably in bed in a hospital gown.  NAD    CV: + s1,s2 regular rate and rhythm    Lungs: normal effort, CTA bilat. no wheezes, rales or rhonchi    ABDOMEN: soft, nontender, nondistended    EXTREMITIES:  no edema, cyanosis    skin: warm, dry, no rashes.     Neuro: A&O x 3, no sensory or motor deficit.        discharge time 40 minutes

## 2020-07-13 NOTE — DISCHARGE NOTE PROVIDER - CARE PROVIDERS DIRECT ADDRESSES
,DirectAddress_Unknown ,DirectAddress_Unknown,DirectAddress_Unknown,[email or web address]

## 2020-07-14 LAB — TM INTERPRETATION: SIGNIFICANT CHANGE UP

## 2020-07-17 ENCOUNTER — APPOINTMENT (OUTPATIENT)
Dept: CARDIOLOGY | Facility: CLINIC | Age: 45
End: 2020-07-17
Payer: SELF-PAY

## 2020-07-17 VITALS
SYSTOLIC BLOOD PRESSURE: 128 MMHG | DIASTOLIC BLOOD PRESSURE: 84 MMHG | WEIGHT: 238 LBS | HEIGHT: 68 IN | BODY MASS INDEX: 36.07 KG/M2 | TEMPERATURE: 98.4 F | HEART RATE: 98 BPM | OXYGEN SATURATION: 97 %

## 2020-07-17 DIAGNOSIS — I10 ESSENTIAL (PRIMARY) HYPERTENSION: ICD-10-CM

## 2020-07-17 DIAGNOSIS — D89.89 OTHER SPECIFIED DISORDERS INVOLVING THE IMMUNE MECHANISM, NOT ELSEWHERE CLASSIFIED: ICD-10-CM

## 2020-07-17 PROCEDURE — 99213 OFFICE O/P EST LOW 20 MIN: CPT

## 2020-07-17 RX ORDER — HUMAN INSULIN 100 [IU]/ML
100 INJECTION, SOLUTION SUBCUTANEOUS
Refills: 0 | Status: ACTIVE | COMMUNITY

## 2020-07-17 RX ORDER — POTASSIUM CHLORIDE 1500 MG/1
20 TABLET, EXTENDED RELEASE ORAL DAILY
Refills: 0 | Status: ACTIVE | COMMUNITY

## 2020-07-17 RX ORDER — ATORVASTATIN CALCIUM 10 MG/1
10 TABLET, FILM COATED ORAL DAILY
Refills: 0 | Status: ACTIVE | COMMUNITY

## 2020-07-17 RX ORDER — METOPROLOL TARTRATE 25 MG/1
25 TABLET, FILM COATED ORAL DAILY
Refills: 0 | Status: ACTIVE | COMMUNITY

## 2020-07-17 RX ORDER — INSULIN HUMAN 100 [IU]/ML
(70-30) 100 INJECTION, SUSPENSION SUBCUTANEOUS
Refills: 0 | Status: ACTIVE | COMMUNITY

## 2020-07-17 NOTE — HISTORY OF PRESENT ILLNESS
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  His initial presentation was in 3/19 in [place].  He had a moderate effusion at that time and was discharged home without anti-inflammatory treatment.  He then presented to our practice where he was found to have a large pericardial effusion.  He went to [facility] and had a pericardial tap yielding 700 cc of fluid.  1 week later he reaccumulated and was tapped again yielding an additional 700 cc of straw-colored fluid.  Echo while in the hospital showed reaccumulation of the fluid after drain removal and he was transferred to [facility] for CT Sx eval.  A pericardial window was performed by Dr. Peters on 9/30/19 and he was eventually discharged from the hospital.  He reports improvement in his symptoms with less JOHNSON but he is still getting tired easily and has LE edema. \par \par Current Health Status:\par Since seeing me last he was hospitalized at [facility] again. He saw rheumatologist there. He was diagnosed with IgG4 related disease. He had a biopsy of a retroperitoneal mass which is pending pathology. He was started on prednisone. He feels less SOB. No chest pain or palpitations.

## 2020-07-17 NOTE — REASON FOR VISIT
[Follow-Up - Clinic] : a clinic follow-up of [Medication Management] : Medication management [Hypertension] : hypertension [Source: ______] : History obtained from [unfilled] [FreeTextEntry1] : Pericardial effusion, effusive-contstrictive pericarditis

## 2020-07-17 NOTE — DISCUSSION/SUMMARY
[FreeTextEntry1] : 45M w/ PMH as above presenting for routine follow up.  He has effusive-constrictive pericardial disease. He should remain on diuresis, Lasix 40mg daily. He was also diagnosed with IgG 4 related disease which is likely contributing to his systemic issues. He was started on prednisone. He states he will be going to rheumatology office tomorrow to make an appointment because he has had trouble reaching them by phone. I explained to him that it is of the utmost importance to follow closely with rheumatology. \par \par Follow up in 3 months. \par

## 2020-07-17 NOTE — PHYSICAL EXAM
[General Appearance - Well Developed] : well developed [Normal Appearance] : normal appearance [Well Groomed] : well groomed [General Appearance - Well Nourished] : well nourished [No Deformities] : no deformities [Normal Conjunctiva] : the conjunctiva exhibited no abnormalities [General Appearance - In No Acute Distress] : no acute distress [Normal Oral Mucosa] : normal oral mucosa [Eyelids - No Xanthelasma] : the eyelids demonstrated no xanthelasmas [No Oral Pallor] : no oral pallor [No Oral Cyanosis] : no oral cyanosis [Respiration, Rhythm And Depth] : normal respiratory rhythm and effort [Auscultation Breath Sounds / Voice Sounds] : lungs were clear to auscultation bilaterally [Exaggerated Use Of Accessory Muscles For Inspiration] : no accessory muscle use [Heart Sounds] : normal S1 and S2 [Heart Rate And Rhythm] : heart rate and rhythm were normal [Murmurs] : no murmurs present [Abnormal Walk] : normal gait [Gait - Sufficient For Exercise Testing] : the gait was sufficient for exercise testing [Nail Clubbing] : no clubbing of the fingernails [Petechial Hemorrhages (___cm)] : no petechial hemorrhages [Cyanosis, Localized] : no localized cyanosis [Skin Color & Pigmentation] : normal skin color and pigmentation [] : no rash [No Venous Stasis] : no venous stasis [Skin Lesions] : no skin lesions [No Xanthoma] : no  xanthoma was observed [No Skin Ulcers] : no skin ulcer [Oriented To Time, Place, And Person] : oriented to person, place, and time [Affect] : the affect was normal [Mood] : the mood was normal [No Anxiety] : not feeling anxious [FreeTextEntry1] : no carotid artery bruits auscultated bilaterally

## 2020-07-17 NOTE — REVIEW OF SYSTEMS
[Feeling Fatigued] : feeling fatigued [see HPI] : see HPI [Lower Ext Edema] : lower extremity edema [Negative] : Heme/Lymph [Chest Pain] : no chest pain [Dyspnea on exertion] : not dyspnea during exertion [Shortness Of Breath] : no shortness of breath [Palpitations] : no palpitations

## 2020-07-18 LAB — GAD65 AB SER-MCNC: 0.04 NMOL/L — HIGH

## 2020-07-27 ENCOUNTER — APPOINTMENT (OUTPATIENT)
Dept: RHEUMATOLOGY | Facility: CLINIC | Age: 45
End: 2020-07-27
Payer: SELF-PAY

## 2020-07-27 VITALS
SYSTOLIC BLOOD PRESSURE: 138 MMHG | TEMPERATURE: 97.8 F | DIASTOLIC BLOOD PRESSURE: 90 MMHG | BODY MASS INDEX: 36.07 KG/M2 | OXYGEN SATURATION: 97 % | HEIGHT: 68 IN | WEIGHT: 238 LBS | RESPIRATION RATE: 17 BRPM | HEART RATE: 118 BPM

## 2020-07-27 DIAGNOSIS — D89.89 OTHER SPECIFIED DISORDERS INVOLVING THE IMMUNE MECHANISM, NOT ELSEWHERE CLASSIFIED: ICD-10-CM

## 2020-07-27 DIAGNOSIS — I31.1 CHRONIC CONSTRICTIVE PERICARDITIS: ICD-10-CM

## 2020-07-27 DIAGNOSIS — R06.02 SHORTNESS OF BREATH: ICD-10-CM

## 2020-07-27 DIAGNOSIS — I31.3 PERICARDIAL EFFUSION (NONINFLAMMATORY): ICD-10-CM

## 2020-07-27 PROCEDURE — 99215 OFFICE O/P EST HI 40 MIN: CPT

## 2020-07-27 RX ORDER — PREDNISONE 20 MG/1
20 TABLET ORAL DAILY
Qty: 60 | Refills: 1 | Status: ACTIVE | COMMUNITY
Start: 1900-01-01 | End: 1900-01-01

## 2020-07-27 NOTE — HISTORY OF PRESENT ILLNESS
[FreeTextEntry1] : Pt presenting today for hospital f.u. Has hx of recurrent pericarditis/effusion s/p subxiphoid window. s/p pericardial stripping. prior pericardial biopsy: unremarkable. 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 Now s/p RP biopsy- path pending\par Currently on prednisone 20mg daily. Tolerating well. Denies side effects. \par Today reports to have continued SOB, BL LE swelling, increased abdominal girth\par denies fever, chills, CP, abd pain, rashes.

## 2020-07-27 NOTE — ASSESSMENT
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 +DREAD with low-titer Scl70 presenting today for a f.u visit. Now s/p IR guided RP biopsy. Path pending. Currently on prednisone 20mg daily. Today with significant SOB, BL LE swelling, increased abd girth- concerning for volume overload. \par \par Questionable of IgG4-RD, pericardial bx is not significant, RP biopsy pending pathology. \par - recommend to go back [facility] ER given current symptoms, consider increasing diuretics?\par - c/w prednisone 20 mg daily\par - will call pt with biopsy results and treatment plan\par \par Discussed treatment plan with the patient. The patient was given the opportunity to ask questions and all questions were answered to their satisfaction.

## 2020-07-27 NOTE — PHYSICAL EXAM
[General Appearance - Alert] : alert [General Appearance - In No Acute Distress] : in no acute distress [General Appearance - Well Developed] : well developed [General Appearance - Well Nourished] : well nourished [Sclera] : the sclera and conjunctiva were normal [Outer Ear] : the ears and nose were normal in appearance [PERRL With Normal Accommodation] : pupils were equal in size, round, and reactive to light [Both Tympanic Membranes Were Examined] : both tympanic membranes were normal [Neck Appearance] : the appearance of the neck was normal [Heart Rate And Rhythm] : heart rate was normal and rhythm regular [Heart Sounds] : normal S1 and S2 [Bowel Sounds] : normal bowel sounds [Abnormal Walk] : normal gait [No Spinal Tenderness] : no spinal tenderness [No CVA Tenderness] : no ~M costovertebral angle tenderness [Involuntary Movements] : no involuntary movements were seen [Nail Clubbing] : no clubbing  or cyanosis of the fingernails [] : no rash [Motor Tone] : muscle strength and tone were normal [Musculoskeletal - Swelling] : no joint swelling seen [No Focal Deficits] : no focal deficits [Skin Lesions] : no skin lesions [Oriented To Time, Place, And Person] : oriented to person, place, and time [FreeTextEntry1] : +distended

## 2020-07-28 LAB — SURGICAL PATHOLOGY STUDY: SIGNIFICANT CHANGE UP

## 2020-08-01 LAB
CULTURE RESULTS: SIGNIFICANT CHANGE UP
SPECIMEN SOURCE: SIGNIFICANT CHANGE UP

## 2020-08-31 NOTE — PATIENT PROFILE ADULT - NSPROEDALEARNER_GEN_A_NUR
spouse Mohs Method Verbiage: An incision at a 45 degree angle following the standard Mohs approach was done and the specimen was harvested as a microscopic controlled layer.

## 2020-11-17 ENCOUNTER — APPOINTMENT (OUTPATIENT)
Dept: CARDIOLOGY | Facility: CLINIC | Age: 45
End: 2020-11-17

## 2020-11-18 ENCOUNTER — APPOINTMENT (OUTPATIENT)
Dept: CARDIOLOGY | Facility: CLINIC | Age: 45
End: 2020-11-18

## 2021-06-15 NOTE — ED PROVIDER NOTE - RESPIRATORY [+], MLM
Bilateral simple mastectomy, primary closure, ANSON Hemovacs x2 bilaterally placed EXERTIONAL DYSPNEA

## 2021-09-13 NOTE — PROGRESS NOTE ADULT - PROBLEM SELECTOR PROBLEM 6
The patient is a 60y Female complaining of 
Essential hypertension

## 2021-09-29 NOTE — ED PROVIDER NOTE - CADM POA URETHRAL CATHETER
11.0   8.87  )-----------( 120      ( 22 Sep 2021 06:01 )             32.2     09-22    129<L>  |  97  |  6<L>  ----------------------------<  86  3.5   |  21<L>  |  0.47<L>    Ca    8.6      22 Sep 2021 06:01  Phos  1.9     09-21  Mg     2.0     09-21    TPro  6.4  /  Alb  2.7<L>  /  TBili  0.6  /  DBili  0.2  /  AST  86<H>  /  ALT  54  /  AlkPhos  78  09-20
No
09-28    133<L>  |  99  |  6<L>  ----------------------------<  103<H>  3.6   |  28  |  0.78    Ca    9.2      28 Sep 2021 09:42  Phos  3.4     09-28  Mg     2.0     09-28    TPro  7.5  /  Alb  3.2<L>  /  TBili  0.3  /  DBili  x   /  AST  28  /  ALT  49  /  AlkPhos  79  09-28

## 2022-01-16 NOTE — PROGRESS NOTE ADULT - SUBJECTIVE AND OBJECTIVE BOX
Unknown, pt not a good historian at this time HPI:  44 year old male with PMH cardiomegaly, DM II, HTN, and recurrent pericardial effusions with pericardiocentesis x2 ( 700ml serous drainage both times). He subsequently underwent a subxiphoid pericardial window and pericardial biopsy on 19 at [facility] with Dr. Peters .  Postoperative course complicated by AF/SVT which converted to SR.  Was found to be + lymes on that admission and has completed his course of IV and PO antibiotics.  Patient then presented to the ER on  from Dr. Castro office s/p in office TTE that showed an increased pericardial effusion. Patient found to have infiltrate on Chest CT performed  that is concerning for infiltrative process / neoplastic disease, hematology/ oncology recommend ID follow up as outpatient.  Ct scan of abdomen pelvis with contrast on  show infiltrative process progressing to the retroperitoneal soft tissue surrounding both kidneys causing moderate bilateral hydronephrosis. ON 11/15 he underwent IR pericardial drain placement for 55ml of bloody drainage.  At that time, ID was consulted regarding infiltrative process on CT, and it was deemed likely not infectious.  Hem/onc recalled and Rheumatology consulted.  An Abd/Renal MRI was done that showed retroperitoneal fibrosis, mod. margoth. hydronephrosis, fibrosis prox. SMA, RIAN, margoth. renal artery, resulting luminal narrowing.  He then underwent a Right and left cardiac cath on  that showed normal coronaries, increased wedge pressure 24, no evidence of constriction or restriction.  His drain was removed  pericardial AI removed and he was discharged home.  He was following with his cardiologist (Dr. Mcginnis) and a rheumatologist as an outpatient.    He presented to [facility]  sent from his cardiologists office with c/o SOB on minimal exertion.  Pt reports that he gets SOB and fatigued with minimal exertion such as doing ADL's and even while talking. He was ruled out for an dMI. CT chest showed stable moderate bilateral pleural effusions with atelectasis, as well as a small stable pericardial effusion with adjacent inflammatory change and small loculated fluid along the right atrial border that could correlate with pericarditis.   He underwent a TTE and right and left heart cath at [facility] but the reports are not available.  He was transferred to [facility] today for evaluation of constrictive pericarditis /pericardial stripping. (2020 17:47)    PAST MEDICAL & SURGICAL HISTORY:  Hydronephrosis  Pericardial effusion  Lyme disease  Pericarditis  Heart failure  Cardiomegaly  Nonsmoker  Diabetes mellitus  Hypertension  S/P pericardial window creation  S/P pericardiocentesis    Subjective: "Can I have some diet soda, just not ginger ale?" Patient siting in bed in no acute distress. +SOB with minimal exertion, but lower extremity edema is getting better and patient is able to walk around the unit during the day now. Denies chills, lightheadedness, dizziness, HA, CP, palpitations, cough, abdominal pain, N/V, diarrhea, numbness/tingling in extremities, or any other acute complaints.    VITAL SIGNS  T(C): 36.7 (20 @ 21:15), Max: 37.4 (20 @ 05:21)  T(F): 98 (20 @ 21:15), Max: 99.3 (20 @ 05:21)  HR: 102 (20 @ 21:15) (102 - 112)  BP: 114/84 (20 @ 21:15) (107/62 - 119/84)  RR: 19 (20 @ 21:15) (18 - 20)  SpO2: 95% (20 @ 21:15) (93% - 95%)  on (O2)              MEDICATIONS  acetaminophen   Tablet .. 650 milliGRAM(s) Oral every 6 hours PRN  atorvastatin 10 milliGRAM(s) Oral at bedtime  buMETAnide Injectable 2 milliGRAM(s) IV Push two times a day  chlorhexidine 0.12% Liquid 15 milliLiter(s) Oral Mucosa two times a day  glucagon  Injectable 1 milliGRAM(s) IntraMuscular once PRN  insulin glargine Injectable (LANTUS) 16 Unit(s) SubCutaneous at bedtime  insulin lispro (HumaLOG) corrective regimen sliding scale   SubCutaneous four times a day before meals  insulin lispro Injectable (HumaLOG) 2 Unit(s) SubCutaneous three times a day with meals  melatonin 10 milliGRAM(s) Oral at bedtime  methylPREDNISolone sodium succinate Injectable 20 milliGRAM(s) IV Push daily  mupirocin 2% Ointment 1 Application(s) Topical two times a day  pantoprazole    Tablet 40 milliGRAM(s) Oral before breakfast  sodium chloride 0.9% lock flush 3 milliLiter(s) IV Push every 8 hours    PHYSICAL EXAM  General: well nourished, well developed, no acute distress  Neurology: alert and oriented x 3, nonfocal, no gross deficits  Respiratory: Decreased BSs at b/l bases. No accessory muscle use noted.   CV: regular rate and rhythm, normal S1, S2.  Abdomen: soft, nontender, nondistended, positive bowel sounds  Extremities: warm, well perfused. +2- 3 BLE edema. + DP pulses bilaterally  Tubes: + Left Chest Tube to waterseal, +Serous output, no airleak noted. No crepitus around CT site noted.     I&O's Detail    2020 07:01  -  2020 07:00  --------------------------------------------------------  IN:    Oral Fluid: 1360 mL  Total IN: 1360 mL    OUT:    Chest Tube: 90 mL    Voided: 4415 mL  Total OUT: 4505 mL    Total NET: -3145 mL      2020 07:01  -  2020 00:36  --------------------------------------------------------  IN:    IV PiggyBack: 50 mL    Oral Fluid: 540 mL  Total IN: 590 mL    OUT:    Chest Tube: 100 mL    Voided: 2200 mL  Total OUT: 2300 mL    Total NET: -1710 mL    Weights:  Daily Weight in k.9 (2020 05:21)  Admit Wt: Drug Dosing Weight  Height (cm): 172.72 (2019 17:34)  Weight (kg): 114.3 (2020 06:45)  BMI (kg/m2): 38.3 (2020 06:45)  BSA (m2): 2.25 (2020 06:45)    All laboratory results, radiology and medications reviewed.    LABS    -    133<L>  |  90<L>  |  11.0  ----------------------------<  171<H>  3.2<L>   |  27.0  |  0.64    Ca    9.0      2020 07:00  Phos  4.6     07-03  Mg     1.7     -04    TPro  7.1  /  Alb  3.0<L>  /  TBili  1.2  /  DBili  x   /  AST  14  /  ALT  9   /  AlkPhos  85                                   13.1   21.28 )-----------( 495      ( 2020 07:02 )             40.6            Bilirubin Total, Serum: 1.2 mg/dL ( @ 07:00)    CAPILLARY BLOOD GLUCOSE  POCT Blood Glucose.: 210 mg/dL (2020 21:18)  POCT Blood Glucose.: 236 mg/dL (2020 17:42)  POCT Blood Glucose.: 212 mg/dL (2020 12:04)  POCT Blood Glucose.: 158 mg/dL (2020 07:54)           Last CXR:  < from: Xray Chest 1 View- PORTABLE-Routine (20 @ 05:20) >  IMPRESSION:  1.  Compared to 7/3/2020, mild dependent bilateral pleural effusions are unchanged.  2.  A pigtail catheter is noted in the LEFT costophrenic sulcus. No pneumothorax.  3.  Mild pulmonary venous congestion, unchanged.  4.  Enlarged cardiac silhouette.  < end of copied text >    < from: CT Chest w/ IV Cont (20 @ 11:52) >  IMPRESSION:   Infiltrative soft tissue process surrounding the heart and encasing vessels, and soft tissue infiltrative process surrounding the kidneys with bilateral hydronephrosis and vasculature in the retroperitoneum. This may be due to a fibrotic infiltrative disease or an autoimmune process such as IgG 4 disease. Clinical correlation is suggested. While the bulk of disease does not appear significantly changed, the soft tissue infiltration surrounding vessels visually appears mildly increased. Clinical correlation is suggested.   Focal area of low in attenuation within the fibrotic changes along the right side of the heart. This is unchanged. Infected fluid cannot entirely be excluded.  Moderate left and small right pleural effusions. Atelectatic lung. Additional findings as above.  < end of copied text >    < from: JAMES Echo Doppler (20 @ 14:17) >  Summary:   1. Technically good study.   2. Normal global left ventricular systolic function.   3. Left ventricular ejection fraction, by visual estimation, is 60 to 65%.   4. Normal right ventricular size and function.   5. Trace mitral valve regurgitation.   6. The pericardium appears thickened. There is a small pericardial effusion. There is a LV septal bounce present. Tissue Doppler is higher in the medial annuls than lateral annulus, this maybe due to constriction. IVC is plethoric. There is no significant expiratory reversal of the hepatic vein.   7. Findings were DW Dr. Peters.  < end of copied text >

## 2022-12-12 PROCEDURE — 0: CUSTOM

## 2023-06-21 NOTE — PROGRESS NOTE ADULT - PROBLEM SELECTOR PLAN 2
CT on 11/13 show moderate effusion with infiltrative process surrounding pulmonary vasculature extended to the abdomen, Repeat CT Abd/pelvis with contrast ordered 11/14  show infiltrative process progressing to the retroperitoneal soft tissue surrounding both kidneys causing bilateral hydronephrosis, concerning for fibrosis with differential of autoimmune disease. CT on 11/13 show moderate effusion with infiltrative process surrounding pulmonary vasculature extended to the abdomen, Repeat CT Abd/pelvis with contrast ordered 11/14  show infiltrative process progressing to the retroperitoneal soft tissue surrounding both kidneys causing bilateral hydronephrosis, concerning for fibrosis with differential of autoimmune disease. Will consult ID today CT on 11/13 show moderate effusion with infiltrative process surrounding pulmonary vasculature and extended to the abdomen, Repeat CT Abd/pelvis with contrast ordered 11/14  show infiltrative process in the retroperitoneal soft tissue surrounding both kidneys and ureters causing bilateral hydronephrosis. Will consult ID today, obtain renal ultrasound and UA. Dressing: pressure dressing with telfa

## 2023-07-28 NOTE — PATIENT PROFILE ADULT - NSPROEDAABILITYLEARN_GEN_A_NUR
Follow up for local wound care. Pt assessed for pain prior to assessment, no pain medication was indicated. Right anterior arm, extending form slightly above the anti cubital space to right wrist noted scattered scabs and dry peeling skin.    Right elbow noted with two wounds. Right elbow superior wound measures 2.0cm x 2.0cm x 0.2 with 50% red tissue, 50% soft eschar. Right elbow inferior wound measures 0.8cm x 0.1cm x 0.2cm with 50% red tissue, 50% yellow devitalized tissue. Both wounds with minimal serosanguinous drainage.   Patient with generalized edema to the right upper extremity. No bharti wound warmth, erythema, induration or warmth noted.     Rec: Continue to apply bordered foam dressing to right elbow, change every 3 days. Continue to apply clear aide ointment to right anterior arm dry peeling skin and scabs daily and PRN. Patient assessed with primary RN, updated at bedside. MD on page for orders updates. Will continue to follow.      Nick: Nick Scale Score: 14 (07/28/23 1000)  Nutrition:    Dietary Orders (From admission, onward)     Start     Ordered    07/26/23 1343  Nutrition Communication  CONTINUOUS        Question:  Nutrition communication (specify)  Answer:  Please send Tube feeding diet Vital AF 1.2 Rubén/Peptide Based 1.2 Calorie Formula. Thanks!    07/26/23 1343    07/26/23 1309  Tube Feeding Diet Vital AF 1.2 Rubén/Peptide Based 1.2 Calorie Formula; Without Diet Tray; Water; Every 4 hours; 300  (Tube Feeding order set Dietitian Only)  DIET EFFECTIVE NOW        Question Answer Comment   Tube Feeding Products Vital AF 1.2 Rubén/Peptide Based 1.2 Calorie Formula    Administer enteral feeding. Choose schedule Continuous    Method By pump    Initiate enteral feeding at (mL/hr) Other (specify)    Other (specify) 80    Increase feeding by (mL/hr) as tolerated until goal is reached Other (specify)    Other (specify) okay to start at previous goal rate    Advance to goal rate (mL/hr) 80    Tube Feeding  language

## 2024-01-11 NOTE — HISTORY OF PRESENT ILLNESS
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  His initial presentation was in 3/19 in [place].  He had a moderate effusion at that time and was discharged home without anti-inflammatory treatment.  He then presented to our practice where he was found to have a large pericardial effusion.  He went to [facility] and had a pericardial tap yielding 700 cc of fluid.  1 week later he reaccumulated and was tapped again yielding an additional 700 cc of straw-colored fluid.  Echo while in the hospital showed reaccumulation of the fluid after drain removal and he was transferred to [facility] for CT Sx eval.  A pericardial window was performed by Dr. Peters on 9/30/19 and he was eventually discharged from the hospital.  He reports improvement in his symptoms with less JOHNSON but he is still getting tired easily and has LE edema. \par \par Current Health Status:\par Patient has fatigue and SOB with mild exertional activity. No orthopnea.  Swelling in his lower extremities has worsened. He has orthopnea.  \par \par He went to rheumatology. Patient can't remember name of rheumatologist but was told he had Abs to something but doesn't know the details No lymphadedenopathy

## 2024-08-26 NOTE — PROGRESS NOTE ADULT - SUBJECTIVE AND OBJECTIVE BOX
For Your Information     If your provider ordered any imaging for you today. Our pre-scheduling services will be reaching out to you within 2 business days to schedule this. Prescheduling Services can be reached by calling [phone].    If you are in need of a medication refill, please use one of the following options. You can expect your medication to be filled within 2-3 business days.   1. Call your pharmacy for all medication refills and renewals.   2. [email or web address]- [email or web address]  3. Call your providers office    If your provider ordered testing today, you will be notified of your lab results within 3-5 business days and imaging results within 7-14 business days, unless specified otherwise. If you have not received your results within the allotted business days please call your provider's office. Have you signed up for the [employer], if not please consider doing so to receive results on the slim as soon as they have been resulted by the system. [email or web address]     Medication Prior Authorizations may take up to 30 days for approval/denial.     You may be receiving a survey.  Please take the time to complete this, as your feedback is very important to us!  We strive to make your experience exceptional, and your comments help us with that goal.  We look forward to hearing from you!    For all future appointments please arrive 15 minutes prior to your scheduled visit.     Patient Contact Center Business Office: assistance with medical billing & financial inquires [phone]                Want to Say “Thank You” to a Nurse?  The TULIO Award® was created in memory of MAIDA Manriquez by his family to say thank you to nurses who provide an outstanding level of care.    Submit a nomination using any method below.     OR    [email or web address]  Or visit the Resource section   on your [employer] slim      [facility]  INFECTIOUS DISEASES AND INTERNAL MEDICINE at [place]  =======================================================  Carlo Hernandez MD  Diplomates American Board of Internal Medicine and Infectious Diseases  =======================================================    MIRANDA RAYO [id number]    Follow up for:  + lyme test; pericarditis/pleural effusion   patient seen and examined.     Still had the chest tube, wound looks fine. No new complaint.   No fever.     Allergies:  No Known Allergies  [facility] patient (Unknown)    Antibiotics:  cefTRIAXone   IVPB 2000 milliGRAM(s) IV Intermittent every 24 hours     REVIEW OF SYSTEMS:  CONSTITUTIONAL:  No Fever or chills  HEENT:   No diplopia or blurred vision.  No earache, sore throat or runny nose.  CARDIOVASCULAR:  No chest pain or SOB  RESPIRATORY:  No cough, shortness of breath, PND or orthopnea.  GASTROINTESTINAL:  No nausea, vomiting or diarrhea.  GENITOURINARY:  No dysuria, frequency or urgency. No Blood in urine  MUSCULOSKELETAL:  no joint aches, no muscle pain  SKIN:  No change in skin, hair or nails.  NEUROLOGIC:  No paresthesias or weakness.  PSYCHIATRIC:  No disorder of thought or mood.  ENDOCRINE:  No heat or cold intolerance, polyuria or polydipsia.  HEMATOLOGICAL:  No easy bruising or bleeding.      Physical Exam:  ICU Vital Signs Last 24 Hrs  T(C): 36.9 (07 Oct 2019 10:23), Max: 36.9 (07 Oct 2019 10:23)  T(F): 98.4 (07 Oct 2019 10:23), Max: 98.4 (07 Oct 2019 10:23)  HR: 79 (07 Oct 2019 10:23) (79 - 93)  BP: 106/75 (07 Oct 2019 10:23) (106/75 - 122/58)  RR: 18 (07 Oct 2019 10:23) (16 - 18)  SpO2: 96% (07 Oct 2019 10:23) (96% - 100%)  GEN: NAD, obese  HEENT: normocephalic and atraumatic. EOMI. SADA.    NECK: Supple.  No lymphadenopathy   LUNGS: Clear to auscultation.  HEART: Regular rate and rhythm without murmur. wounds are clean, chest tube left   ABDOMEN: Soft, nontender, and nondistended.  Positive bowel sounds.    : No CVA tenderness  EXTREMITIES: Without any cyanosis, clubbing, rash, lesions or edema.  MSK: no joint swelling  NEUROLOGIC: grossly intact.  PSYCHIATRIC: Appropriate affect .  SKIN: No ulceration or induration present.      Labs:  10-07    137  |  97<L>  |  13.0  ----------------------------<  89  4.1   |  26.0  |  0.86    Ca    8.6      07 Oct 2019 05:59  Mg     1.9     10-06    TPro  6.0<L>  /  Alb  2.7<L>  /  TBili  0.3<L>  /  DBili  x   /  AST  36  /  ALT  19  /  AlkPhos  88  10-07                        10.4   11.37 )-----------( 524      ( 07 Oct 2019 05:59 )             33.8     LIVER FUNCTIONS - ( 07 Oct 2019 05:59 )  Alb: 2.7 g/dL / Pro: 6.0 g/dL / ALK PHOS: 88 U/L / ALT: 19 U/L / AST: 36 U/L / GGT: x           RECENT CULTURES:  09-30 @ 12:24 .Body Fluid Pericardial Effusion Enterococcus faecalis    Enterococcus faecalis Growing in broth media only  Candida species Growing in broth media only  Culture in progress    Few White blood cells  No organisms seen    09-29 @ 14:34      NotDetec    09-27 @ 19:11      [facility]    All imaging and data are reviewed.     Assessment and plan:   45 y/o man with DM, HTN, and anemia had leg edema and SOB. Found with pericardial effusion. S/P pericardiocentesis for 700 cc's in [facility].  The drain was D/C'd on 9/26/19, repeat echo showed posterior moderate effusion.  He was then transported to [place] for evaluation for pericardial window on 9/27/19. Pericardial window on 9/30/19  Since doesn't meet the criteria for new lyme infection and he usually lyme causes AV node block or cardiomyopathy, most of the time, less likely lyme causing his problem.     - Coxsackie serology for some strains slight elevated, no convincing for new infection leading to pericarditis  - HIV screen in non-reactive  - Continue ceftriaxone 2 grams Q 24H; stop; 10/13/19, If unable to go home on IV can switch to po doxycycline 100mg q12h to complete total 4 weeks.   - Has a midline  - follow up on surgical pathology    Will follow.

## 2024-11-11 NOTE — PROGRESS NOTE ADULT - PROBLEM SELECTOR PROBLEM 7
-Encourage fluids  -Rest    Follow up with regular doctor if needed.    Type 2 diabetes mellitus without complication, without long-term current use of insulin

## 2025-04-01 NOTE — H&P ADULT - PROBLEM SELECTOR PLAN 4
Seen on MRI on last admission in November.  Seen by Rheumatology on that admission.  Was recommended to have a CT guided biopsy to confirm.  Was following with a rheumatologist as an outpatient.  Will recall rheumatology.
Products Recommended: Cerairam, Eucerin, ELTAMD
Detail Level: Detailed
General Sunscreen Counseling: I recommended a broad spectrum sunscreen with a SPF of 30 or higher.  I explained that SPF 30 sunscreens block approximately 97 percent of the sun's harmful rays.  Sunscreens should be applied at least 15 minutes prior to expected sun exposure and then every 2 hours after that as long as sun exposure continues. If swimming or exercising sunscreen should be reapplied every 45 minutes to an hour after getting wet or sweating.  One ounce, or the equivalent of a shot glass full of sunscreen, is adequate to protect the skin not covered by a bathing suit. I also recommended a lip balm with a sunscreen as well. Sun protective clothing can be used in lieu of sunscreen but must be worn the entire time you are exposed to the sun's rays.